# Patient Record
Sex: FEMALE | Race: WHITE | NOT HISPANIC OR LATINO | Employment: OTHER | URBAN - METROPOLITAN AREA
[De-identification: names, ages, dates, MRNs, and addresses within clinical notes are randomized per-mention and may not be internally consistent; named-entity substitution may affect disease eponyms.]

---

## 2017-01-02 PROBLEM — I48.0 PAROXYSMAL A-FIB (H): Status: ACTIVE | Noted: 2017-01-02

## 2017-01-02 PROBLEM — G63 POLYNEUROPATHY ASSOCIATED WITH UNDERLYING DISEASE (H): Status: ACTIVE | Noted: 2017-01-02

## 2017-01-02 PROBLEM — E11.9 TYPE 2 DIABETES MELLITUS WITHOUT COMPLICATION, WITHOUT LONG-TERM CURRENT USE OF INSULIN (H): Status: ACTIVE | Noted: 2017-01-02

## 2017-01-02 PROBLEM — E53.8 VITAMIN B12 DEFICIENCY (NON ANEMIC): Status: ACTIVE | Noted: 2017-01-02

## 2017-01-03 ENCOUNTER — OFFICE VISIT (OUTPATIENT)
Dept: FAMILY MEDICINE | Facility: CLINIC | Age: 82
End: 2017-01-03
Payer: MEDICARE

## 2017-01-03 VITALS
WEIGHT: 204.4 LBS | HEART RATE: 129 BPM | DIASTOLIC BLOOD PRESSURE: 80 MMHG | OXYGEN SATURATION: 95 % | SYSTOLIC BLOOD PRESSURE: 140 MMHG | TEMPERATURE: 97.6 F | BODY MASS INDEX: 30.27 KG/M2 | HEIGHT: 69 IN

## 2017-01-03 DIAGNOSIS — I48.0 PAROXYSMAL A-FIB (H): ICD-10-CM

## 2017-01-03 DIAGNOSIS — L30.9 DERMATITIS: ICD-10-CM

## 2017-01-03 DIAGNOSIS — Z13.5 SCREENING FOR DIABETIC RETINOPATHY: ICD-10-CM

## 2017-01-03 DIAGNOSIS — I10 BENIGN ESSENTIAL HYPERTENSION: Primary | ICD-10-CM

## 2017-01-03 DIAGNOSIS — E03.9 ACQUIRED HYPOTHYROIDISM: ICD-10-CM

## 2017-01-03 DIAGNOSIS — E78.5 HYPERLIPIDEMIA, UNSPECIFIED: ICD-10-CM

## 2017-01-03 DIAGNOSIS — Z23 NEED FOR PROPHYLACTIC VACCINATION AND INOCULATION AGAINST INFLUENZA: ICD-10-CM

## 2017-01-03 DIAGNOSIS — G63 POLYNEUROPATHY ASSOCIATED WITH UNDERLYING DISEASE (H): ICD-10-CM

## 2017-01-03 DIAGNOSIS — E11.9 TYPE 2 DIABETES MELLITUS WITHOUT COMPLICATION, WITHOUT LONG-TERM CURRENT USE OF INSULIN (H): ICD-10-CM

## 2017-01-03 DIAGNOSIS — Z13.89 SCREENING FOR DIABETIC PERIPHERAL NEUROPATHY: ICD-10-CM

## 2017-01-03 DIAGNOSIS — H81.10 BPPV (BENIGN PAROXYSMAL POSITIONAL VERTIGO), UNSPECIFIED LATERALITY: ICD-10-CM

## 2017-01-03 DIAGNOSIS — E53.8 VITAMIN B12 DEFICIENCY (NON ANEMIC): ICD-10-CM

## 2017-01-03 LAB
ANION GAP SERPL CALCULATED.3IONS-SCNC: 8 MMOL/L (ref 3–14)
BUN SERPL-MCNC: 10 MG/DL (ref 7–30)
CALCIUM SERPL-MCNC: 9.3 MG/DL (ref 8.5–10.1)
CHLORIDE SERPL-SCNC: 100 MMOL/L (ref 94–109)
CO2 SERPL-SCNC: 29 MMOL/L (ref 20–32)
CREAT SERPL-MCNC: 0.77 MG/DL (ref 0.52–1.04)
GFR SERPL CREATININE-BSD FRML MDRD: 71 ML/MIN/1.7M2
GLUCOSE SERPL-MCNC: 206 MG/DL (ref 70–99)
HBA1C MFR BLD: 7.8 % (ref 4.3–6)
POTASSIUM SERPL-SCNC: 4.1 MMOL/L (ref 3.4–5.3)
SODIUM SERPL-SCNC: 137 MMOL/L (ref 133–144)

## 2017-01-03 PROCEDURE — 90662 IIV NO PRSV INCREASED AG IM: CPT | Performed by: INTERNAL MEDICINE

## 2017-01-03 PROCEDURE — 99214 OFFICE O/P EST MOD 30 MIN: CPT | Mod: 25 | Performed by: INTERNAL MEDICINE

## 2017-01-03 PROCEDURE — 36415 COLL VENOUS BLD VENIPUNCTURE: CPT | Performed by: INTERNAL MEDICINE

## 2017-01-03 PROCEDURE — 83036 HEMOGLOBIN GLYCOSYLATED A1C: CPT | Performed by: INTERNAL MEDICINE

## 2017-01-03 PROCEDURE — G0008 ADMIN INFLUENZA VIRUS VAC: HCPCS | Performed by: INTERNAL MEDICINE

## 2017-01-03 PROCEDURE — 80048 BASIC METABOLIC PNL TOTAL CA: CPT | Performed by: INTERNAL MEDICINE

## 2017-01-03 RX ORDER — TRIAMCINOLONE ACETONIDE 1 MG/G
CREAM TOPICAL
Qty: 45 G | Refills: 1 | Status: SHIPPED | OUTPATIENT
Start: 2017-01-03 | End: 2018-07-16

## 2017-01-03 NOTE — NURSING NOTE
"Chief Complaint   Patient presents with     Establish Care     Dizziness       Initial /110 mmHg  Pulse 129  Temp(Src) 97.6  F (36.4  C) (Oral)  Ht 5' 9\" (1.753 m)  Wt 204 lb 6.4 oz (92.715 kg)  BMI 30.17 kg/m2  SpO2 95%  Breastfeeding? No Estimated body mass index is 30.17 kg/(m^2) as calculated from the following:    Height as of this encounter: 5' 9\" (1.753 m).    Weight as of this encounter: 204 lb 6.4 oz (92.715 kg).  BP completed using cuff size: large, right arm  Francisca Guadalupe CMA    "

## 2017-01-03 NOTE — PROGRESS NOTES
SUBJECTIVE:                                                    Gavi Pearson is a 85 year old female who presents to clinic today for the following health issues:    Medications  lovastain 20 mg   Metoprolol 50 mg   Lisinopril 20 mg   Januvia 100 mg   lisinopril-hydrochlorothiazide 20-12.5 mg   Metformin 500 mg BID  Levothyroxine 137 mcg  Glipizide 2.5 mg AM, 10 mg PM  Baby aspirin     Immunizations  Prevnar 2014  Pneumococcal 2006  TD 2009  Tdap 2013    Dizziness  Duration of complaint: Sat am   Patient with a history of polyneuropathy developed dizziness 3 days ago when she awoken in the morning. The following morning, she experienced more dizziness but denies any associated nausea or headaches. She has not felt dizzy since her last episode but states she feels off and does not feel comfortable laying supine. She denies any current illnesses, rhinorrhea, or fever. She states her ears do not feel plugged or achy but is unsure of any hearing changes.     She stubbed her left great toe about 1.5 months ago and traumatized her toenail. She was evaluated in clinic and her nail was trimmed down. She was instructed to keep a bandage over the nail until it falls off. Of note, she has onychomycosis and has been using toe nail antifungal since her last visit.     Admits to a high salt diet due to eating out often and does not exercise regularly. She goes to the grocery store about 2 times per week and does walk around the entire store. Weight has been stable and she feels well emotionally.    She recently was evaluated by dermatology for skin itching and was diagnosed with eczema. She had been using triamcinolone on the affected areas but ran out. She states she was prescribed diprosone cream but she ran out. She also has been applying Eucerin cream on the affected areas.     Problem list and histories reviewed & adjusted, as indicated.  Additional history:    Current Outpatient Prescriptions   Medication Sig Dispense  "Refill     lovastatin (MEVACOR) 20 MG tablet Take 20 mg by mouth At Bedtime       metoprolol (LOPRESSOR) 50 MG tablet Take 50 mg by mouth 2 times daily       lisinopril (PRINIVIL/ZESTRIL) 20 MG tablet Take 20 mg by mouth daily       sitagliptin (JANUVIA) 100 MG tablet Take 100 mg by mouth daily       lisinopril-hydrochlorothiazide (PRINZIDE/ZESTORETIC) 20-12.5 MG per tablet Take 1 tablet by mouth daily       metFORMIN (GLUCOPHAGE) 500 MG tablet Take 500 mg by mouth 2 times daily (with meals)       levothyroxine (SYNTHROID/LEVOTHROID) 137 MCG tablet Take 137 mcg by mouth daily       GLIPIZIDE PO Take 5 mg by mouth Take one-jade table by mouth every am and 2 tablets by mouth every evening       JOSÉ ASPIRIN PO        Allergies   Allergen Reactions     No Known Allergies        ROS:  Constitutional, HEENT, cardiovascular, pulmonary, gi and gu systems are negative, except as otherwise noted.    This document serves as a record of the services and decisions personally performed and made by Aleks Maher MD. It was created on his/her behalf by Glen Goins, a trained medical scribe. The creation of this document is based the provider's statements to the medical scribe.  Scribe Glen Goins 10:03 AM, January 3, 2017    OBJECTIVE:                                                    /80 mmHg  Pulse 129  Temp(Src) 97.6  F (36.4  C) (Oral)  Ht 5' 9\" (1.753 m)  Wt 204 lb 6.4 oz (92.715 kg)  BMI 30.17 kg/m2  SpO2 95%  Breastfeeding? No  Body mass index is 30.17 kg/(m^2).  Obese  Blood pressure recheck to 140/80  Throat clear  No nystagmus, PERRL  Lungs clear   Left great toenail detached from nailbed  Cardiovascular with no murmur, no gallop  Right knee with excoriations, no other changes      Diagnostic Test Results:  Results for orders placed or performed in visit on 01/03/17 (from the past 24 hour(s))   HEMOGLOBIN A1C   Result Value Ref Range    Hemoglobin A1C 7.8 (H) 4.3 - 6.0 %    "     ASSESSMENT/PLAN:                                                    1. Benign essential hypertension  Initially 172/110 but rechecked to be within normal limits at 140/80   - Basic metabolic panel    2. Paroxysmal a-fib (H)    - FLU VACCINE, INCREASED ANTIGEN, PRESV FREE    3. Polyneuropathy associated with underlying disease (H)    4. Type 2 diabetes mellitus without complication, without long-term current use of insulin (H)  Well managed on Januvia, metformin, and glipizide. Will adjust medication as necessary based on lab results.   - HEMOGLOBIN A1C    5. Hyperlipidemia, unspecified      6. Acquired hypothyroidism  Managed on levothyroixine 137 mcg. No change in medication.     7. Vitamin B12 deficiency (non anemic)      8. Screening for diabetic retinopathy      9. Screening for diabetic peripheral neuropathy      10. Need for prophylactic vaccination and inoculation against influenza    - ADMIN INFLUENZA[] (For MEDICARE Patients ONLY)     11. Dermatitis  Discussed applying triamcinolone cream to affected area as it has been effective in the past.   - triamcinolone (KENALOG) 0.1 % cream; Apply b.i.d. p.r.n. to dermatitis but not on the face  Dispense: 45 g; Refill: 1    12. BPPV (benign paroxysmal positional vertigo), unspecified laterality    If the vertigo is symptomatic she could try using  meclizine available over-the-counter to treat dizziness. Advised to avoid laying flat as this exacerbates vertigo     The information in this document, created by the medical scribe for me, accurately reflects the services I personally performed and the decisions made by me. I have reviewed and approved this document for accuracy prior to leaving the patient care area.  Aleks Maher MD  10:03 AM, 01/03/2017    Aleks Maher MD  [facility]  Injectable Influenza Immunization Documentation    1.  Is the person to be vaccinated sick today?  No    2. Does the person to be vaccinated have an allergy  to eggs or to a component of the vaccine?  No    3. Has the person to be vaccinated today ever had a serious reaction to influenza vaccine in the past?  No    4. Has the person to be vaccinated ever had Guillain-[place] syndrome?  No     Form completed by Francisca Guadalupe CMA

## 2017-01-03 NOTE — PATIENT INSTRUCTIONS
Continue covering left great toe nail.   Take Antivert (meclizine) to treat dizziness.   Increase walking  Apply triamcinolone cream   Avoid laying down flat

## 2017-01-25 ENCOUNTER — OFFICE VISIT (OUTPATIENT)
Dept: FAMILY MEDICINE | Facility: CLINIC | Age: 82
End: 2017-01-25
Payer: MEDICARE

## 2017-01-25 VITALS
SYSTOLIC BLOOD PRESSURE: 130 MMHG | DIASTOLIC BLOOD PRESSURE: 80 MMHG | WEIGHT: 204 LBS | HEART RATE: 68 BPM | TEMPERATURE: 97.8 F | OXYGEN SATURATION: 95 % | HEIGHT: 69 IN | BODY MASS INDEX: 30.21 KG/M2

## 2017-01-25 DIAGNOSIS — E78.5 HYPERLIPIDEMIA, UNSPECIFIED: ICD-10-CM

## 2017-01-25 DIAGNOSIS — E11.9 TYPE 2 DIABETES MELLITUS WITHOUT COMPLICATION, WITHOUT LONG-TERM CURRENT USE OF INSULIN (H): ICD-10-CM

## 2017-01-25 DIAGNOSIS — I10 BENIGN ESSENTIAL HYPERTENSION: ICD-10-CM

## 2017-01-25 DIAGNOSIS — E53.8 VITAMIN B12 DEFICIENCY (NON ANEMIC): ICD-10-CM

## 2017-01-25 DIAGNOSIS — H81.10 BPPV (BENIGN PAROXYSMAL POSITIONAL VERTIGO), UNSPECIFIED LATERALITY: ICD-10-CM

## 2017-01-25 DIAGNOSIS — G63 POLYNEUROPATHY ASSOCIATED WITH UNDERLYING DISEASE (H): ICD-10-CM

## 2017-01-25 DIAGNOSIS — E03.9 ACQUIRED HYPOTHYROIDISM: ICD-10-CM

## 2017-01-25 DIAGNOSIS — I48.0 PAROXYSMAL A-FIB (H): Primary | ICD-10-CM

## 2017-01-25 PROCEDURE — 99213 OFFICE O/P EST LOW 20 MIN: CPT | Performed by: INTERNAL MEDICINE

## 2017-01-25 NOTE — PROGRESS NOTES
SUBJECTIVE:                                                    Gavi Pearson is a 85 year old female who presents to clinic today for the following health issues:    Mrs. Pearson presents to the clinic follow up for dizziness and hypertension. She reports no subsequent presentations of symptoms. Blood pressure readings from pharmacy cuffs reported 144/74 on 1/19/17 and 143/94 on 1/21/17. Reports lightheadedness upon positional changes. Mrs. Pearson reports no regular exercise routine other than walking in grocery store.     Problem list and histories reviewed & adjusted, as indicated.  Additional history: as documented    Current Outpatient Prescriptions   Medication Sig Dispense Refill     triamcinolone (KENALOG) 0.1 % cream Apply b.i.d. p.r.n. to dermatitis but not on the face 45 g 1     lovastatin (MEVACOR) 20 MG tablet Take 20 mg by mouth At Bedtime       metoprolol (LOPRESSOR) 50 MG tablet Take 50 mg by mouth 2 times daily       lisinopril (PRINIVIL/ZESTRIL) 20 MG tablet Take 20 mg by mouth daily       sitagliptin (JANUVIA) 100 MG tablet Take 100 mg by mouth daily       lisinopril-hydrochlorothiazide (PRINZIDE/ZESTORETIC) 20-12.5 MG per tablet Take 1 tablet by mouth daily       metFORMIN (GLUCOPHAGE) 500 MG tablet Take 500 mg by mouth 2 times daily (with meals)       levothyroxine (SYNTHROID/LEVOTHROID) 137 MCG tablet Take 137 mcg by mouth daily       GLIPIZIDE PO Take 5 mg by mouth Take one-jade table by mouth every am and 2 tablets by mouth every evening       JOSÉ ASPIRIN PO        Allergies   Allergen Reactions     No Known Allergies        ROS:  Constitutional, HEENT, cardiovascular, pulmonary, gi and gu systems are negative, except as otherwise noted.    This document serves as a record of the services and decisions personally performed and made by Aleks Maher MD. It was created on his/her behalf by Felicitas Naqvi, a trained medical scribe. The creation of this document is based the provider's  "statements to the medical scribe.  Scribe Felicitas Naqvi 1:08 PM, January 25, 2017     OBJECTIVE:                                                    /80 mmHg  Pulse 68  Temp(Src) 97.8  F (36.6  C) (Oral)  Ht 1.753 m (5' 9\")  Wt 92.534 kg (204 lb)  BMI 30.11 kg/m2  SpO2 95%  Body mass index is 30.11 kg/(m^2).   Neck was supple without adenopathy or thyromegaly her carotids were normal without bruits  Chest clear to auscultation and percussion  Cardiovascular S1 and S2 are physiologic without murmurs or gallops  Abdomen bowel sounds were normal.  There is no palpable mass or organomegaly  Extremities nontender without any edema  Pulses pedal pulses are as described otherwise his pulses are bilaterally symmetrical throughout without bruits  Skin without significant abnormality      ASSESSMENT/PLAN:                                                    1. Paroxysmal a-fib (H)      2. Vitamin B12 deficiency (non anemic)      3. Polyneuropathy associated with underlying disease (H)      4. BPPV (benign paroxysmal positional vertigo), unspecified laterality  Recommended    5. Type 2 diabetes mellitus without complication, without long-term current use of insulin (H)  Advised walking program    6. Benign essential hypertension  Recommended patient to embark on an exercise routine walking on her treadmill, the grocery store or wherever.  She will continue to monitor her blood pressure and follow-up with her blood pressure and her hemoglobin A1c in 2 months        7. Hyperlipidemia, unspecified      8. Acquired hypothyroidism      Follow up in 2 months     The information in this document, created by the medical scribe for me, accurately reflects the services I personally performed and the decisions made by me. I have reviewed and approved this document for accuracy prior to leaving the patient care area.  Aleks Maher MD  1:08 PM, 01/25/2017     Aleks Maher MD  [facility] afternoon  "

## 2017-01-25 NOTE — NURSING NOTE
"Chief Complaint   Patient presents with     RECHECK     Hypertension       Initial /84 mmHg  Pulse 84  Temp(Src) 97.8  F (36.6  C) (Oral)  Ht 5' 9\" (1.753 m)  Wt 204 lb (92.534 kg)  BMI 30.11 kg/m2  SpO2 95% Estimated body mass index is 30.11 kg/(m^2) as calculated from the following:    Height as of this encounter: 5' 9\" (1.753 m).    Weight as of this encounter: 204 lb (92.534 kg).  BP completed using cuff size: large, right arm.  Adelaida Crespo MA    "

## 2017-08-11 NOTE — PROGRESS NOTES
SUBJECTIVE:   Gavi Pearson is a 85 year old female who presents for Preventive Visit.    Are you in the first 12 months of your Medicare Part B coverage?  No    Healthy Habits:    Do you get at least three servings of calcium containing foods daily (dairy, green leafy vegetables, etc.)? yes    Amount of exercise or daily activities, outside of work: 3 day(s) per week    Problems taking medications regularly No    Medication side effects: No    Have you had an eye exam in the past two years? no    Do you see a dentist twice per year? yes    Do you have sleep apnea, excessive snoring or daytime drowsiness?yes    COGNITIVE SCREEN  1) Repeat 3 items (Banana, Sunrise, Chair)    2) Clock draw:   3) 3 item recall:   Results: 3 items recalled: COGNITIVE IMPAIRMENT LESS LIKELY    Mini-CogTM Copyright S Yasmine. Licensed by the author for use in [employer]; reprinted with permission (mathew@[facility]). All rights reserved.      Patient presents to clinic for a preventive health visit. She complains of regular urinary incontinence. She wears pads to help her symptoms. She denies burning with urination, headaches, sinus issues, vision changes, neck or back pain, dyspnea, angina, palpitations, heartburn, hematochezia, and skin changes. She has nocturia 2-3x.   Patient states that her arthritis is flaring up and is affecting her hands mostly. She complains of increased swelling and soreness.  Also complains of decreased in her stamina when she walks. Does not exercise regularly, but she grocery shops twice a week and walks all the [place].    Glipizide 1 and 1/2 in the morning, 2 tablets in the evening    Reviewed and updated as needed this visit by clinical staff  Tobacco  Allergies  Meds  Problems  Med Hx  Surg Hx  Fam Hx  Soc Hx        Reviewed and updated as needed this visit by Provider      Social History   Substance Use Topics     Smoking status: Never Smoker     Smokeless tobacco: Never Used     Alcohol use  0.0 oz/week     0 Standard drinks or equivalent per week      Comment: occ     The patient does not drink >3 drinks per day nor >7 drinks per week.    Today's PHQ-2 Score:   PHQ-2 ( 1999 [employer]) 1/25/2017 12/16/2016   Q1: Little interest or pleasure in doing things 0 0   Q2: Feeling down, depressed or hopeless 0 0   PHQ-2 Score 0 0         Do you feel safe in your environment - Yes    Do you have a Health Care Directive?: Yes: Patient states has Advance Directive and will bring in a copy to clinic.    Current providers sharing in care for this patient include: Patient Care Team:  Aleks Maher MD as PCP - General (Internal Medicine)      Hearing impairment: No    Ability to successfully perform activities of daily living: Yes, no assistance needed     Fall risk:       Home safety:  none identified      The following health maintenance items are reviewed in Epic and correct as of today:Health Maintenance   Topic Date Due     FOOT EXAM Q1 YEAR  10/21/1932     EYE EXAM Q1 YEAR  10/21/1932     MICROALBUMIN Q1 YEAR  10/21/1932     ADVANCE DIRECTIVE PLANNING Q5 YRS  10/21/1986     A1C Q6 MO  07/03/2017     LIPID MONITORING Q1 YEAR  07/13/2017     INFLUENZA VACCINE (SYSTEM ASSIGNED)  09/01/2017     CREATININE Q1 YEAR  01/03/2018     FALL RISK ASSESSMENT  01/25/2018     TSH W/ FREE T4 REFLEX Q2 YEAR  07/13/2018     TETANUS IMMUNIZATION (SYSTEM ASSIGNED)  06/12/2023     PNEUMOCOCCAL  Completed     Current Outpatient Prescriptions   Medication Sig Dispense Refill     triamcinolone (KENALOG) 0.1 % cream Apply b.i.d. p.r.n. to dermatitis but not on the face 45 g 1     lovastatin (MEVACOR) 20 MG tablet Take 20 mg by mouth At Bedtime       metoprolol (LOPRESSOR) 50 MG tablet Take 50 mg by mouth 2 times daily       lisinopril (PRINIVIL/ZESTRIL) 20 MG tablet Take 20 mg by mouth daily       sitagliptin (JANUVIA) 100 MG tablet Take 100 mg by mouth daily       lisinopril-hydrochlorothiazide (PRINZIDE/ZESTORETIC) 20-12.5 MG per  "tablet Take 1 tablet by mouth daily       metFORMIN (GLUCOPHAGE) 500 MG tablet Take 500 mg by mouth 2 times daily (with meals)       levothyroxine (SYNTHROID/LEVOTHROID) 137 MCG tablet Take 137 mcg by mouth daily       GLIPIZIDE PO Take 5 mg by mouth Take one-jade table by mouth every am and 2 tablets by mouth every evening       JOSÉ ASPIRIN PO        Allergies   Allergen Reactions     No Known Allergies        ROS:  C: NEGATIVE for fever, chills, change in weight  I: NEGATIVE for worrisome rashes, moles or lesions  E: NEGATIVE for vision changes or irritation  E/M: NEGATIVE for ear, mouth and throat problems  R: NEGATIVE for significant cough or SOB  B: NEGATIVE for masses, tenderness or discharge  CV: NEGATIVE for chest pain, palpitations or peripheral edema  GI: NEGATIVE for nausea, abdominal pain, heartburn, or change in bowel habits  : NEGATIVE for frequency, dysuria, or hematuria  M: NEGATIVE for significant arthralgias or myalgia  N: NEGATIVE for weakness, dizziness or paresthesias  E: NEGATIVE for temperature intolerance, skin/hair changes  H: NEGATIVE for bleeding problems  P: NEGATIVE for changes in mood or affect    This document serves as a record of the services and decisions personally performed and made by Aleks Maher MD. It was created on his/her behalf by Diogenes Aguirre, a trained medical scribe. The creation of this document is based the provider's statements to the medical scribe.  Yanira Aguirre 9:00 AM, August 14, 2017    OBJECTIVE:   /80 (BP Location: Left arm, Patient Position: Sitting)  Temp 98.4  F (36.9  C) (Oral)  Ht 1.753 m (5' 9\")  Wt 90.7 kg (200 lb)  SpO2 97%  Breastfeeding? No  BMI 29.53 kg/m2 Estimated body mass index is 30.13 kg/(m^2) as calculated from the following:    Height as of 1/25/17: 5' 9\" (1.753 m).    Weight as of 1/25/17: 204 lb (92.5 kg).  EXAM:   GENERAL APPEARANCE: healthy, alert and no distress  EYES: Eyes grossly normal to inspection, " PERRL and conjunctivae and sclerae normal  HENT: ear canals and TM's normal, nose and mouth without ulcers or lesions, oropharynx clear and oral mucous membranes moist  NECK: no adenopathy, no asymmetry, masses, or scars and thyroid normal to palpation  RESP: lungs clear to auscultation - no rales, rhonchi or wheezes  BREAST: normal without masses, tenderness or nipple discharge and no palpable axillary masses or adenopathy  CV:irregularly irregular with a well controlled ventricular response normal S1 S2, no S3 or S4, no murmur, click or rub, no peripheral edema and peripheral pulses strong  ABDOMEN: soft, nontender, no hepatosplenomegaly, no masses and bowel sounds normal  MS: no musculoskeletal defects are noted and gait is age appropriate without ataxia  SKIN: no suspicious lesions or rashes, she has multiple seborrheic keratoses  NEURO: Normal strength and tone, sensory exam grossly normal, mentation intact and speech normal  Filament exam was positive on her feet, and subjectively positive to her high ankle  Foot exam: her feet were clean and dry, pulses were intact, she has bilateral bunions with a callus on her right bunion  PSYCH: mentation appears normal and affect normal/bright    ASSESSMENT / PLAN:   1. Benign essential hypertension  - Comprehensive metabolic panel  - Urine Microscopic    2. Hyperlipidemia, unspecified  - Lipid panel reflex to direct LDL    3. Acquired hypothyroidism  - TSH with free T4 reflex    4. Paroxysmal a-fib (H)  - EKG 12-lead complete w/read - Clinics    5. Type 2 diabetes mellitus without complication, without long-term current use of insulin (H)  - Albumin Random Urine Quantitative  - C FOOT EXAM  NO CHARGE  - Hemoglobin A1c    6. Osteopenia of multiple sites  - Vitamin D Deficiency    7. Encounter for routine adult health examination without abnormal findings  - UA reflex to Microscopic and Culture  - CBC with platelets    -Patient will watch her diet more closely and monitor  "her blood sugars regularly twice daily for two weeks. She will return to follow up on her blood sugars, diet, and medication.    End of Life Planning:  Patient currently has an advanced directive: No.  I have verified the patient's ablity to prepare an advanced directive/make health care decisions.  Literature was provided to assist patient in preparing an advanced directive.    COUNSELING:  Reviewed preventive health counseling, as reflected in patient instructions       Regular exercise       Healthy diet/nutrition       Vision screening       Hearing screening       Dental care    Estimated body mass index is 30.13 kg/(m^2) as calculated from the following:    Height as of 1/25/17: 5' 9\" (1.753 m).    Weight as of 1/25/17: 204 lb (92.5 kg).  Weight management plan: Discussed healthy diet and exercise guidelines and patient will follow up in 3 months in clinic to re-evaluate.   reports that she has never smoked. She has never used smokeless tobacco.    Appropriate preventive services were discussed with this patient, including applicable screening as appropriate for cardiovascular disease, diabetes, osteopenia/osteoporosis, and glaucoma.  As appropriate for age/gender, discussed screening for colorectal cancer, prostate cancer, breast cancer, and cervical cancer. Checklist reviewing preventive services available has been given to the patient.    Reviewed patients plan of care and provided an AVS. The Basic Care Plan (routine screening as documented in Health Maintenance) for Gavi meets the Care Plan requirement. This Care Plan has been established and reviewed with the Patient.    Counseling Resources:  ATP IV Guidelines  Pooled Cohorts Equation Calculator  Breast Cancer Risk Calculator  FRAX Risk Assessment  ICSI Preventive Guidelines  Dietary Guidelines for Americans, 2010  USDA's MyPlate  ASA Prophylaxis  Lung CA Screening    The information in this document, created by the medical scribe for me, accurately " reflects the services I personally performed and the decisions made by me. I have reviewed and approved this document for accuracy prior to leaving the patient care area.  Aleks Maher MD  10:19 AM, 08/14/17    Aleks Maher MD  [facility]

## 2017-08-14 ENCOUNTER — OFFICE VISIT (OUTPATIENT)
Dept: FAMILY MEDICINE | Facility: CLINIC | Age: 82
End: 2017-08-14
Payer: MEDICARE

## 2017-08-14 VITALS
BODY MASS INDEX: 29.62 KG/M2 | OXYGEN SATURATION: 97 % | HEIGHT: 69 IN | TEMPERATURE: 98.4 F | WEIGHT: 200 LBS | DIASTOLIC BLOOD PRESSURE: 80 MMHG | SYSTOLIC BLOOD PRESSURE: 130 MMHG

## 2017-08-14 DIAGNOSIS — M85.89 OSTEOPENIA OF MULTIPLE SITES: ICD-10-CM

## 2017-08-14 DIAGNOSIS — I10 BENIGN ESSENTIAL HYPERTENSION: Primary | ICD-10-CM

## 2017-08-14 DIAGNOSIS — E03.9 ACQUIRED HYPOTHYROIDISM: ICD-10-CM

## 2017-08-14 DIAGNOSIS — Z00.00 ENCOUNTER FOR ROUTINE ADULT HEALTH EXAMINATION WITHOUT ABNORMAL FINDINGS: ICD-10-CM

## 2017-08-14 DIAGNOSIS — I48.0 PAROXYSMAL A-FIB (H): ICD-10-CM

## 2017-08-14 DIAGNOSIS — E11.9 TYPE 2 DIABETES MELLITUS WITHOUT COMPLICATION, WITHOUT LONG-TERM CURRENT USE OF INSULIN (H): ICD-10-CM

## 2017-08-14 DIAGNOSIS — E78.5 HYPERLIPIDEMIA, UNSPECIFIED: ICD-10-CM

## 2017-08-14 LAB
ALBUMIN SERPL-MCNC: 3.7 G/DL (ref 3.4–5)
ALBUMIN UR-MCNC: 100 MG/DL
ALP SERPL-CCNC: 79 U/L (ref 40–150)
ALT SERPL W P-5'-P-CCNC: 47 U/L (ref 0–50)
ANION GAP SERPL CALCULATED.3IONS-SCNC: 10 MMOL/L (ref 3–14)
APPEARANCE UR: ABNORMAL
AST SERPL W P-5'-P-CCNC: 58 U/L (ref 0–45)
BACTERIA #/AREA URNS HPF: ABNORMAL /HPF
BILIRUB SERPL-MCNC: 0.8 MG/DL (ref 0.2–1.3)
BILIRUB UR QL STRIP: ABNORMAL
BUN SERPL-MCNC: 11 MG/DL (ref 7–30)
CALCIUM SERPL-MCNC: 9.3 MG/DL (ref 8.5–10.1)
CHLORIDE SERPL-SCNC: 98 MMOL/L (ref 94–109)
CHOLEST SERPL-MCNC: 135 MG/DL
CO2 SERPL-SCNC: 26 MMOL/L (ref 20–32)
COLOR UR AUTO: YELLOW
CREAT SERPL-MCNC: 0.76 MG/DL (ref 0.52–1.04)
CREAT UR-MCNC: 368 MG/DL
ERYTHROCYTE [DISTWIDTH] IN BLOOD BY AUTOMATED COUNT: 12.1 % (ref 10–15)
GFR SERPL CREATININE-BSD FRML MDRD: 73 ML/MIN/1.7M2
GLUCOSE SERPL-MCNC: 226 MG/DL (ref 70–99)
GLUCOSE UR STRIP-MCNC: NEGATIVE MG/DL
HBA1C MFR BLD: 7.9 % (ref 4.3–6)
HCT VFR BLD AUTO: 41.5 % (ref 35–47)
HDLC SERPL-MCNC: 54 MG/DL
HGB BLD-MCNC: 14 G/DL (ref 11.7–15.7)
HGB UR QL STRIP: NEGATIVE
HYALINE CASTS #/AREA URNS LPF: ABNORMAL /LPF (ref 0–2)
KETONES UR STRIP-MCNC: 15 MG/DL
LDLC SERPL CALC-MCNC: 58 MG/DL
LEUKOCYTE ESTERASE UR QL STRIP: NEGATIVE
MCH RBC QN AUTO: 30.9 PG (ref 26.5–33)
MCHC RBC AUTO-ENTMCNC: 33.7 G/DL (ref 31.5–36.5)
MCV RBC AUTO: 92 FL (ref 78–100)
MICROALBUMIN UR-MCNC: 454 MG/L
MICROALBUMIN/CREAT UR: 123.37 MG/G CR (ref 0–25)
MUCOUS THREADS #/AREA URNS LPF: PRESENT /LPF
NITRATE UR QL: NEGATIVE
NON-SQ EPI CELLS #/AREA URNS LPF: ABNORMAL /LPF
NONHDLC SERPL-MCNC: 81 MG/DL
PH UR STRIP: 5.5 PH (ref 5–7)
PLATELET # BLD AUTO: 226 10E9/L (ref 150–450)
POTASSIUM SERPL-SCNC: 4.1 MMOL/L (ref 3.4–5.3)
PROT SERPL-MCNC: 7.7 G/DL (ref 6.8–8.8)
RBC # BLD AUTO: 4.53 10E12/L (ref 3.8–5.2)
RBC #/AREA URNS AUTO: ABNORMAL /HPF (ref 0–2)
SODIUM SERPL-SCNC: 134 MMOL/L (ref 133–144)
SP GR UR STRIP: >1.03 (ref 1–1.03)
T4 FREE SERPL-MCNC: 1.62 NG/DL (ref 0.76–1.46)
TRIGL SERPL-MCNC: 115 MG/DL
TSH SERPL DL<=0.005 MIU/L-ACNC: 5.97 MU/L (ref 0.4–4)
URN SPEC COLLECT METH UR: ABNORMAL
UROBILINOGEN UR STRIP-ACNC: 1 EU/DL (ref 0.2–1)
WBC # BLD AUTO: 10.5 10E9/L (ref 4–11)
WBC #/AREA URNS AUTO: ABNORMAL /HPF (ref 0–2)

## 2017-08-14 PROCEDURE — 81001 URINALYSIS AUTO W/SCOPE: CPT | Performed by: INTERNAL MEDICINE

## 2017-08-14 PROCEDURE — 84439 ASSAY OF FREE THYROXINE: CPT | Performed by: INTERNAL MEDICINE

## 2017-08-14 PROCEDURE — 36415 COLL VENOUS BLD VENIPUNCTURE: CPT | Performed by: INTERNAL MEDICINE

## 2017-08-14 PROCEDURE — 99207 C FOOT EXAM  NO CHARGE: CPT | Performed by: INTERNAL MEDICINE

## 2017-08-14 PROCEDURE — 82043 UR ALBUMIN QUANTITATIVE: CPT | Performed by: INTERNAL MEDICINE

## 2017-08-14 PROCEDURE — 93000 ELECTROCARDIOGRAM COMPLETE: CPT | Performed by: INTERNAL MEDICINE

## 2017-08-14 PROCEDURE — 84443 ASSAY THYROID STIM HORMONE: CPT | Performed by: INTERNAL MEDICINE

## 2017-08-14 PROCEDURE — 80053 COMPREHEN METABOLIC PANEL: CPT | Performed by: INTERNAL MEDICINE

## 2017-08-14 PROCEDURE — 99213 OFFICE O/P EST LOW 20 MIN: CPT | Mod: 25 | Performed by: INTERNAL MEDICINE

## 2017-08-14 PROCEDURE — G0439 PPPS, SUBSEQ VISIT: HCPCS | Performed by: INTERNAL MEDICINE

## 2017-08-14 PROCEDURE — 83036 HEMOGLOBIN GLYCOSYLATED A1C: CPT | Performed by: INTERNAL MEDICINE

## 2017-08-14 PROCEDURE — 82306 VITAMIN D 25 HYDROXY: CPT | Performed by: INTERNAL MEDICINE

## 2017-08-14 PROCEDURE — 85027 COMPLETE CBC AUTOMATED: CPT | Performed by: INTERNAL MEDICINE

## 2017-08-14 PROCEDURE — 80061 LIPID PANEL: CPT | Performed by: INTERNAL MEDICINE

## 2017-08-14 NOTE — NURSING NOTE
"Chief Complaint   Patient presents with     Medicare Visit       Initial BP (!) 172/98  Temp 98.4  F (36.9  C) (Oral)  Ht 5' 9\" (1.753 m)  Wt 200 lb (90.7 kg)  SpO2 97%  Breastfeeding? No  BMI 29.53 kg/m2 Estimated body mass index is 29.53 kg/(m^2) as calculated from the following:    Height as of this encounter: 5' 9\" (1.753 m).    Weight as of this encounter: 200 lb (90.7 kg).  Medication Reconciliation: complete   Estefany THOMPSON CMA      "

## 2017-08-14 NOTE — MR AVS SNAPSHOT
After Visit Summary   8/14/2017    Gavi Pearson    MRN: [id number]           Patient Information     Date Of Birth          10/21/1931        Visit Information        Provider Department      8/14/2017 9:00 AM Aleks Maher MD [facility]        Today's Diagnoses     Benign essential hypertension    -  1    Hyperlipidemia, unspecified        Acquired hypothyroidism        Paroxysmal a-fib (H)        Type 2 diabetes mellitus without complication, without long-term current use of insulin (H)        Osteopenia of multiple sites        Encounter for routine adult health examination without abnormal findings          Care Instructions      Preventive Health Recommendations    Female Ages 65 +    Yearly exam:     See your health care provider every year in order to  o Review health changes.   o Discuss preventive care.    o Review your medicines if your doctor has prescribed any.      You no longer need a yearly Pap test unless you've had an abnormal Pap test in the past 10 years. If you have vaginal symptoms, such as bleeding or discharge, be sure to talk with your provider about a Pap test.      Every 1 to 2 years, have a mammogram.  If you are over 69, talk with your health care provider about whether or not you want to continue having screening mammograms.      Every 10 years, have a colonoscopy. Or, have a yearly FIT test (stool test). These exams will check for colon cancer.       Have a cholesterol test every 5 years, or more often if your doctor advises it.       Have a diabetes test (fasting glucose) every three years. If you are at risk for diabetes, you should have this test more often.       At age 65, have a bone density scan (DEXA) to check for osteoporosis (brittle bone disease).    Shots:    Get a flu shot each year.    Get a tetanus shot every 10 years.    Talk to your doctor about your pneumonia vaccines. There are now two you should receive - Pneumovax (PPSV 23) and Prevnar  (PCV 13).    Talk to your doctor about the shingles vaccine.    Talk to your doctor about the hepatitis B vaccine.    Nutrition:     Eat at least 5 servings of fruits and vegetables each day.      Eat whole-grain bread, whole-wheat pasta and brown rice instead of white grains and rice.      Talk to your provider about Calcium and Vitamin D.     Lifestyle    Exercise at least 150 minutes a week (30 minutes a day, 5 days a week). This will help you control your weight and prevent disease.      Limit alcohol to one drink per day.      No smoking.       Wear sunscreen to prevent skin cancer.       See your dentist twice a year for an exam and cleaning.      See your eye doctor every 1 to 2 years to screen for conditions such as glaucoma, macular degeneration and cataracts.          Follow-ups after your visit        Your next 10 appointments already scheduled     Aug 28, 2017 10:30 AM CDT   Office Visit with Aleks Maher MD   [facility] ([facility])    [address] [facility] [id number] [phone]           Bring a current list of meds and any records pertaining to this visit. For Physicals, please bring immunization records and any forms needing to be filled out. Please arrive 10 minutes early to complete paperwork.              Who to contact     If you have questions or need follow up information about today's clinic visit or your schedule please contact [facility] directly at [phone].  Normal or non-critical lab and imaging results will be communicated to you by MyChart, letter or phone within 4 business days after the clinic has received the results. If you do not hear from us within 7 days, please contact the clinic through MyChart or phone. If you have a critical or abnormal lab result, we will notify you by phone as soon as possible.  Submit refill requests through [employer] or call your pharmacy and they will forward the refill request to us. Please allow 3  "business days for your refill to be completed.          Additional Information About Your Visit        MyChart Information     [employer] lets you send messages to your doctor, view your test results, renew your prescriptions, schedule appointments and more. To sign up, go to [email or web address] . Click on \"Log in\" on the left side of the screen, which will take you to the Welcome page. Then click on \"Sign up Now\" on the right side of the page.     You will be asked to enter the access code listed below, as well as some personal information. Please follow the directions to create your username and password.     Your access code is: [email or web address]  Expires: 2017  4:37 AM     Your access code will  in 90 days. If you need help or a new code, please call your [place] clinic or [phone].        Care EveryWhere ID     This is your Care EveryWhere ID. This could be used by other organizations to access your [place] medical records  [id number]        Your Vitals Were     Temperature Height Pulse Oximetry Breastfeeding? BMI (Body Mass Index)       98.4  F (36.9  C) (Oral) 5' 9\" (1.753 m) 97% No 29.53 kg/m2        Blood Pressure from Last 3 Encounters:   17 130/80   17 130/80   17 140/80    Weight from Last 3 Encounters:   17 200 lb (90.7 kg)   17 204 lb (92.5 kg)   17 204 lb 6.4 oz (92.7 kg)              We Performed the Following     Albumin Random Urine Quantitative     C FOOT EXAM  NO CHARGE     CBC with platelets     Comprehensive metabolic panel     EKG 12-lead complete w/read - Clinics     Hemoglobin A1c     Lipid panel reflex to direct LDL     T4 free     TSH with free T4 reflex     UA reflex to Microscopic and Culture     Urine Microscopic     Vitamin D Deficiency        Primary Care Provider Office Phone # Fax #    Aleks Maher [phone] [address]  [place] [address]        Equal Access to Services     MARLYN GIL AH: " Hadii anthony morris hadaideno Sonavyaali, waaxda luqadaha, qaybta kaalmada delaney, shay jones. So [facility] [phone].    ATENCIÓN: Si habla augustine, tiene a lema disposición servicios gratuitos de asistencia lingüística. Llame al [phone].    We comply with applicable federal civil rights laws and [place] laws. We do not discriminate on the basis of race, color, national origin, age, disability sex, sexual orientation or gender identity.            Thank you!     Thank you for choosing [facility]  for your care. Our goal is always to provide you with excellent care. Hearing back from our patients is one way we can continue to improve our services. Please take a few minutes to complete the written survey that you may receive in the mail after your visit with us. Thank you!             Your Updated Medication List - Protect others around you: Learn how to safely use, store and throw away your medicines at www.disposemymeds.org.          This list is accurate as of: 8/14/17 11:59 PM.  Always use your most recent med list.                   Brand Name Dispense Instructions for use Diagnosis    JOSÉ ASPIRIN PO           triamcinolone 0.1 % cream    KENALOG    45 g    Apply b.i.d. p.r.n. to dermatitis but not on the face    Dermatitis

## 2017-08-15 DIAGNOSIS — E03.4 HYPOTHYROIDISM DUE TO ACQUIRED ATROPHY OF THYROID: ICD-10-CM

## 2017-08-15 DIAGNOSIS — E11.9 TYPE 2 DIABETES MELLITUS WITHOUT COMPLICATION, WITHOUT LONG-TERM CURRENT USE OF INSULIN (H): ICD-10-CM

## 2017-08-15 DIAGNOSIS — I10 BENIGN ESSENTIAL HYPERTENSION: Primary | ICD-10-CM

## 2017-08-15 DIAGNOSIS — E78.5 HYPERLIPIDEMIA LDL GOAL <100: ICD-10-CM

## 2017-08-15 LAB — DEPRECATED CALCIDIOL+CALCIFEROL SERPL-MC: 17 UG/L (ref 20–75)

## 2017-08-15 NOTE — TELEPHONE ENCOUNTER
ALL 7 MEDS below  are historical entries   Last Written Prescription Date:  --- historical   Last Fill Quantity: --- historical , # refills: --- historical   Last Office Visit with G, P or [facility] prescribing provider:  Yesterday Dr. Maher 8-14-17   Next 5 appointments (look out 90 days)     Aug 28, 2017 10:30 AM CDT   Office Visit with Aleks Maher MD   [facility] ([facility])    [address] [id number] [phone]                   lovastatin (MEVACOR) 20 MG tablet     --   Sig: Take 20 mg by mouth At Bedtime   Class: Historical       GLIPIZIDE PO     --   Sig: Take 5 mg by mouth Take one-jade table by mouth every am and 2 tablets by mouth every evening   Class: Historical     sitagliptin (JANUVIA) 100 MG tablet     No   Sig: Take 100 mg by mouth daily   Class: Historical       BP Readings from Last 3 Encounters:   08/14/17 130/80   01/25/17 130/80   01/03/17 140/80     Lab Results   Component Value Date    MICROL 454 08/14/2017     Lab Results   Component Value Date    UMALCR 123.37 08/14/2017     Creatinine   Date Value Ref Range Status   08/14/2017 0.76 0.52 - 1.04 mg/dL Final   ]  GFR Estimate   Date Value Ref Range Status   08/14/2017 73 >60 mL/min/1.7m2 Final     Comment:     Non  GFR Calc   01/03/2017 71 >60 mL/min/1.7m2 Final     Comment:     Non  GFR Calc   07/13/2016 64 >OR=60 ml/min/1.73m2 Final     GFR Estimate If Black   Date Value Ref Range Status   08/14/2017 88 >60 mL/min/1.7m2 Final     Comment:      GFR Calc   01/03/2017 86 >60 mL/min/1.7m2 Final     Comment:      GFR Calc   07/13/2016 74 >OR=60 ml/min/1.73m2 Final     Lab Results   Component Value Date    CHOL 135 08/14/2017     Lab Results   Component Value Date    HDL 54 08/14/2017     Lab Results   Component Value Date    LDL 58 08/14/2017     Lab Results   Component Value Date    TRIG 115 08/14/2017     No results found  for: CHOLHDLRATIO  Lab Results   Component Value Date    AST 58 08/14/2017     Lab Results   Component Value Date    ALT 47 08/14/2017     Lab Results   Component Value Date    A1C 7.9 08/14/2017    A1C 7.8 01/03/2017    A1C 7.1 11/17/2015    A1C 6.6 07/13/2015    A1C 7.0 11/18/2014     Potassium   Date Value Ref Range Status   08/14/2017 4.1 3.4 - 5.3 mmol/L Final     ======================  levothyroxine (SYNTHROID/LEVOTHROID) 137 MCG tablet     --   Sig: Take 137 mcg by mouth daily   Class: Historical                   TSH   Date Value Ref Range Status   08/14/2017 5.97 (H) 0.40 - 4.00 mU/L Final     Component      Latest Ref Rng & Units 8/14/2017   T4 Free      0.76 - 1.46 ng/dL 1.62 (H)     ===========================================  lisinopril (PRINIVIL/ZESTRIL) 20 MG tablet     --   Sig: Take 20 mg by mouth daily   Class: Historical     lisinopril-hydrochlorothiazide (PRINZIDE/ZESTORETIC) 20-12.5 MG per tablet     --   Sig: Take 1 tablet by mouth daily   Class: Historical       metoprolol (LOPRESSOR) 50 MG tablet     --   Sig: Take 50 mg by mouth 2 times daily   Class: Historical          BP Readings from Last 3 Encounters:   08/14/17 130/80   01/25/17 130/80   01/03/17 140/80     ==============================

## 2017-08-16 DIAGNOSIS — E11.9 TYPE 2 DIABETES MELLITUS WITHOUT COMPLICATION, WITHOUT LONG-TERM CURRENT USE OF INSULIN (H): Primary | ICD-10-CM

## 2017-08-16 RX ORDER — METOPROLOL TARTRATE 50 MG
TABLET ORAL
Qty: 180 TABLET | Refills: 3 | Status: SHIPPED | OUTPATIENT
Start: 2017-08-16 | End: 2018-08-13

## 2017-08-16 RX ORDER — LISINOPRIL AND HYDROCHLOROTHIAZIDE 12.5; 2 MG/1; MG/1
TABLET ORAL
Qty: 90 TABLET | Refills: 3 | Status: SHIPPED | OUTPATIENT
Start: 2017-08-16 | End: 2018-08-13

## 2017-08-16 RX ORDER — LEVOTHYROXINE SODIUM 137 UG/1
TABLET ORAL
Qty: 90 TABLET | Refills: 3 | Status: SHIPPED | OUTPATIENT
Start: 2017-08-16 | End: 2018-06-25

## 2017-08-16 RX ORDER — LOVASTATIN 20 MG
TABLET ORAL
Qty: 90 TABLET | Refills: 3 | Status: SHIPPED | OUTPATIENT
Start: 2017-08-16 | End: 2018-08-13

## 2017-08-16 RX ORDER — GLIPIZIDE 5 MG/1
TABLET ORAL
Qty: 270 TABLET | Refills: 0 | Status: SHIPPED | OUTPATIENT
Start: 2017-08-16 | End: 2017-08-29

## 2017-08-16 RX ORDER — LISINOPRIL 20 MG/1
TABLET ORAL
Qty: 90 TABLET | Refills: 0 | Status: SHIPPED | OUTPATIENT
Start: 2017-08-16 | End: 2017-08-28

## 2017-08-16 RX ORDER — SITAGLIPTIN 100 MG/1
TABLET, FILM COATED ORAL
Qty: 90 TABLET | Refills: 3 | Status: SHIPPED | OUTPATIENT
Start: 2017-08-16 | End: 2018-10-15

## 2017-08-16 NOTE — TELEPHONE ENCOUNTER
metFORMIN (GLUCOPHAGE) 500 MG tablet        Last Written Prescription Date:  ?  Last Fill Quantity: ?,   # refills: ?  Last Office Visit with [facility], P or [facility] prescribing provider: 8/14/2017  Future Office visit:    Next 5 appointments (look out 90 days)     Aug 28, 2017 10:30 AM CDT   [facility]    [address] [facility] [address]   [phone]                   Routing refill request to provider for review/approval because:  Medication is reported/historical

## 2017-08-17 DIAGNOSIS — E11.9 TYPE 2 DIABETES MELLITUS WITHOUT COMPLICATION, WITHOUT LONG-TERM CURRENT USE OF INSULIN (H): ICD-10-CM

## 2017-08-17 RX ORDER — GLIPIZIDE 5 MG/1
TABLET ORAL
Qty: 270 TABLET | Refills: 0 | OUTPATIENT
Start: 2017-08-17

## 2017-08-17 NOTE — TELEPHONE ENCOUNTER
Patient started this medication yesterday 8/16/17, does not need a refill        Brina STOVALL(R)

## 2017-08-18 ENCOUNTER — TELEPHONE (OUTPATIENT)
Dept: FAMILY MEDICINE | Facility: CLINIC | Age: 82
End: 2017-08-18

## 2017-08-18 NOTE — TELEPHONE ENCOUNTER
Per fax from [facility]    Plan does not cover Glipizide 5mg tabs (maybe quantity limit?)   Start PA or change medication.    Plan phone [phone]  Patient ID:  [id number]    RT Guadalupe (R)

## 2017-08-22 NOTE — TELEPHONE ENCOUNTER
I called the pharmacy and they report they were able to process it after changing the quantity to match the day supply.  Ta Willams, CMA

## 2017-08-22 NOTE — TELEPHONE ENCOUNTER
Hmm, according to formulary they should cover up to 8 tablets per day of the immediate release glipizide.    Ta Willams, CMA

## 2017-08-25 NOTE — PROGRESS NOTES
SUBJECTIVE:   Gaiv Pearson is a 85 year old female who presents to clinic today for the following health issues:    Patient presents to the clinic to follow up on her type 2 DM and hypertension. She has been tracking her blood sugars more regularly. Her numbers have been trending in good ranges and are improving. She reports that her arms have been aching bilaterally in the morning. The pain radiates from her upper arm to the forearm. She also complains of heartburn, she thinks it might be due to her diet. She gives examples of overeating, acidic, and greasy foods that exacerbating her heartburn. She denies any dyspnea, and angina.      Problem list and histories reviewed & adjusted, as indicated.  Additional history: as documented    Current Outpatient Prescriptions   Medication Sig Dispense Refill     metFORMIN (GLUCOPHAGE) 500 MG tablet TAKE 1 TABLET BY MOUTH TWICE DAILY 180 tablet 3     lovastatin (MEVACOR) 20 MG tablet TAKE 1 TABLET BY MOUTH EVERY DAY 90 tablet 3     levothyroxine (SYNTHROID/LEVOTHROID) 137 MCG tablet TAKE 1 TABLET BY MOUTH EVERY DAY 90 tablet 3     glipiZIDE (GLUCOTROL) 5 MG tablet TAKE ONE-HALF TABLET BY MOUTH EVERY MORNING AND 2 TABLETS BY MOUTH EVERY EVENING. 270 tablet 0     lisinopril (PRINIVIL/ZESTRIL) 20 MG tablet TAKE 1 TABLET BY MOUTH DAILY 90 tablet 0     lisinopril-hydrochlorothiazide (PRINZIDE/ZESTORETIC) 20-12.5 MG per tablet TAKE 1 TABLET BY MOUTH EVERY MORNING 90 tablet 3     metoprolol (LOPRESSOR) 50 MG tablet TAKE 1 TABLET BY MOUTH TWICE DAILY. 180 tablet 3     JANUVIA 100 MG tablet TAKE 1 TABLET BY MOUTH EVERY DAY 90 tablet 3     triamcinolone (KENALOG) 0.1 % cream Apply b.i.d. p.r.n. to dermatitis but not on the face 45 g 1     JOSÉ ASPIRIN PO        Allergies   Allergen Reactions     No Known Allergies        Reviewed and updated as needed this visit by clinical staff  Tobacco  Allergies  Meds  Problems  Med Hx  Surg Hx  Fam Hx  Soc Hx        Reviewed and updated  "as needed this visit by Provider       ROS:  Constitutional, HEENT, cardiovascular, pulmonary, gi and gu systems are negative, except as otherwise noted.    This document serves as a record of the services and decisions personally performed and made by Aleks Maher MD. It was created on his/her behalf by Diogenes Aguirre, a trained medical scribe. The creation of this document is based the provider's statements to the medical scribe.  Scribe Diogenes Aguirre 10:34 AM, August 28, 2017    OBJECTIVE:   /70  Pulse 90  Temp 97.9  F (36.6  C) (Oral)  Ht 1.753 m (5' 9\")  Wt 89.4 kg (197 lb)  SpO2 97%  Breastfeeding? No  BMI 29.09 kg/m2  Body mass index is 29.09 kg/(m^2).    Neck was supple without adenopathy or thyromegaly her carotids were normal without bruits  Chest clear to auscultation and percussion  Cardiovascular S1 and S2 are physiologic without murmurs or gallops  Abdomen bowel sounds were normal.  There is no palpable mass or organomegaly  Extremities nontender without any edema  Skin without significant abnormality  Discussed her diabetes follow up, reviewed her lab studies.    Diagnostic Test Results:  No results found for this or any previous visit (from the past 24 hour(s)).    ASSESSMENT/PLAN:     1. Type 2 diabetes mellitus without complication, without long-term current use of insulin (H)  -Patient will start taking blood sugars daily. Her blood sugars have been following a good trend since she started monitoring regularly.    2. Benign essential hypertension  -Well managed with current therapies.  - lisinopril (PRINIVIL/ZESTRIL) 20 MG tablet; Take 1 tablet (20 mg) by mouth daily  Dispense: 90 tablet; Refill: 3    3. Hyperlipidemia, unspecified  -Will be evaluated at next preventive visit.    4. Acquired hypothyroidism  -Levothyroxine---> patient will take 1 tablet daily, but on Tuesdays and Saturdays she will be taking 1 and 1/2 tablets.    5. Vitamin B12 deficiency (non anemic)  -She " will start taking OTC supplements.    -Start taking vitamin D3 5,000 IU daily OTC, recheck in 6 month  -She will be returning for a follow up on her thyroid medication in 2 months.    Aleks Maher MD  [facility]    The information in this document, created by the medical scribe for me, accurately reflects the services I personally performed and the decisions made by me. I have reviewed and approved this document for accuracy prior to leaving the patient care area.  Aleks Maher MD  10:55 AM, 08/28/17

## 2017-08-28 ENCOUNTER — OFFICE VISIT (OUTPATIENT)
Dept: FAMILY MEDICINE | Facility: CLINIC | Age: 82
End: 2017-08-28
Payer: MEDICARE

## 2017-08-28 VITALS
SYSTOLIC BLOOD PRESSURE: 130 MMHG | WEIGHT: 197 LBS | BODY MASS INDEX: 29.18 KG/M2 | HEART RATE: 90 BPM | TEMPERATURE: 97.9 F | HEIGHT: 69 IN | OXYGEN SATURATION: 97 % | DIASTOLIC BLOOD PRESSURE: 70 MMHG

## 2017-08-28 DIAGNOSIS — E78.5 HYPERLIPIDEMIA, UNSPECIFIED: ICD-10-CM

## 2017-08-28 DIAGNOSIS — I10 BENIGN ESSENTIAL HYPERTENSION: ICD-10-CM

## 2017-08-28 DIAGNOSIS — E53.8 VITAMIN B12 DEFICIENCY (NON ANEMIC): ICD-10-CM

## 2017-08-28 DIAGNOSIS — E03.9 ACQUIRED HYPOTHYROIDISM: ICD-10-CM

## 2017-08-28 DIAGNOSIS — E11.9 TYPE 2 DIABETES MELLITUS WITHOUT COMPLICATION, WITHOUT LONG-TERM CURRENT USE OF INSULIN (H): Primary | ICD-10-CM

## 2017-08-28 PROCEDURE — 99214 OFFICE O/P EST MOD 30 MIN: CPT | Performed by: INTERNAL MEDICINE

## 2017-08-28 RX ORDER — LISINOPRIL 20 MG/1
20 TABLET ORAL DAILY
Qty: 90 TABLET | Refills: 3 | Status: SHIPPED | OUTPATIENT
Start: 2017-08-28 | End: 2018-10-31

## 2017-08-28 NOTE — NURSING NOTE
"Chief Complaint   Patient presents with     Hypertension     Diabetes       Initial BP (!) 174/101  Pulse 90  Temp 97.9  F (36.6  C) (Oral)  Ht 5' 9\" (1.753 m)  Wt 197 lb (89.4 kg)  SpO2 97%  Breastfeeding? No  BMI 29.09 kg/m2 Estimated body mass index is 29.09 kg/(m^2) as calculated from the following:    Height as of this encounter: 5' 9\" (1.753 m).    Weight as of this encounter: 197 lb (89.4 kg).  Medication Reconciliation: complete   Estefany THOMPSON CMA      "

## 2017-08-28 NOTE — MR AVS SNAPSHOT
"              After Visit Summary   8/28/2017    Gavi Pearson    MRN: [id number]           Patient Information     Date Of Birth          10/21/1931        Visit Information        Provider Department      8/28/2017 10:30 AM Aleks Maher MD [facility]        Today's Diagnoses     Type 2 diabetes mellitus without complication, without long-term current use of insulin (H)    -  1    Benign essential hypertension        Polyneuropathy associated with underlying disease (H)        Paroxysmal a-fib (H)        Hyperlipidemia, unspecified        Acquired hypothyroidism        Vitamin B12 deficiency (non anemic)        BPPV (benign paroxysmal positional vertigo), unspecified laterality           Follow-ups after your visit        Who to contact     If you have questions or need follow up information about today's clinic visit or your schedule please contact [facility] directly at [phone].  Normal or non-critical lab and imaging results will be communicated to you by [employer], letter or phone within 4 business days after the clinic has received the results. If you do not hear from us within 7 days, please contact the clinic through MyChart or phone. If you have a critical or abnormal lab result, we will notify you by phone as soon as possible.  Submit refill requests through [employer] or call your pharmacy and they will forward the refill request to us. Please allow 3 business days for your refill to be completed.          Additional Information About Your Visit        MyChart Information     [employer] lets you send messages to your doctor, view your test results, renew your prescriptions, schedule appointments and more. To sign up, go to [email or web address] . Click on \"Log in\" on the left side of the screen, which will take you to the Welcome page. Then click on \"Sign up Now\" on the right side of the page.     You will be asked to enter the access code listed below, as well as some personal " "information. Please follow the directions to create your username and password.     Your access code is: [email or web address]  Expires: 2017  4:37 AM     Your access code will  in 90 days. If you need help or a new code, please call your [place] clinic or [phone].        Care EveryWhere ID     This is your Care EveryWhere ID. This could be used by other organizations to access your [place] medical records  [id number]        Your Vitals Were     Pulse Temperature Height Pulse Oximetry Breastfeeding? BMI (Body Mass Index)    90 97.9  F (36.6  C) (Oral) 5' 9\" (1.753 m) 97% No 29.09 kg/m2       Blood Pressure from Last 3 Encounters:   17 (!) 174/101   17 130/80   17 130/80    Weight from Last 3 Encounters:   17 197 lb (89.4 kg)   17 200 lb (90.7 kg)   17 204 lb (92.5 kg)              Today, you had the following     No orders found for display       Primary Care Provider Office Phone # Fax #    Aleks Maher [phone] [address] [address]        Equal Access to Services     [facility]: Hadii aad ku hadasho Soomaali, waaxda luqadaha, qaybta kaalmada adeegyada, shay pittman hayaan barrett cordero . So [facility] [phone].    ATENCIÓN: Si habla español, tiene a lema disposición servicios gratuitos de asistencia lingüística. Llame al [phone].    We comply with applicable federal civil rights laws and [place] laws. We do not discriminate on the basis of race, color, national origin, age, disability sex, sexual orientation or gender identity.            Thank you!     Thank you for choosing [facility]  for your care. Our goal is always to provide you with excellent care. Hearing back from our patients is one way we can continue to improve our services. Please take a few minutes to complete the written survey that you may receive in the mail after your visit with us. Thank you!             Your Updated " Medication List - Protect others around you: Learn how to safely use, store and throw away your medicines at www.disposemymeds.org.          This list is accurate as of: 8/28/17 10:33 AM.  Always use your most recent med list.                   Brand Name Dispense Instructions for use Diagnosis    JOSÉ ASPIRIN PO           glipiZIDE 5 MG tablet    GLUCOTROL    270 tablet    TAKE ONE-HALF TABLET BY MOUTH EVERY MORNING AND 2 TABLETS BY MOUTH EVERY EVENING.    Type 2 diabetes mellitus without complication, without long-term current use of insulin (H)       JANUVIA 100 MG tablet   Generic drug:  sitagliptin     90 tablet    TAKE 1 TABLET BY MOUTH EVERY DAY    Type 2 diabetes mellitus without complication, without long-term current use of insulin (H)       levothyroxine 137 MCG tablet    SYNTHROID/LEVOTHROID    90 tablet    TAKE 1 TABLET BY MOUTH EVERY DAY    Hypothyroidism due to acquired atrophy of thyroid       lisinopril 20 MG tablet    PRINIVIL/ZESTRIL    90 tablet    TAKE 1 TABLET BY MOUTH DAILY    Benign essential hypertension       lisinopril-hydrochlorothiazide 20-12.5 MG per tablet    PRINZIDE/ZESTORETIC    90 tablet    TAKE 1 TABLET BY MOUTH EVERY MORNING    Benign essential hypertension       lovastatin 20 MG tablet    MEVACOR    90 tablet    TAKE 1 TABLET BY MOUTH EVERY DAY    Hyperlipidemia LDL goal <100       metFORMIN 500 MG tablet    GLUCOPHAGE    180 tablet    TAKE 1 TABLET BY MOUTH TWICE DAILY    Type 2 diabetes mellitus without complication, without long-term current use of insulin (H)       metoprolol 50 MG tablet    LOPRESSOR    180 tablet    TAKE 1 TABLET BY MOUTH TWICE DAILY.    Benign essential hypertension       triamcinolone 0.1 % cream    KENALOG    45 g    Apply b.i.d. p.r.n. to dermatitis but not on the face    Dermatitis

## 2017-08-29 ENCOUNTER — TELEPHONE (OUTPATIENT)
Dept: FAMILY MEDICINE | Facility: CLINIC | Age: 82
End: 2017-08-29

## 2017-08-29 DIAGNOSIS — E11.9 TYPE 2 DIABETES MELLITUS WITHOUT COMPLICATION, WITHOUT LONG-TERM CURRENT USE OF INSULIN (H): ICD-10-CM

## 2017-08-29 RX ORDER — GLIPIZIDE 5 MG/1
TABLET ORAL
Qty: 225 TABLET | Refills: 0 | COMMUNITY
Start: 2017-08-29 | End: 2017-12-07

## 2017-08-29 NOTE — TELEPHONE ENCOUNTER
Dr Maher came to me about this patient's glipizide, stating that it is not going through insurance properly. In taking a closer look at the latest script that was sent it looks like Dr Maher was wanting to ensure a 90-day supply and sent in a dispense amount of 270 tablets. The correct 90-day supply though was actually 225 tablet. I believe that as long as the pharmacy processes this for 225 tablets per 90 days then it should go through just fine. I corrected this on the med list and also called the pharmacy to let them know.    Ta Willams, [facility]

## 2017-08-30 DIAGNOSIS — E11.9 TYPE 2 DIABETES MELLITUS WITHOUT COMPLICATION, WITHOUT LONG-TERM CURRENT USE OF INSULIN (H): Primary | ICD-10-CM

## 2017-08-30 NOTE — TELEPHONE ENCOUNTER
Pending Prescriptions:                       Disp   Refills    blood glucose monitoring (NO BRAND SPECIF*[address] st*             Sig: Use to test blood sugars 2 times daily or as           directed          Last Written Prescription Date:    Last Fill Quantity: ,   # refills:   Last Office Visit with G, P or [facility] prescribing provider: 08/28/17  Future Office visit:    Next 5 appointments (look out 90 days)     Oct 30, 2017  9:30 AM CDT   [facility]    [address] [facility] [address]   [phone]                   Routing refill request to provider for review/approval because:  Drug not active on patient's medication list

## 2017-08-31 NOTE — TELEPHONE ENCOUNTER
Routing refill request to provider for review/approval because:  Drug not active on patient's medication list  Leandra HOROWITZ RN

## 2017-10-29 PROBLEM — E55.9 VITAMIN D DEFICIENCY: Status: ACTIVE | Noted: 2017-10-29

## 2017-10-30 ENCOUNTER — OFFICE VISIT (OUTPATIENT)
Dept: FAMILY MEDICINE | Facility: CLINIC | Age: 82
End: 2017-10-30
Payer: MEDICARE

## 2017-10-30 VITALS
OXYGEN SATURATION: 95 % | TEMPERATURE: 98.1 F | DIASTOLIC BLOOD PRESSURE: 82 MMHG | SYSTOLIC BLOOD PRESSURE: 129 MMHG | HEIGHT: 69 IN | WEIGHT: 194 LBS | BODY MASS INDEX: 28.73 KG/M2 | HEART RATE: 107 BPM

## 2017-10-30 DIAGNOSIS — Z23 NEED FOR PROPHYLACTIC VACCINATION AND INOCULATION AGAINST INFLUENZA: ICD-10-CM

## 2017-10-30 DIAGNOSIS — E03.9 ACQUIRED HYPOTHYROIDISM: ICD-10-CM

## 2017-10-30 DIAGNOSIS — E11.9 TYPE 2 DIABETES MELLITUS WITHOUT COMPLICATION, WITHOUT LONG-TERM CURRENT USE OF INSULIN (H): Primary | ICD-10-CM

## 2017-10-30 DIAGNOSIS — E53.8 VITAMIN B12 DEFICIENCY (NON ANEMIC): ICD-10-CM

## 2017-10-30 LAB
HBA1C MFR BLD: 7.2 % (ref 4.3–6)
VIT B12 SERPL-MCNC: 315 PG/ML (ref 193–986)

## 2017-10-30 PROCEDURE — G0008 ADMIN INFLUENZA VIRUS VAC: HCPCS | Performed by: INTERNAL MEDICINE

## 2017-10-30 PROCEDURE — 36415 COLL VENOUS BLD VENIPUNCTURE: CPT | Performed by: INTERNAL MEDICINE

## 2017-10-30 PROCEDURE — 82607 VITAMIN B-12: CPT | Performed by: INTERNAL MEDICINE

## 2017-10-30 PROCEDURE — 83036 HEMOGLOBIN GLYCOSYLATED A1C: CPT | Performed by: INTERNAL MEDICINE

## 2017-10-30 PROCEDURE — 84443 ASSAY THYROID STIM HORMONE: CPT | Performed by: INTERNAL MEDICINE

## 2017-10-30 PROCEDURE — 99214 OFFICE O/P EST MOD 30 MIN: CPT | Mod: 25 | Performed by: INTERNAL MEDICINE

## 2017-10-30 PROCEDURE — 90662 IIV NO PRSV INCREASED AG IM: CPT | Performed by: INTERNAL MEDICINE

## 2017-10-30 NOTE — PROGRESS NOTES
SUBJECTIVE:   Gavi Pearson is a 86 year old female who presents to clinic today for the following health issues:    Diabetes Follow-up    Patient is checking blood sugars: rarely.  Results range from 134 to 150-160    Diabetic concerns: None     Symptoms of hypoglycemia (low blood sugar): none     Paresthesias (numbness or burning in feet) or sores: Yes sometimes the toes feel numb.     Date of last diabetic eye exam: x2+ years ago  Hyperlipidemia Follow-Up    Rate your low fat/cholesterol diet?: fair    Taking statin?  Yes, no muscle aches from statin    Other lipid medications/supplements?:  none  Hypertension Follow-up    Outpatient blood pressures are not being checked.    Low Salt Diet: no added salt-low salt  Hypothyroidism Follow-up    Since last visit, patient describes the following symptoms: Weight stable, no hair loss, no skin changes, no constipation, no loose stools    Amount of exercise or physical activity: 2-3 days/week for an average of 15 mins. walking    Problems taking medications regularly: No    Medication side effects: none    Diet: regular (no restrictions)      Patient with history of hypothyroidism, hypertension, and type two diabetes presents to the clinic for a follow up. She states that she is doing fine. Her blood pressure is well managed. She has lost a few pounds since her last visit. She eats a healthy well balanced diet and exercises two days a week. She doesn't check her blood sugars regularly. Patient also experiences stiffness in her toes occasionally.She denies palpitations, hair loss, skin changes, constipation, and diarrhea.  Patient states that last night she was sitting and when she got up her right knee gave out causing her to fall forward on her knees. She complains of right knee soreness with associated bruising. She hasn't done anything for her knee. We recommended icing her knee today and using heat for 20 minutes twice daily starting tomorrow.      Problem list and  histories reviewed & adjusted, as indicated.  Additional history: as documented    Current Outpatient Prescriptions   Medication Sig Dispense Refill     blood glucose monitoring (NO BRAND SPECIFIED) test strip Use to test blood sugars 2 times daily or as directed 200 strip 3     glipiZIDE (GLUCOTROL) 5 MG tablet Take 0.5 tablet every morning and 2 tablets every evening 225 tablet 0     lisinopril (PRINIVIL/ZESTRIL) 20 MG tablet Take 1 tablet (20 mg) by mouth daily 90 tablet 3     blood glucose (NO BRAND SPECIFIED) lancets standard Use to test blood sugar 2 times daily or as directed. 200 each 3     metFORMIN (GLUCOPHAGE) 500 MG tablet TAKE 1 TABLET BY MOUTH TWICE DAILY 180 tablet 3     lovastatin (MEVACOR) 20 MG tablet TAKE 1 TABLET BY MOUTH EVERY DAY 90 tablet 3     levothyroxine (SYNTHROID/LEVOTHROID) 137 MCG tablet TAKE 1 TABLET BY MOUTH EVERY DAY 90 tablet 3     lisinopril-hydrochlorothiazide (PRINZIDE/ZESTORETIC) 20-12.5 MG per tablet TAKE 1 TABLET BY MOUTH EVERY MORNING 90 tablet 3     metoprolol (LOPRESSOR) 50 MG tablet TAKE 1 TABLET BY MOUTH TWICE DAILY. 180 tablet 3     JANUVIA 100 MG tablet TAKE 1 TABLET BY MOUTH EVERY DAY 90 tablet 3     triamcinolone (KENALOG) 0.1 % cream Apply b.i.d. p.r.n. to dermatitis but not on the face 45 g 1     JOSÉ ASPIRIN PO        Allergies   Allergen Reactions     No Known Allergies        Reviewed and updated as needed this visit by clinical staffTobacco  Allergies  Soc Hx      Reviewed and updated as needed this visit by Provider         ROS:  Constitutional, HEENT, cardiovascular, pulmonary, gi and gu systems are negative, except as otherwise noted.    This document serves as a record of the services and decisions personally performed and made by Aleks Maher MD. It was created on his/her behalf by Diogenes Aguirre, a trained medical scribe. The creation of this document is based the provider's statements to the medical scribe.  Yanira Aguirre 9:58 AM,  "October 30, 2017    OBJECTIVE:   /82 (BP Location: Right arm, Patient Position: Sitting, Cuff Size: Adult Regular)  Pulse 107  Temp 98.1  F (36.7  C) (Oral)  Ht 1.753 m (5' 9\")  Wt 88 kg (194 lb)  SpO2 95%  Breastfeeding? No  BMI 28.65 kg/m2  Body mass index is 28.65 kg/(m^2).    Neck was supple without adenopathy or thyromegaly her carotids were normal without bruits  Chest clear to auscultation and percussion  Cardiovascular S1 and S2 are irregularly irregular without murmurs or gallops  Abdomen bowel sounds were normal.  There is no palpable mass or organomegaly  Extremities nontender, her right knee showed no significant effusion she does have ecchymoses evolving over her knee with some associated swelling of prepatellar bursae, knee cap seemed to be intact without significant pain with manipulation  FEET: were clean and dry her pulses were all normal  Pulses pedal pulses are as described otherwise his pulses are bilaterally symmetrical throughout without bruits  Skin without significant abnormality    Diagnostic Test Results:  No results found for this or any previous visit (from the past 24 hour(s)).    ASSESSMENT/PLAN:     1. Type 2 diabetes mellitus without complication, without long-term current use of insulin (H)  - Hemoglobin A1c    2. Vitamin B12 deficiency (non anemic)  - Vitamin B12    3. Acquired hypothyroidism  - TSH with free T4 reflex    4. Need for prophylactic vaccination and inoculation against influenza  - FLU VACCINE, INCREASED ANTIGEN, PRESV FREE, AGE 65+ [48759]  - ADMIN INFLUENZA (For MEDICARE Patients ONLY) []      -Will reevaluate her medications pending her lab results. She will be returning for a follow up in the spring.  -We recommended patient use ice a couple times today for her knee. Heat twice daily for 20 minutes starting tomorrow, and using Tylenol for pain.  -Write patient a letter for her lab results.    Aleks Maher MD  [facility]    The " information in this document, created by the medical scribe for me, accurately reflects the services I personally performed and the decisions made by me. I have reviewed and approved this document for accuracy prior to leaving the patient care area.  Aleks Maher MD  10:11 AM, 10/30/17    Injectable Influenza Immunization Documentation    1.  Is the person to be vaccinated sick today?   No    2. Does the person to be vaccinated have an allergy to a component   of the vaccine?   No  Egg Allergy Algorithm Link    3. Has the person to be vaccinated ever had a serious reaction   to influenza vaccine in the past?   No    4. Has the person to be vaccinated ever had Guillain-Barré syndrome?   No    Form completed by Cheyanne Vergara CMA  Prior to injection verified patient identity using patient's name and date of birth.

## 2017-10-30 NOTE — MR AVS SNAPSHOT
"              After Visit Summary   10/30/2017    Gavi Pearson    MRN: [id number]           Patient Information     Date Of Birth          10/21/1931        Visit Information        Provider Department      10/30/2017 9:30 AM Aleks Maher MD [facility]        Today's Diagnoses     Type 2 diabetes mellitus without complication, without long-term current use of insulin (H)    -  1    Vitamin B12 deficiency (non anemic)        Acquired hypothyroidism        Need for prophylactic vaccination and inoculation against influenza           Follow-ups after your visit        Who to contact     If you have questions or need follow up information about today's clinic visit or your schedule please contact [facility] directly at [phone].  Normal or non-critical lab and imaging results will be communicated to you by MyChart, letter or phone within 4 business days after the clinic has received the results. If you do not hear from us within 7 days, please contact the clinic through MyChart or phone. If you have a critical or abnormal lab result, we will notify you by phone as soon as possible.  Submit refill requests through [employer] or call your pharmacy and they will forward the refill request to us. Please allow 3 business days for your refill to be completed.          Additional Information About Your Visit        MyChart Information     PayPlug lets you send messages to your doctor, view your test results, renew your prescriptions, schedule appointments and more. To sign up, go to [email or web address] . Click on \"Log in\" on the left side of the screen, which will take you to the Welcome page. Then click on \"Sign up Now\" on the right side of the page.     You will be asked to enter the access code listed below, as well as some personal information. Please follow the directions to create your username and password.     Your access code is: [email or web address]  Expires: 11/19/2017  4:37 AM     Your " "access code will  in 90 days. If you need help or a new code, please call your [place] clinic or [phone].        Care EveryWhere ID     This is your Care EveryWhere ID. This could be used by other organizations to access your [place] medical records  [id number]        Your Vitals Were     Pulse Temperature Height Pulse Oximetry Breastfeeding? BMI (Body Mass Index)    107 98.1  F (36.7  C) (Oral) 5' 9\" (1.753 m) 95% No 28.65 kg/m2       Blood Pressure from Last 3 Encounters:   10/30/17 129/82   17 130/70   17 130/80    Weight from Last 3 Encounters:   10/30/17 194 lb (88 kg)   17 197 lb (89.4 kg)   17 200 lb (90.7 kg)              We Performed the Following     ADMIN INFLUENZA (For MEDICARE Patients ONLY) []     FLU VACCINE, INCREASED ANTIGEN, PRESV FREE, AGE 65+ [83980]     Hemoglobin A1c     TSH with free T4 reflex     Vitamin B12        Primary Care Provider Office Phone # Fax #    Aleks Maher [phone] [address]  [facility] [address]        Equal Access to Services     MARLYN GIL : Hadii aad ku hadasho Soomaali, waaxda luqadaha, qaybta kaalmada adeegyada, shay cordero . So [facility] [phone].    ATENCIÓN: Si habla español, tiene a lema disposición servicios gratuitos de asistencia lingüística. Llame al [phone].    We comply with applicable federal civil rights laws and [place] laws. We do not discriminate on the basis of race, color, national origin, age, disability, sex, sexual orientation, or gender identity.            Thank you!     Thank you for choosing [facility]  for your care. Our goal is always to provide you with excellent care. Hearing back from our patients is one way we can continue to improve our services. Please take a few minutes to complete the written survey that you may receive in the mail after your visit with us. Thank you!             Your Updated Medication " List - Protect others around you: Learn how to safely use, store and throw away your medicines at www.disposemymeds.org.          This list is accurate as of: 10/30/17 11:59 PM.  Always use your most recent med list.                   Brand Name Dispense Instructions for use Diagnosis    OJSÉ ASPIRIN PO           blood glucose lancets standard    no brand specified    200 each    Use to test blood sugar 2 times daily or as directed.    Type 2 diabetes mellitus without complication, without long-term current use of insulin (H)       blood glucose monitoring test strip    no brand specified    200 strip    Use to test blood sugars 2 times daily or as directed    Type 2 diabetes mellitus without complication, without long-term current use of insulin (H)       glipiZIDE 5 MG tablet    GLUCOTROL    225 tablet    Take 0.5 tablet every morning and 2 tablets every evening    Type 2 diabetes mellitus without complication, without long-term current use of insulin (H)       JANUVIA 100 MG tablet   Generic drug:  sitagliptin     90 tablet    TAKE 1 TABLET BY MOUTH EVERY DAY    Type 2 diabetes mellitus without complication, without long-term current use of insulin (H)       levothyroxine 137 MCG tablet    SYNTHROID/LEVOTHROID    90 tablet    TAKE 1 TABLET BY MOUTH EVERY DAY    Hypothyroidism due to acquired atrophy of thyroid       lisinopril 20 MG tablet    PRINIVIL/ZESTRIL    90 tablet    Take 1 tablet (20 mg) by mouth daily    Benign essential hypertension       lisinopril-hydrochlorothiazide 20-12.5 MG per tablet    PRINZIDE/ZESTORETIC    90 tablet    TAKE 1 TABLET BY MOUTH EVERY MORNING    Benign essential hypertension       lovastatin 20 MG tablet    MEVACOR    90 tablet    TAKE 1 TABLET BY MOUTH EVERY DAY    Hyperlipidemia LDL goal <100       metFORMIN 500 MG tablet    GLUCOPHAGE    180 tablet    TAKE 1 TABLET BY MOUTH TWICE DAILY    Type 2 diabetes mellitus without complication, without long-term current use of insulin  (H)       metoprolol 50 MG tablet    LOPRESSOR    180 tablet    TAKE 1 TABLET BY MOUTH TWICE DAILY.    Benign essential hypertension       triamcinolone 0.1 % cream    KENALOG    45 g    Apply b.i.d. p.r.n. to dermatitis but not on the face    Dermatitis

## 2017-10-30 NOTE — NURSING NOTE
"Chief Complaint   Patient presents with     RECHECK       Initial /82 (BP Location: Right arm, Patient Position: Sitting, Cuff Size: Adult Regular)  Pulse 107  Temp 98.1  F (36.7  C) (Oral)  Ht 5' 9\" (1.753 m)  Wt 194 lb (88 kg)  SpO2 95%  Breastfeeding? No  BMI 28.65 kg/m2 Estimated body mass index is 28.65 kg/(m^2) as calculated from the following:    Height as of this encounter: 5' 9\" (1.753 m).    Weight as of this encounter: 194 lb (88 kg).  Medication Reconciliation: complete   Prior to injection verified patient identity using patient's name and date of birth.      "

## 2017-11-01 LAB — TSH SERPL DL<=0.005 MIU/L-ACNC: 1.02 MU/L (ref 0.4–4)

## 2017-11-02 ENCOUNTER — TRANSFERRED RECORDS (OUTPATIENT)
Dept: HEALTH INFORMATION MANAGEMENT | Facility: CLINIC | Age: 82
End: 2017-11-02

## 2017-11-14 DIAGNOSIS — E11.9 TYPE 2 DIABETES MELLITUS WITHOUT COMPLICATION, WITHOUT LONG-TERM CURRENT USE OF INSULIN (H): ICD-10-CM

## 2017-12-07 ENCOUNTER — OFFICE VISIT (OUTPATIENT)
Dept: FAMILY MEDICINE | Facility: CLINIC | Age: 82
End: 2017-12-07
Payer: MEDICARE

## 2017-12-07 VITALS
WEIGHT: 195.5 LBS | HEIGHT: 69 IN | HEART RATE: 112 BPM | BODY MASS INDEX: 28.96 KG/M2 | OXYGEN SATURATION: 99 % | SYSTOLIC BLOOD PRESSURE: 107 MMHG | TEMPERATURE: 97.6 F | DIASTOLIC BLOOD PRESSURE: 79 MMHG

## 2017-12-07 DIAGNOSIS — R19.7 ACUTE DIARRHEA: ICD-10-CM

## 2017-12-07 DIAGNOSIS — G63 POLYNEUROPATHY ASSOCIATED WITH UNDERLYING DISEASE (H): ICD-10-CM

## 2017-12-07 DIAGNOSIS — E11.9 TYPE 2 DIABETES MELLITUS WITHOUT COMPLICATION, WITHOUT LONG-TERM CURRENT USE OF INSULIN (H): ICD-10-CM

## 2017-12-07 DIAGNOSIS — E03.9 ACQUIRED HYPOTHYROIDISM: ICD-10-CM

## 2017-12-07 DIAGNOSIS — I48.0 PAROXYSMAL A-FIB (H): ICD-10-CM

## 2017-12-07 DIAGNOSIS — I10 BENIGN ESSENTIAL HYPERTENSION: Primary | ICD-10-CM

## 2017-12-07 DIAGNOSIS — E53.8 VITAMIN B12 DEFICIENCY (NON ANEMIC): ICD-10-CM

## 2017-12-07 LAB
ERYTHROCYTE [DISTWIDTH] IN BLOOD BY AUTOMATED COUNT: 12.7 % (ref 10–15)
HBA1C MFR BLD: 7 % (ref 4.3–6)
HCT VFR BLD AUTO: 38.8 % (ref 35–47)
HGB BLD-MCNC: 13.6 G/DL (ref 11.7–15.7)
MCH RBC QN AUTO: 31.6 PG (ref 26.5–33)
MCHC RBC AUTO-ENTMCNC: 35.1 G/DL (ref 31.5–36.5)
MCV RBC AUTO: 90 FL (ref 78–100)
PLATELET # BLD AUTO: 216 10E9/L (ref 150–450)
RBC # BLD AUTO: 4.3 10E12/L (ref 3.8–5.2)
WBC # BLD AUTO: 11.9 10E9/L (ref 4–11)

## 2017-12-07 PROCEDURE — 80048 BASIC METABOLIC PNL TOTAL CA: CPT | Performed by: INTERNAL MEDICINE

## 2017-12-07 PROCEDURE — 99213 OFFICE O/P EST LOW 20 MIN: CPT | Performed by: INTERNAL MEDICINE

## 2017-12-07 PROCEDURE — 83036 HEMOGLOBIN GLYCOSYLATED A1C: CPT | Performed by: INTERNAL MEDICINE

## 2017-12-07 PROCEDURE — 85027 COMPLETE CBC AUTOMATED: CPT | Performed by: INTERNAL MEDICINE

## 2017-12-07 PROCEDURE — 36415 COLL VENOUS BLD VENIPUNCTURE: CPT | Performed by: INTERNAL MEDICINE

## 2017-12-07 RX ORDER — GLIPIZIDE 5 MG/1
TABLET ORAL
Qty: 225 TABLET | Refills: 3 | Status: SHIPPED | OUTPATIENT
Start: 2017-12-07 | End: 2018-11-26

## 2017-12-07 NOTE — LETTER
[place] [address] [facility]  [address]  [place]  [address]  Tel: [phone]    December 26, 2017    Gavi MINI Lili  [address] [place] [address]        Dear Ms. Pearson,    You'll notice that your open A1c continues to improve. It was 7.0 down from 7.2 from a month ago and down from 7.9 from four months ago.    Your minerals and electrolytes were generally okay however you will notice your serum sodium is slightly lower than normal. If you are drinking excessive amounts of water this could affect this value. It is nothing serious story about and we should recheck it in January.    Your blood cell tests were all normal.    I hope you're having good holidays, I will look forward to seeing you sometime in the middle of January.    Thanks,     If you have any further questions or problems, please contact our office.      Sincerely,    Aleks Maher MD/ Mariajose Irene CMA  Results for orders placed or performed in visit on 12/07/17   Basic metabolic panel   Result Value Ref Range    Sodium 129 (L) 133 - 144 mmol/L    Potassium 3.8 3.4 - 5.3 mmol/L    Chloride 95 94 - 109 mmol/L    Carbon Dioxide 22 20 - 32 mmol/L    Anion Gap 12 3 - 14 mmol/L    Glucose 222 (H) 70 - 99 mg/dL    Urea Nitrogen 14 7 - 30 mg/dL    Creatinine 0.85 0.52 - 1.04 mg/dL    GFR Estimate 63 >60 mL/min/1.7m2    GFR Estimate If Black 77 >60 mL/min/1.7m2    Calcium 8.7 8.5 - 10.1 mg/dL   CBC with platelets   Result Value Ref Range    WBC 11.9 (H) 4.0 - 11.0 10e9/L    RBC Count 4.30 3.8 - 5.2 10e12/L    Hemoglobin 13.6 11.7 - 15.7 g/dL    Hematocrit 38.8 35.0 - 47.0 %    MCV 90 78 - 100 fl    MCH 31.6 26.5 - 33.0 pg    MCHC 35.1 31.5 - 36.5 g/dL    RDW 12.7 10.0 - 15.0 %    Platelet Count 216 150 - 450 10e9/L   Hemoglobin A1c   Result Value Ref Range    Hemoglobin A1C 7.0 (H) 4.3 - 6.0 %               Enclosure: Lab Results

## 2017-12-07 NOTE — MR AVS SNAPSHOT
"              After Visit Summary   12/7/2017    Gavi Pearson    MRN: [id number]           Patient Information     Date Of Birth          10/21/1931        Visit Information        Provider Department      12/7/2017 1:30 PM Aleks Maher MD [facility]        Today's Diagnoses     Benign essential hypertension    -  1    Acquired hypothyroidism        Paroxysmal a-fib (H)        Vitamin B12 deficiency (non anemic)        Type 2 diabetes mellitus without complication, without long-term current use of insulin (H)        Polyneuropathy associated with underlying disease (H)        Acute diarrhea           Follow-ups after your visit        Who to contact     If you have questions or need follow up information about today's clinic visit or your schedule please contact [facility] directly at [phone].  Normal or non-critical lab and imaging results will be communicated to you by MyChart, letter or phone within 4 business days after the clinic has received the results. If you do not hear from us within 7 days, please contact the clinic through MyChart or phone. If you have a critical or abnormal lab result, we will notify you by phone as soon as possible.  Submit refill requests through [employer] or call your pharmacy and they will forward the refill request to us. Please allow 3 business days for your refill to be completed.          Additional Information About Your Visit        MyChart Information     [employer]Sprout lets you send messages to your doctor, view your test results, renew your prescriptions, schedule appointments and more. To sign up, go to [email or web address] . Click on \"Log in\" on the left side of the screen, which will take you to the Welcome page. Then click on \"Sign up Now\" on the right side of the page.     You will be asked to enter the access code listed below, as well as some personal information. Please follow the directions to create your username and password.     Your " "access code is: [email or web address]  Expires: 3/10/2018  4:52 PM     Your access code will  in 90 days. If you need help or a new code, please call your [place] clinic or [phone].        Care EveryWhere ID     This is your Care EveryWhere ID. This could be used by other organizations to access your [place] medical records  [id number]        Your Vitals Were     Pulse Temperature Height Pulse Oximetry Breastfeeding? BMI (Body Mass Index)    112 97.6  F (36.4  C) (Oral) 5' 9\" (1.753 m) 99% No 28.87 kg/m2       Blood Pressure from Last 3 Encounters:   17 107/79   10/30/17 129/82   17 130/70    Weight from Last 3 Encounters:   17 195 lb 8 oz (88.7 kg)   10/30/17 194 lb (88 kg)   17 197 lb (89.4 kg)              We Performed the Following     Basic metabolic panel     CBC with platelets     Hemoglobin A1c          Where to get your medicines      These medications were sent to [id number] - [place] - [address]  [address] [place] [address]    Hours:  24-hours Phone:  [phone]     glipiZIDE 5 MG tablet          Primary Care Provider Office Phone # Fax #    Aleks Maher [phone] [address]  [place] [address]        Equal Access to Services     [facility] AH: Hadii aad ku hadasho Soomaali, waaxda luqadaha, qaybta kaalmada adeegyada, shay jones. So [facility] [phone].    ATENCIÓN: Si habla español, tiene a lema disposición servicios gratuitos de asistencia lingüística. Llame al [phone].    We comply with applicable federal civil rights laws and [place] laws. We do not discriminate on the basis of race, color, national origin, age, disability, sex, sexual orientation, or gender identity.            Thank you!     Thank you for choosing [facility]  for your care. Our goal is always to provide you with excellent care. Hearing back from our " patients is one way we can continue to improve our services. Please take a few minutes to complete the written survey that you may receive in the mail after your visit with us. Thank you!             Your Updated Medication List - Protect others around you: Learn how to safely use, store and throw away your medicines at www.disposemymeds.org.          This list is accurate as of: 12/7/17 11:59 PM.  Always use your most recent med list.                   Brand Name Dispense Instructions for use Diagnosis    JOSÉ ASPIRIN PO           blood glucose lancets standard    no brand specified    200 each    Use to test blood sugar 2 times daily or as directed.    Type 2 diabetes mellitus without complication, without long-term current use of insulin (H)       blood glucose monitoring test strip    no brand specified    200 strip    Use to test blood sugars 2 times daily or as directed    Type 2 diabetes mellitus without complication, without long-term current use of insulin (H)       glipiZIDE 5 MG tablet    GLUCOTROL    225 tablet    Take 0.5 tablet every morning and 2 tablets every evening    Type 2 diabetes mellitus without complication, without long-term current use of insulin (H)       JANUVIA 100 MG tablet   Generic drug:  sitagliptin     90 tablet    TAKE 1 TABLET BY MOUTH EVERY DAY    Type 2 diabetes mellitus without complication, without long-term current use of insulin (H)       levothyroxine 137 MCG tablet    SYNTHROID/LEVOTHROID    90 tablet    TAKE 1 TABLET BY MOUTH EVERY DAY    Hypothyroidism due to acquired atrophy of thyroid       lisinopril 20 MG tablet    PRINIVIL/ZESTRIL    90 tablet    Take 1 tablet (20 mg) by mouth daily    Benign essential hypertension       lisinopril-hydrochlorothiazide 20-12.5 MG per tablet    PRINZIDE/ZESTORETIC    90 tablet    TAKE 1 TABLET BY MOUTH EVERY MORNING    Benign essential hypertension       lovastatin 20 MG tablet    MEVACOR    90 tablet    TAKE 1 TABLET BY MOUTH EVERY  DAY    Hyperlipidemia LDL goal <100       metFORMIN 500 MG tablet    GLUCOPHAGE    180 tablet    TAKE 1 TABLET BY MOUTH TWICE DAILY    Type 2 diabetes mellitus without complication, without long-term current use of insulin (H)       metoprolol 50 MG tablet    LOPRESSOR    180 tablet    TAKE 1 TABLET BY MOUTH TWICE DAILY.    Benign essential hypertension       triamcinolone 0.1 % cream    KENALOG    45 g    Apply b.i.d. p.r.n. to dermatitis but not on the face    Dermatitis

## 2017-12-07 NOTE — PROGRESS NOTES
SUBJECTIVE:   Gavi Pearson is a 86 year old female who presents to clinic today for the following health issues:      Dizziness  Duration of complaint: fell on Tues, dizziness started today, nauseous on Wed.,        She states that on Tuesday she went outside to  the newspaper and she tipped over forward and fell on her hands. She crawled into the house. The rest of that day she took it easy. She states that on Wednesday she felt nauseated and she'd never had that symptom after a fall. She state that this morning she ate breakfast and leaned her head back and had a feeling of lightheadedness as if she was going to faint. She drinks plenty of water, coffee, and orange juice. She has been having episodes of diarrhea for the last couple days with 2 or 3 bowel movements a day. She denies abdominal discomfort with the diarrhea. Of note her blood sugar was 124 this morning.      Problem list and histories reviewed & adjusted, as indicated.  Additional history: as documented    Current Outpatient Prescriptions   Medication Sig Dispense Refill     blood glucose monitoring (NO BRAND SPECIFIED) test strip Use to test blood sugars 2 times daily or as directed 200 strip 3     glipiZIDE (GLUCOTROL) 5 MG tablet Take 0.5 tablet every morning and 2 tablets every evening 225 tablet 0     lisinopril (PRINIVIL/ZESTRIL) 20 MG tablet Take 1 tablet (20 mg) by mouth daily 90 tablet 3     blood glucose (NO BRAND SPECIFIED) lancets standard Use to test blood sugar 2 times daily or as directed. 200 each 3     metFORMIN (GLUCOPHAGE) 500 MG tablet TAKE 1 TABLET BY MOUTH TWICE DAILY 180 tablet 3     lovastatin (MEVACOR) 20 MG tablet TAKE 1 TABLET BY MOUTH EVERY DAY 90 tablet 3     levothyroxine (SYNTHROID/LEVOTHROID) 137 MCG tablet TAKE 1 TABLET BY MOUTH EVERY DAY 90 tablet 3     lisinopril-hydrochlorothiazide (PRINZIDE/ZESTORETIC) 20-12.5 MG per tablet TAKE 1 TABLET BY MOUTH EVERY MORNING 90 tablet 3     metoprolol (LOPRESSOR) 50 MG  "tablet TAKE 1 TABLET BY MOUTH TWICE DAILY. 180 tablet 3     JANUVIA 100 MG tablet TAKE 1 TABLET BY MOUTH EVERY DAY 90 tablet 3     triamcinolone (KENALOG) 0.1 % cream Apply b.i.d. p.r.n. to dermatitis but not on the face 45 g 1     JOSÉ ASPIRIN PO        Allergies   Allergen Reactions     No Known Allergies      Reviewed and updated as needed this visit by clinical staff  Tobacco  Allergies  Meds  Soc Hx      Reviewed and updated as needed this visit by Provider         ROS:  Constitutional, HEENT, cardiovascular, pulmonary, gi and gu systems are negative, except as otherwise noted.    This document serves as a record of the services and decisions personally performed and made by Aleks Maher MD. It was created on his/her behalf by Diogenes Aguirre, a trained medical scribe. The creation of this document is based the provider's statements to the medical scribe.  Scribphoenix Aguirre 1:35 PM, December 7, 2017    OBJECTIVE:   /79 (BP Location: Right arm, Patient Position: Chair, Cuff Size: Adult Large)  Pulse 112  Temp 97.6  F (36.4  C) (Oral)  Ht 1.753 m (5' 9\")  Wt 88.7 kg (195 lb 8 oz)  SpO2 99%  Breastfeeding? No  BMI 28.87 kg/m2  Body mass index is 28.87 kg/(m^2).  Supine-110/70, Sitting 96/80    Neck was supple without adenopathy or thyromegaly her carotids were normal without bruits  Chest clear to auscultation and percussion  Cardiovascular S1 and S2 are physiologic without murmurs or gallops  Abdomen bowel sounds were normal.  There is no palpable mass or organomegaly  Extremities nontender without any edema  Pulses pedal pulses are as described otherwise his pulses are bilaterally symmetrical throughout without bruits  Skin without significant abnormality      Diagnostic Test Results:  Results for orders placed or performed in visit on 12/07/17 (from the past 24 hour(s))   CBC with platelets   Result Value Ref Range    WBC 11.9 (H) 4.0 - 11.0 10e9/L    RBC Count 4.30 3.8 - 5.2 10e12/L "    Hemoglobin 13.6 11.7 - 15.7 g/dL    Hematocrit 38.8 35.0 - 47.0 %    MCV 90 78 - 100 fl    MCH 31.6 26.5 - 33.0 pg    MCHC 35.1 31.5 - 36.5 g/dL    RDW 12.7 10.0 - 15.0 %    Platelet Count 216 150 - 450 10e9/L       ASSESSMENT/PLAN:     1. Benign essential hypertension    2. Acquired hypothyroidism    3. Paroxysmal a-fib (H)    4. Vitamin B12 deficiency (non anemic)    5. Type 2 diabetes mellitus without complication, without long-term current use of insulin (H)  - Hemoglobin A1c  - glipiZIDE (GLUCOTROL) 5 MG tablet; Take 0.5 tablet every morning and 2 tablets every evening  Dispense: 225 tablet; Refill: 3    6. Polyneuropathy associated with underlying disease (H)    7. Acute diarrhea  - Basic metabolic panel  - CBC with platelets    -Recommend patient start a full liquid diet. We recommend that patient expand her activities/exercise program. Left all her medications unchanged, except she should hold her Lisinopril and lisinopril HCTuntil she is on a regular diet.    -Call patient with lab results.    Aleks Maher MD  [facility]    The information in this document, created by the medical scribe for me, accurately reflects the services I personally performed and the decisions made by me. I have reviewed and approved this document for accuracy prior to leaving the patient care area.  Aleks Maher MD  2:45 PM, 12/07/17

## 2017-12-07 NOTE — NURSING NOTE
"Chief Complaint   Patient presents with     Dizziness       Initial /79 (BP Location: Right arm, Patient Position: Chair, Cuff Size: Adult Large)  Pulse 112  Temp 97.6  F (36.4  C) (Oral)  Ht 5' 9\" (1.753 m)  Wt 195 lb 8 oz (88.7 kg)  SpO2 99%  Breastfeeding? No  BMI 28.87 kg/m2 Estimated body mass index is 28.87 kg/(m^2) as calculated from the following:    Height as of this encounter: 5' 9\" (1.753 m).    Weight as of this encounter: 195 lb 8 oz (88.7 kg).  Medication Reconciliation: complete.  Francisca Guadalupe CMA    "

## 2017-12-08 LAB
ANION GAP SERPL CALCULATED.3IONS-SCNC: 12 MMOL/L (ref 3–14)
BUN SERPL-MCNC: 14 MG/DL (ref 7–30)
CALCIUM SERPL-MCNC: 8.7 MG/DL (ref 8.5–10.1)
CHLORIDE SERPL-SCNC: 95 MMOL/L (ref 94–109)
CO2 SERPL-SCNC: 22 MMOL/L (ref 20–32)
CREAT SERPL-MCNC: 0.85 MG/DL (ref 0.52–1.04)
GFR SERPL CREATININE-BSD FRML MDRD: 63 ML/MIN/1.7M2
GLUCOSE SERPL-MCNC: 222 MG/DL (ref 70–99)
POTASSIUM SERPL-SCNC: 3.8 MMOL/L (ref 3.4–5.3)
SODIUM SERPL-SCNC: 129 MMOL/L (ref 133–144)

## 2018-01-10 ENCOUNTER — OFFICE VISIT (OUTPATIENT)
Dept: FAMILY MEDICINE | Facility: CLINIC | Age: 83
End: 2018-01-10
Payer: MEDICARE

## 2018-01-10 VITALS
TEMPERATURE: 96.7 F | WEIGHT: 194 LBS | DIASTOLIC BLOOD PRESSURE: 90 MMHG | BODY MASS INDEX: 28.73 KG/M2 | HEIGHT: 69 IN | HEART RATE: 66 BPM | OXYGEN SATURATION: 96 % | SYSTOLIC BLOOD PRESSURE: 159 MMHG

## 2018-01-10 DIAGNOSIS — I48.0 PAROXYSMAL A-FIB (H): ICD-10-CM

## 2018-01-10 DIAGNOSIS — G63 POLYNEUROPATHY ASSOCIATED WITH UNDERLYING DISEASE (H): ICD-10-CM

## 2018-01-10 DIAGNOSIS — I10 BENIGN ESSENTIAL HYPERTENSION: Primary | ICD-10-CM

## 2018-01-10 DIAGNOSIS — E53.8 VITAMIN B12 DEFICIENCY (NON ANEMIC): ICD-10-CM

## 2018-01-10 DIAGNOSIS — E11.9 TYPE 2 DIABETES MELLITUS WITHOUT COMPLICATION, WITHOUT LONG-TERM CURRENT USE OF INSULIN (H): ICD-10-CM

## 2018-01-10 DIAGNOSIS — E78.01 FAMILIAL HYPERCHOLESTEROLEMIA: ICD-10-CM

## 2018-01-10 DIAGNOSIS — E03.9 ACQUIRED HYPOTHYROIDISM: ICD-10-CM

## 2018-01-10 LAB
ERYTHROCYTE [DISTWIDTH] IN BLOOD BY AUTOMATED COUNT: 13 % (ref 10–15)
HBA1C MFR BLD: 6.8 % (ref 4.3–6)
HCT VFR BLD AUTO: 40.3 % (ref 35–47)
HGB BLD-MCNC: 13.9 G/DL (ref 11.7–15.7)
MCH RBC QN AUTO: 31.8 PG (ref 26.5–33)
MCHC RBC AUTO-ENTMCNC: 34.5 G/DL (ref 31.5–36.5)
MCV RBC AUTO: 92 FL (ref 78–100)
PLATELET # BLD AUTO: 217 10E9/L (ref 150–450)
RBC # BLD AUTO: 4.37 10E12/L (ref 3.8–5.2)
WBC # BLD AUTO: 10.6 10E9/L (ref 4–11)

## 2018-01-10 PROCEDURE — 80048 BASIC METABOLIC PNL TOTAL CA: CPT | Performed by: INTERNAL MEDICINE

## 2018-01-10 PROCEDURE — 83036 HEMOGLOBIN GLYCOSYLATED A1C: CPT | Performed by: INTERNAL MEDICINE

## 2018-01-10 PROCEDURE — 84443 ASSAY THYROID STIM HORMONE: CPT | Performed by: INTERNAL MEDICINE

## 2018-01-10 PROCEDURE — 36415 COLL VENOUS BLD VENIPUNCTURE: CPT | Performed by: INTERNAL MEDICINE

## 2018-01-10 PROCEDURE — 99214 OFFICE O/P EST MOD 30 MIN: CPT | Performed by: INTERNAL MEDICINE

## 2018-01-10 PROCEDURE — 85027 COMPLETE CBC AUTOMATED: CPT | Performed by: INTERNAL MEDICINE

## 2018-01-10 NOTE — PROGRESS NOTES
"  SUBJECTIVE:   Gavi Pearson is a 86 year old female who presents to clinic today for the following health issues:      The patient states that she has been feeling \"not quite there\" for the past three months, since she fell in October. She has been experiencing emotional fatigue constantly. She also feels like she is negatively affected by the Winter season and darker days. She denies diarrhea, difficulty sleeping, and memory problems.       The patient has a history of type II diabetes and states that she has been checking her blood pressures regularly. In the morning she gets readings of 140-160 mg/dL and 145-150 mg/dL in the evening. She currently takes glipizide, metformin, and Januvia.    Nocturia x1    Problem list and histories reviewed & adjusted, as indicated.  Additional history: as documented      Reviewed and updated as needed this visit by clinical staff  Tobacco  Allergies  Meds  Problems  Med Hx  Surg Hx  Fam Hx  Soc Hx        Reviewed and updated as needed this visit by Provider         ROS:  Constitutional, HEENT, cardiovascular, pulmonary, gi and gu systems are negative, except as otherwise noted.    This document serves as a record of the services and decisions personally performed and made by Aleks Maher MD. It was created on his/her behalf by Diogenes Aguirre, a trained medical scribe. The creation of this document is based the provider's statements to the medical scribe.  Yanira Aguirre 1:20 PM, January 10, 2018  OBJECTIVE:   /90 (BP Location: Left arm, Cuff Size: Adult Large)  Pulse 66  Temp 96.7  F (35.9  C) (Oral)  Ht 1.753 m (5' 9\")  Wt 88 kg (194 lb)  SpO2 96%  BMI 28.65 kg/m2  Body mass index is 28.65 kg/(m^2).  Neck was supple without adenopathy or thyromegaly her carotids were normal without bruits  Chest clear to auscultation and percussion  Cardiovascular S1 and S2 were irregularly irregular and are physiologic without murmurs or gallops  Abdomen bowel " sounds were normal.  There is no palpable mass or organomegaly  Extremities nontender without any edema  Pulses pedal pulses are as described otherwise his pulses are bilaterally symmetrical throughout without bruits  Skin without significant abnormality  NEURO: Normal strength and tone, mentation intact and speech normal, strength was equal bilaterally, no drift, Rhomberg was negative     Diagnostic Test Results:  Pending  ASSESSMENT/PLAN:     1. Type 2 diabetes mellitus without complication, without long-term current use of insulin (H)  - Lab results pending  - Hemoglobin A1c    2. Benign essential hypertension  - Lab results pending  - Basic metabolic panel    3. Paroxysmal a-fib (H)    4. Polyneuropathy associated with underlying disease (H)    5. Familial hypercholesterolemia    6. Acquired hypothyroidism  - Lab results pending  - TSH with free T4 reflex    7. Vitamin B12 deficiency (non anemic)  - Lab results pending  - CBC with platelets    8. Fatigue  - The patient most likely SAD, we are checking to make sure there is no progression in previously noted hyponitremia or no other hormonal or metabolic cause of her fatigue  - Unlikely that this is associated with hypoglycemia    Followup in one week for lab recheck and review labs    Aleks Maher MD  [facility]    The information in this document, created by the medical scribe for me, accurately reflects the services I personally performed and the decisions made by me. I have reviewed and approved this document for accuracy prior to leaving the patient care area.  Aleks Maher MD  1:21 PM, 01/10/18

## 2018-01-10 NOTE — NURSING NOTE
"Chief Complaint   Patient presents with     RECHECK       Initial /90 (BP Location: Left arm, Cuff Size: Adult Large)  Pulse 66  Temp 96.7  F (35.9  C) (Oral)  Ht 5' 9\" (1.753 m)  Wt 194 lb (88 kg)  SpO2 96%  BMI 28.65 kg/m2 Estimated body mass index is 28.65 kg/(m^2) as calculated from the following:    Height as of this encounter: 5' 9\" (1.753 m).    Weight as of this encounter: 194 lb (88 kg).  Medication Reconciliation: complete     Adelaida Crespo MA    "

## 2018-01-10 NOTE — MR AVS SNAPSHOT
"              After Visit Summary   1/10/2018    Gavi Pearson    MRN: [id number]           Patient Information     Date Of Birth          10/21/1931        Visit Information        Provider Department      1/10/2018 1:00 PM Aleks Maher MD [facility]        Today's Diagnoses     Benign essential hypertension    -  1    Type 2 diabetes mellitus without complication, without long-term current use of insulin (H)        Paroxysmal a-fib (H)        Polyneuropathy associated with underlying disease (H)        Familial hypercholesterolemia        Acquired hypothyroidism        Vitamin B12 deficiency (non anemic)           Follow-ups after your visit        Who to contact     If you have questions or need follow up information about today's clinic visit or your schedule please contact [facility] directly at [phone].  Normal or non-critical lab and imaging results will be communicated to you by MyChart, letter or phone within 4 business days after the clinic has received the results. If you do not hear from us within 7 days, please contact the clinic through MyChart or phone. If you have a critical or abnormal lab result, we will notify you by phone as soon as possible.  Submit refill requests through [employer] or call your pharmacy and they will forward the refill request to us. Please allow 3 business days for your refill to be completed.          Additional Information About Your Visit        My[employer] Information     [employer] lets you send messages to your doctor, view your test results, renew your prescriptions, schedule appointments and more. To sign up, go to [email or web address] . Click on \"Log in\" on the left side of the screen, which will take you to the Welcome page. Then click on \"Sign up Now\" on the right side of the page.     You will be asked to enter the access code listed below, as well as some personal information. Please follow the directions to create your username and " "password.     Your access code is: [email or web address]  Expires: 2018 10:12 PM     Your access code will  in 90 days. If you need help or a new code, please call your [place] clinic or [phone].        Care EveryWhere ID     This is your Care EveryWhere ID. This could be used by other organizations to access your [facility] medical records  [id number]        Your Vitals Were     Pulse Temperature Height Pulse Oximetry BMI (Body Mass Index)       66 96.7  F (35.9  C) (Oral) 5' 9\" (1.753 m) 96% 28.65 kg/m2        Blood Pressure from Last 3 Encounters:   18 173/88   01/10/18 159/90   17 107/79    Weight from Last 3 Encounters:   18 194 lb (88 kg)   01/10/18 194 lb (88 kg)   17 195 lb 8 oz (88.7 kg)              We Performed the Following     Basic metabolic panel     CBC with platelets     Hemoglobin A1c     TSH with free T4 reflex        Primary Care Provider Office Phone # Fax #    Aleks Maher [phone] [address] [address]        Equal Access to Services     MARCIA GIL : Hadii aad ku hadasho Soomaali, waaxda luqadaha, qaybta kaalmada adeegyada, waxay idiin hayvirgenn barrett sampson lajorgito . So [facility] [phone].    ATENCIÓN: Si habla español, tiene a lema disposición servicios gratuitos de asistencia lingüística. Llkiley al [phone].    We comply with applicable federal civil rights laws and [place] laws. We do not discriminate on the basis of race, color, national origin, age, disability, sex, sexual orientation, or gender identity.            Thank you!     Thank you for choosing [facility]  for your care. Our goal is always to provide you with excellent care. Hearing back from our patients is one way we can continue to improve our services. Please take a few minutes to complete the written survey that you may receive in the mail after your visit with us. Thank you!             Your Updated Medication List - Protect " others around you: Learn how to safely use, store and throw away your medicines at www.disposemymeds.org.          This list is accurate as of 1/10/18 11:59 PM.  Always use your most recent med list.                   Brand Name Dispense Instructions for use Diagnosis    JOSÉ ASPIRIN PO           blood glucose lancets standard    no brand specified    200 each    Use to test blood sugar 2 times daily or as directed.    Type 2 diabetes mellitus without complication, without long-term current use of insulin (H)       blood glucose monitoring test strip    no brand specified    200 strip    Use to test blood sugars 2 times daily or as directed    Type 2 diabetes mellitus without complication, without long-term current use of insulin (H)       glipiZIDE 5 MG tablet    GLUCOTROL    225 tablet    Take 0.5 tablet every morning and 2 tablets every evening    Type 2 diabetes mellitus without complication, without long-term current use of insulin (H)       JANUVIA 100 MG tablet   Generic drug:  sitagliptin     90 tablet    TAKE 1 TABLET BY MOUTH EVERY DAY    Type 2 diabetes mellitus without complication, without long-term current use of insulin (H)       levothyroxine 137 MCG tablet    SYNTHROID/LEVOTHROID    90 tablet    TAKE 1 TABLET BY MOUTH EVERY DAY    Hypothyroidism due to acquired atrophy of thyroid       lisinopril 20 MG tablet    PRINIVIL/ZESTRIL    90 tablet    Take 1 tablet (20 mg) by mouth daily    Benign essential hypertension       lisinopril-hydrochlorothiazide 20-12.5 MG per tablet    PRINZIDE/ZESTORETIC    90 tablet    TAKE 1 TABLET BY MOUTH EVERY MORNING    Benign essential hypertension       lovastatin 20 MG tablet    MEVACOR    90 tablet    TAKE 1 TABLET BY MOUTH EVERY DAY    Hyperlipidemia LDL goal <100       metFORMIN 500 MG tablet    GLUCOPHAGE    180 tablet    TAKE 1 TABLET BY MOUTH TWICE DAILY    Type 2 diabetes mellitus without complication, without long-term current use of insulin (H)        metoprolol tartrate 50 MG tablet    LOPRESSOR    180 tablet    TAKE 1 TABLET BY MOUTH TWICE DAILY.    Benign essential hypertension       triamcinolone 0.1 % cream    KENALOG    45 g    Apply b.i.d. p.r.n. to dermatitis but not on the face    Dermatitis

## 2018-01-10 NOTE — LETTER
[place] [address] [facility]  [address]  [place]  [address]  Tel: [phone]    January 16, 2018    Gavi Pearson  [address] [place] [address]        Dear MsRashi Lili,    I'm happy to report that your lab studies are looking good.   Your serum sodium is much improved from a month ago and your minerals and electrolytes or otherwise all normal in your kidney function test is normal as well.the blood cell counts are equally normal, there is no sign of low blood or anemia.   Last but not least your hemoglobin A1c continues to improve, congratulations on the effort. The hemoglobin A1c was 6.8 down from 7.0, our goal is to keep it under seven.   I'm glad to report the noted progress, please keep up the good work. If you have any questions please let me know. Let's recheck your A1c in four months.       Sincerely,    Aleks Maher MD/fernando    Results for orders placed or performed in visit on 01/10/18   TSH with free T4 reflex   Result Value Ref Range    TSH 0.60 0.40 - 4.00 mU/L   Basic metabolic panel   Result Value Ref Range    Sodium 137 133 - 144 mmol/L    Potassium 3.9 3.4 - 5.3 mmol/L    Chloride 101 94 - 109 mmol/L    Carbon Dioxide 22 20 - 32 mmol/L    Anion Gap 14 3 - 14 mmol/L    Glucose 99 70 - 99 mg/dL    Urea Nitrogen 15 7 - 30 mg/dL    Creatinine 0.67 0.52 - 1.04 mg/dL    GFR Estimate 83 >60 mL/min/1.7m2    GFR Estimate If Black >90 >60 mL/min/1.7m2    Calcium 9.3 8.5 - 10.1 mg/dL   Hemoglobin A1c   Result Value Ref Range    Hemoglobin A1C 6.8 (H) 4.3 - 6.0 %   CBC with platelets   Result Value Ref Range    WBC 10.6 4.0 - 11.0 10e9/L    RBC Count 4.37 3.8 - 5.2 10e12/L    Hemoglobin 13.9 11.7 - 15.7 g/dL    Hematocrit 40.3 35.0 - 47.0 %    MCV 92 78 - 100 fl    MCH 31.8 26.5 - 33.0 pg    MCHC 34.5 31.5 - 36.5 g/dL    RDW 13.0 10.0 - 15.0 %    Platelet Count 217 150 - 450 10e9/L           Enclosure: Lab Results

## 2018-01-12 LAB
ANION GAP SERPL CALCULATED.3IONS-SCNC: 14 MMOL/L (ref 3–14)
BUN SERPL-MCNC: 15 MG/DL (ref 7–30)
CALCIUM SERPL-MCNC: 9.3 MG/DL (ref 8.5–10.1)
CHLORIDE SERPL-SCNC: 101 MMOL/L (ref 94–109)
CO2 SERPL-SCNC: 22 MMOL/L (ref 20–32)
CREAT SERPL-MCNC: 0.67 MG/DL (ref 0.52–1.04)
GFR SERPL CREATININE-BSD FRML MDRD: 83 ML/MIN/1.7M2
GLUCOSE SERPL-MCNC: 99 MG/DL (ref 70–99)
POTASSIUM SERPL-SCNC: 3.9 MMOL/L (ref 3.4–5.3)
SODIUM SERPL-SCNC: 137 MMOL/L (ref 133–144)
TSH SERPL DL<=0.005 MIU/L-ACNC: 0.6 MU/L (ref 0.4–4)

## 2018-01-18 ENCOUNTER — OFFICE VISIT (OUTPATIENT)
Dept: FAMILY MEDICINE | Facility: CLINIC | Age: 83
End: 2018-01-18
Payer: MEDICARE

## 2018-01-18 VITALS
TEMPERATURE: 97.5 F | SYSTOLIC BLOOD PRESSURE: 173 MMHG | HEIGHT: 69 IN | WEIGHT: 194 LBS | RESPIRATION RATE: 18 BRPM | BODY MASS INDEX: 28.73 KG/M2 | OXYGEN SATURATION: 96 % | HEART RATE: 92 BPM | DIASTOLIC BLOOD PRESSURE: 88 MMHG

## 2018-01-18 DIAGNOSIS — E53.8 VITAMIN B12 DEFICIENCY (NON ANEMIC): ICD-10-CM

## 2018-01-18 DIAGNOSIS — E11.9 TYPE 2 DIABETES MELLITUS WITHOUT COMPLICATION, WITHOUT LONG-TERM CURRENT USE OF INSULIN (H): ICD-10-CM

## 2018-01-18 DIAGNOSIS — G63 POLYNEUROPATHY ASSOCIATED WITH UNDERLYING DISEASE (H): ICD-10-CM

## 2018-01-18 DIAGNOSIS — I48.0 PAROXYSMAL A-FIB (H): ICD-10-CM

## 2018-01-18 DIAGNOSIS — E78.01 FAMILIAL HYPERCHOLESTEROLEMIA: ICD-10-CM

## 2018-01-18 DIAGNOSIS — E03.9 ACQUIRED HYPOTHYROIDISM: ICD-10-CM

## 2018-01-18 DIAGNOSIS — I10 BENIGN ESSENTIAL HYPERTENSION: Primary | ICD-10-CM

## 2018-01-18 PROCEDURE — 99213 OFFICE O/P EST LOW 20 MIN: CPT | Performed by: INTERNAL MEDICINE

## 2018-01-18 NOTE — PROGRESS NOTES
SUBJECTIVE:                                                    Gavi Pearson is a 86 year old female who presents to clinic today for the following health issues:    Patient presents to the clinic to follow up on her lab results from her previous visit. We reviewed her reports. We discussed the influenza vaccine as well.       Problem list and histories reviewed & adjusted, as indicated.  Additional history: as documented    Current Outpatient Prescriptions   Medication Sig Dispense Refill     glipiZIDE (GLUCOTROL) 5 MG tablet Take 0.5 tablet every morning and 2 tablets every evening 225 tablet 3     blood glucose monitoring (NO BRAND SPECIFIED) test strip Use to test blood sugars 2 times daily or as directed 200 strip 3     lisinopril (PRINIVIL/ZESTRIL) 20 MG tablet Take 1 tablet (20 mg) by mouth daily 90 tablet 3     blood glucose (NO BRAND SPECIFIED) lancets standard Use to test blood sugar 2 times daily or as directed. 200 each 3     metFORMIN (GLUCOPHAGE) 500 MG tablet TAKE 1 TABLET BY MOUTH TWICE DAILY 180 tablet 3     lovastatin (MEVACOR) 20 MG tablet TAKE 1 TABLET BY MOUTH EVERY DAY 90 tablet 3     levothyroxine (SYNTHROID/LEVOTHROID) 137 MCG tablet TAKE 1 TABLET BY MOUTH EVERY DAY 90 tablet 3     lisinopril-hydrochlorothiazide (PRINZIDE/ZESTORETIC) 20-12.5 MG per tablet TAKE 1 TABLET BY MOUTH EVERY MORNING 90 tablet 3     metoprolol (LOPRESSOR) 50 MG tablet TAKE 1 TABLET BY MOUTH TWICE DAILY. 180 tablet 3     JANUVIA 100 MG tablet TAKE 1 TABLET BY MOUTH EVERY DAY 90 tablet 3     triamcinolone (KENALOG) 0.1 % cream Apply b.i.d. p.r.n. to dermatitis but not on the face 45 g 1     JOSÉ ASPIRIN PO        No Known Allergies    ROS:  Constitutional, HEENT, cardiovascular, pulmonary, gi and gu systems are negative, except as otherwise noted.    This document serves as a record of the services and decisions personally performed and made by Aleks Maher MD. It was created on his/her behalf by Diogenes Aguirre  "a trained medical scribe. The creation of this document is based the provider's statements to the medical scribe.  Yanira Duffymarisol 1:25 PM, January 18, 2018    OBJECTIVE:   /88 (BP Location: Right arm, Patient Position: Chair, Cuff Size: Adult Regular)  Pulse 92  Temp 97.5  F (36.4  C) (Oral)  Resp 18  Ht 1.753 m (5' 9\")  Wt 88 kg (194 lb)  SpO2 96%  BMI 28.65 kg/m2  Body mass index is 28.65 kg/(m^2).    5 minutes spent with patient, over 50% time counseling, coordinating care and explaining about nature of the patient's conditions.    Diagnostic Test Results:  none     ASSESSMENT/PLAN:     1. Benign essential hypertension  -Continue current therapies.    2. Familial hypercholesterolemia  -Patient is managing well.    3. Acquired hypothyroidism  -She will continue her current course of treatment.    4. Paroxysmal a-fib (H)    5. Vitamin B12 deficiency (non anemic)    6. Polyneuropathy associated with underlying disease (H)    7. Type 2 diabetes mellitus without complication, without long-term current use of insulin (H)  -Well managed on current therapies.    -She will return to clinic if acute problems arise.    Aleks Maher MD  [facility]    The information in this document, created by the medical scribe for me, accurately reflects the services I personally performed and the decisions made by me. I have reviewed and approved this document for accuracy prior to leaving the patient care area.  Aleks Maher MD  1:30 PM, 01/18/18          "

## 2018-01-18 NOTE — NURSING NOTE
"Chief Complaint   Patient presents with     Diabetes     Lab results       Initial /88 (BP Location: Right arm, Patient Position: Chair, Cuff Size: Adult Regular)  Pulse 92  Temp 97.5  F (36.4  C) (Oral)  Resp 18  Ht 5' 9\" (1.753 m)  Wt 194 lb (88 kg)  SpO2 96%  BMI 28.65 kg/m2 Estimated body mass index is 28.65 kg/(m^2) as calculated from the following:    Height as of this encounter: 5' 9\" (1.753 m).    Weight as of this encounter: 194 lb (88 kg).  Medication Reconciliation: complete   Aliyah Mitchell CMA (AAMA)      "

## 2018-01-18 NOTE — MR AVS SNAPSHOT
"              After Visit Summary   1/18/2018    Gavi Pearson    MRN: [id number]           Patient Information     Date Of Birth          10/21/1931        Visit Information        Provider Department      1/18/2018 1:30 PM Aleks Maher MD [facility]        Today's Diagnoses     Benign essential hypertension    -  1    Familial hypercholesterolemia        Acquired hypothyroidism        Paroxysmal a-fib (H)        Vitamin B12 deficiency (non anemic)        Polyneuropathy associated with underlying disease (H)        Type 2 diabetes mellitus without complication, without long-term current use of insulin (H)           Follow-ups after your visit        Who to contact     If you have questions or need follow up information about today's clinic visit or your schedule please contact [facility] directly at [phone].  Normal or non-critical lab and imaging results will be communicated to you by MyChart, letter or phone within 4 business days after the clinic has received the results. If you do not hear from us within 7 days, please contact the clinic through MyChart or phone. If you have a critical or abnormal lab result, we will notify you by phone as soon as possible.  Submit refill requests through [employer] or call your pharmacy and they will forward the refill request to us. Please allow 3 business days for your refill to be completed.          Additional Information About Your Visit        [employer] Information     [employer] lets you send messages to your doctor, view your test results, renew your prescriptions, schedule appointments and more. To sign up, go to [email or web address] . Click on \"Log in\" on the left side of the screen, which will take you to the Welcome page. Then click on \"Sign up Now\" on the right side of the page.     You will be asked to enter the access code listed below, as well as some personal information. Please follow the directions to create your username and " "password.     Your access code is: [email or web address]  Expires: 3/10/2018  4:52 PM     Your access code will  in 90 days. If you need help or a new code, please call your [facility] clinic or [phone].        Care EveryWhere ID     This is your Care EveryWhere ID. This could be used by other organizations to access your [facility] medical records  [id number]        Your Vitals Were     Pulse Temperature Respirations Height Pulse Oximetry BMI (Body Mass Index)    92 97.5  F (36.4  C) (Oral) 18 5' 9\" (1.753 m) 96% 28.65 kg/m2       Blood Pressure from Last 3 Encounters:   18 173/88   01/10/18 159/90   17 107/79    Weight from Last 3 Encounters:   18 194 lb (88 kg)   01/10/18 194 lb (88 kg)   17 195 lb 8 oz (88.7 kg)              Today, you had the following     No orders found for display       Primary Care Provider Office Phone # Fax #    Aleks Maher [phone] [address]  [facility] [address]        Equal Access to Services     MARLYN GIL : Hadii aad ku hadasho Soomaali, waaxda luqadaha, qaybta kaalmada adeegyada, waxay zain hayvirgenn barrett cordero . So [facility] [phone].    ATENCIÓN: Si habla español, tiene a lema disposición servicios gratuitos de asistencia lingüística. Llkiley al [phone].    We comply with applicable federal civil rights laws and [place] laws. We do not discriminate on the basis of race, color, national origin, age, disability, sex, sexual orientation, or gender identity.            Thank you!     Thank you for choosing [facility]  for your care. Our goal is always to provide you with excellent care. Hearing back from our patients is one way we can continue to improve our services. Please take a few minutes to complete the written survey that you may receive in the mail after your visit with us. Thank you!             Your Updated Medication List - Protect others around you: Learn how to safely use, store " and throw away your medicines at www.disposemymeds.org.          This list is accurate as of: 1/18/18 11:59 PM.  Always use your most recent med list.                   Brand Name Dispense Instructions for use Diagnosis    JOSÉ ASPIRIN PO           blood glucose lancets standard    no brand specified    200 each    Use to test blood sugar 2 times daily or as directed.    Type 2 diabetes mellitus without complication, without long-term current use of insulin (H)       blood glucose monitoring test strip    no brand specified    200 strip    Use to test blood sugars 2 times daily or as directed    Type 2 diabetes mellitus without complication, without long-term current use of insulin (H)       glipiZIDE 5 MG tablet    GLUCOTROL    225 tablet    Take 0.5 tablet every morning and 2 tablets every evening    Type 2 diabetes mellitus without complication, without long-term current use of insulin (H)       JANUVIA 100 MG tablet   Generic drug:  sitagliptin     90 tablet    TAKE 1 TABLET BY MOUTH EVERY DAY    Type 2 diabetes mellitus without complication, without long-term current use of insulin (H)       levothyroxine 137 MCG tablet    SYNTHROID/LEVOTHROID    90 tablet    TAKE 1 TABLET BY MOUTH EVERY DAY    Hypothyroidism due to acquired atrophy of thyroid       lisinopril 20 MG tablet    PRINIVIL/ZESTRIL    90 tablet    Take 1 tablet (20 mg) by mouth daily    Benign essential hypertension       lisinopril-hydrochlorothiazide 20-12.5 MG per tablet    PRINZIDE/ZESTORETIC    90 tablet    TAKE 1 TABLET BY MOUTH EVERY MORNING    Benign essential hypertension       lovastatin 20 MG tablet    MEVACOR    90 tablet    TAKE 1 TABLET BY MOUTH EVERY DAY    Hyperlipidemia LDL goal <100       metFORMIN 500 MG tablet    GLUCOPHAGE    180 tablet    TAKE 1 TABLET BY MOUTH TWICE DAILY    Type 2 diabetes mellitus without complication, without long-term current use of insulin (H)       metoprolol tartrate 50 MG tablet    LOPRESSOR    180  tablet    TAKE 1 TABLET BY MOUTH TWICE DAILY.    Benign essential hypertension       triamcinolone 0.1 % cream    KENALOG    45 g    Apply b.i.d. p.r.n. to dermatitis but not on the face    Dermatitis

## 2018-06-25 DIAGNOSIS — E03.4 HYPOTHYROIDISM DUE TO ACQUIRED ATROPHY OF THYROID: ICD-10-CM

## 2018-06-25 NOTE — TELEPHONE ENCOUNTER
"levothyroxine (SYNTHROID/LEVOTHROID) 137 MCG tablet 90 tablet 3 8/16/2017     Last Written Prescription Date:  8/16/17  Last Fill Quantity: 90,  # refills: 3   Last office visit: 1/18/2018 with prescribing provider:  Gunnar   Future Office Visit:   Next 5 appointments (look out 90 days)     Jul 16, 2018  9:00 AM CDT   [facility]    [address] [facility] [address] [phone]                 Requested Prescriptions   Pending Prescriptions Disp Refills     levothyroxine (SYNTHROID/LEVOTHROID) 137 MCG tablet [Pharmacy Med Name: LEVOTHYROXINE 0.137MG (137MCG) TAB] 90 tablet 0     Sig: TAKE 1 TABLET BY MOUTH EVERY DAY    Thyroid Protocol Passed    6/25/2018 10:12 AM       Passed - Patient is 12 years or older       Passed - Recent (12 mo) or future (30 days) visit within the authorizing provider's specialty    Patient had office visit in the last 12 months or has a visit in the next 30 days with authorizing provider or within the authorizing provider's specialty.  See \"Patient Info\" tab in inbasket, or \"Choose Columns\" in Meds & Orders section of the refill encounter.           Passed - Normal TSH on file in past 12 months    Recent Labs   Lab Test  01/10/18   1340   TSH  0.60             Passed - No active pregnancy on record    If patient is pregnant or has had a positive pregnancy test, please check TSH.         Passed - No positive pregnancy test in past 12 months    If patient is pregnant or has had a positive pregnancy test, please check TSH.          No flowsheet data found.  "

## 2018-06-26 RX ORDER — LEVOTHYROXINE SODIUM 137 UG/1
TABLET ORAL
Qty: 90 TABLET | Refills: 0 | Status: SHIPPED | OUTPATIENT
Start: 2018-06-26 | End: 2018-09-18

## 2018-07-13 NOTE — PROGRESS NOTES
SUBJECTIVE:   Gavi Pearson is a 86 year old female who presents for Preventive Visit.  -Notes general weakness with a low level of activity  -Intermittent right jaw pain not associated with activity  -= Suffering from gnat bites  Are you in the first 12 months of your Medicare Part B coverage?  No    Healthy Habits:    Do you get at least three servings of calcium containing foods daily (dairy, green leafy vegetables, etc.)? yes    Amount of exercise or daily activities, outside of work: 3 day(s) per week    Problems taking medications regularly No    Medication side effects: No    Have you had an eye exam in the past two years? no    Do you see a dentist twice per year? yes    Do you have sleep apnea, excessive snoring or daytime drowsiness?no      Ability to successfully perform activities of daily living: Yes, no assistance needed    Home safety:  none identified     Hearing impairment: no isk:           COGNITIVE SCREEN  1) Repeat 3 items (Leader, Season, Table)    2) Clock draw:   3) 3 item recall: Recalls 3 objects  Results: 3 items recalled: COGNITIVE IMPAIRMENT LESS LIKELY    Mini-CogTM Copyright KATLIN Underwood. Licensed by the author for use in [facility]; reprinted with permission (soob@[email or web address]). All rights reserved.                Reviewed and updated as needed this visit by clinical staff         Reviewed and updated as needed this visit by Provider        Social History   Substance Use Topics     Smoking status: Never Smoker     Smokeless tobacco: Never Used     Alcohol use 0.0 oz/week     0 Standard drinks or equivalent per week      Comment: occ       If you drink alcohol do you typically have >3 drinks per day or >7 drinks per week? No                        Today's PHQ-2 Score:   PHQ-2 ( 1999 [employer]) 1/18/2018 1/10/2018   Q1: Little interest or pleasure in doing things 0 0   Q2: Feeling down, depressed or hopeless 0 0   PHQ-2 Score 0 0       Do you feel safe in your environment - Yes    Do  you have a Health Care Directive?: Yes: Patient states has Advance Directive and will bring in a copy to clinic.    Current providers sharing in care for this patient include:   Patient Care Team:  Aleks Maher MD as PCP - General (Internal Medicine)    The following health maintenance items are reviewed in Epic and correct as of today:  Health Maintenance   Topic Date Due     EYE EXAM Q1 YEAR  10/21/1932     ADVANCE DIRECTIVE PLANNING Q5 YRS  10/21/1986     A1C Q6 MO  07/10/2018     FOOT EXAM Q1 YEAR  08/14/2018     LIPID MONITORING Q1 YEAR  08/14/2018     MICROALBUMIN Q1 YEAR  08/14/2018     INFLUENZA VACCINE (1) 09/01/2018     CREATININE Q1 YEAR  01/10/2019     FALL RISK ASSESSMENT  01/10/2019     PHQ-2 Q1 YR  01/18/2019     TSH W/ FREE T4 REFLEX Q2 YEAR  01/10/2020     TETANUS IMMUNIZATION (SYSTEM ASSIGNED)  06/12/2023     PNEUMOCOCCAL  Completed     Current Outpatient Prescriptions   Medication Sig Dispense Refill     JOSÉ ASPIRIN PO        blood glucose (NO BRAND SPECIFIED) lancets standard Use to test blood sugar 2 times daily or as directed. 200 each 3     blood glucose monitoring (NO BRAND SPECIFIED) test strip Use to test blood sugars 2 times daily or as directed 200 strip 3     glipiZIDE (GLUCOTROL) 5 MG tablet Take 0.5 tablet every morning and 2 tablets every evening 225 tablet 3     lisinopril (PRINIVIL/ZESTRIL) 20 MG tablet Take 1 tablet (20 mg) by mouth daily 90 tablet 3     triamcinolone (KENALOG) 0.1 % cream Apply b.i.d. p.r.n. to dermatitis but not on the face 45 g 1     JANUVIA 100 MG tablet TAKE 1 TABLET BY MOUTH EVERY DAY 90 tablet 1     levothyroxine (SYNTHROID/LEVOTHROID) 137 MCG tablet Take 1 tablet (137 mcg) by mouth daily Lab recheck due end of October. Schedule at [phone] 90 tablet 0     lisinopril-hydrochlorothiazide (PRINZIDE/ZESTORETIC) 20-12.5 MG per tablet TAKE 1 TABLET BY MOUTH EVERY MORNING 90 tablet 0     lovastatin (MEVACOR) 20 MG tablet TAKE 1 TABLET BY MOUTH EVERY DAY  "90 tablet 2     metFORMIN (GLUCOPHAGE) 500 MG tablet TAKE 1 TABLET BY MOUTH TWICE DAILY 180 tablet 0     metoprolol tartrate (LOPRESSOR) 50 MG tablet TAKE 1 TABLET BY MOUTH TWICE DAILY. 180 tablet 2           ROS:  CONSTITUTIONAL: NEGATIVE for fever, chills, change in weight  INTEGUMENTARY/SKIN: NEGATIVE for worrisome rashes, moles or lesions  EYES: NEGATIVE for vision changes or irritation    ENT/MOUTH: NEGATIVE for ear, mouth and throat problems  RESP: NEGATIVE for significant cough or SOB  BREAST: NEGATIVE for masses, tenderness or discharge  CV: NEGATIVE for chest pain, palpitations or peripheral edema    GI: NEGATIVE for nausea, abdominal pain, heartburn, or change in bowel habits  : NEGATIVE except for frequency, no dysuria, or hematuria  MUSCULOSKELETAL: NEGATIVE for significant arthralgias or myalgia except for bilateral knee pain  NEURO: NEGATIVE for weakness, dizziness or paresthesias  ENDOCRINE: NEGATIVE for temperature intolerance, skin/hair changes  HEME: NEGATIVE for bleeding problems  PSYCHIATRIC: NEGATIVE for changes in mood or affect    OBJECTIVE:   There were no vitals taken for this visit. Estimated body mass index is 28.65 kg/(m^2) as calculated from the following:    Height as of 1/18/18: 5' 9\" (1.753 m).    Weight as of 1/18/18: 194 lb (88 kg).  EXAM:   GENERAL: healthy, alert and no distress  EYES: Eyes grossly normal to inspection, PERRL and conjunctivae and sclerae normal  HENT: ear canals and TM's normal, nose and mouth without ulcers or lesions  NECK: no adenopathy, no asymmetry, masses, or scars and thyroid normal to palpation  RESP: lungs clear to auscultation - no rales, rhonchi or wheezes  BREAST: normal without masses, tenderness or nipple discharge and no palpable axillary masses or adenopathy  Breasts surgically absent with no masses skin changes or lymphadenopathy  CV: regular rate and rhythm, normal S1 S2, no S3 or S4, no murmur, click or rub, no peripheral edema and peripheral " pulses strong  ABDOMEN: soft, nontender, no hepatosplenomegaly, no masses and bowel sounds normal  MS: no gross musculoskeletal defects noted, no edema  SKIN: no suspicious lesions or rashes  NEURO: Normal strength and tone, mentation intact and speech normal  PSYCH: mentation appears normal, affect normal/bright  Feet:clean and dry no other skin changes, paresthesias to the ankle      ASSESSMENT / PLAN:   1. Encounter for routine adult health examination without abnormal findings    - UA reflex to Microscopic and Culture  - CBC with platelets  - Comprehensive metabolic panel  - Lipid panel reflex to direct LDL Fasting  - TSH with free T4 reflex  - Vitamin D Deficiency  - Hemoglobin A1c  - Albumin Random Urine Quantitative with Creat Ratio  - C FOOT EXAM  NO CHARGE  - Urine Microscopic  - T4 free    2. Benign essential hypertension    - UA reflex to Microscopic and Culture  - CBC with platelets  - Comprehensive metabolic panel    3. Familial hypercholesterolemia    - Lipid panel reflex to direct LDL Fasting    4. Acquired hypothyroidism    - TSH with free T4 reflex    5. Paroxysmal a-fib (H)      6. Vitamin B12 deficiency (non anemic)      7. Polyneuropathy associated with underlying disease (H)      8. Type 2 diabetes mellitus without complication, without long-term current use of insulin (H)    - Hemoglobin A1c  - Albumin Random Urine Quantitative with Creat Ratio  - C FOOT EXAM  NO CHARGE    9. Vitamin D deficiency    - Vitamin D Deficiency    10. CARDIOVASCULAR SCREENING; LDL GOAL LESS THAN 100    - Lipid panel reflex to direct LDL Fasting    11. Dermatitis    - triamcinolone (KENALOG) 0.1 % cream; Apply b.i.d. p.r.n. to dermatitis but not on the face  Dispense: 45 g; Refill: 1    End of Life Planning:  Patient currently has an advanced directive:     COUNSELING:  Reviewed preventive health counseling, as reflected in patient instructions       Regular exercise       Healthy diet/nutrition       Vision  "screening       Immunizations              BP Readings from Last 1 Encounters:   01/18/18 173/88     Estimated body mass index is 28.65 kg/(m^2) as calculated from the following:    Height as of 1/18/18: 5' 9\" (1.753 m).    Weight as of 1/18/18: 194 lb (88 kg).           reports that she has never smoked. She has never used smokeless tobacco.      Appropriate preventive services were discussed with this patient, including applicable screening as appropriate for cardiovascular disease, diabetes, osteopenia/osteoporosis, and glaucoma.  As appropriate for age/gender, discussed screening for colorectal cancer, prostate cancer, breast cancer, and cervical cancer. Checklist reviewing preventive services available has been given to the patient.    Reviewed patients plan of care and provided an AVS. The  for Gavi meets the Care Plan requirement. This Care Plan has been established and reviewed with the Patient.    Counseling Resources:  ATP IV Guidelines  Pooled Cohorts Equation Calculator  Breast Cancer Risk Calculator  FRAX Risk Assessment  ICSI Preventive Guidelines  Dietary Guidelines for Americans, 2010  USDA's MyPlate  ASA Prophylaxis  Lung CA Screening    Aleks Maher MD  [facility]  "

## 2018-07-16 ENCOUNTER — OFFICE VISIT (OUTPATIENT)
Dept: FAMILY MEDICINE | Facility: CLINIC | Age: 83
End: 2018-07-16
Payer: MEDICARE

## 2018-07-16 VITALS
OXYGEN SATURATION: 96 % | TEMPERATURE: 97.9 F | BODY MASS INDEX: 28.88 KG/M2 | SYSTOLIC BLOOD PRESSURE: 154 MMHG | DIASTOLIC BLOOD PRESSURE: 85 MMHG | HEIGHT: 69 IN | WEIGHT: 195 LBS | HEART RATE: 96 BPM

## 2018-07-16 DIAGNOSIS — E78.01 FAMILIAL HYPERCHOLESTEROLEMIA: ICD-10-CM

## 2018-07-16 DIAGNOSIS — I48.0 PAROXYSMAL A-FIB (H): ICD-10-CM

## 2018-07-16 DIAGNOSIS — L30.9 DERMATITIS: ICD-10-CM

## 2018-07-16 DIAGNOSIS — E53.8 VITAMIN B12 DEFICIENCY (NON ANEMIC): ICD-10-CM

## 2018-07-16 DIAGNOSIS — Z13.6 CARDIOVASCULAR SCREENING; LDL GOAL LESS THAN 100: ICD-10-CM

## 2018-07-16 DIAGNOSIS — E11.9 TYPE 2 DIABETES MELLITUS WITHOUT COMPLICATION, WITHOUT LONG-TERM CURRENT USE OF INSULIN (H): ICD-10-CM

## 2018-07-16 DIAGNOSIS — E03.9 ACQUIRED HYPOTHYROIDISM: ICD-10-CM

## 2018-07-16 DIAGNOSIS — G63 POLYNEUROPATHY ASSOCIATED WITH UNDERLYING DISEASE (H): ICD-10-CM

## 2018-07-16 DIAGNOSIS — E55.9 VITAMIN D DEFICIENCY: ICD-10-CM

## 2018-07-16 DIAGNOSIS — I10 BENIGN ESSENTIAL HYPERTENSION: ICD-10-CM

## 2018-07-16 DIAGNOSIS — Z00.00 ENCOUNTER FOR ROUTINE ADULT HEALTH EXAMINATION WITHOUT ABNORMAL FINDINGS: Primary | ICD-10-CM

## 2018-07-16 LAB
ALBUMIN SERPL-MCNC: 3.7 G/DL (ref 3.4–5)
ALBUMIN UR-MCNC: ABNORMAL MG/DL
ALP SERPL-CCNC: 82 U/L (ref 40–150)
ALT SERPL W P-5'-P-CCNC: 32 U/L (ref 0–50)
ANION GAP SERPL CALCULATED.3IONS-SCNC: 8 MMOL/L (ref 3–14)
APPEARANCE UR: CLEAR
AST SERPL W P-5'-P-CCNC: 26 U/L (ref 0–45)
BACTERIA #/AREA URNS HPF: ABNORMAL /HPF
BILIRUB SERPL-MCNC: 0.7 MG/DL (ref 0.2–1.3)
BILIRUB UR QL STRIP: NEGATIVE
BUN SERPL-MCNC: 14 MG/DL (ref 7–30)
CALCIUM SERPL-MCNC: 9 MG/DL (ref 8.5–10.1)
CHLORIDE SERPL-SCNC: 99 MMOL/L (ref 94–109)
CHOLEST SERPL-MCNC: 130 MG/DL
CO2 SERPL-SCNC: 27 MMOL/L (ref 20–32)
COLOR UR AUTO: YELLOW
CREAT SERPL-MCNC: 0.74 MG/DL (ref 0.52–1.04)
CREAT UR-MCNC: 106 MG/DL
ERYTHROCYTE [DISTWIDTH] IN BLOOD BY AUTOMATED COUNT: 12.4 % (ref 10–15)
GFR SERPL CREATININE-BSD FRML MDRD: 75 ML/MIN/1.7M2
GLUCOSE SERPL-MCNC: 169 MG/DL (ref 70–99)
GLUCOSE UR STRIP-MCNC: NEGATIVE MG/DL
HBA1C MFR BLD: 7 % (ref 0–5.6)
HCT VFR BLD AUTO: 40.8 % (ref 35–47)
HDLC SERPL-MCNC: 51 MG/DL
HGB BLD-MCNC: 14 G/DL (ref 11.7–15.7)
HGB UR QL STRIP: NEGATIVE
KETONES UR STRIP-MCNC: NEGATIVE MG/DL
LDLC SERPL CALC-MCNC: 58 MG/DL
LEUKOCYTE ESTERASE UR QL STRIP: ABNORMAL
MCH RBC QN AUTO: 31.5 PG (ref 26.5–33)
MCHC RBC AUTO-ENTMCNC: 34.3 G/DL (ref 31.5–36.5)
MCV RBC AUTO: 92 FL (ref 78–100)
MICROALBUMIN UR-MCNC: 57 MG/L
MICROALBUMIN/CREAT UR: 53.58 MG/G CR (ref 0–25)
NITRATE UR QL: NEGATIVE
NON-SQ EPI CELLS #/AREA URNS LPF: ABNORMAL /LPF
NONHDLC SERPL-MCNC: 79 MG/DL
PH UR STRIP: 6 PH (ref 5–7)
PLATELET # BLD AUTO: 180 10E9/L (ref 150–450)
POTASSIUM SERPL-SCNC: 4.2 MMOL/L (ref 3.4–5.3)
PROT SERPL-MCNC: 7.6 G/DL (ref 6.8–8.8)
RBC # BLD AUTO: 4.44 10E12/L (ref 3.8–5.2)
RBC #/AREA URNS AUTO: ABNORMAL /HPF
SODIUM SERPL-SCNC: 134 MMOL/L (ref 133–144)
SOURCE: ABNORMAL
SP GR UR STRIP: 1.02 (ref 1–1.03)
T4 FREE SERPL-MCNC: 1.57 NG/DL (ref 0.76–1.46)
TRANS CELLS #/AREA URNS HPF: ABNORMAL /HPF
TRIGL SERPL-MCNC: 107 MG/DL
TSH SERPL DL<=0.005 MIU/L-ACNC: 0.35 MU/L (ref 0.4–4)
UROBILINOGEN UR STRIP-ACNC: 0.2 EU/DL (ref 0.2–1)
WBC # BLD AUTO: 9.6 10E9/L (ref 4–11)
WBC #/AREA URNS AUTO: ABNORMAL /HPF

## 2018-07-16 PROCEDURE — 85027 COMPLETE CBC AUTOMATED: CPT | Performed by: INTERNAL MEDICINE

## 2018-07-16 PROCEDURE — 83036 HEMOGLOBIN GLYCOSYLATED A1C: CPT | Performed by: INTERNAL MEDICINE

## 2018-07-16 PROCEDURE — 82043 UR ALBUMIN QUANTITATIVE: CPT | Performed by: INTERNAL MEDICINE

## 2018-07-16 PROCEDURE — 36415 COLL VENOUS BLD VENIPUNCTURE: CPT | Performed by: INTERNAL MEDICINE

## 2018-07-16 PROCEDURE — 84439 ASSAY OF FREE THYROXINE: CPT | Performed by: INTERNAL MEDICINE

## 2018-07-16 PROCEDURE — G0439 PPPS, SUBSEQ VISIT: HCPCS | Performed by: INTERNAL MEDICINE

## 2018-07-16 PROCEDURE — 80053 COMPREHEN METABOLIC PANEL: CPT | Performed by: INTERNAL MEDICINE

## 2018-07-16 PROCEDURE — 81001 URINALYSIS AUTO W/SCOPE: CPT | Performed by: INTERNAL MEDICINE

## 2018-07-16 PROCEDURE — 82306 VITAMIN D 25 HYDROXY: CPT | Performed by: INTERNAL MEDICINE

## 2018-07-16 PROCEDURE — 99207 C FOOT EXAM  NO CHARGE: CPT | Performed by: INTERNAL MEDICINE

## 2018-07-16 PROCEDURE — 84443 ASSAY THYROID STIM HORMONE: CPT | Performed by: INTERNAL MEDICINE

## 2018-07-16 PROCEDURE — 80061 LIPID PANEL: CPT | Performed by: INTERNAL MEDICINE

## 2018-07-16 RX ORDER — TRIAMCINOLONE ACETONIDE 1 MG/G
CREAM TOPICAL
Qty: 45 G | Refills: 1 | Status: SHIPPED | OUTPATIENT
Start: 2018-07-16 | End: 2022-05-12

## 2018-07-16 NOTE — LETTER
[place] [address] [facility]  [address]  [place]  [address]  Tel: [phone]    August 27, 2018    Gavi Pearson  [address]  [place] [address]        Dear Ms. Pearson,    Enclosed are your lab reports from your recent wellness exam.    Vitamin D is important for bone health and your vitamin D level remains low.  I recommend beginning a vitamin D supplement 5000 international units daily.  After 6 months we could recheck a value and determine if we given you adequate supplementation.    As follow-up for your adult onset diabetes we did check several laboratory studies.  Hemoglobin A1c is not quite as good as it was a year ago.  Your recent result was 7.0 up slightly from 6.8 a year ago.  I recommend increasing the vigilance and dietary discretion and adding a little more aerobic activity would be helpful as well.  In addition we also had checked your random urine albumin level which is a very sensitive indicator of kidney function.  I am happy to report that the test is improved from a year ago with only very minor protein loss.  As we attempt to maintain as normal of blood sugar control and helps to maintain normal kidney function.    Your thyroid function shows that we are slightly overdosing you on your thyroid replacement hormone.  My recommendation is to begin taking your thyroid replacement hormone with food.  By doing this it slightly reduces the absorption and should help to normalize your thyroid status.  Recommend rechecking this in the latter part of October.    Comprehensive metabolic panel showed that your minerals and electrolytes, kidney and liver function tests were all normal.  Your fasting blood sugar was 169.    Your lipid panel was excellent.  The total cholesterol was 130, anything less than 200 is normal and is essentially the same as a year ago.  The triglycerides were 107, better than 115 from a year ago and anything less than 150 is normal.  The HDL or good cholesterol was  good at 51, similar to 54 from a year ago and anything higher than 50 is normal.  The LDL or bad cholesterol is the most important factor in your level was 58, the same as a year ago and her goal is to keep it under 100 but the lower that value is the better off you are.    Your urinalysis was normal and your complete blood count was normal as well.  There is no sign of low blood or anemia.    In general I am glad to see that things are looking very good and as I mentioned previously if you  taking her thyroid replacement hormone with food will help to normalize your thyroid function.  We should recheck the value at the end of October.  You can get your flu shot as well at that time.  If you have any questions regarding these reports please let me know.  I will look forward to seeing you in October.    Sincerely,    Aleks Maher MD/PREM          Enclosure: Lab Results  Results for orders placed or performed in visit on 07/16/18   UA reflex to Microscopic and Culture   Result Value Ref Range    Color Urine Yellow     Appearance Urine Clear     Glucose Urine Negative NEG^Negative mg/dL    Bilirubin Urine Negative NEG^Negative    Ketones Urine Negative NEG^Negative mg/dL    Specific Gravity Urine 1.020 1.003 - 1.035    Blood Urine Negative NEG^Negative    pH Urine 6.0 5.0 - 7.0 pH    Protein Albumin Urine Trace (A) NEG^Negative mg/dL    Urobilinogen Urine 0.2 0.2 - 1.0 EU/dL    Nitrite Urine Negative NEG^Negative    Leukocyte Esterase Urine Trace (A) NEG^Negative    Source Midstream Urine    CBC with platelets   Result Value Ref Range    WBC 9.6 4.0 - 11.0 10e9/L    RBC Count 4.44 3.8 - 5.2 10e12/L    Hemoglobin 14.0 11.7 - 15.7 g/dL    Hematocrit 40.8 35.0 - 47.0 %    MCV 92 78 - 100 fl    MCH 31.5 26.5 - 33.0 pg    MCHC 34.3 31.5 - 36.5 g/dL    RDW 12.4 10.0 - 15.0 %    Platelet Count 180 150 - 450 10e9/L   Comprehensive metabolic panel   Result Value Ref Range    Sodium 134 133 - 144 mmol/L    Potassium 4.2 3.4 -  5.3 mmol/L    Chloride 99 94 - 109 mmol/L    Carbon Dioxide 27 20 - 32 mmol/L    Anion Gap 8 3 - 14 mmol/L    Glucose 169 (H) 70 - 99 mg/dL    Urea Nitrogen 14 7 - 30 mg/dL    Creatinine 0.74 0.52 - 1.04 mg/dL    GFR Estimate 75 >60 mL/min/1.7m2    GFR Estimate If Black >90 >60 mL/min/1.7m2    Calcium 9.0 8.5 - 10.1 mg/dL    Bilirubin Total 0.7 0.2 - 1.3 mg/dL    Albumin 3.7 3.4 - 5.0 g/dL    Protein Total 7.6 6.8 - 8.8 g/dL    Alkaline Phosphatase 82 40 - 150 U/L    ALT 32 0 - 50 U/L    AST 26 0 - 45 U/L   Lipid panel reflex to direct LDL Fasting   Result Value Ref Range    Cholesterol 130 <200 mg/dL    Triglycerides 107 <150 mg/dL    HDL Cholesterol 51 >49 mg/dL    LDL Cholesterol Calculated 58 <100 mg/dL    Non HDL Cholesterol 79 <130 mg/dL   TSH with free T4 reflex   Result Value Ref Range    TSH 0.35 (L) 0.40 - 4.00 mU/L   Vitamin D Deficiency   Result Value Ref Range    Vitamin D Deficiency screening 16 (L) 20 - 75 ug/L   Hemoglobin A1c   Result Value Ref Range    Hemoglobin A1C 7.0 (H) 0 - 5.6 %   Albumin Random Urine Quantitative with Creat Ratio   Result Value Ref Range    Creatinine Urine 106 mg/dL    Albumin Urine mg/L 57 mg/L    Albumin Urine mg/g Cr 53.58 (H) 0 - 25 mg/g Cr   Urine Microscopic   Result Value Ref Range    WBC Urine 5-10 (A) OTO5^0 - 5 /HPF    RBC Urine O - 2 OTO2^O - 2 /HPF    Squamous Epithelial /LPF Urine Many (A) FEW^Few /LPF    Transitional Epi Few FEW^Few /HPF    Bacteria Urine Few (A) NEG^Negative /HPF   T4 free   Result Value Ref Range    T4 Free 1.57 (H) 0.76 - 1.46 ng/dL

## 2018-07-16 NOTE — MR AVS SNAPSHOT
After Visit Summary   7/16/2018    Gavi Pearson    MRN: [id number]           Patient Information     Date Of Birth          10/21/1931        Visit Information        Provider Department      7/16/2018 9:00 AM Aleks Maher MD [facility]        Today's Diagnoses     Encounter for routine adult health examination without abnormal findings    -  1    Benign essential hypertension        Familial hypercholesterolemia        Acquired hypothyroidism        Paroxysmal a-fib (H)        Vitamin B12 deficiency (non anemic)        Polyneuropathy associated with underlying disease (H)        Type 2 diabetes mellitus without complication, without long-term current use of insulin (H)        Vitamin D deficiency        CARDIOVASCULAR SCREENING; LDL GOAL LESS THAN 100        Dermatitis          Care Instructions      Preventive Health Recommendations    Female Ages 65 +    Yearly exam:     See your health care provider every year in order to  o Review health changes.   o Discuss preventive care.    o Review your medicines if your doctor has prescribed any.      You no longer need a yearly Pap test unless you've had an abnormal Pap test in the past 10 years. If you have vaginal symptoms, such as bleeding or discharge, be sure to talk with your provider about a Pap test.      Every 1 to 2 years, have a mammogram.  If you are over 69, talk with your health care provider about whether or not you want to continue having screening mammograms.      Every 10 years, have a colonoscopy. Or, have a yearly FIT test (stool test). These exams will check for colon cancer.       Have a cholesterol test every 5 years, or more often if your doctor advises it.       Have a diabetes test (fasting glucose) every three years. If you are at risk for diabetes, you should have this test more often.       At age 65, have a bone density scan (DEXA) to check for osteoporosis (brittle bone disease).    Shots:    Get a flu shot each  year.    Get a tetanus shot every 10 years.    Talk to your doctor about your pneumonia vaccines. There are now two you should receive - Pneumovax (PPSV 23) and Prevnar (PCV 13).    Talk to your pharmacist about the shingles vaccine.    Talk to your doctor about the hepatitis B vaccine.    Nutrition:     Eat at least 5 servings of fruits and vegetables each day.      Eat whole-grain bread, whole-wheat pasta and brown rice instead of white grains and rice.      Get adequate Calcium and Vitamin D.     Lifestyle    Exercise at least 150 minutes a week (30 minutes a day, 5 days a week). This will help you control your weight and prevent disease.      Limit alcohol to one drink per day.      No smoking.       Wear sunscreen to prevent skin cancer.       See your dentist twice a year for an exam and cleaning.      See your eye doctor every 1 to 2 years to screen for conditions such as glaucoma, macular degeneration and cataracts.          Follow-ups after your visit        Who to contact     If you have questions or need follow up information about today's clinic visit or your schedule please contact [facility] directly at [phone].  Normal or non-critical lab and imaging results will be communicated to you by Fatsomahart, letter or phone within 4 business days after the clinic has received the results. If you do not hear from us within 7 days, please contact the clinic through Fotecht or phone. If you have a critical or abnormal lab result, we will notify you by phone as soon as possible.  Submit refill requests through TruantToday or call your pharmacy and they will forward the refill request to us. Please allow 3 business days for your refill to be completed.          Additional Information About Your Visit        TruantToday Information     TruantToday lets you send messages to your doctor, view your test results, renew your prescriptions, schedule appointments and more. To sign up, go to [email or web address] .  "Click on \"Log in\" on the left side of the screen, which will take you to the Welcome page. Then click on \"Sign up Now\" on the right side of the page.     You will be asked to enter the access code listed below, as well as some personal information. Please follow the directions to create your username and password.     Your access code is: [email or web address]  Expires: 2019  4:44 AM     Your access code will  in 90 days. If you need help or a new code, please call your [facility] clinic or [phone].        Care EveryWhere ID     This is your Care EveryWhere ID. This could be used by other organizations to access your [facility] medical records  [id number]        Your Vitals Were     Pulse Temperature Height Pulse Oximetry Breastfeeding? BMI (Body Mass Index)    96 97.9  F (36.6  C) (Oral) 5' 9\" (1.753 m) 96% No 28.8 kg/m2       Blood Pressure from Last 3 Encounters:   18 154/85   18 173/88   01/10/18 159/90    Weight from Last 3 Encounters:   18 195 lb (88.5 kg)   18 194 lb (88 kg)   01/10/18 194 lb (88 kg)              We Performed the Following     Albumin Random Urine Quantitative with Creat Ratio     C FOOT EXAM  NO CHARGE     CBC with platelets     Comprehensive metabolic panel     Hemoglobin A1c     Lipid panel reflex to direct LDL Fasting     T4 free     TSH with free T4 reflex     UA reflex to Microscopic and Culture     Urine Microscopic     Vitamin D Deficiency          Where to get your medicines      These medications were sent to [facility] [id number]  [place] - [address] AT [address] [place] [address] & [facility]  [address] [place] [address]    Hours:  24-hours Phone:  [phone]     triamcinolone 0.1 % cream          Primary Care Provider Office Phone # Fax #    Aleks Maher [phone] [address]  [place] [address]        Equal Access to Services     MARCIA GIL AH: dulce Rudolph, aurora " shay osborneterrence cordero ah. So [facility] [phone].    ATENCIÓN: Si bridget bradshaw, tiene a lema disposición servicios gratuitos de asistencia lingüística. Esdras al [phone].    We comply with applicable federal civil rights laws and [place] laws. We do not discriminate on the basis of race, color, national origin, age, disability, sex, sexual orientation, or gender identity.            Thank you!     Thank you for choosing [facility]  for your care. Our goal is always to provide you with excellent care. Hearing back from our patients is one way we can continue to improve our services. Please take a few minutes to complete the written survey that you may receive in the mail after your visit with us. Thank you!             Your Updated Medication List - Protect others around you: Learn how to safely use, store and throw away your medicines at www.disposemymeds.org.          This list is accurate as of 7/16/18 11:59 PM.  Always use your most recent med list.                   Brand Name Dispense Instructions for use Diagnosis    JOSÉ ASPIRIN PO           blood glucose lancets standard    no brand specified    200 each    Use to test blood sugar 2 times daily or as directed.    Type 2 diabetes mellitus without complication, without long-term current use of insulin (H)       blood glucose monitoring test strip    no brand specified    200 strip    Use to test blood sugars 2 times daily or as directed    Type 2 diabetes mellitus without complication, without long-term current use of insulin (H)       glipiZIDE 5 MG tablet    GLUCOTROL    225 tablet    Take 0.5 tablet every morning and 2 tablets every evening    Type 2 diabetes mellitus without complication, without long-term current use of insulin (H)       lisinopril 20 MG tablet    PRINIVIL/ZESTRIL    90 tablet    Take 1 tablet (20 mg) by mouth daily    Benign essential hypertension       triamcinolone 0.1 % cream     KENALOG    45 g    Apply b.i.d. p.r.n. to dermatitis but not on the face    Dermatitis

## 2018-07-17 LAB — DEPRECATED CALCIDIOL+CALCIFEROL SERPL-MC: 16 UG/L (ref 20–75)

## 2018-08-13 DIAGNOSIS — I10 BENIGN ESSENTIAL HYPERTENSION: ICD-10-CM

## 2018-08-13 DIAGNOSIS — E78.5 HYPERLIPIDEMIA LDL GOAL <100: ICD-10-CM

## 2018-08-13 DIAGNOSIS — E11.9 TYPE 2 DIABETES MELLITUS WITHOUT COMPLICATION, WITHOUT LONG-TERM CURRENT USE OF INSULIN (H): ICD-10-CM

## 2018-08-13 NOTE — TELEPHONE ENCOUNTER
"Requesting Lisinopril + hydrochlorothiazide last Rx'ed 8-   BUT I see plain lisinopril filled at office visit on  08-        Disp Refills Start End ERASTO   lisinopril-hydrochlorothiazide (PRINZIDE/ZESTORETIC) 20-12.5 MG per tablet 90 tablet 3 8/16/2017  No   Sig: TAKE 1 TABLET BY MOUTH EVERY MORNING   Class: E-Prescribe       lisinopril (PRINIVIL/ZESTRIL) 20 MG tablet 90 tablet 3 8/28/2017  No   Sig - Route: Take 1 tablet (20 mg) by mouth daily          lovastatin (MEVACOR) 20 MG tablet 90 tablet 3 8/16/2017  No   Sig: TAKE 1 TABLET BY MOUTH EVERY DAY       metFORMIN (GLUCOPHAGE) 500 MG tablet 180 tablet 3 8/21/2017  No   Sig: TAKE 1 TABLET BY MOUTH TWICE DAILY       metoprolol (LOPRESSOR) 50 MG tablet 180 tablet 3 8/16/2017  No   Sig: TAKE 1 TABLET BY MOUTH TWICE DAILY.     Last Written Prescription Date:  08/16 or 21/2017  Last Fill Quantity: 90 day,  # refills: 3   Last office visit: 7/16/2018 with prescribing provider:     Future Office Visit:      Requested Prescriptions   Pending Prescriptions Disp Refills     lisinopril-hydrochlorothiazide (PRINZIDE/ZESTORETIC) 20-12.5 MG per tablet [Pharmacy Med Name: LISINOPRIL-HCTZ 20/12.5MG TABLETS] 90 tablet 0     Sig: TAKE 1 TABLET BY MOUTH EVERY MORNING    Diuretics (Including Combos) Protocol Failed    8/13/2018  9:47 AM       Failed - Blood pressure under 140/90 in past 12 months    BP Readings from Last 3 Encounters:   07/16/18 154/85   01/18/18 173/88   01/10/18 159/90                Passed - Recent (12 mo) or future (30 days) visit within the authorizing provider's specialty    Patient had office visit in the last 12 months or has a visit in the next 30 days with authorizing provider or within the authorizing provider's specialty.  See \"Patient Info\" tab in inbasket, or \"Choose Columns\" in Meds & Orders section of the refill encounter.           Passed - Patient is age 18 or older       Passed - No active pregancy on record       Passed - Normal serum " "creatinine on file in past 12 months    Recent Labs   Lab Test  07/16/18   1032   CR  0.74             Passed - Normal serum potassium on file in past 12 months    Recent Labs   Lab Test  07/16/18   1032   POTASSIUM  4.2                   Passed - Normal serum sodium on file in past 12 months    Recent Labs   Lab Test  07/16/18   1032   NA  134             Passed - No positive pregnancy test in past 12 months        lovastatin (MEVACOR) 20 MG tablet [Pharmacy Med Name: LOVASTATIN 20MG TABLETS] 90 tablet 0     Sig: TAKE 1 TABLET BY MOUTH EVERY DAY    Statins Protocol Passed    8/13/2018  9:47 AM       Passed - LDL on file in past 12 months    Recent Labs   Lab Test  07/16/18   1032   LDL  58            Passed - No abnormal creatine kinase in past 12 months    No lab results found.            Passed - Recent (12 mo) or future (30 days) visit within the authorizing provider's specialty    Patient had office visit in the last 12 months or has a visit in the next 30 days with authorizing provider or within the authorizing provider's specialty.  See \"Patient Info\" tab in inbasket, or \"Choose Columns\" in Meds & Orders section of the refill encounter.           Passed - Patient is age 18 or older       Passed - No active pregnancy on record       Passed - No positive pregnancy test in past 12 months        metoprolol tartrate (LOPRESSOR) 50 MG tablet [Pharmacy Med Name: METOPROLOL TARTRATE 50MG TABLETS] 180 tablet 0     Sig: TAKE 1 TABLET BY MOUTH TWICE DAILY.    Beta-Blockers Protocol Failed    8/13/2018  9:47 AM       Failed - Blood pressure under 140/90 in past 12 months    BP Readings from Last 3 Encounters:   07/16/18 154/85   01/18/18 173/88   01/10/18 159/90                Passed - Patient is age 6 or older       Passed - Recent (12 mo) or future (30 days) visit within the authorizing provider's specialty    Patient had office visit in the last 12 months or has a visit in the next 30 days with authorizing provider or " "within the authorizing provider's specialty.  See \"Patient Info\" tab in inbasket, or \"Choose Columns\" in Meds & Orders section of the refill encounter.            metFORMIN (GLUCOPHAGE) 500 MG tablet [Pharmacy Med Name: METFORMIN 500MG TABLETS] 180 tablet 0     Sig: TAKE 1 TABLET BY MOUTH TWICE DAILY    Biguanide Agents Failed    8/13/2018  9:47 AM       Failed - Blood pressure less than 140/90 in past 6 months    BP Readings from Last 3 Encounters:   07/16/18 154/85   01/18/18 173/88   01/10/18 159/90                Passed - Patient has documented LDL within the past 12 mos.    Recent Labs   Lab Test  07/16/18   1032   LDL  58            Passed - Patient has had a Microalbumin in the past 12 mos.    Recent Labs   Lab Test  07/16/18   1032   MICROL  57   UMALCR  53.58*            Passed - Patient is age 10 or older       Passed - Patient has documented A1c within the specified period of time.    If HgbA1C is 8 or greater, it needs to be on file within the past 3 months.  If less than 8, must be on file within the past 6 months.     Recent Labs   Lab Test  07/16/18   1032   A1C  7.0*            Passed - Patient's CR is NOT>1.4 OR Patient's EGFR is NOT<45 within past 12 mos.    Recent Labs   Lab Test  07/16/18   1032   GFRESTIMATED  75   GFRESTBLACK  >90       Recent Labs   Lab Test  07/16/18   1032   CR  0.74            Passed - Patient does NOT have a diagnosis of CHF.       Passed - Patient is not pregnant       Passed - Patient has not had a positive pregnancy test within the past 12 mos.        Passed - Recent (6 mo) or future (30 days) visit within the authorizing provider's specialty    Patient had office visit in the last 6 months or has a visit in the next 30 days with authorizing provider or within the authorizing provider's specialty.  See \"Patient Info\" tab in inbasket, or \"Choose Columns\" in Meds & Orders section of the refill encounter.              "

## 2018-08-15 RX ORDER — LOVASTATIN 20 MG
TABLET ORAL
Qty: 90 TABLET | Refills: 2 | Status: SHIPPED | OUTPATIENT
Start: 2018-08-15 | End: 2019-05-13

## 2018-08-15 RX ORDER — LISINOPRIL AND HYDROCHLOROTHIAZIDE 12.5; 2 MG/1; MG/1
TABLET ORAL
Qty: 90 TABLET | Refills: 0 | Status: SHIPPED | OUTPATIENT
Start: 2018-08-15 | End: 2018-11-14

## 2018-08-15 RX ORDER — METOPROLOL TARTRATE 50 MG
TABLET ORAL
Qty: 180 TABLET | Refills: 2 | Status: SHIPPED | OUTPATIENT
Start: 2018-08-15 | End: 2019-11-12

## 2018-08-15 NOTE — TELEPHONE ENCOUNTER
Metoprolol, Metformin, Lovastatin:   Prescription approved per [facility] Refill Protocol.    Lisinopril:   Is pt to be on stand alone med or combo med  Last Rx for plain Lisinopril  Pharmacy requesting Lisinopril Hydrochlorothiazide though  Leandra HOROWITZ RN

## 2018-08-26 ENCOUNTER — TELEPHONE (OUTPATIENT)
Dept: FAMILY MEDICINE | Facility: CLINIC | Age: 83
End: 2018-08-26

## 2018-09-18 DIAGNOSIS — E03.4 HYPOTHYROIDISM DUE TO ACQUIRED ATROPHY OF THYROID: ICD-10-CM

## 2018-09-18 NOTE — TELEPHONE ENCOUNTER
"    levothyroxine (SYNTHROID/LEVOTHROID) 137 MCG tablet 90 tablet 0 6/26/2018         Last Written Prescription Date:  06/26/2018  Last Fill Quantity: 90,  # refills: 0   Last office visit: 7/16/2018 with prescribing provider:    Future Office Visit:  Unknown      Requested Prescriptions   Pending Prescriptions Disp Refills     levothyroxine (SYNTHROID/LEVOTHROID) 137 MCG tablet [Pharmacy Med Name: LEVOTHYROXINE 0.137MG (137MCG) TAB] 90 tablet 0     Sig: TAKE 1 TABLET BY MOUTH EVERY DAY    Thyroid Protocol Failed    9/18/2018  9:51 AM       Failed - Normal TSH on file in past 12 months    Recent Labs   Lab Test  07/16/18   1032   TSH  0.35*             Passed - Patient is 12 years or older       Passed - Recent (12 mo) or future (30 days) visit within the authorizing provider's specialty    Patient had office visit in the last 12 months or has a visit in the next 30 days with authorizing provider or within the authorizing provider's specialty.  See \"Patient Info\" tab in inbasket, or \"Choose Columns\" in Meds & Orders section of the refill encounter.           Passed - No active pregnancy on record    If patient is pregnant or has had a positive pregnancy test, please check TSH.         Passed - No positive pregnancy test in past 12 months    If patient is pregnant or has had a positive pregnancy test, please check TSH.            "

## 2018-09-19 RX ORDER — LEVOTHYROXINE SODIUM 137 UG/1
137 TABLET ORAL DAILY
Qty: 90 TABLET | Refills: 0 | Status: SHIPPED | OUTPATIENT
Start: 2018-09-19 | End: 2018-11-26

## 2018-09-19 NOTE — TELEPHONE ENCOUNTER
Medication is being filled for 1 time refill only due to:  Patient needs labs TSH recheck due end of October.  Pt notified. .     Notes from last TSH lab:   Your thyroid function shows that we are slightly overdosing you on your thyroid replacement hormone.  My recommendation is to begin taking your thyroid replacement hormone with food.  By doing this it slightly reduces the absorption and should help to normalize your thyroid status.  Recommend rechecking this in the latter part of October      Patricia ENAMORADO RN,BSN

## 2018-10-15 DIAGNOSIS — E11.9 TYPE 2 DIABETES MELLITUS WITHOUT COMPLICATION, WITHOUT LONG-TERM CURRENT USE OF INSULIN (H): ICD-10-CM

## 2018-10-16 NOTE — TELEPHONE ENCOUNTER
"JANUVIA 100 MG tablet 90 tablet 3 8/16/2017  No   Sig: TAKE 1 TABLET BY MOUTH EVERY DAY       Last Written Prescription Date:  08/16/2017  Last Fill Quantity: 90,  # refills: 3   Last office visit: 7/16/2018 with prescribing provider:     Future Office Visit:      Requested Prescriptions   Pending Prescriptions Disp Refills     JANUVIA 100 MG tablet [Pharmacy Med Name: JANUVIA 100MG TABLETS] 90 tablet 0     Sig: TAKE 1 TABLET BY MOUTH EVERY DAY    DPP4 Inhibitors Protocol Failed    10/15/2018  9:39 AM       Failed - Blood pressure less than 140/90 in past 6 months    BP Readings from Last 3 Encounters:   07/16/18 154/85   01/18/18 173/88   01/10/18 159/90                Passed - LDL on file in past 12 months    Recent Labs   Lab Test  07/16/18   1032   LDL  58            Passed - Microalbumin on file in past 12 months    Recent Labs   Lab Test  07/16/18   1032   MICROL  57   UMALCR  53.58*            Passed - HgbA1C in past 3 or 6 months    If HgbA1C is 8 or greater, it needs to be on file within the past 3 months.  If less than 8, must be on file within the past 6 months.     Recent Labs   Lab Test  07/16/18   1032   A1C  7.0*            Passed - Patient is age 18 or older       Passed - No active pregnancy on record       Passed - Normal serum creatinine in past 12 months    Recent Labs   Lab Test  07/16/18   1032   CR  0.74            Passed - No positive pregnancy test in past 12 months       Passed - Recent (6 mo) or future (30 days) visit within the authorizing provider's specialty    Patient had office visit in the last 6 months or has a visit in the next 30 days with authorizing provider.  See \"Patient Info\" tab in inbasket, or \"Choose Columns\" in Meds & Orders section of the refill encounter.                "

## 2018-10-16 NOTE — TELEPHONE ENCOUNTER
Routing refill request to provider for review/approval because:  BP out of range:  07/16/18 154/85   01/18/18 173/88   01/10/18 159/90     Please review and authorize if appropriate.    Thank you,   Zane REDD RN

## 2018-10-17 RX ORDER — SITAGLIPTIN 100 MG/1
TABLET, FILM COATED ORAL
Qty: 90 TABLET | Refills: 1 | Status: SHIPPED | OUTPATIENT
Start: 2018-10-17 | End: 2019-04-15

## 2018-10-31 ENCOUNTER — OFFICE VISIT (OUTPATIENT)
Dept: FAMILY MEDICINE | Facility: CLINIC | Age: 83
End: 2018-10-31
Payer: MEDICARE

## 2018-10-31 VITALS
DIASTOLIC BLOOD PRESSURE: 82 MMHG | OXYGEN SATURATION: 93 % | BODY MASS INDEX: 29.03 KG/M2 | HEART RATE: 120 BPM | TEMPERATURE: 98.7 F | HEIGHT: 69 IN | WEIGHT: 196 LBS | SYSTOLIC BLOOD PRESSURE: 140 MMHG

## 2018-10-31 DIAGNOSIS — E53.8 VITAMIN B12 DEFICIENCY (NON ANEMIC): ICD-10-CM

## 2018-10-31 DIAGNOSIS — Z23 NEED FOR PROPHYLACTIC VACCINATION AND INOCULATION AGAINST INFLUENZA: ICD-10-CM

## 2018-10-31 DIAGNOSIS — G63 POLYNEUROPATHY ASSOCIATED WITH UNDERLYING DISEASE (H): ICD-10-CM

## 2018-10-31 DIAGNOSIS — E55.9 VITAMIN D DEFICIENCY: ICD-10-CM

## 2018-10-31 DIAGNOSIS — I10 BENIGN ESSENTIAL HYPERTENSION: Primary | ICD-10-CM

## 2018-10-31 DIAGNOSIS — E11.9 TYPE 2 DIABETES MELLITUS WITHOUT COMPLICATION, WITHOUT LONG-TERM CURRENT USE OF INSULIN (H): ICD-10-CM

## 2018-10-31 DIAGNOSIS — E03.9 ACQUIRED HYPOTHYROIDISM: ICD-10-CM

## 2018-10-31 DIAGNOSIS — I48.0 PAROXYSMAL A-FIB (H): ICD-10-CM

## 2018-10-31 DIAGNOSIS — E78.01 FAMILIAL HYPERCHOLESTEROLEMIA: ICD-10-CM

## 2018-10-31 LAB
HBA1C MFR BLD: 7.5 % (ref 0–5.6)
VIT B12 SERPL-MCNC: 216 PG/ML (ref 193–986)

## 2018-10-31 PROCEDURE — 36415 COLL VENOUS BLD VENIPUNCTURE: CPT | Performed by: INTERNAL MEDICINE

## 2018-10-31 PROCEDURE — 83036 HEMOGLOBIN GLYCOSYLATED A1C: CPT | Performed by: INTERNAL MEDICINE

## 2018-10-31 PROCEDURE — G0008 ADMIN INFLUENZA VIRUS VAC: HCPCS | Performed by: INTERNAL MEDICINE

## 2018-10-31 PROCEDURE — 99214 OFFICE O/P EST MOD 30 MIN: CPT | Mod: 25 | Performed by: INTERNAL MEDICINE

## 2018-10-31 PROCEDURE — 82607 VITAMIN B-12: CPT | Performed by: INTERNAL MEDICINE

## 2018-10-31 PROCEDURE — 90662 IIV NO PRSV INCREASED AG IM: CPT | Performed by: INTERNAL MEDICINE

## 2018-10-31 PROCEDURE — 84443 ASSAY THYROID STIM HORMONE: CPT | Performed by: INTERNAL MEDICINE

## 2018-10-31 NOTE — LETTER
[place] [address] [facility]  [address]  [place]  [address]  Tel: [phone]    November 14, 2018    Gavi Pearson  [address] [place] [place] [address]      Gavi,    Enclosed are your lab reports from your recent office exam.    Lab studies show that your thyroid function test is normal your vitamin B12 is low and you should begin taking a vitamin B12 replacement at thousand micrograms per day. This is available over-the-counter at the drugstore and you should be given this as soon as possible.    Your hemoglobin A1c was 7.5    Higher than 7.0 from 4 months ago and higher than 6.8 from 10 months ago.  Obviously her blood sugars are not well controlled and we need to improve this.  He has several different choices including improved dietary discretion and increased activity, increasing oral medications or adding insulin.  My recommendations would be to improve the dietary discretion and increase her activities.    I recommend coming back to see me in 2 months to follow-up on your B12 and your A1c.  If any questions regarding these lab reports please let me know otherwise is, I will look forward to seeing you back in 2 months.    If you have any further questions or problems, please contact our office.      Sincerely,    Aleks Maher MD / osmin          Enclosure: Lab Results          Resulted Orders   Vitamin B12   Result Value Ref Range    Vitamin B12 216 193 - 986 pg/mL   TSH with free T4 reflex   Result Value Ref Range    TSH 1.41 0.40 - 4.00 mU/L   Hemoglobin A1c   Result Value Ref Range    Hemoglobin A1C 7.5 (H) 0 - 5.6 %      Comment:      Normal <5.7% Prediabetes 5.7-6.4%  Diabetes 6.5% or higher - adopted from ADA   consensus guidelines.

## 2018-10-31 NOTE — MR AVS SNAPSHOT
"              After Visit Summary   10/31/2018    Gavi Pearson    MRN: [id number]           Patient Information     Date Of Birth          10/21/1931        Visit Information        Provider Department      10/31/2018 11:00 AM Aleks Maher MD [facility]        Today's Diagnoses     Benign essential hypertension    -  1    Familial hypercholesterolemia        Type 2 diabetes mellitus without complication, without long-term current use of insulin (H)        Acquired hypothyroidism        Paroxysmal A-fib (H)        Vitamin B12 deficiency (non anemic)        Polyneuropathy associated with underlying disease (H)        Vitamin D deficiency        Need for prophylactic vaccination and inoculation against influenza          Care Instructions    Flu vac    F'up labs          Follow-ups after your visit        Who to contact     If you have questions or need follow up information about today's clinic visit or your schedule please contact [facility] directly at [phone].  Normal or non-critical lab and imaging results will be communicated to you by [email or web address], letter or phone within 4 business days after the clinic has received the results. If you do not hear from us within 7 days, please contact the clinic through [email or web address] or phone. If you have a critical or abnormal lab result, we will notify you by phone as soon as possible.  Submit refill requests through goCatch or call your pharmacy and they will forward the refill request to us. Please allow 3 business days for your refill to be completed.          Additional Information About Your Visit        MyChart Information     goCatch lets you send messages to your doctor, view your test results, renew your prescriptions, schedule appointments and more. To sign up, go to [email or web address] . Click on \"Log in\" on the left side of the screen, which will take you to the Welcome page. Then click on \"Sign up Now\" on the right side of the page. " "    You will be asked to enter the access code listed below, as well as some personal information. Please follow the directions to create your username and password.     Your access code is: [email or web address]  Expires: 2019  4:44 AM     Your access code will  in 90 days. If you need help or a new code, please call your [facility] clinic or [phone].        Care EveryWhere ID     This is your Care EveryWhere ID. This could be used by other organizations to access your [facility] medical records  [id number]        Your Vitals Were     Pulse Temperature Height Pulse Oximetry BMI (Body Mass Index)       120 98.7  F (37.1  C) (Tympanic) 5' 9\" (1.753 m) 93% 28.94 kg/m2        Blood Pressure from Last 3 Encounters:   10/31/18 140/82   18 154/85   18 173/88    Weight from Last 3 Encounters:   10/31/18 196 lb (88.9 kg)   18 195 lb (88.5 kg)   18 194 lb (88 kg)              We Performed the Following     ADMIN INFLUENZA (For MEDICARE Patients ONLY) []     FLU VACCINE, INCREASED ANTIGEN, PRESV FREE, AGE 65+ [00229]     Hemoglobin A1c     TSH with free T4 reflex     Vitamin B12          Today's Medication Changes          These changes are accurate as of 10/31/18 11:59 PM.  If you have any questions, ask your nurse or doctor.               Stop taking these medicines if you haven't already. Please contact your care team if you have questions.     lisinopril 20 MG tablet   Commonly known as:  PRINIVIL/ZESTRIL   Stopped by:  Aleks Maher MD                    Primary Care Provider Office Phone # Fax #    Aleks Maher [phone] [address] [address]        Equal Access to Services     MARCIA GIL : Dedrick Diaz, waaxda luqadaha, qaybta kaalmamily baltazar, shay jones. So [facility] [place] [facility] [phone].    ATENCIÓN: Si habla español, tiene a lema disposición servicios gratuitos de asistencia lingüística. " Esdras flanagan [phone].    We comply with applicable federal civil rights laws and [place] laws. We do not discriminate on the basis of race, color, national origin, age, disability, sex, sexual orientation, or gender identity.            Thank you!     Thank you for choosing [facility]  for your care. Our goal is always to provide you with excellent care. Hearing back from our patients is one way we can continue to improve our services. Please take a few minutes to complete the written survey that you may receive in the mail after your visit with us. Thank you!             Your Updated Medication List - Protect others around you: Learn how to safely use, store and throw away your medicines at www.disposemymeds.org.          This list is accurate as of 10/31/18 11:59 PM.  Always use your most recent med list.                   Brand Name Dispense Instructions for use Diagnosis    JOSÉ ASPIRIN PO           blood glucose lancets standard    no brand specified    200 each    Use to test blood sugar 2 times daily or as directed.    Type 2 diabetes mellitus without complication, without long-term current use of insulin (H)       blood glucose monitoring test strip    no brand specified    200 strip    Use to test blood sugars 2 times daily or as directed    Type 2 diabetes mellitus without complication, without long-term current use of insulin (H)       glipiZIDE 5 MG tablet    GLUCOTROL    225 tablet    Take 0.5 tablet every morning and 2 tablets every evening    Type 2 diabetes mellitus without complication, without long-term current use of insulin (H)       JANUVIA 100 MG tablet   Generic drug:  sitagliptin     90 tablet    TAKE 1 TABLET BY MOUTH EVERY DAY    Type 2 diabetes mellitus without complication, without long-term current use of insulin (H)       levothyroxine 137 MCG tablet    SYNTHROID/LEVOTHROID    90 tablet    Take 1 tablet (137 mcg) by mouth daily Lab recheck due end of October. Schedule at  [phone]    Hypothyroidism due to acquired atrophy of thyroid       lisinopril-hydrochlorothiazide 20-12.5 MG per tablet    PRINZIDE/ZESTORETIC    90 tablet    TAKE 1 TABLET BY MOUTH EVERY MORNING    Benign essential hypertension       lovastatin 20 MG tablet    MEVACOR    90 tablet    TAKE 1 TABLET BY MOUTH EVERY DAY    Hyperlipidemia LDL goal <100       metFORMIN 500 MG tablet    GLUCOPHAGE    180 tablet    TAKE 1 TABLET BY MOUTH TWICE DAILY    Type 2 diabetes mellitus without complication, without long-term current use of insulin (H)       metoprolol tartrate 50 MG tablet    LOPRESSOR    180 tablet    TAKE 1 TABLET BY MOUTH TWICE DAILY.    Benign essential hypertension       triamcinolone 0.1 % cream    KENALOG    45 g    Apply b.i.d. p.r.n. to dermatitis but not on the face    Dermatitis

## 2018-10-31 NOTE — PROGRESS NOTES
SUBJECTIVE:   Gavi Pearson is a 87 year old female who presents to clinic today for the following health issues:    Recheck -- Thyroid  Gavi takes her levothyroxine as prescribed with food and occasionally takes a second tablet as needed.     Problem list and histories reviewed & adjusted, as indicated.  Additional history: as documented    Patient Active Problem List   Diagnosis     Benign essential hypertension     Hyperlipidemia, unspecified     Acquired hypothyroidism     Paroxysmal A-fib (H)     Vitamin B12 deficiency (non anemic)     Polyneuropathy associated with underlying disease (H)     Type 2 diabetes mellitus without complication, without long-term current use of insulin (H)     BPPV (benign paroxysmal positional vertigo), unspecified laterality     Vitamin D deficiency     History reviewed. No pertinent surgical history.    Social History   Substance Use Topics     Smoking status: Never Smoker     Smokeless tobacco: Never Used     Alcohol use 0.0 oz/week     0 Standard drinks or equivalent per week      Comment: occ     History reviewed. No pertinent family history.      Labs reviewed in EPIC    Reviewed and updated as needed this visit by clinical staff  Tobacco  Allergies  Meds  Problems  Med Hx  Surg Hx  Fam Hx  Soc Hx        Reviewed and updated as needed this visit by Provider       ROS:  CONSTITUTIONAL: NEGATIVE for fever, chills, change in weight  INTEGUMENTARY/SKIN: NEGATIVE for worrisome rashes, moles or lesions  EYES: NEGATIVE for vision changes or irritation  ENT/MOUTH: NEGATIVE for ear, mouth and throat problems  RESP: NEGATIVE for significant cough or SOB  BREAST: NEGATIVE for masses, tenderness or discharge  CV: NEGATIVE for chest pain, palpitations or peripheral edema  GI: NEGATIVE for nausea, abdominal pain, heartburn, or change in bowel habits  : NEGATIVE for frequency, dysuria, or hematuria  MUSCULOSKELETAL: NEGATIVE for significant arthralgias or myalgia  NEURO: NEGATIVE for  "weakness, dizziness or paresthesias    This document serves as a record of the services and decisions personally performed and made by Aleks Maher MD. It was created on his behalf by Wilner Lynne, a trained medical scribe. The creation of this document is based the provider's statements to the medical scribe.  Scribe Wilner Lynne 11:47 AM, October 31, 2018    OBJECTIVE:   /82  Pulse 120  Temp 98.7  F (37.1  C) (Tympanic)  Ht 1.753 m (5' 9\")  Wt 88.9 kg (196 lb)  SpO2 93%  BMI 28.94 kg/m2  Body mass index is 28.94 kg/(m^2).  Neck was supple without adenopathy or thyromegaly her carotids were normal without bruits  Chest clear to auscultation and percussion  Cardiovascular S1 and S2 are physiologic without murmurs or gallops  Abdomen bowel sounds were normal.  There is no palpable mass or organomegaly  Extremities nontender without any edema  Pulses pedal pulses are as described otherwise his pulses are bilaterally symmetrical throughout without bruits  Feet were nontender without any edema otherwise skin was clean and dry  DTR normal   Skin without significant abnormality     BP Recheck: 140/82    Results for orders placed or performed in visit on 10/31/18 (from the past 24 hour(s))   Hemoglobin A1c   Result Value Ref Range    Hemoglobin A1C 7.5 (H) 0 - 5.6 %      Labs pending at time of visit, will contact to follow up with results tomorrow.     ASSESSMENT/PLAN:   Gavi was seen today for recheck.    Diagnoses and all orders for this visit:    Benign essential hypertension  Mentioned therapy as blood pressure is under good control   familial hypercholesterolemia    Type 2 diabetes mellitus without complication, without long-term current use of insulin (H)  Encouraged to adjust carbohydrate intake  -     Hemoglobin A1c  Blood sugar control continues to be out of control.  Reviewed diet and activity with the patient.  Recommend returning to clinic in 3 months to reevaluate and if her blood sugars are not improved we " need to consider alternative treatment options including insulin  Acquired hypothyroidism  -     TSH with free T4 reflex  Follow-up reports and review thyroid replacement  Paroxysmal A-fib (H)  Rate controlled and intentionally not on anticoagulants    vitamin B12 deficiency (non anemic)  -     Vitamin B12    Polyneuropathy associated with underlying disease (H)    Vitamin D deficiency    Need for prophylactic vaccination and inoculation against influenza  -     FLU VACCINE, INCREASED ANTIGEN, PRESV FREE, AGE 65+ [19279]  -     ADMIN INFLUENZA (For MEDICARE Patients ONLY) []      We will call to follow up on lab results for A1C and TSH tomorrow.       The information in this document, created by the medical scribe for me, accurately reflects the services I personally performed and the decisions made by me. I have reviewed and approved this document for accuracy prior to leaving the patient care area.  12:01 PM, 10/31/18    Aleks Maher MD  [facility]    Injectable Influenza Immunization Documentation    1.  Is the person to be vaccinated sick today?   No    2. Does the person to be vaccinated have an allergy to a component   of the vaccine?   No  Egg Allergy Algorithm Link    3. Has the person to be vaccinated ever had a serious reaction   to influenza vaccine in the past?   No    4. Has the person to be vaccinated ever had Guillain-Barré syndrome?   No    Form completed by patient

## 2018-11-01 LAB — TSH SERPL DL<=0.005 MIU/L-ACNC: 1.41 MU/L (ref 0.4–4)

## 2018-11-26 DIAGNOSIS — E03.4 HYPOTHYROIDISM DUE TO ACQUIRED ATROPHY OF THYROID: ICD-10-CM

## 2018-11-26 DIAGNOSIS — E11.9 TYPE 2 DIABETES MELLITUS WITHOUT COMPLICATION, WITHOUT LONG-TERM CURRENT USE OF INSULIN (H): ICD-10-CM

## 2018-11-26 NOTE — TELEPHONE ENCOUNTER
"glipiZIDE (GLUCOTROL) 5 MG tablet 225 tablet 3 12/7/2017  No   Sig: Take 0.5 tablet every morning and 2 tablets every evening     Last Written Prescription Date:  12/07/2017  Last Fill Quantity: 225,  # refills: 3   Last office visit: 10/31/2018 with prescribing provider:     Future Office Visit:          levothyroxine (SYNTHROID/LEVOTHROID) 137 MCG tablet 90 tablet 0 9/19/2018  No   Sig - Route: Take 1 tablet (137 mcg) by mouth daily Lab recheck due end of October.      Last Written Prescription Date:  09/19/2018  Last Fill Quantity: 90,  # refills: 0   Last office visit: 10/31/2018 with prescribing provider:     Future Office Visit:        Requested Prescriptions   Pending Prescriptions Disp Refills     levothyroxine (SYNTHROID/LEVOTHROID) 137 MCG tablet [Pharmacy Med Name: LEVOTHYROXINE 0.137MG (137MCG) TAB] 90 tablet 0     Sig: TAKE 1 TABLET BY MOUTH EVERY DAY    Thyroid Protocol Passed    11/26/2018  9:54 AM       Passed - Patient is 12 years or older       Passed - Recent (12 mo) or future (30 days) visit within the authorizing provider's specialty    Patient had office visit in the last 12 months or has a visit in the next 30 days with authorizing provider or within the authorizing provider's specialty.  See \"Patient Info\" tab in inbasket, or \"Choose Columns\" in Meds & Orders section of the refill encounter.             Passed - Normal TSH on file in past 12 months    Recent Labs   Lab Test  10/31/18   1125   TSH  1.41             Passed - No active pregnancy on record    If patient is pregnant or has had a positive pregnancy test, please check TSH.         Passed - No positive pregnancy test in past 12 months    If patient is pregnant or has had a positive pregnancy test, please check TSH.          glipiZIDE (GLUCOTROL) 5 MG tablet [Pharmacy Med Name: GLIPIZIDE 5MG TABLETS] 225 tablet 0     Sig: TAKE 0.5 TABLET EVERY MORNING AND 2 TABLETS EVERY EVENING    Sulfonylurea Agents Failed    11/26/2018  9:54 AM    " "   Failed - Blood pressure less than 140/90 in past 6 months    BP Readings from Last 3 Encounters:   10/31/18 140/82   07/16/18 154/85   01/18/18 173/88                Passed - Patient has documented LDL within the past 12 mos.    Recent Labs   Lab Test  07/16/18   1032   LDL  58            Passed - Patient has had a Microalbumin in the past 12 mos.    Recent Labs   Lab Test  07/16/18   1032   MICROL  57   UMALCR  53.58*            Passed - Patient has documented A1c within the specified period of time.    If HgbA1C is 8 or greater, it needs to be on file within the past 3 months.  If less than 8, must be on file within the past 6 months.     Recent Labs   Lab Test  10/31/18   1125   A1C  7.5*            Passed - Patient is age 18 or older       Passed - No active pregnancy on record       Passed - Patient has a recent creatinine (normal) within the past 12 mos.    Recent Labs   Lab Test  07/16/18   1032   CR  0.74            Passed - Patient has not had a positive pregnancy test within the past 12 mos.       Passed - Recent (6 mo) or future (30 days) visit within the authorizing provider's specialty    Patient had office visit in the last 6 months or has a visit in the next 30 days with authorizing provider or within the authorizing provider's specialty.  See \"Patient Info\" tab in inbasket, or \"Choose Columns\" in Meds & Orders section of the refill encounter.              "

## 2018-11-28 RX ORDER — LEVOTHYROXINE SODIUM 137 UG/1
TABLET ORAL
Qty: 90 TABLET | Refills: 1 | Status: SHIPPED | OUTPATIENT
Start: 2018-11-28 | End: 2019-04-30

## 2018-11-28 RX ORDER — GLIPIZIDE 5 MG/1
TABLET ORAL
Qty: 225 TABLET | Refills: 1 | Status: SHIPPED | OUTPATIENT
Start: 2018-11-28 | End: 2019-04-30

## 2018-11-28 NOTE — TELEPHONE ENCOUNTER
Levothyroxine prescription approved per [facility] Refill Protocol.    Routing Glipizide refill request to provider for review/approval because:  BP out of range:   10/31/18 140/82   07/16/18 154/85   01/18/18 173/88     Please review and authorize if appropriate.    Thank you,   Zane REDD RN

## 2019-02-11 DIAGNOSIS — I10 BENIGN ESSENTIAL HYPERTENSION: ICD-10-CM

## 2019-02-11 DIAGNOSIS — E11.9 TYPE 2 DIABETES MELLITUS WITHOUT COMPLICATION, WITHOUT LONG-TERM CURRENT USE OF INSULIN (H): ICD-10-CM

## 2019-02-11 NOTE — TELEPHONE ENCOUNTER
"  lisinopril-hydrochlorothiazide (PRINZIDE/ZESTORETIC) 20-12.5 MG per tablet 90 tablet 0 11/15/2018     lisinopril (PRINIVIL/ZESTRIL) 20 MG tablet 90 tablet 0 11/15/2018       Last Written Prescription Date:  11/15/2018  Last Fill Quantity: 90,  # refills: 0       metFORMIN (GLUCOPHAGE) 500 MG tablet 180 tablet 0 11/15/2018       Last Written Prescription Date:  11/15/2018  Last Fill Quantity: 180,  # refills: 0   Last office visit: 10/31/2018 with prescribing provider:     Future Office Visit:  Unknown    Requested Prescriptions   Pending Prescriptions Disp Refills     lisinopril (PRINIVIL/ZESTRIL) 20 MG tablet [Pharmacy Med Name: LISINOPRIL 20MG TABLETS] 90 tablet 0     Sig: TAKE 1 TABLET(20 MG) BY MOUTH DAILY    ACE Inhibitors (Including Combos) Protocol Failed - 2/11/2019  1:07 PM       Failed - Blood pressure under 140/90 in past 12 months    BP Readings from Last 3 Encounters:   10/31/18 140/82   07/16/18 154/85   01/18/18 173/88                Passed - Recent (12 mo) or future (30 days) visit within the authorizing provider's specialty    Patient had office visit in the last 12 months or has a visit in the next 30 days with authorizing provider or within the authorizing provider's specialty.  See \"Patient Info\" tab in inbasket, or \"Choose Columns\" in Meds & Orders section of the refill encounter.             Passed - Medication is active on med list       Passed - Patient is age 18 or older       Passed - No active pregnancy on record       Passed - Normal serum creatinine on file in past 12 months    Recent Labs   Lab Test 07/16/18  1032   CR 0.74            Passed - Normal serum potassium on file in past 12 months    Recent Labs   Lab Test 07/16/18  1032   POTASSIUM 4.2            Passed - No positive pregnancy test within past 12 months        metFORMIN (GLUCOPHAGE) 500 MG tablet [Pharmacy Med Name: METFORMIN 500MG TABLETS] 180 tablet 0     Sig: TAKE 1 TABLET BY MOUTH TWICE DAILY    Biguanide " "Agents Failed - 2/11/2019  1:07 PM       Failed - Blood pressure less than 140/90 in past 6 months    BP Readings from Last 3 Encounters:   10/31/18 140/82   07/16/18 154/85   01/18/18 173/88                Passed - Patient has documented LDL within the past 12 mos.    Recent Labs   Lab Test 07/16/18  1032   LDL 58            Passed - Patient has had a Microalbumin in the past 15 mos.    Recent Labs   Lab Test 07/16/18  1032   MICROL 57   UMALCR 53.58*            Passed - Patient is age 10 or older       Passed - Patient has documented A1c within the specified period of time.    If HgbA1C is 8 or greater, it needs to be on file within the past 3 months.  If less than 8, must be on file within the past 6 months.     Recent Labs   Lab Test 10/31/18  1125   A1C 7.5*            Passed - Patient's CR is NOT>1.4 OR Patient's EGFR is NOT<45 within past 12 mos.    Recent Labs   Lab Test 07/16/18  1032   GFRESTIMATED 75   GFRESTBLACK >90       Recent Labs   Lab Test 07/16/18  1032   CR 0.74            Passed - Patient does NOT have a diagnosis of CHF.       Passed - Medication is active on med list       Passed - Patient is not pregnant       Passed - Patient has not had a positive pregnancy test within the past 12 mos.        Passed - Recent (6 mo) or future (30 days) visit within the authorizing provider's specialty    Patient had office visit in the last 6 months or has a visit in the next 30 days with authorizing provider or within the authorizing provider's specialty.  See \"Patient Info\" tab in inbasket, or \"Choose Columns\" in Meds & Orders section of the refill encounter.            lisinopril-hydrochlorothiazide (PRINZIDE/ZESTORETIC) 20-12.5 MG tablet [Pharmacy Med Name: LISINOPRIL-HCTZ 20/12.5MG TABLETS] 90 tablet 0     Sig: TAKE 1 TABLET BY MOUTH EVERY MORNING    ACE Inhibitors (Including Combos) Protocol Failed - 2/11/2019  1:07 PM       Failed - Blood pressure under 140/90 in past 12 months    BP Readings from " "Last 3 Encounters:   10/31/18 140/82   07/16/18 154/85   01/18/18 173/88                Passed - Recent (12 mo) or future (30 days) visit within the authorizing provider's specialty    Patient had office visit in the last 12 months or has a visit in the next 30 days with authorizing provider or within the authorizing provider's specialty.  See \"Patient Info\" tab in inbasket, or \"Choose Columns\" in Meds & Orders section of the refill encounter.             Passed - Medication is active on med list       Passed - Patient is age 18 or older       Passed - No active pregnancy on record       Passed - Normal serum creatinine on file in past 12 months    Recent Labs   Lab Test 07/16/18  1032   CR 0.74            Passed - Normal serum potassium on file in past 12 months    Recent Labs   Lab Test 07/16/18  1032   POTASSIUM 4.2            Passed - No positive pregnancy test within past 12 months          "

## 2019-02-13 RX ORDER — LISINOPRIL AND HYDROCHLOROTHIAZIDE 12.5; 2 MG/1; MG/1
TABLET ORAL
Qty: 90 TABLET | Refills: 0 | Status: SHIPPED | OUTPATIENT
Start: 2019-02-13 | End: 2019-04-30

## 2019-02-13 RX ORDER — LISINOPRIL 20 MG/1
TABLET ORAL
Qty: 90 TABLET | Refills: 0 | Status: SHIPPED | OUTPATIENT
Start: 2019-02-13 | End: 2019-04-30

## 2019-02-13 NOTE — TELEPHONE ENCOUNTER
Routing refill requests to provider for review/approval because:  Last BP elevated     Temi THOMPSON RN

## 2019-04-08 ENCOUNTER — OFFICE VISIT (OUTPATIENT)
Dept: FAMILY MEDICINE | Facility: CLINIC | Age: 84
End: 2019-04-08
Payer: MEDICARE

## 2019-04-08 VITALS
OXYGEN SATURATION: 96 % | TEMPERATURE: 98.8 F | SYSTOLIC BLOOD PRESSURE: 140 MMHG | WEIGHT: 201 LBS | DIASTOLIC BLOOD PRESSURE: 76 MMHG | HEART RATE: 67 BPM | HEIGHT: 69 IN | BODY MASS INDEX: 29.77 KG/M2

## 2019-04-08 DIAGNOSIS — E11.9 TYPE 2 DIABETES MELLITUS WITHOUT COMPLICATION, WITHOUT LONG-TERM CURRENT USE OF INSULIN (H): ICD-10-CM

## 2019-04-08 DIAGNOSIS — E53.8 VITAMIN B12 DEFICIENCY (NON ANEMIC): ICD-10-CM

## 2019-04-08 DIAGNOSIS — I10 BENIGN ESSENTIAL HYPERTENSION: Primary | ICD-10-CM

## 2019-04-08 DIAGNOSIS — E03.9 ACQUIRED HYPOTHYROIDISM: ICD-10-CM

## 2019-04-08 LAB — HBA1C MFR BLD: 7.7 % (ref 0–5.6)

## 2019-04-08 PROCEDURE — 99214 OFFICE O/P EST MOD 30 MIN: CPT | Performed by: INTERNAL MEDICINE

## 2019-04-08 PROCEDURE — 36415 COLL VENOUS BLD VENIPUNCTURE: CPT | Performed by: INTERNAL MEDICINE

## 2019-04-08 PROCEDURE — 84443 ASSAY THYROID STIM HORMONE: CPT | Performed by: INTERNAL MEDICINE

## 2019-04-08 PROCEDURE — 83036 HEMOGLOBIN GLYCOSYLATED A1C: CPT | Performed by: INTERNAL MEDICINE

## 2019-04-08 PROCEDURE — 80048 BASIC METABOLIC PNL TOTAL CA: CPT | Performed by: INTERNAL MEDICINE

## 2019-04-08 PROCEDURE — 82607 VITAMIN B-12: CPT | Performed by: INTERNAL MEDICINE

## 2019-04-08 ASSESSMENT — MIFFLIN-ST. JEOR: SCORE: 1411.11

## 2019-04-08 NOTE — PROGRESS NOTES
SUBJECTIVE:                                                    Gavi Pearson is a 87 year old female who presents to clinic today for the following health issues:    Diabetes Follow Up  Patient states blood sugars running between 140-160 mg/dL. Usually around 140 mg/dL when patient wakes up in the morning. Checks blood sugars once a day or every other day. She is taking her medications as instructed and diligently without complication.      Incontinence   Gavi reports that she has frequent episodes of incontinence, and nocturia 2x.           Problem list and histories reviewed & adjusted, as indicated.  Additional history: as documented    Patient Active Problem List   Diagnosis     Benign essential hypertension     Hyperlipidemia, unspecified     Acquired hypothyroidism     Paroxysmal A-fib (H)     Vitamin B12 deficiency (non anemic)     Polyneuropathy associated with underlying disease (H)     Type 2 diabetes mellitus without complication, without long-term current use of insulin (H)     BPPV (benign paroxysmal positional vertigo), unspecified laterality     Vitamin D deficiency     No past surgical history on file.    Social History     Tobacco Use     Smoking status: Never Smoker     Smokeless tobacco: Never Used   Substance Use Topics     Alcohol use: Yes     Alcohol/week: 0.0 oz     Comment: occ     No family history on file.      Current Outpatient Medications   Medication Sig Dispense Refill     JOSÉ ASPIRIN PO        blood glucose (NO BRAND SPECIFIED) lancets standard Use to test blood sugar 2 times daily or as directed. 200 each 3     blood glucose monitoring (NO BRAND SPECIFIED) test strip Use to test blood sugars 2 times daily or as directed 200 strip 3     glipiZIDE (GLUCOTROL) 5 MG tablet TAKE 0.5 TABLET EVERY MORNING AND 2 TABLETS EVERY EVENING 225 tablet 1     JANUVIA 100 MG tablet TAKE 1 TABLET BY MOUTH EVERY DAY 90 tablet 1     levothyroxine (SYNTHROID/LEVOTHROID) 137 MCG tablet TAKE 1 TABLET BY  MOUTH EVERY DAY 90 tablet 1     lisinopril (PRINIVIL/ZESTRIL) 20 MG tablet TAKE 1 TABLET(20 MG) BY MOUTH DAILY 90 tablet 0     lisinopril-hydrochlorothiazide (PRINZIDE/ZESTORETIC) 20-12.5 MG tablet TAKE 1 TABLET BY MOUTH EVERY MORNING 90 tablet 0     lovastatin (MEVACOR) 20 MG tablet TAKE 1 TABLET BY MOUTH EVERY DAY 90 tablet 2     metFORMIN (GLUCOPHAGE) 500 MG tablet TAKE 1 TABLET BY MOUTH TWICE DAILY 180 tablet 0     metoprolol tartrate (LOPRESSOR) 50 MG tablet TAKE 1 TABLET BY MOUTH TWICE DAILY. 180 tablet 2     triamcinolone (KENALOG) 0.1 % cream Apply b.i.d. p.r.n. to dermatitis but not on the face 45 g 1     No Known Allergies  Labs reviewed in EPIC    ROS:  CONSTITUTIONAL: NEGATIVE for fever, chills, change in weight  INTEGUMENTARY/SKIN: NEGATIVE for worrisome rashes, moles or lesions  EYES: NEGATIVE for vision changes or irritation  ENT/MOUTH: NEGATIVE for ear, mouth and throat problems  RESP: NEGATIVE for significant cough or SOB  BREAST: NEGATIVE for masses, tenderness or discharge  CV: NEGATIVE for chest pain, palpitations or peripheral edema  GI: NEGATIVE for nausea, abdominal pain, heartburn, or change in bowel habits  : NEGATIVE for frequency, dysuria, or hematuria  MUSCULOSKELETAL: NEGATIVE for significant arthralgias or myalgia  NEURO: NEGATIVE for weakness, dizziness or paresthesias  ENDOCRINE: NEGATIVE for temperature intolerance, skin/hair changes  HEME: NEGATIVE for bleeding problems  PSYCHIATRIC: NEGATIVE for changes in mood or affect  This document serves as a record of the services and decisions personally performed and made by Aleks Maher MD. It was created on his behalf by Wilner Lynne, a trained medical scribe. The creation of this document is based the provider's statements to the medical scribe.  [place]rib[place] Lynne 5:15 PM, April 8, 2019    OBJECTIVE:     /76 (BP Location: Right arm, Patient Position: Sitting, Cuff Size: Adult Regular)   Pulse 67   Temp 98.8  F (37.1  C) (Oral)   Ht  "1.753 m (5' 9\")   Wt 91.2 kg (201 lb)   SpO2 96%   BMI 29.68 kg/m    Body mass index is 29.68 kg/m .  Neck was supple without adenopathy or thyromegaly her carotids were normal without bruits  Chest clear to auscultation and percussion  Cardiovascular S1 and S2 were irregularly irregular with a well controlled ventricular response without murmurs or gallops  Abdomen bowel sounds were normal.  There is no palpable mass or organomegaly  Extremities nontender without any edema  Pulses pedal pulses are as described otherwise his pulses are bilaterally symmetrical throughout without bruits  Skin without significant abnormality  Feet are clean and dry  Neuro paraesthesias to the mid calf     ASSESSMENT/PLAN:   Gavi was seen today for recheck.    Diagnoses and all orders for this visit:    Benign essential hypertension  Adequately controlled, recommended to increase her activities to assist good control.   -     Basic metabolic panel    Acquired hypothyroidism  Rechecking labs  -     TSH with free T4 reflex    Vitamin B12 deficiency (non anemic)  Rechecking labs  -     Vitamin B12    Type 2 diabetes mellitus without complication, without long-term current use of insulin (H)  Recommended increasing her activities, adjusting her diets to more selective choices than fast foods.   -     Hemoglobin A1c      The information in this document, created by the medical scribe for me, accurately reflects the services I personally performed and the decisions made by me. I have reviewed and approved this document for accuracy prior to leaving the patient care area.  4:59 PM, 04/08/19  30 minutes spent in reviewing and evaluating her condition and greater than 50% was spent in counseling and coordinating care  Aleks Maher MD  [facility]        "

## 2019-04-09 LAB
ANION GAP SERPL CALCULATED.3IONS-SCNC: 11 MMOL/L (ref 3–14)
BUN SERPL-MCNC: 12 MG/DL (ref 7–30)
CALCIUM SERPL-MCNC: 9.5 MG/DL (ref 8.5–10.1)
CHLORIDE SERPL-SCNC: 102 MMOL/L (ref 94–109)
CO2 SERPL-SCNC: 23 MMOL/L (ref 20–32)
CREAT SERPL-MCNC: 0.72 MG/DL (ref 0.52–1.04)
GFR SERPL CREATININE-BSD FRML MDRD: 76 ML/MIN/{1.73_M2}
GLUCOSE SERPL-MCNC: 115 MG/DL (ref 70–99)
POTASSIUM SERPL-SCNC: 4.1 MMOL/L (ref 3.4–5.3)
SODIUM SERPL-SCNC: 136 MMOL/L (ref 133–144)
TSH SERPL DL<=0.005 MIU/L-ACNC: 1.67 MU/L (ref 0.4–4)
VIT B12 SERPL-MCNC: 306 PG/ML (ref 193–986)

## 2019-04-15 DIAGNOSIS — E11.9 TYPE 2 DIABETES MELLITUS WITHOUT COMPLICATION, WITHOUT LONG-TERM CURRENT USE OF INSULIN (H): ICD-10-CM

## 2019-04-16 NOTE — TELEPHONE ENCOUNTER
"JANUVIA 100 MG tablet 90 tablet 1 10/17/2018  No        Last Written Prescription Date:  10/17/18  Last Fill Quantity: 90,  # refills: 1   Last office visit: 4/8/2019 with prescribing provider:  Gunnar   Future Office Visit:  NONE    Requested Prescriptions   Pending Prescriptions Disp Refills     JANUVIA 100 MG tablet [Pharmacy Med Name: JANUVIA 100MG TABLETS] 90 tablet 0     Sig: TAKE 1 TABLET BY MOUTH EVERY DAY       DPP4 Inhibitors Protocol Failed - 4/15/2019  9:52 AM        Failed - Blood pressure less than 140/90 in past 6 months     BP Readings from Last 3 Encounters:   04/08/19 140/76   10/31/18 140/82   07/16/18 154/85                 Passed - LDL on file in past 12 months     Recent Labs   Lab Test 07/16/18  1032   LDL 58             Passed - Microalbumin on file in past 12 months     Recent Labs   Lab Test 07/16/18  1032   MICROL 57   UMALCR 53.58*             Passed - HgbA1C in past 3 or 6 months     If HgbA1C is 8 or greater, it needs to be on file within the past 3 months.  If less than 8, must be on file within the past 6 months.     Recent Labs   Lab Test 04/08/19  1716   A1C 7.7*             Passed - Medication is active on med list        Passed - Patient is age 18 or older        Passed - No active pregnancy on record        Passed - Normal serum creatinine in past 12 months     Recent Labs   Lab Test 04/08/19  1716   CR 0.72             Passed - No positive pregnancy test in past 12 months        Passed - Recent (6 mo) or future (30 days) visit within the authorizing provider's specialty     Patient had office visit in the last 6 months or has a visit in the next 30 days with authorizing provider.  See \"Patient Info\" tab in inbasket, or \"Choose Columns\" in Meds & Orders section of the refill encounter.            No flowsheet data found.      "

## 2019-04-17 NOTE — TELEPHONE ENCOUNTER
Routing refill request to provider for review/approval because:  BP out of range:    04/08/19 140/76   10/31/18 140/82   07/16/18 154/85     Please review and authorize if appropriate.    Thank you,   Zane REDD RN

## 2019-04-18 RX ORDER — SITAGLIPTIN 100 MG/1
TABLET, FILM COATED ORAL
Qty: 90 TABLET | Refills: 1 | Status: SHIPPED | OUTPATIENT
Start: 2019-04-18 | End: 2019-10-01

## 2019-05-13 DIAGNOSIS — E78.5 HYPERLIPIDEMIA LDL GOAL <100: ICD-10-CM

## 2019-05-13 NOTE — TELEPHONE ENCOUNTER
"lovastatin (MEVACOR) 20 MG tablet 90 tablet 2 8/15/2018         Last Written Prescription Date:  08/15/2018  Last Fill Quantity: 90,  # refills: 2   Last office visit: 4/8/2019 with prescribing provider:     Future Office Visit:  Unknown    Requested Prescriptions   Pending Prescriptions Disp Refills     lovastatin (MEVACOR) 20 MG tablet [Pharmacy Med Name: LOVASTATIN 20MG TABLETS] 90 tablet 0     Sig: TAKE 1 TABLET BY MOUTH EVERY DAY       Statins Protocol Passed - 5/13/2019  9:34 AM        Passed - LDL on file in past 12 months     Recent Labs   Lab Test 07/16/18  1032   LDL 58             Passed - No abnormal creatine kinase in past 12 months     No lab results found.             Passed - Recent (12 mo) or future (30 days) visit within the authorizing provider's specialty     Patient had office visit in the last 12 months or has a visit in the next 30 days with authorizing provider or within the authorizing provider's specialty.  See \"Patient Info\" tab in inbasket, or \"Choose Columns\" in Meds & Orders section of the refill encounter.              Passed - Medication is active on med list        Passed - Patient is age 18 or older        Passed - No active pregnancy on record        Passed - No positive pregnancy test in past 12 months          "

## 2019-05-15 RX ORDER — LOVASTATIN 20 MG
TABLET ORAL
Qty: 90 TABLET | Refills: 0 | Status: SHIPPED | OUTPATIENT
Start: 2019-05-15 | End: 2019-10-01

## 2019-10-01 ENCOUNTER — OFFICE VISIT (OUTPATIENT)
Dept: FAMILY MEDICINE | Facility: CLINIC | Age: 84
End: 2019-10-01
Payer: MEDICARE

## 2019-10-01 VITALS
WEIGHT: 196 LBS | DIASTOLIC BLOOD PRESSURE: 70 MMHG | SYSTOLIC BLOOD PRESSURE: 138 MMHG | TEMPERATURE: 98.6 F | HEART RATE: 87 BPM | BODY MASS INDEX: 28.94 KG/M2 | OXYGEN SATURATION: 96 %

## 2019-10-01 DIAGNOSIS — I48.0 PAROXYSMAL A-FIB (H): ICD-10-CM

## 2019-10-01 DIAGNOSIS — Z23 NEED FOR PROPHYLACTIC VACCINATION AND INOCULATION AGAINST INFLUENZA: ICD-10-CM

## 2019-10-01 DIAGNOSIS — E55.9 VITAMIN D DEFICIENCY: ICD-10-CM

## 2019-10-01 DIAGNOSIS — I10 BENIGN ESSENTIAL HYPERTENSION: ICD-10-CM

## 2019-10-01 DIAGNOSIS — E03.4 HYPOTHYROIDISM DUE TO ACQUIRED ATROPHY OF THYROID: Primary | ICD-10-CM

## 2019-10-01 DIAGNOSIS — E53.8 VITAMIN B12 DEFICIENCY (NON ANEMIC): ICD-10-CM

## 2019-10-01 DIAGNOSIS — G63 POLYNEUROPATHY ASSOCIATED WITH UNDERLYING DISEASE (H): ICD-10-CM

## 2019-10-01 DIAGNOSIS — E78.5 HYPERLIPIDEMIA LDL GOAL <100: ICD-10-CM

## 2019-10-01 DIAGNOSIS — H81.10 BPPV (BENIGN PAROXYSMAL POSITIONAL VERTIGO), UNSPECIFIED LATERALITY: ICD-10-CM

## 2019-10-01 DIAGNOSIS — E11.9 TYPE 2 DIABETES MELLITUS WITHOUT COMPLICATION, WITHOUT LONG-TERM CURRENT USE OF INSULIN (H): ICD-10-CM

## 2019-10-01 DIAGNOSIS — L30.9 DERMATITIS: ICD-10-CM

## 2019-10-01 DIAGNOSIS — E78.01 FAMILIAL HYPERCHOLESTEROLEMIA: ICD-10-CM

## 2019-10-01 LAB
ERYTHROCYTE [DISTWIDTH] IN BLOOD BY AUTOMATED COUNT: 12.5 % (ref 10–15)
HBA1C MFR BLD: 7.7 % (ref 0–5.6)
HCT VFR BLD AUTO: 42.1 % (ref 35–47)
HGB BLD-MCNC: 14.6 G/DL (ref 11.7–15.7)
MCH RBC QN AUTO: 32 PG (ref 26.5–33)
MCHC RBC AUTO-ENTMCNC: 34.7 G/DL (ref 31.5–36.5)
MCV RBC AUTO: 92 FL (ref 78–100)
PLATELET # BLD AUTO: 202 10E9/L (ref 150–450)
RBC # BLD AUTO: 4.56 10E12/L (ref 3.8–5.2)
WBC # BLD AUTO: 10.5 10E9/L (ref 4–11)

## 2019-10-01 PROCEDURE — 99207 C FOOT EXAM  NO CHARGE: CPT | Performed by: INTERNAL MEDICINE

## 2019-10-01 PROCEDURE — 90662 IIV NO PRSV INCREASED AG IM: CPT | Performed by: INTERNAL MEDICINE

## 2019-10-01 PROCEDURE — 82043 UR ALBUMIN QUANTITATIVE: CPT | Performed by: INTERNAL MEDICINE

## 2019-10-01 PROCEDURE — 80048 BASIC METABOLIC PNL TOTAL CA: CPT | Performed by: INTERNAL MEDICINE

## 2019-10-01 PROCEDURE — G0008 ADMIN INFLUENZA VIRUS VAC: HCPCS | Performed by: INTERNAL MEDICINE

## 2019-10-01 PROCEDURE — 85027 COMPLETE CBC AUTOMATED: CPT | Performed by: INTERNAL MEDICINE

## 2019-10-01 PROCEDURE — 84443 ASSAY THYROID STIM HORMONE: CPT | Performed by: INTERNAL MEDICINE

## 2019-10-01 PROCEDURE — 82607 VITAMIN B-12: CPT | Performed by: INTERNAL MEDICINE

## 2019-10-01 PROCEDURE — 99214 OFFICE O/P EST MOD 30 MIN: CPT | Mod: 25 | Performed by: INTERNAL MEDICINE

## 2019-10-01 PROCEDURE — 36415 COLL VENOUS BLD VENIPUNCTURE: CPT | Performed by: INTERNAL MEDICINE

## 2019-10-01 PROCEDURE — 83036 HEMOGLOBIN GLYCOSYLATED A1C: CPT | Performed by: INTERNAL MEDICINE

## 2019-10-01 RX ORDER — LOVASTATIN 20 MG
20 TABLET ORAL DAILY
Qty: 90 TABLET | Refills: 0 | Status: SHIPPED | OUTPATIENT
Start: 2019-10-01 | End: 2020-01-07

## 2019-10-01 RX ORDER — GLIPIZIDE 5 MG/1
TABLET ORAL
Qty: 225 TABLET | Refills: 1 | Status: SHIPPED | OUTPATIENT
Start: 2019-10-01 | End: 2020-05-05

## 2019-10-01 NOTE — PROGRESS NOTES
Subjective     Gavi Pearson is a 87 year old female who presents to clinic today for the following health issues:    HPI   Medication Followup of all meds    Taking Medication as prescribed: yes    Side Effects:  None    Medication Helping Symptoms:  yes     The pt reports feeling in good health. Notes that she has occasional arthritic pain in her fingers and left shoulder. She states that the pain is mild.     For exercise the pt will go to the grocery store 2x week.     The pt notes that her regular morning blood/glucose levels are around 140 mg/dL. The pt has not been to the ophthalmologist in awhile. Notes that she is worried about cataract surgery being a possibility. Requests a referral. Notes a bunion on her left foot. She wears half calf compression stockings.      Reports a few weeks ago she had an itchy rash on her left forearm.      Patient Active Problem List   Diagnosis     Benign essential hypertension     Hyperlipidemia, unspecified     Acquired hypothyroidism     Paroxysmal A-fib (H)     Vitamin B12 deficiency (non anemic)     Polyneuropathy associated with underlying disease (H)     Type 2 diabetes mellitus without complication, without long-term current use of insulin (H)     BPPV (benign paroxysmal positional vertigo), unspecified laterality     Vitamin D deficiency     History reviewed. No pertinent surgical history.    Social History     Tobacco Use     Smoking status: Never Smoker     Smokeless tobacco: Never Used   Substance Use Topics     Alcohol use: Yes     Alcohol/week: 0.0 standard drinks     Comment: occ     History reviewed. No pertinent family history.      Current Outpatient Medications   Medication Sig Dispense Refill     JOSÉ ASPIRIN PO        blood glucose (NO BRAND SPECIFIED) lancets standard Use to test blood sugar 2 times daily or as directed. 200 each 3     blood glucose monitoring (NO BRAND SPECIFIED) test strip Use to test blood sugars 2 times daily or as directed 200 strip 3      glipiZIDE (GLUCOTROL) 5 MG tablet TAKE 0.5 TABLET EVERY MORNING AND 2 TABLETS EVERY EVENING 225 tablet 1     levothyroxine (SYNTHROID/LEVOTHROID) 137 MCG tablet TAKE 1 TABLET BY MOUTH EVERY DAY 90 tablet 3     lisinopril (PRINIVIL/ZESTRIL) 20 MG tablet TAKE 1 TABLET(20 MG) BY MOUTH DAILY 90 tablet 3     lisinopril-hydrochlorothiazide (PRINZIDE/ZESTORETIC) 20-12.5 MG tablet TAKE 1 TABLET BY MOUTH EVERY MORNING 90 tablet 3     lovastatin (MEVACOR) 20 MG tablet Take 1 tablet (20 mg) by mouth daily 90 tablet 0     metFORMIN (GLUCOPHAGE) 500 MG tablet TAKE 1 TABLET BY MOUTH TWICE DAILY 180 tablet 1     metoprolol tartrate (LOPRESSOR) 50 MG tablet TAKE 1 TABLET BY MOUTH TWICE DAILY. 180 tablet 2     sitagliptin (JANUVIA) 100 MG tablet Take 1 tablet (100 mg) by mouth daily 90 tablet 1     triamcinolone (KENALOG) 0.1 % cream Apply b.i.d. p.r.n. to dermatitis but not on the face 45 g 1     No Known Allergies    Reviewed and updated as needed this visit by Provider         Review of Systems   ROS COMP: Constitutional, HEENT, cardiovascular, pulmonary, gi and gu systems are negative, except as otherwise noted.    This document serves as a record of the services and decisions personally performed and made by Aleks Maher MD. It was created on his behalf by Denzel Torres, a trained medical scribe. The creation of this document is based on the provider's statements to the medical scribe.  Denzel Torres October 1, 2019 1:15 PM          Objective    BP (!) 171/88 (BP Location: Right arm, Cuff Size: Adult Regular)   Pulse 87   Temp 98.6  F (37  C) (Tympanic)   Wt 88.9 kg (196 lb)   SpO2 96%   BMI 28.94 kg/m    Body mass index is 28.94 kg/m .     BP Recheck   138/70    Physical Exam   Neck was supple without adenopathy or thyromegaly her carotids were normal without bruits  Chest clear to auscultation and percussion  Cardiovascular irregularly irregular, S1 and S2 are physiologic without murmurs or gallops  Abdomen bowel  "sounds were normal.  There is no palpable mass or organomegaly  Extremities nontender without any edema  Feet clean and dry, bunion on her left great toe  Pulses pedal pulses are as described otherwise his pulses are bilaterally symmetrical throughout without bruits  Skin without significant abnormality      Diagnostic Test Results:  Labs reviewed in Epic  No results found for this or any previous visit (from the past 24 hour(s)).        Assessment & Plan     We will send the pt a letter explaining her lab results once they made available and reviewed.     Hypothyroidism due to acquired atrophy of thyroid    - TSH with free T4 reflex    Type 2 diabetes mellitus without complication, without long-term current use of insulin (H)  Recommended that the pt schedule an eye appointment. We provided the pt with a referral.   - Hemoglobin A1c  - Albumin Random Urine Quantitative with Creat Ratio  - C FOOT EXAM  NO CHARGE  - glipiZIDE (GLUCOTROL) 5 MG tablet  Dispense: 225 tablet; Refill: 1  - sitagliptin (JANUVIA) 100 MG tablet  Dispense: 90 tablet; Refill: 1  - metFORMIN (GLUCOPHAGE) 500 MG tablet  Dispense: 180 tablet; Refill: 1    Paroxysmal A-fib (H)      Benign essential hypertension    - Basic metabolic panel  - CBC with platelets    Dermatitis      Familial hypercholesterolemia      Vitamin B12 deficiency (non anemic)    - Vitamin B12    BPPV (benign paroxysmal positional vertigo), unspecified laterality  Stable with current therapies     Polyneuropathy associated with underlying disease (H)  Stable with current therapies     Vitamin D deficiency      Hyperlipidemia LDL goal <100    - lovastatin (MEVACOR) 20 MG tablet  Dispense: 90 tablet; Refill: 0         BMI:   Estimated body mass index is 28.94 kg/m  as calculated from the following:    Height as of 4/8/19: 1.753 m (5' 9\").    Weight as of this encounter: 88.9 kg (196 lb).           FUTURE APPOINTMENTS:       - Follow-up visit in 3 months     No follow-ups on " file.     The information in this document, created by the medical scribe for me, accurately reflects the services I personally performed and the decisions made by me. I have reviewed and approved this document for accuracy prior to leaving the patient care area.  October 1, 2019 1:43 PM  30 minutes were spent during this encounter which included greater than 50% of the time spent in counseling and coordinating care  Aleks Maher MD  [facility]

## 2019-10-02 LAB
ANION GAP SERPL CALCULATED.3IONS-SCNC: 9 MMOL/L (ref 3–14)
BUN SERPL-MCNC: 12 MG/DL (ref 7–30)
CALCIUM SERPL-MCNC: 9.3 MG/DL (ref 8.5–10.1)
CHLORIDE SERPL-SCNC: 99 MMOL/L (ref 94–109)
CO2 SERPL-SCNC: 25 MMOL/L (ref 20–32)
CREAT SERPL-MCNC: 0.64 MG/DL (ref 0.52–1.04)
CREAT UR-MCNC: 83 MG/DL
GFR SERPL CREATININE-BSD FRML MDRD: 80 ML/MIN/{1.73_M2}
GLUCOSE SERPL-MCNC: 132 MG/DL (ref 70–99)
MICROALBUMIN UR-MCNC: 85 MG/L
MICROALBUMIN/CREAT UR: 102.52 MG/G CR (ref 0–25)
POTASSIUM SERPL-SCNC: 3.9 MMOL/L (ref 3.4–5.3)
SODIUM SERPL-SCNC: 133 MMOL/L (ref 133–144)
TSH SERPL DL<=0.005 MIU/L-ACNC: 1.16 MU/L (ref 0.4–4)

## 2019-10-03 LAB — VIT B12 SERPL-MCNC: 286 PG/ML (ref 193–986)

## 2019-11-12 DIAGNOSIS — I10 BENIGN ESSENTIAL HYPERTENSION: ICD-10-CM

## 2019-11-12 RX ORDER — METOPROLOL TARTRATE 50 MG
TABLET ORAL
Qty: 180 TABLET | Refills: 3 | Status: SHIPPED | OUTPATIENT
Start: 2019-11-12 | End: 2020-11-25

## 2019-11-12 NOTE — TELEPHONE ENCOUNTER
"  metoprolol tartrate (LOPRESSOR) 50 MG tablet 180 tablet 2 8/15/2018       Last Written Prescription Date:  08/15/2018  Last Fill Quantity: 180,  # refills: 2   Last office visit: 10/1/2019 with prescribing provider:     Future Office Visit:  Unknown      Requested Prescriptions   Pending Prescriptions Disp Refills     metoprolol tartrate (LOPRESSOR) 50 MG tablet [Pharmacy Med Name: METOPROLOL TARTRATE 50MG TABLETS] 180 tablet 0     Sig: TAKE 1 TABLET BY MOUTH TWICE DAILY.       Beta-Blockers Protocol Passed - 11/12/2019 10:10 AM        Passed - Blood pressure under 140/90 in past 12 months     BP Readings from Last 3 Encounters:   10/01/19 138/70   04/08/19 140/76   10/31/18 140/82                 Passed - Patient is age 6 or older        Passed - Recent (12 mo) or future (30 days) visit within the authorizing provider's specialty     Patient has had an office visit with the authorizing provider or a provider within the authorizing providers department within the previous 12 mos or has a future within next 30 days. See \"Patient Info\" tab in inbasket, or \"Choose Columns\" in Meds & Orders section of the refill encounter.              Passed - Medication is active on med list          "

## 2020-01-06 DIAGNOSIS — E78.5 HYPERLIPIDEMIA LDL GOAL <100: ICD-10-CM

## 2020-01-06 NOTE — TELEPHONE ENCOUNTER
"lovastatin (MEVACOR) 20 MG tablet    Last Written Prescription Date:  10/1/2019  Last Fill Quantity: 90,  # refills: 0   Last office visit: 10/1/2019 with prescribing provider:  Dr. Maher   Future Office Visit:  unknown    Requested Prescriptions   Pending Prescriptions Disp Refills     lovastatin (MEVACOR) 20 MG tablet [Pharmacy Med Name: LOVASTATIN 20MG TABLETS] 90 tablet 0     Sig: TAKE 1 TABLET(20 MG) BY MOUTH DAILY       Statins Protocol Failed - 1/6/2020  9:37 AM        Failed - LDL on file in past 12 months     Recent Labs   Lab Test 07/16/18  1032   LDL 58             Passed - No abnormal creatine kinase in past 12 months     No lab results found.             Passed - Recent (12 mo) or future (30 days) visit within the authorizing provider's specialty     Patient has had an office visit with the authorizing provider or a provider within the authorizing providers department within the previous 12 mos or has a future within next 30 days. See \"Patient Info\" tab in inbasket, or \"Choose Columns\" in Meds & Orders section of the refill encounter.              Passed - Medication is active on med list        Passed - Patient is age 18 or older        Passed - No active pregnancy on record        Passed - No positive pregnancy test in past 12 months          "

## 2020-01-07 RX ORDER — LOVASTATIN 20 MG
TABLET ORAL
Qty: 30 TABLET | Refills: 0 | Status: SHIPPED | OUTPATIENT
Start: 2020-01-07 | End: 2020-01-23

## 2020-01-07 NOTE — TELEPHONE ENCOUNTER
Medication is being filled for 1 time refill only due to:  Patient needs to be seen because due for physical and fasting labs. Last 7/16/18.   Mary Lou Snyder RN

## 2020-01-23 ENCOUNTER — OFFICE VISIT (OUTPATIENT)
Dept: FAMILY MEDICINE | Facility: CLINIC | Age: 85
End: 2020-01-23
Payer: MEDICARE

## 2020-01-23 VITALS
HEART RATE: 100 BPM | DIASTOLIC BLOOD PRESSURE: 80 MMHG | TEMPERATURE: 99 F | OXYGEN SATURATION: 95 % | BODY MASS INDEX: 28.8 KG/M2 | SYSTOLIC BLOOD PRESSURE: 136 MMHG | WEIGHT: 195 LBS

## 2020-01-23 DIAGNOSIS — E11.9 TYPE 2 DIABETES MELLITUS WITHOUT COMPLICATION, WITHOUT LONG-TERM CURRENT USE OF INSULIN (H): Primary | ICD-10-CM

## 2020-01-23 DIAGNOSIS — E78.5 HYPERLIPIDEMIA LDL GOAL <100: ICD-10-CM

## 2020-01-23 DIAGNOSIS — I10 BENIGN ESSENTIAL HYPERTENSION: ICD-10-CM

## 2020-01-23 DIAGNOSIS — G63 POLYNEUROPATHY ASSOCIATED WITH UNDERLYING DISEASE (H): ICD-10-CM

## 2020-01-23 DIAGNOSIS — E03.4 HYPOTHYROIDISM DUE TO ACQUIRED ATROPHY OF THYROID: ICD-10-CM

## 2020-01-23 DIAGNOSIS — I48.0 PAROXYSMAL A-FIB (H): ICD-10-CM

## 2020-01-23 DIAGNOSIS — E53.8 VITAMIN B12 DEFICIENCY (NON ANEMIC): ICD-10-CM

## 2020-01-23 DIAGNOSIS — E55.9 VITAMIN D DEFICIENCY: ICD-10-CM

## 2020-01-23 DIAGNOSIS — E78.01 FAMILIAL HYPERCHOLESTEROLEMIA: ICD-10-CM

## 2020-01-23 DIAGNOSIS — L30.9 DERMATITIS: ICD-10-CM

## 2020-01-23 LAB
ALBUMIN UR-MCNC: ABNORMAL MG/DL
APPEARANCE UR: CLEAR
BILIRUB UR QL STRIP: NEGATIVE
COLOR UR AUTO: YELLOW
ERYTHROCYTE [DISTWIDTH] IN BLOOD BY AUTOMATED COUNT: 12.4 % (ref 10–15)
GLUCOSE UR STRIP-MCNC: NEGATIVE MG/DL
HBA1C MFR BLD: 7.7 % (ref 0–5.6)
HCT VFR BLD AUTO: 41.6 % (ref 35–47)
HGB BLD-MCNC: 14.1 G/DL (ref 11.7–15.7)
HGB UR QL STRIP: ABNORMAL
KETONES UR STRIP-MCNC: NEGATIVE MG/DL
LEUKOCYTE ESTERASE UR QL STRIP: ABNORMAL
MCH RBC QN AUTO: 31.4 PG (ref 26.5–33)
MCHC RBC AUTO-ENTMCNC: 33.9 G/DL (ref 31.5–36.5)
MCV RBC AUTO: 93 FL (ref 78–100)
NITRATE UR QL: NEGATIVE
PH UR STRIP: 6 PH (ref 5–7)
PLATELET # BLD AUTO: 201 10E9/L (ref 150–450)
RBC # BLD AUTO: 4.49 10E12/L (ref 3.8–5.2)
RBC #/AREA URNS AUTO: NORMAL /HPF
SOURCE: ABNORMAL
SP GR UR STRIP: 1.02 (ref 1–1.03)
UROBILINOGEN UR STRIP-ACNC: 0.2 EU/DL (ref 0.2–1)
VIT B12 SERPL-MCNC: 509 PG/ML (ref 193–986)
WBC # BLD AUTO: 11.6 10E9/L (ref 4–11)
WBC #/AREA URNS AUTO: NORMAL /HPF

## 2020-01-23 PROCEDURE — 83036 HEMOGLOBIN GLYCOSYLATED A1C: CPT | Performed by: INTERNAL MEDICINE

## 2020-01-23 PROCEDURE — 90714 TD VACC NO PRESV 7 YRS+ IM: CPT | Performed by: INTERNAL MEDICINE

## 2020-01-23 PROCEDURE — 80053 COMPREHEN METABOLIC PANEL: CPT | Performed by: INTERNAL MEDICINE

## 2020-01-23 PROCEDURE — 80061 LIPID PANEL: CPT | Performed by: INTERNAL MEDICINE

## 2020-01-23 PROCEDURE — 36415 COLL VENOUS BLD VENIPUNCTURE: CPT | Performed by: INTERNAL MEDICINE

## 2020-01-23 PROCEDURE — 81001 URINALYSIS AUTO W/SCOPE: CPT | Performed by: INTERNAL MEDICINE

## 2020-01-23 PROCEDURE — 90471 IMMUNIZATION ADMIN: CPT | Performed by: INTERNAL MEDICINE

## 2020-01-23 PROCEDURE — 82306 VITAMIN D 25 HYDROXY: CPT | Performed by: INTERNAL MEDICINE

## 2020-01-23 PROCEDURE — 99207 C FOOT EXAM  NO CHARGE: CPT | Performed by: INTERNAL MEDICINE

## 2020-01-23 PROCEDURE — 85027 COMPLETE CBC AUTOMATED: CPT | Performed by: INTERNAL MEDICINE

## 2020-01-23 PROCEDURE — 84443 ASSAY THYROID STIM HORMONE: CPT | Performed by: INTERNAL MEDICINE

## 2020-01-23 PROCEDURE — G0439 PPPS, SUBSEQ VISIT: HCPCS | Performed by: INTERNAL MEDICINE

## 2020-01-23 PROCEDURE — 82607 VITAMIN B-12: CPT | Performed by: INTERNAL MEDICINE

## 2020-01-23 PROCEDURE — 82043 UR ALBUMIN QUANTITATIVE: CPT | Performed by: INTERNAL MEDICINE

## 2020-01-23 RX ORDER — LOVASTATIN 20 MG
20 TABLET ORAL DAILY
Qty: 90 TABLET | Refills: 3 | Status: SHIPPED | OUTPATIENT
Start: 2020-01-23 | End: 2021-02-03

## 2020-01-23 ASSESSMENT — ACTIVITIES OF DAILY LIVING (ADL): CURRENT_FUNCTION: NO ASSISTANCE NEEDED

## 2020-01-23 NOTE — LETTER
[place] [address] [facility]  [address]  [place]  [address]  Tel: [phone]    February 4, 2020    Gavi Pearson  [address] [place] [address]        Gavi,     Enclosed are your lab reports from your recent wellness exam.     Vitamin D is important for bone health and your vitamin D level remains low at 10.  I recommend making sure that you are getting a vitamin D3 supplement to help to improve your vitamin D level and dosage I would recommend is 5000 international units a day.  Vitamin D3 is available over-the-counter at the drugstore at this dose.   The TSH is a sensitive indicator of thyroid function and your TSH remains in a very good normal range.     Your lipid profile was excellent.  The total cholesterol was 151, up slightly from 130 from a year ago.  The triglycerides were 141, up slightly from 107 from a year ago and anything less than 150 is normal but anything under 100 would be better.  The triglycerides are intimately related to carbohydrates in your diet.  The HDL or good cholesterol was 50 essentially the same as 51 from a year ago and anything greater than 49 is normal.  The HDL is intimately related to regular aerobic activity.  Most important factor is the LDL or bad cholesterol which was 73, up slightly from 58 from a year ago and anything less than 100 is our goal.     The comprehensive metabolic panel showed your minerals and electrolytes, kidney function and liver function tests were all normal.  Your serum glucose was 142.     The random urine albumin is a sensitive indicator of kidney function and your test was improved from a year ago.   Vitamin B12 is an important vitamin for multiple biological functions and your vitamin D level was 509 much better than 286 from 4 months ago.  I recommend continuing with the same B12 supplement.     The hemoglobin A1c gives us an idea what your blood sugars are doing over weeks to months and your hemoglobin A1c was 7.7 the same as it  was over the last 10 months and our goal is to keep this under 7.  To improve this I would recommend increasing your activity and monitoring the simple carbohydrates in your diet more closely.     The routine urinalysis was normal.  The CBC showed that your blood cell counts were all normal, no sign of low blood or anemia.     All in all things are looking good and please except the recommendations on improving your blood sugars.  I suggest coming back to see me in 4 months.  If you have any questions regarding these reports please let me know.     Thanks, GB           Enclosure: Lab Results  Results for orders placed or performed in visit on 01/23/20   Albumin Random Urine Quantitative with Creat Ratio     Status: Abnormal   Result Value Ref Range    Creatinine Urine 113 mg/dL    Albumin Urine mg/L 79 mg/L    Albumin Urine mg/g Cr 69.91 (H) 0 - 25 mg/g Cr   Hemoglobin A1c     Status: Abnormal   Result Value Ref Range    Hemoglobin A1C 7.7 (H) 0 - 5.6 %   UA reflex to Microscopic and Culture     Status: Abnormal   Result Value Ref Range    Color Urine Yellow     Appearance Urine Clear     Glucose Urine Negative NEG^Negative mg/dL    Bilirubin Urine Negative NEG^Negative    Ketones Urine Negative NEG^Negative mg/dL    Specific Gravity Urine 1.020 1.003 - 1.035    Blood Urine Trace (A) NEG^Negative    pH Urine 6.0 5.0 - 7.0 pH    Protein Albumin Urine Trace (A) NEG^Negative mg/dL    Urobilinogen Urine 0.2 0.2 - 1.0 EU/dL    Nitrite Urine Negative NEG^Negative    Leukocyte Esterase Urine Trace (A) NEG^Negative    Source Midstream Urine    CBC with platelets     Status: Abnormal   Result Value Ref Range    WBC 11.6 (H) 4.0 - 11.0 10e9/L    RBC Count 4.49 3.8 - 5.2 10e12/L    Hemoglobin 14.1 11.7 - 15.7 g/dL    Hematocrit 41.6 35.0 - 47.0 %    MCV 93 78 - 100 fl    MCH 31.4 26.5 - 33.0 pg    MCHC 33.9 31.5 - 36.5 g/dL    RDW 12.4 10.0 - 15.0 %    Platelet Count 201 150 - 450 10e9/L   Comprehensive metabolic panel      Status: Abnormal   Result Value Ref Range    Sodium 134 133 - 144 mmol/L    Potassium 3.9 3.4 - 5.3 mmol/L    Chloride 101 94 - 109 mmol/L    Carbon Dioxide 24 20 - 32 mmol/L    Anion Gap 9 3 - 14 mmol/L    Glucose 142 (H) 70 - 99 mg/dL    Urea Nitrogen 13 7 - 30 mg/dL    Creatinine 0.70 0.52 - 1.04 mg/dL    GFR Estimate 77 >60 mL/min/[1.73_m2]    GFR Estimate If Black 90 >60 mL/min/[1.73_m2]    Calcium 9.2 8.5 - 10.1 mg/dL    Bilirubin Total 0.5 0.2 - 1.3 mg/dL    Albumin 3.8 3.4 - 5.0 g/dL    Protein Total 7.6 6.8 - 8.8 g/dL    Alkaline Phosphatase 88 40 - 150 U/L    ALT 33 0 - 50 U/L    AST 32 0 - 45 U/L   Lipid panel reflex to direct LDL Fasting     Status: None   Result Value Ref Range    Cholesterol 151 <200 mg/dL    Triglycerides 141 <150 mg/dL    HDL Cholesterol 50 >49 mg/dL    LDL Cholesterol Calculated 73 <100 mg/dL    Non HDL Cholesterol 101 <130 mg/dL   TSH with free T4 reflex     Status: None   Result Value Ref Range    TSH 1.37 0.40 - 4.00 mU/L   Vitamin D Deficiency     Status: Abnormal   Result Value Ref Range    Vitamin D Deficiency screening 10 (L) 20 - 75 ug/L   Vitamin B12     Status: None   Result Value Ref Range    Vitamin B12 509 193 - 986 pg/mL   Urine Microscopic     Status: None   Result Value Ref Range    WBC Urine 0 - 5 OTO5^0 - 5 /HPF    RBC Urine O - 2 OTO2^O - 2 /HPF

## 2020-01-23 NOTE — PROGRESS NOTES
"SUBJECTIVE:   Gavi Pearson is a 88 year old female who presents for Preventive Visit.      Are you in the first 12 months of your Medicare coverage?  No    Healthy Habits:    In general, how would you rate your overall health?  Very good    Frequency of exercise:  None    Duration of exercise:  Less than 15 minutes    Do you usually eat at least 4 servings of fruit and vegetables a day, include whole grains    & fiber and avoid regularly eating high fat or \"junk\" foods?  Yes    Taking medications regularly:  Yes    Barriers to taking medications:  None    Medication side effects:  None    Ability to successfully perform activities of daily living:  No assistance needed    Home Safety:  No safety concerns identified    Hearing Impairment:  No hearing concerns    In the past 6 months, have you been bothered by leaking of urine? Yes    In general, how would you rate your overall mental or emotional health?  Very good      PHQ-2 Total Score:    Additional concerns today:  No  The pt presents to the clinic for a routine physical exam. She notes that she is feeling in good health.     She complains of pain in his right upper arm. 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     She notes that she is taking 500 international unit(s) of vitamin B12 for energy.     Patient denies any headaches, vision changes, back or neck pain, dyspnea, chest pain, palpitations, heartburn, acid indigestion, bowel changes, hematochezia, or nocturia.    Do you feel safe in your environment? Yes    Have you ever done Advance Care Planning? (For example, a Health Directive, POLST, or a discussion with a medical provider or your loved ones about your wishes): No, advance care planning information given to patient to review.  Advanced care planning was discussed at today's visit.      Fall risk  Fallen 2 or more times in the past year?: No  Any fall with injury in the past year?: No    Cognitive Screening   1) Repeat 3 " items (Leader, Season, Table)    2) Clock draw: ABNORMAL   3) 3 item recall: Recalls 3 objects  Results: 3 items recalled: COGNITIVE IMPAIRMENT LESS LIKELY    Mini-CogTM Copyright S Yasmine. Licensed by the author for use in [facility]; reprinted with permission (mathew@.[facility]). All rights reserved.      Do you have sleep apnea, excessive snoring or daytime drowsiness?: no    Reviewed and updated as needed this visit by clinical staff  Tobacco  Allergies  Meds  Problems  Med Hx  Surg Hx         Reviewed and updated as needed this visit by Provider        Social History     Tobacco Use     Smoking status: Never Smoker     Smokeless tobacco: Never Used   Substance Use Topics     Alcohol use: Yes     Alcohol/week: 0.0 standard drinks     Comment: occ     If you drink alcohol do you typically have >3 drinks per day or >7 drinks per week? No    Alcohol Use 1/23/2020   Prescreen: >3 drinks/day or >7 drinks/week? No               Current providers sharing in care for this patient include:   Patient Care Team:  Aleks Maher MD as PCP - General (Internal Medicine)  Aleks Maher MD as Assigned PCP    The following health maintenance items are reviewed in Epic and correct as of today:  Health Maintenance   Topic Date Due     ADVANCE CARE PLANNING  10/21/1931     EYE EXAM  10/21/1931     ZOSTER IMMUNIZATION (1 of 2) 10/21/1981     MEDICARE ANNUAL WELLNESS VISIT  07/16/2019     LIPID  07/16/2019     PHQ-2  01/01/2020     A1C  04/01/2020     BMP  10/01/2020     MICROALBUMIN  10/01/2020     DIABETIC FOOT EXAM  10/01/2020     FALL RISK ASSESSMENT  10/01/2020     TSH W/FREE T4 REFLEX  10/01/2021     DTAP/TDAP/TD IMMUNIZATION (3 - Td) 06/12/2023     INFLUENZA VACCINE  Completed     PNEUMOCOCCAL IMMUNIZATION 65+ LOW/MEDIUM RISK  Completed     IPV IMMUNIZATION  Aged Out     MENINGITIS IMMUNIZATION  Aged Out     Patient Active Problem List   Diagnosis     Benign essential hypertension     Hyperlipidemia,  unspecified     Acquired hypothyroidism     Paroxysmal A-fib (H)     Vitamin B12 deficiency (non anemic)     Polyneuropathy associated with underlying disease (H)     Type 2 diabetes mellitus without complication, without long-term current use of insulin (H)     BPPV (benign paroxysmal positional vertigo), unspecified laterality     Vitamin D deficiency     Dermatitis     Hypothyroidism due to acquired atrophy of thyroid     History reviewed. No pertinent surgical history.    Social History     Tobacco Use     Smoking status: Never Smoker     Smokeless tobacco: Never Used   Substance Use Topics     Alcohol use: Yes     Alcohol/week: 0.0 standard drinks     Comment: occ     No family history on file.      Current Outpatient Medications   Medication Sig Dispense Refill     JOSÉ ASPIRIN PO        blood glucose (NO BRAND SPECIFIED) lancets standard Use to test blood sugar 2 times daily or as directed. 200 each 3     blood glucose monitoring (NO BRAND SPECIFIED) test strip Use to test blood sugars 2 times daily or as directed 200 strip 3     glipiZIDE (GLUCOTROL) 5 MG tablet TAKE 0.5 TABLET EVERY MORNING AND 2 TABLETS EVERY EVENING 225 tablet 1     levothyroxine (SYNTHROID/LEVOTHROID) 137 MCG tablet TAKE 1 TABLET BY MOUTH EVERY DAY 90 tablet 3     lisinopril (PRINIVIL/ZESTRIL) 20 MG tablet TAKE 1 TABLET(20 MG) BY MOUTH DAILY 90 tablet 3     lisinopril-hydrochlorothiazide (PRINZIDE/ZESTORETIC) 20-12.5 MG tablet TAKE 1 TABLET BY MOUTH EVERY MORNING 90 tablet 3     lovastatin (MEVACOR) 20 MG tablet Take 1 tablet (20 mg) by mouth daily 90 tablet 3     metFORMIN (GLUCOPHAGE) 500 MG tablet TAKE 1 TABLET BY MOUTH TWICE DAILY 180 tablet 1     metoprolol tartrate (LOPRESSOR) 50 MG tablet TAKE 1 TABLET BY MOUTH TWICE DAILY. 180 tablet 3     sitagliptin (JANUVIA) 100 MG tablet Take 1 tablet (100 mg) by mouth daily 90 tablet 1     triamcinolone (KENALOG) 0.1 % cream Apply b.i.d. p.r.n. to dermatitis but not on the face 45 g 1  "    No Known Allergies      Review of Systems  CONSTITUTIONAL: NEGATIVE for fever, chills, change in weight  INTEGUMENTARY/SKIN: POSITIVE for eczema NEGATIVE for worrisome moles or lesions  EYES: NEGATIVE for vision changes or irritation  ENT/MOUTH: NEGATIVE for ear, mouth and throat problems  RESP: NEGATIVE for significant cough or SOB  BREAST: NEGATIVE for masses, tenderness or discharge  CV: NEGATIVE for chest pain, palpitations or peripheral edema  GI: NEGATIVE for nausea, abdominal pain, heartburn, or change in bowel habits  : POSITIVE for urinary incontinence NEGATIVE for frequency, dysuria, or hematuria  MUSCULOSKELETAL: POSITIVE for right arm/shoulder pain NEGATIVE for significant myalgia  NEURO: NEGATIVE for weakness, dizziness or paresthesias  ENDOCRINE: NEGATIVE for temperature intolerance, skin/hair changes  HEME: NEGATIVE for bleeding problems  PSYCHIATRIC: NEGATIVE for changes in mood or affect    This document serves as a record of the services and decisions personally performed and made by Aleks Maher MD. It was created on his behalf by Denzel Torres, a trained medical scribe. The creation of this document is based on the provider's statements to the medical scribe.  Denzel Torres January 23, 2020 2:14 PM      OBJECTIVE:   /80 (BP Location: Left arm, Patient Position: Sitting, Cuff Size: Adult Regular)   Pulse 100   Temp 99  F (37.2  C) (Tympanic)   Wt 88.5 kg (195 lb)   SpO2 95%   BMI 28.80 kg/m   Estimated body mass index is 28.8 kg/m  as calculated from the following:    Height as of 4/8/19: 1.753 m (5' 9\").    Weight as of this encounter: 88.5 kg (195 lb).     BP Recheck  136/80    Physical Exam  GENERAL APPEARANCE: healthy, alert and no distress  EYES: Eyes grossly normal to inspection, PERRL and conjunctivae and sclerae normal  HENT: ear canals and TM's normal, nose and mouth without ulcers or lesions, oropharynx clear and oral mucous membranes moist  NECK: no adenopathy, no " asymmetry, masses, or scars and thyroid normal to palpation  RESP: lungs clear to auscultation - no rales, rhonchi or wheezes  BREAST: normal without masses, tenderness or nipple discharge and no palpable axillary masses or adenopathy  CV: regular rate and irregularly irregular rhythm, normal S1 S2, no S3 or S4, no murmur, click or rub, no peripheral edema and peripheral pulses strong  ABDOMEN: soft, nontender, no hepatosplenomegaly, no masses and bowel sounds normal  MS: no musculoskeletal defects are noted and gait is age appropriate without ataxia, superficial varicosities bilaterally, 1-2+ pretibial edema, prominent bunion on left great toe   Feet clean and dry, filament normal? Bilaterally   SKIN: no suspicious lesions or rashes  NEURO: Normal strength and tone, sensory exam grossly normal, mentation intact and speech normal  PSYCH: mentation appears normal and affect normal/bright    Diagnostic Test Results:  Labs reviewed in Epic  No results found for this or any previous visit (from the past 24 hour(s)).    ASSESSMENT / PLAN:   Hyperlipidemia LDL goal <100  Refilled lovastatin and rechecking lipid panel in lab   - lovastatin (MEVACOR) 20 MG tablet  Dispense: 90 tablet; Refill: 3  - Lipid panel reflex to direct LDL Fasting    Type 2 diabetes mellitus without complication, without long-term current use of insulin (H)  Rechecking status of DM   - C FOOT EXAM  NO CHARGE  - Albumin Random Urine Quantitative with Creat Ratio  - Hemoglobin A1c    Paroxysmal A-fib (H)      Vitamin D deficiency    - Vitamin D Deficiency    Vitamin B12 deficiency (non anemic)    - Vitamin B12    Benign essential hypertension  Well controlled with current therapies. Highly encouraged the pt to continue with frequent exercise.   - UA reflex to Microscopic and Culture  - CBC with platelets  - Comprehensive metabolic panel    Polyneuropathy associated with underlying disease (H)      Familial hypercholesterolemia    - Lipid panel reflex  "to direct LDL Fasting    Hypothyroidism due to acquired atrophy of thyroid    - TSH with free T4 reflex    Dermatitis          F/U in 3 months         COUNSELING:  Reviewed preventive health counseling, as reflected in patient instructions       Regular exercise       Healthy diet/nutrition    Estimated body mass index is 28.8 kg/m  as calculated from the following:    Height as of 4/8/19: 1.753 m (5' 9\").    Weight as of this encounter: 88.5 kg (195 lb).         reports that she has never smoked. She has never used smokeless tobacco.      Appropriate preventive services were discussed with this patient, including applicable screening as appropriate for cardiovascular disease, diabetes, osteopenia/osteoporosis, and glaucoma.  As appropriate for age/gender, discussed screening for colorectal cancer, prostate cancer, breast cancer, and cervical cancer. Checklist reviewing preventive services available has been given to the patient.    Reviewed patients plan of care and provided an AVS. The Basic Care Plan (routine screening as documented in Health Maintenance) for Gavi meets the Care Plan requirement. This Care Plan has been established and reviewed with the Patient.    Counseling Resources:  ATP IV Guidelines  Pooled Cohorts Equation Calculator  Breast Cancer Risk Calculator  FRAX Risk Assessment  ICSI Preventive Guidelines  Dietary Guidelines for Americans, 2010  USDA's MyPlate  ASA Prophylaxis  Lung CA Screening    The information in this document, created by the medical scribe for me, accurately reflects the services I personally performed and the decisions made by me. I have reviewed and approved this document for accuracy prior to leaving the patient care area.  January 23, 2020 3:01 PM    Aleks Maher MD  [facility]    Identified Health Risks:  "

## 2020-01-23 NOTE — NURSING NOTE
Prior to immunization administration, verified patients identity using patient s name and date of birth. Please see Immunization Activity for additional information.     Screening Questionnaire for Adult Immunization    Are you sick today?   No   Do you have allergies to medications, food, a vaccine component or latex?   No   Have you ever had a serious reaction after receiving a vaccination?   No   Do you have a long-term health problem with heart, lung, kidney, or metabolic disease (e.g., diabetes), asthma, a blood disorder, no spleen, complement component deficiency, a cochlear implant, or a spinal fluid leak?  Are you on long-term aspirin therapy?   No   Do you have cancer, leukemia, HIV/AIDS, or any other immune system problem?   No   Do you have a parent, brother, or sister with an immune system problem?   No   In the past 3 months, have you taken medications that affect  your immune system, such as prednisone, other steroids, or anticancer drugs; drugs for the treatment of rheumatoid arthritis, Crohn s disease, or psoriasis; or have you had radiation treatments?   No   Have you had a seizure, or a brain or other nervous system problem?   No   During the past year, have you received a transfusion of blood or blood    products, or been given immune (gamma) globulin or antiviral drug?   No   For women: Are you pregnant or is there a chance you could become       pregnant during the next month?   No   Have you received any vaccinations in the past 4 weeks?   No     Immunization questionnaire answers were all negative.        Per orders of Dr. Maher, injection of Td given by Adelaida Duncan CMA. Patient instructed to remain in clinic for 15 minutes afterwards, and to report any adverse reaction to me immediately.       Screening performed by Adelaida Duncan CMA on 1/23/2020 at 3:24 PM.

## 2020-01-24 LAB
ALBUMIN SERPL-MCNC: 3.8 G/DL (ref 3.4–5)
ALP SERPL-CCNC: 88 U/L (ref 40–150)
ALT SERPL W P-5'-P-CCNC: 33 U/L (ref 0–50)
ANION GAP SERPL CALCULATED.3IONS-SCNC: 9 MMOL/L (ref 3–14)
AST SERPL W P-5'-P-CCNC: 32 U/L (ref 0–45)
BILIRUB SERPL-MCNC: 0.5 MG/DL (ref 0.2–1.3)
BUN SERPL-MCNC: 13 MG/DL (ref 7–30)
CALCIUM SERPL-MCNC: 9.2 MG/DL (ref 8.5–10.1)
CHLORIDE SERPL-SCNC: 101 MMOL/L (ref 94–109)
CHOLEST SERPL-MCNC: 151 MG/DL
CO2 SERPL-SCNC: 24 MMOL/L (ref 20–32)
CREAT SERPL-MCNC: 0.7 MG/DL (ref 0.52–1.04)
CREAT UR-MCNC: 113 MG/DL
DEPRECATED CALCIDIOL+CALCIFEROL SERPL-MC: 10 UG/L (ref 20–75)
GFR SERPL CREATININE-BSD FRML MDRD: 77 ML/MIN/{1.73_M2}
GLUCOSE SERPL-MCNC: 142 MG/DL (ref 70–99)
HDLC SERPL-MCNC: 50 MG/DL
LDLC SERPL CALC-MCNC: 73 MG/DL
MICROALBUMIN UR-MCNC: 79 MG/L
MICROALBUMIN/CREAT UR: 69.91 MG/G CR (ref 0–25)
NONHDLC SERPL-MCNC: 101 MG/DL
POTASSIUM SERPL-SCNC: 3.9 MMOL/L (ref 3.4–5.3)
PROT SERPL-MCNC: 7.6 G/DL (ref 6.8–8.8)
SODIUM SERPL-SCNC: 134 MMOL/L (ref 133–144)
TRIGL SERPL-MCNC: 141 MG/DL
TSH SERPL DL<=0.005 MIU/L-ACNC: 1.37 MU/L (ref 0.4–4)

## 2020-02-19 ENCOUNTER — TRANSFERRED RECORDS (OUTPATIENT)
Dept: HEALTH INFORMATION MANAGEMENT | Facility: CLINIC | Age: 85
End: 2020-02-19

## 2020-02-21 ENCOUNTER — TRANSFERRED RECORDS (OUTPATIENT)
Dept: HEALTH INFORMATION MANAGEMENT | Facility: CLINIC | Age: 85
End: 2020-02-21

## 2020-02-21 LAB — RETINOPATHY: NEGATIVE

## 2020-03-11 ENCOUNTER — OFFICE VISIT (OUTPATIENT)
Dept: FAMILY MEDICINE | Facility: CLINIC | Age: 85
End: 2020-03-11
Payer: MEDICARE

## 2020-03-11 VITALS
TEMPERATURE: 98.3 F | OXYGEN SATURATION: 97 % | BODY MASS INDEX: 27.99 KG/M2 | SYSTOLIC BLOOD PRESSURE: 110 MMHG | HEART RATE: 94 BPM | WEIGHT: 189 LBS | DIASTOLIC BLOOD PRESSURE: 60 MMHG | HEIGHT: 69 IN

## 2020-03-11 DIAGNOSIS — Z01.818 PREOP GENERAL PHYSICAL EXAM: ICD-10-CM

## 2020-03-11 DIAGNOSIS — E55.9 VITAMIN D DEFICIENCY: ICD-10-CM

## 2020-03-11 DIAGNOSIS — I48.0 PAROXYSMAL A-FIB (H): ICD-10-CM

## 2020-03-11 DIAGNOSIS — E78.5 HYPERLIPIDEMIA LDL GOAL <100: ICD-10-CM

## 2020-03-11 DIAGNOSIS — G63 POLYNEUROPATHY ASSOCIATED WITH UNDERLYING DISEASE (H): ICD-10-CM

## 2020-03-11 DIAGNOSIS — E11.9 TYPE 2 DIABETES MELLITUS WITHOUT COMPLICATION, WITHOUT LONG-TERM CURRENT USE OF INSULIN (H): Primary | ICD-10-CM

## 2020-03-11 DIAGNOSIS — E03.4 HYPOTHYROIDISM DUE TO ACQUIRED ATROPHY OF THYROID: ICD-10-CM

## 2020-03-11 DIAGNOSIS — E03.9 ACQUIRED HYPOTHYROIDISM: ICD-10-CM

## 2020-03-11 DIAGNOSIS — I10 BENIGN ESSENTIAL HYPERTENSION: ICD-10-CM

## 2020-03-11 DIAGNOSIS — E78.01 FAMILIAL HYPERCHOLESTEROLEMIA: ICD-10-CM

## 2020-03-11 DIAGNOSIS — E53.8 VITAMIN B12 DEFICIENCY (NON ANEMIC): ICD-10-CM

## 2020-03-11 LAB
ANION GAP SERPL CALCULATED.3IONS-SCNC: 7 MMOL/L (ref 3–14)
BUN SERPL-MCNC: 14 MG/DL (ref 7–30)
CALCIUM SERPL-MCNC: 9.2 MG/DL (ref 8.5–10.1)
CHLORIDE SERPL-SCNC: 97 MMOL/L (ref 94–109)
CO2 SERPL-SCNC: 28 MMOL/L (ref 20–32)
CREAT SERPL-MCNC: 0.73 MG/DL (ref 0.52–1.04)
ERYTHROCYTE [DISTWIDTH] IN BLOOD BY AUTOMATED COUNT: 12.2 % (ref 10–15)
GFR SERPL CREATININE-BSD FRML MDRD: 73 ML/MIN/{1.73_M2}
GLUCOSE SERPL-MCNC: 209 MG/DL (ref 70–99)
HBA1C MFR BLD: 8.2 % (ref 0–5.6)
HCT VFR BLD AUTO: 42.1 % (ref 35–47)
HGB BLD-MCNC: 14.6 G/DL (ref 11.7–15.7)
MCH RBC QN AUTO: 31.6 PG (ref 26.5–33)
MCHC RBC AUTO-ENTMCNC: 34.7 G/DL (ref 31.5–36.5)
MCV RBC AUTO: 91 FL (ref 78–100)
PLATELET # BLD AUTO: 208 10E9/L (ref 150–450)
POTASSIUM SERPL-SCNC: 3.7 MMOL/L (ref 3.4–5.3)
RBC # BLD AUTO: 4.62 10E12/L (ref 3.8–5.2)
SODIUM SERPL-SCNC: 132 MMOL/L (ref 133–144)
WBC # BLD AUTO: 13.6 10E9/L (ref 4–11)

## 2020-03-11 PROCEDURE — 85027 COMPLETE CBC AUTOMATED: CPT | Performed by: INTERNAL MEDICINE

## 2020-03-11 PROCEDURE — 80048 BASIC METABOLIC PNL TOTAL CA: CPT | Performed by: INTERNAL MEDICINE

## 2020-03-11 PROCEDURE — 99215 OFFICE O/P EST HI 40 MIN: CPT | Performed by: INTERNAL MEDICINE

## 2020-03-11 PROCEDURE — 36415 COLL VENOUS BLD VENIPUNCTURE: CPT | Performed by: INTERNAL MEDICINE

## 2020-03-11 PROCEDURE — 83036 HEMOGLOBIN GLYCOSYLATED A1C: CPT | Performed by: INTERNAL MEDICINE

## 2020-03-11 PROCEDURE — 93000 ELECTROCARDIOGRAM COMPLETE: CPT | Performed by: INTERNAL MEDICINE

## 2020-03-11 ASSESSMENT — MIFFLIN-ST. JEOR: SCORE: 1351.68

## 2020-03-11 NOTE — PROGRESS NOTES
[address] [address]  [phone]  Dept: [phone]    PRE-OP EVALUATION:  Today's date: 3/11/2020    Gavi Pearson (: 10/21/1931) presents for pre-operative evaluation assessment as requested by Dr. James.  She requires evaluation and anesthesia risk assessment prior to undergoing surgery/procedure for treatment of cataracts .    Proposed Surgery/ Procedure: Remove Cataract- Left eye  Date of Surgery/ Procedure: 20  Time of Surgery/ Procedure: 7:30 am  Hospital/Surgical Facility: [facility]  Fax number for surgical facility: [phone]  Primary Physician: Aleks Maher  Type of Anesthesia Anticipated: to be determined    Patient has a Health Care Directive or Living Will:  NO    1. NO - Do you have a history of heart attack, stroke, stent, bypass or surgery on an artery in the head, neck, heart or legs?  2. NO - Do you ever have any pain or discomfort in your chest?  3. NO - Do you have a history of  Heart Failure?  4. NO - Are you troubled by shortness of breath when: walking on the level, up a slight hill or at night?  5. NO - Do you currently have a cold, bronchitis or other respiratory infection?  6. NO - Do you have a cough, shortness of breath or wheezing?  7. NO - Do you sometimes get pains in the calves of your legs when you walk?  8. NO - Do you or anyone in your family have previous history of blood clots?  9. NO - Do you or does anyone in your family have a serious bleeding problem such as prolonged bleeding following surgeries or cuts?  10. NO - Have you ever had problems with anemia or been told to take iron pills?  11. NO - Have you had any abnormal blood loss such as black, tarry or bloody stools, or abnormal vaginal bleeding?  12. NO - Have you ever had a blood transfusion?  13. NO - Have you or any of your relatives ever had problems with anesthesia?  14. NO - Do you have sleep apnea, excessive snoring or daytime  drowsiness?  15. NO - Do you have any prosthetic heart valves?  16. NO - Do you have prosthetic joints?  17. NO - Is there any chance that you may be pregnant?    HPI:     HPI related to upcoming procedure:   Gavi Pearson is an 88 year old female who presents to the clinic for pre-op evaluation. The pt is scheduled for a left eye cataracts procedure with [facility] on 03/18/2020. The pt reports her vision is blurry or 'fuzzy' in her left eye. She notes her vision is worsened especially after blinking.     A-FIB - Patient has a longstanding history of chronic A-fib currently on rate control. Current treatment regimen includes Aspirin for stroke prevention and denies significant symptoms of lightheadedness, palpitations or dyspnea.     DIABETES - Patient has a longstanding history of DiabetesType Type II . Patient is being treated with diet, oral agents and exercise and denies significant side effects. Control has been good. Complicating factors include but are not limited to: hypertension, hyperlipidemia and neuropathy.     HYPERTENSION - Patient has longstanding history of HTN , currently denies any symptoms referable to elevated blood pressure. Specifically denies chest pain, palpitations, dyspnea, orthopnea, PND or peripheral edema. Blood pressure readings have been in normal range. Current medication regimen is as listed below. Patient denies any side effects of medication.     HYPOTHYROIDISM - Patient has a longstanding history of chronic Hypothyroidism. Patient has been doing well, noting no tremor, insomnia, hair loss or changes in skin texture. Continues to take medications as directed, without adverse reactions or side effects. Last TSH   Lab Results   Component Value Date    TSH 1.37 01/23/2020     Patient denies any headaches, back or neck pain, dyspnea, palpitations, chest pain, heartburn, acid indigestion, bowel changes, hematochezia, nocturia or skin changes.    MEDICAL HISTORY:     Patient  Active Problem List    Diagnosis Date Noted     Dermatitis 01/23/2020     Priority: Medium     Hypothyroidism due to acquired atrophy of thyroid 01/23/2020     Priority: Medium     Vitamin D deficiency 10/29/2017     Priority: Medium     BPPV (benign paroxysmal positional vertigo), unspecified laterality 01/03/2017     Priority: Medium     Paroxysmal A-fib (H) 01/02/2017     Priority: Medium     Vitamin B12 deficiency (non anemic) 01/02/2017     Priority: Medium     Polyneuropathy associated with underlying disease (H) 01/02/2017     Priority: Medium     Type 2 diabetes mellitus without complication, without long-term current use of insulin (H) 01/02/2017     Priority: Medium     Benign essential hypertension 12/16/2016     Priority: Medium     Hyperlipidemia, unspecified 12/16/2016     Priority: Medium     Acquired hypothyroidism 12/16/2016     Priority: Medium      History reviewed. No pertinent past medical history.  History reviewed. No pertinent surgical history.  Current Outpatient Medications   Medication Sig Dispense Refill     JOSÉ ASPIRIN PO        blood glucose (NO BRAND SPECIFIED) lancets standard Use to test blood sugar 2 times daily or as directed. 200 each 3     blood glucose monitoring (NO BRAND SPECIFIED) test strip Use to test blood sugars 2 times daily or as directed 200 strip 3     glipiZIDE (GLUCOTROL) 5 MG tablet TAKE 0.5 TABLET EVERY MORNING AND 2 TABLETS EVERY EVENING 225 tablet 1     levothyroxine (SYNTHROID/LEVOTHROID) 137 MCG tablet TAKE 1 TABLET BY MOUTH EVERY DAY 90 tablet 3     lisinopril (PRINIVIL/ZESTRIL) 20 MG tablet TAKE 1 TABLET(20 MG) BY MOUTH DAILY 90 tablet 3     lisinopril-hydrochlorothiazide (PRINZIDE/ZESTORETIC) 20-12.5 MG tablet TAKE 1 TABLET BY MOUTH EVERY MORNING 90 tablet 3     lovastatin (MEVACOR) 20 MG tablet Take 1 tablet (20 mg) by mouth daily 90 tablet 3     metFORMIN (GLUCOPHAGE) 500 MG tablet TAKE 1 TABLET BY MOUTH TWICE DAILY 180 tablet 1     metoprolol tartrate  (LOPRESSOR) 50 MG tablet TAKE 1 TABLET BY MOUTH TWICE DAILY. 180 tablet 3     sitagliptin (JANUVIA) 100 MG tablet Take 1 tablet (100 mg) by mouth daily 90 tablet 1     triamcinolone (KENALOG) 0.1 % cream Apply b.i.d. p.r.n. to dermatitis but not on the face 45 g 1     OTC products: None, except as noted above    No Known Allergies   Latex Allergy: NO    Social History     Tobacco Use     Smoking status: Never Smoker     Smokeless tobacco: Never Used   Substance Use Topics     Alcohol use: Yes     Alcohol/week: 0.0 standard drinks     Comment: occ     History   Drug Use No       REVIEW OF SYSTEMS:   CONSTITUTIONAL: NEGATIVE for fever, chills, change in weight  INTEGUMENTARY/SKIN: NEGATIVE for worrisome rashes, moles or lesions  EYES: POSITIVE for vision changes (cataracts left eye) NEGATIVE for irritation  ENT/MOUTH: NEGATIVE for ear, mouth and throat problems  RESP: NEGATIVE for significant cough or SOB  BREAST: NEGATIVE for masses, tenderness or discharge  CV: NEGATIVE for chest pain, palpitations or peripheral edema  GI: NEGATIVE for nausea, abdominal pain, heartburn, or change in bowel habits  : POSITIVE for urinary incontinence/stress incontinence NEGATIVE for frequency, dysuria, or hematuria  MUSCULOSKELETAL: POSITIVE for right knee pain NEGATIVE for significant arthralgias or myalgia  NEURO: NEGATIVE for weakness, dizziness or paresthesias  ENDOCRINE: NEGATIVE for temperature intolerance, skin/hair changes  HEME: NEGATIVE for bleeding problems  PSYCHIATRIC: NEGATIVE for changes in mood or affect    This document serves as a record of the services and decisions personally performed and made by Aleks Maher MD. It was created on his behalf by Denzel Torres, a trained medical scribe. The creation of this document is based on the provider's statements to the medical scribe.  Denzel Torres March 11, 2020 9:47 AM      EXAM:   /60 (BP Location: Right arm, Patient Position: Sitting, Cuff Size: Adult Regular)    "Pulse 94   Temp 98.3  F (36.8  C) (Tympanic)   Ht 1.753 m (5' 9\")   Wt 85.7 kg (189 lb)   SpO2 97%   BMI 27.91 kg/m       BP Recheck   110/60      GENERAL APPEARANCE: healthy, alert and no distress     EYES: EOMI, PERRL     HENT: ear canals and TM's normal and nose and mouth without ulcers or lesions     NECK: no adenopathy, no asymmetry, masses, or scars and thyroid normal to palpation     RESP: lungs clear to auscultation - no rales, rhonchi or wheezes     CV: irregularly irregular, regular rates and rhythm, normal S1 S2, no S3 or S4 and no murmur, click or rub     ABDOMEN:  soft, nontender, no HSM or masses and bowel sounds normal     MS: extremities normal- no gross deformities noted, no evidence of inflammation in joints, FROM in all extremities.     SKIN: no suspicious lesions or rashes     NEURO: Normal strength and tone, sensory exam grossly normal, mentation intact and speech normal, paresthesias of her feet      PSYCH: mentation appears normal. and affect normal/bright     LYMPHATICS: No cervical adenopathy    DIAGNOSTICS:     EKG: atrial fibrillation, rate 89, normal axis, normal intervals, no LVH by voltage criteria, nonspecific ST depression, unchanged from previous tracings  Labs Drawn and in Process:   Unresulted Labs Ordered in the Past 30 Days of this Admission     No orders found from 2/10/2020 to 3/12/2020.          Recent Labs   CBC, BMP, hemoglobin A1c, EKG    IMPRESSION:   Reason for surgery/procedure: Left eye cataracts removal   Diagnosis/reason for consult: Presents for pre-operative evaluation assessment as requested by Dr. James.  She requires evaluation and anesthesia risk assessment prior to undergoing surgery/procedure for treatment of cataracts .    The proposed surgical procedure is considered LOW risk.    REVISED CARDIAC RISK INDEX  The patient has the following serious cardiovascular risks for perioperative complications such as (MI, PE, VFib and 3  AV Block):  Atrial " Fibrillation     INTERPRETATION: 0 risks: Class I (very low risk - 0.4% complication rate)    The patient has the following additional risks for perioperative complications:  No identified additional risks      ICD-10-CM    1. Type 2 diabetes mellitus without complication, without long-term current use of insulin (H)  E11.9 Hemoglobin A1c   2. Paroxysmal A-fib (H)  I48.0 EKG 12-lead complete w/read - Clinics   3. Polyneuropathy associated with underlying disease (H)  G63    4. Benign essential hypertension  I10 CBC with platelets     Basic metabolic panel   5. Familial hypercholesterolemia  E78.01    6. Vitamin B12 deficiency (non anemic)  E53.8    7. Acquired hypothyroidism  E03.9    8. Vitamin D deficiency  E55.9    9. Hyperlipidemia LDL goal <100  E78.5    10. Preop general physical exam  Z01.818 CBC with platelets     Basic metabolic panel     Hemoglobin A1c     EKG 12-lead complete w/read - Clinics   11. Hypothyroidism due to acquired atrophy of thyroid  E03.4 levothyroxine (SYNTHROID/LEVOTHROID) 137 MCG tablet       RECOMMENDATIONS:   Type 2 diabetes mellitus without complication, without long-term current use of insulin (H)    - Hemoglobin A1c    Paroxysmal A-fib (H)    Performing EKG for upcoming cataracts procedure.   - EKG 12-lead complete w/read - Clinics    Polyneuropathy associated with underlying disease (H)      Benign essential hypertension  Well controlled with current therapies   - CBC with platelets  - Basic metabolic panel    Familial hypercholesterolemia      Vitamin B12 deficiency (non anemic)      Acquired hypothyroidism      Vitamin D deficiency      Hyperlipidemia LDL goal <100      Preop general physical exam  Reviewed medications and the pt should follow the instructions for surgery. Pt will receive a report concerning her lab results once they are made available.   - CBC with platelets  - Basic metabolic panel  - Hemoglobin A1c  - EKG 12-lead complete w/read - Clinics    Hypothyroidism  due to acquired atrophy of thyroid    - levothyroxine (SYNTHROID/LEVOTHROID) 137 MCG tablet  Dispense: 90 tablet; Refill: 3      --Patient is to take all scheduled medications on the day of surgery EXCEPT for modifications listed below.  Hold glipizide AM of surgery  Hold lisinopril-hydrochlorothiazide AM of surgery  Hold Januvia AM of surgery and take after the procedure    APPROVAL GIVEN to proceed with proposed procedure, without further diagnostic evaluation     The information in this document, created by the medical scribe for me, accurately reflects the services I personally performed and the decisions made by me. I have reviewed and approved this document for accuracy prior to leaving the patient care area.  March 11, 2020 10:43 AM    Signed Electronically by: Aleks Maher MD    Copy of this evaluation report is provided to requesting physician.    [facility] Preop Guidelines    Revised Cardiac Risk Index

## 2020-03-11 NOTE — LETTER
[address]  [address]  [place]  [address]  Tel: [phone]    March 19, 2020    Gavi MINI Lili  [address] [place] [place] [address]        Dear Ms. Pearson,    The basic metabolic panel shows that your minerals and electrolytes and kidney function tests are all normal.  Please note that your nonfasting blood sugar was 209.   The hemoglobin A1c shows that your blood sugar control is slipping as compared to a month ago.  Your hemoglobin A1c was 8.2 and our goal is to keep it less than 7.  You need to moderate the carbohydrates in your diet and a little activity would help to enhance the control of your blood sugars.  If we cannot achieve this light diet and activity then we will reevaluate your medications.  I recommend working diligently on this over the next 2 months.     CBC shows that your blood cell counts are all good, no sign of low blood or anemia.     I recommend that you come back to see me in 2 months which may need to be modified depending on the pandemic.  If we are still in the midst of the coronavirus outbreak please call me in 2 months.       Sincerely,    Aleks Maher MD/PREM          Enclosure: Lab Results  Results for orders placed or performed in visit on 03/11/20   CBC with platelets     Status: Abnormal   Result Value Ref Range    WBC 13.6 (H) 4.0 - 11.0 10e9/L    RBC Count 4.62 3.8 - 5.2 10e12/L    Hemoglobin 14.6 11.7 - 15.7 g/dL    Hematocrit 42.1 35.0 - 47.0 %    MCV 91 78 - 100 fl    MCH 31.6 26.5 - 33.0 pg    MCHC 34.7 31.5 - 36.5 g/dL    RDW 12.2 10.0 - 15.0 %    Platelet Count 208 150 - 450 10e9/L   Basic metabolic panel     Status: Abnormal   Result Value Ref Range    Sodium 132 (L) 133 - 144 mmol/L    Potassium 3.7 3.4 - 5.3 mmol/L    Chloride 97 94 - 109 mmol/L    Carbon Dioxide 28 20 - 32 mmol/L    Anion Gap 7 3 - 14 mmol/L    Glucose 209 (H) 70 - 99 mg/dL    Urea Nitrogen 14 7 - 30 mg/dL    Creatinine 0.73 0.52 - 1.04 mg/dL    GFR Estimate 73 >60  mL/min/[1.73_m2]    GFR Estimate If Black 85 >60 mL/min/[1.73_m2]    Calcium 9.2 8.5 - 10.1 mg/dL   Hemoglobin A1c     Status: Abnormal   Result Value Ref Range    Hemoglobin A1C 8.2 (H) 0 - 5.6 %

## 2020-03-15 RX ORDER — LEVOTHYROXINE SODIUM 137 UG/1
137 TABLET ORAL DAILY
Qty: 90 TABLET | Refills: 3 | Status: SHIPPED | OUTPATIENT
Start: 2020-03-15 | End: 2020-11-25

## 2020-04-06 DIAGNOSIS — E11.9 TYPE 2 DIABETES MELLITUS WITHOUT COMPLICATION, WITHOUT LONG-TERM CURRENT USE OF INSULIN (H): ICD-10-CM

## 2020-04-06 NOTE — TELEPHONE ENCOUNTER
"Last Written Prescription Date:  10/1/19  Last Fill Quantity: 90,  # refills: 1   Last office visit: 3/11/2020 with prescribing provider:     Future Office Visit:    Requested Prescriptions   Pending Prescriptions Disp Refills     JANUVIA 100 MG tablet [Pharmacy Med Name: JANUVIA 100MG TABLETS] 90 tablet 1     Sig: TAKE 1 TABLET(100 MG) BY MOUTH DAILY       DPP4 Inhibitors Protocol Passed - 4/6/2020  9:11 AM        Passed - HgbA1C in past 3 or 6 months     If HgbA1C is 8 or greater, it needs to be on file within the past 3 months.  If less than 8, must be on file within the past 6 months.     Recent Labs   Lab Test 03/11/20  1044   A1C 8.2*             Passed - Medication is active on med list        Passed - Patient is age 18 or older        Passed - No active pregnancy on record        Passed - Normal serum creatinine in past 12 months     Recent Labs   Lab Test 03/11/20  1044   CR 0.73       Ok to refill medication if creatinine is low          Passed - No positive pregnancy test in past 12 months        Passed - Recent (6 mo) or future (30 days) visit within the authorizing provider's specialty     Patient had office visit in the last 6 months or has a visit in the next 30 days with authorizing provider.  See \"Patient Info\" tab in inbasket, or \"Choose Columns\" in Meds & Orders section of the refill encounter.                 "

## 2020-04-07 RX ORDER — SITAGLIPTIN 100 MG/1
TABLET, FILM COATED ORAL
Qty: 90 TABLET | Refills: 1 | Status: SHIPPED | OUTPATIENT
Start: 2020-04-07 | End: 2020-10-06

## 2020-05-04 DIAGNOSIS — E11.9 TYPE 2 DIABETES MELLITUS WITHOUT COMPLICATION, WITHOUT LONG-TERM CURRENT USE OF INSULIN (H): ICD-10-CM

## 2020-05-04 DIAGNOSIS — I10 BENIGN ESSENTIAL HYPERTENSION: ICD-10-CM

## 2020-05-05 RX ORDER — GLIPIZIDE 5 MG/1
TABLET ORAL
Qty: 225 TABLET | Refills: 1 | Status: SHIPPED | OUTPATIENT
Start: 2020-05-05 | End: 2020-10-29

## 2020-05-05 NOTE — TELEPHONE ENCOUNTER
PCP,    Pt on both lisinopril and lisinopril- hydrochlorothiazide  Routing to confirm this is correct  Also sodium out of range    Please review and authorize if appropriate.    Thank you,   Zane REDD RN

## 2020-05-07 NOTE — TELEPHONE ENCOUNTER
Reason for Call:  Other prescription    Detailed comments: Gavi calling in to check on the status. Please send RX.    Phone Number Patient can be reached at: Cell number on file:    Telephone Information:   Mobile [phone]       Best Time: any    Can we leave a detailed message on this number? YES    Call taken on 5/7/2020 at 9:38 AM by Dawson Titus

## 2020-05-08 RX ORDER — LISINOPRIL 20 MG/1
TABLET ORAL
Qty: 90 TABLET | Refills: 3 | Status: SHIPPED | OUTPATIENT
Start: 2020-05-08 | End: 2021-05-24

## 2020-05-08 RX ORDER — LISINOPRIL AND HYDROCHLOROTHIAZIDE 12.5; 2 MG/1; MG/1
TABLET ORAL
Qty: 90 TABLET | Refills: 3 | Status: SHIPPED | OUTPATIENT
Start: 2020-05-08 | End: 2021-05-24

## 2020-06-03 ENCOUNTER — VIRTUAL VISIT (OUTPATIENT)
Dept: FAMILY MEDICINE | Facility: CLINIC | Age: 85
End: 2020-06-03
Payer: MEDICARE

## 2020-06-03 DIAGNOSIS — H81.10 BPPV (BENIGN PAROXYSMAL POSITIONAL VERTIGO), UNSPECIFIED LATERALITY: ICD-10-CM

## 2020-06-03 DIAGNOSIS — G63 POLYNEUROPATHY ASSOCIATED WITH UNDERLYING DISEASE (H): Primary | ICD-10-CM

## 2020-06-03 DIAGNOSIS — E03.9 ACQUIRED HYPOTHYROIDISM: ICD-10-CM

## 2020-06-03 DIAGNOSIS — I10 BENIGN ESSENTIAL HYPERTENSION: ICD-10-CM

## 2020-06-03 DIAGNOSIS — E53.8 VITAMIN B12 DEFICIENCY (NON ANEMIC): ICD-10-CM

## 2020-06-03 DIAGNOSIS — I48.0 PAROXYSMAL A-FIB (H): ICD-10-CM

## 2020-06-03 DIAGNOSIS — E55.9 VITAMIN D DEFICIENCY: ICD-10-CM

## 2020-06-03 DIAGNOSIS — E11.9 TYPE 2 DIABETES MELLITUS WITHOUT COMPLICATION, WITHOUT LONG-TERM CURRENT USE OF INSULIN (H): ICD-10-CM

## 2020-06-03 PROCEDURE — 99442 ZZC PHYSICIAN TELEPHONE EVALUATION 11-20 MIN: CPT | Performed by: INTERNAL MEDICINE

## 2020-06-03 NOTE — PROGRESS NOTES
"Gavi Pearson is a 88 year old female who is being evaluated via a billable telephone visit.      The patient has been notified of following:     \"This telephone visit will be conducted via a call between you and your physician/provider. We have found that certain health care needs can be provided without the need for a physical exam.  This service lets us provide the care you need with a short phone conversation.  If a prescription is necessary we can send it directly to your pharmacy.  If lab work is needed we can place an order for that and you can then stop by our lab to have the test done at a later time.    Telephone visits are billed at different rates depending on your insurance coverage. During this emergency period, for some insurers they may be billed the same as an in-person visit.  Please reach out to your insurance provider with any questions.    If during the course of the call the physician/provider feels a telephone visit is not appropriate, you will not be charged for this service.\"    Patient has given verbal consent for Telephone visit?  Yes    What phone number would you like to be contacted at? [phone]    How would you like to obtain your AVS? Mail a copy    Subjective     Gavi Pearson is a 88 year old female who presents via phone visit today for the following health issues:    HPI  Managing diet better and her blood sugars are in the 150s to 160 range.  She has not been doing much activity because of COVID and the protests.  These factors are also causing increasing anxiety.  She is getting good support from her niece who provides her with some conversation and companionship and provides her with meals twice weekly.  She is also continuing to be self quarantine and is getting her groceries delivered.?  Weight gain    Right shoulder discomfort continues and she has to adjust the way that she is sleeping to manage the discomfort.           Current Outpatient Medications   Medication Sig " Dispense Refill     JOSÉ ASPIRIN PO        blood glucose (NO BRAND SPECIFIED) lancets standard Use to test blood sugar 2 times daily or as directed. 200 each 3     blood glucose (NO BRAND SPECIFIED) test strip Use to test blood sugars 2 times daily or as directed 200 strip 3     glipiZIDE (GLUCOTROL) 5 MG tablet TAKE 1/2 TABLET BY MOUTH EVERY MORNING AND 2 TABLETS BY MOUTH EVERY EVENING 225 tablet 1     JANUVIA 100 MG tablet TAKE 1 TABLET(100 MG) BY MOUTH DAILY 90 tablet 1     levothyroxine (SYNTHROID/LEVOTHROID) 137 MCG tablet Take 1 tablet (137 mcg) by mouth daily 90 tablet 3     lisinopril (ZESTRIL) 20 MG tablet TAKE 1 TABLET(20 MG) BY MOUTH DAILY 90 tablet 3     lisinopril-hydrochlorothiazide (ZESTORETIC) 20-12.5 MG tablet TAKE 1 TABLET BY MOUTH EVERY MORNING 90 tablet 3     lovastatin (MEVACOR) 20 MG tablet Take 1 tablet (20 mg) by mouth daily 90 tablet 3     metFORMIN (GLUCOPHAGE) 500 MG tablet TAKE 1 TABLET BY MOUTH TWICE DAILY 180 tablet 1     metoprolol tartrate (LOPRESSOR) 50 MG tablet TAKE 1 TABLET BY MOUTH TWICE DAILY. 180 tablet 3     triamcinolone (KENALOG) 0.1 % cream Apply b.i.d. p.r.n. to dermatitis but not on the face 45 g 1     No Known Allergies    Reviewed and updated as needed this visit by Provider         Review of Systems   Constitutional, HEENT, cardiovascular, pulmonary, gi and gu systems are negative, except as otherwise noted.       Objective   Reported vitals:  There were no vitals taken for this visit.   healthy, alert and no distress  PSYCH: Alert and oriented times 3; coherent speech, normal   rate and volume, able to articulate logical thoughts, able   to abstract reason, no tangential thoughts, no hallucinations   or delusions  Her affect is normal  RESP: No cough, no audible wheezing, able to talk in full sentences  Remainder of exam unable to be completed due to telephone visits            Assessment/Plan:  1. Type 2 diabetes mellitus without complication, without long-term  current use of insulin (H)  Recommended continuing to manage the carbohydrates in her diet and to avoid weight gain.  Walking more regularly would be helpful.  Recommended returning to clinic in July for follow-up 6-month evaluation including a hemoglobin A1c and exam.- blood glucose (NO BRAND SPECIFIED) test strip; Use to test blood sugars 2 times daily or as directed  Dispense: 200 strip; Refill: 3    2. Polyneuropathy associated with underlying disease (H)  No changes    3. Acquired hypothyroidism  Continue same thyroid replacement hormone    4. BPPV (benign paroxysmal positional vertigo), unspecified laterality      5. Benign essential hypertension  Reevaluate with next visit, chills    6. Vitamin D deficiency      7. Paroxysmal A-fib (H)      8. Vitamin B12 deficiency (non anemic)        No follow-ups on file.      Phone call duration:16  minutes    Aleks Maher MD

## 2020-10-06 DIAGNOSIS — E11.9 TYPE 2 DIABETES MELLITUS WITHOUT COMPLICATION, WITHOUT LONG-TERM CURRENT USE OF INSULIN (H): ICD-10-CM

## 2020-10-07 NOTE — TELEPHONE ENCOUNTER
Prescription approved per [facility] Refill Protocol.  Leandra HOROWITZ RN

## 2020-10-07 NOTE — TELEPHONE ENCOUNTER
Refill request:    JANUVIA 100 mg tablets    Summary: TAKE 1 TABLET(100 MG) BY MOUTH DAILY, Disp-90 tablet,R-1, E-Prescribe   Start: 4/7/2020  Ord/Sold: 4/7/2020

## 2020-10-27 DIAGNOSIS — E11.9 TYPE 2 DIABETES MELLITUS WITHOUT COMPLICATION, WITHOUT LONG-TERM CURRENT USE OF INSULIN (H): ICD-10-CM

## 2020-10-27 NOTE — TELEPHONE ENCOUNTER
Refill request:    METFORMIN 500 MG TABLETS    Summary: TAKE 1 TABLET BY MOUTH TWICE DAILY, Disp-180 tablet,R-1, E-Prescribe   Start: 5/5/2020  Ord/Sold: 5/5/2020     GLIPIZIDE 5 MG TABLETS    Summary: TAKE 1/2 TABLET BY MOUTH EVERY MORNING AND 2 TABLETS BY MOUTH EVERY EVENING, Disp-225 tablet,R-1, E-Prescribe   Start: 5/5/2020  Ord/Sold: 5/5/2020 (

## 2020-10-29 RX ORDER — GLIPIZIDE 5 MG/1
TABLET ORAL
Qty: 225 TABLET | Refills: 3 | Status: SHIPPED | OUTPATIENT
Start: 2020-10-29 | End: 2021-11-10

## 2020-10-29 NOTE — TELEPHONE ENCOUNTER
Routing refill request to provider for review/approval because:  Labs not current:  A1C  Patient needs to be seen because:    Overdue for apt- was due in July- added in pharm comments to schedule.  Please authorize if appropriate.  Thanks,  Liv Cohen RN

## 2020-11-06 ENCOUNTER — HOSPITAL ENCOUNTER (INPATIENT)
Facility: CLINIC | Age: 85
LOS: 5 days | Discharge: NURSING, REHAB OR LONG-TERM CARE | DRG: 522 | End: 2020-11-11
Attending: EMERGENCY MEDICINE | Admitting: INTERNAL MEDICINE
Payer: MEDICARE

## 2020-11-06 ENCOUNTER — APPOINTMENT (OUTPATIENT)
Dept: GENERAL RADIOLOGY | Facility: CLINIC | Age: 85
DRG: 522 | End: 2020-11-06
Attending: EMERGENCY MEDICINE
Payer: MEDICARE

## 2020-11-06 DIAGNOSIS — S72.001A CLOSED FRACTURE OF NECK OF RIGHT FEMUR, INITIAL ENCOUNTER (H): ICD-10-CM

## 2020-11-06 DIAGNOSIS — E03.4 HYPOTHYROIDISM DUE TO ACQUIRED ATROPHY OF THYROID: ICD-10-CM

## 2020-11-06 LAB
ALBUMIN UR-MCNC: NEGATIVE MG/DL
ANION GAP SERPL CALCULATED.3IONS-SCNC: 7 MMOL/L (ref 3–14)
APPEARANCE UR: CLEAR
BASOPHILS # BLD AUTO: 0 10E9/L (ref 0–0.2)
BASOPHILS NFR BLD AUTO: 0.1 %
BILIRUB UR QL STRIP: NEGATIVE
BUN SERPL-MCNC: 10 MG/DL (ref 7–30)
CALCIUM SERPL-MCNC: 8 MG/DL (ref 8.5–10.1)
CHLORIDE SERPL-SCNC: 103 MMOL/L (ref 94–109)
CO2 SERPL-SCNC: 24 MMOL/L (ref 20–32)
COLOR UR AUTO: ABNORMAL
CREAT SERPL-MCNC: 0.66 MG/DL (ref 0.52–1.04)
DIFFERENTIAL METHOD BLD: ABNORMAL
EOSINOPHIL # BLD AUTO: 0.1 10E9/L (ref 0–0.7)
EOSINOPHIL NFR BLD AUTO: 0.4 %
ERYTHROCYTE [DISTWIDTH] IN BLOOD BY AUTOMATED COUNT: 12.4 % (ref 10–15)
GFR SERPL CREATININE-BSD FRML MDRD: 78 ML/MIN/{1.73_M2}
GLUCOSE BLDC GLUCOMTR-MCNC: 268 MG/DL (ref 70–99)
GLUCOSE SERPL-MCNC: 226 MG/DL (ref 70–99)
GLUCOSE UR STRIP-MCNC: 70 MG/DL
HBA1C MFR BLD: 7.2 % (ref 0–5.6)
HCT VFR BLD AUTO: 39.1 % (ref 35–47)
HGB BLD-MCNC: 13.4 G/DL (ref 11.7–15.7)
HGB UR QL STRIP: NEGATIVE
IMM GRANULOCYTES # BLD: 0.1 10E9/L (ref 0–0.4)
IMM GRANULOCYTES NFR BLD: 0.4 %
INR PPP: 1.07 (ref 0.86–1.14)
INTERPRETATION ECG - MUSE: NORMAL
KETONES UR STRIP-MCNC: 10 MG/DL
LACTATE BLD-SCNC: 3.3 MMOL/L (ref 0.7–2)
LEUKOCYTE ESTERASE UR QL STRIP: NEGATIVE
LYMPHOCYTES # BLD AUTO: 1.4 10E9/L (ref 0.8–5.3)
LYMPHOCYTES NFR BLD AUTO: 8.9 %
MCH RBC QN AUTO: 31.1 PG (ref 26.5–33)
MCHC RBC AUTO-ENTMCNC: 34.3 G/DL (ref 31.5–36.5)
MCV RBC AUTO: 91 FL (ref 78–100)
MONOCYTES # BLD AUTO: 1.4 10E9/L (ref 0–1.3)
MONOCYTES NFR BLD AUTO: 9 %
MUCOUS THREADS #/AREA URNS LPF: PRESENT /LPF
NEUTROPHILS # BLD AUTO: 12.9 10E9/L (ref 1.6–8.3)
NEUTROPHILS NFR BLD AUTO: 81.2 %
NITRATE UR QL: NEGATIVE
NRBC # BLD AUTO: 0 10*3/UL
NRBC BLD AUTO-RTO: 0 /100
PH UR STRIP: 7 PH (ref 5–7)
PLATELET # BLD AUTO: 186 10E9/L (ref 150–450)
POTASSIUM SERPL-SCNC: 3.4 MMOL/L (ref 3.4–5.3)
POTASSIUM SERPL-SCNC: 3.6 MMOL/L (ref 3.4–5.3)
RBC # BLD AUTO: 4.31 10E12/L (ref 3.8–5.2)
RBC #/AREA URNS AUTO: 1 /HPF (ref 0–2)
SODIUM SERPL-SCNC: 134 MMOL/L (ref 133–144)
SOURCE: ABNORMAL
SP GR UR STRIP: 1.01 (ref 1–1.03)
UROBILINOGEN UR STRIP-MCNC: NORMAL MG/DL (ref 0–2)
WBC # BLD AUTO: 15.6 10E9/L (ref 4–11)
WBC #/AREA URNS AUTO: 1 /HPF (ref 0–5)

## 2020-11-06 PROCEDURE — 36415 COLL VENOUS BLD VENIPUNCTURE: CPT | Performed by: EMERGENCY MEDICINE

## 2020-11-06 PROCEDURE — C9803 HOPD COVID-19 SPEC COLLECT: HCPCS

## 2020-11-06 PROCEDURE — 83036 HEMOGLOBIN GLYCOSYLATED A1C: CPT | Performed by: INTERNAL MEDICINE

## 2020-11-06 PROCEDURE — 96374 THER/PROPH/DIAG INJ IV PUSH: CPT

## 2020-11-06 PROCEDURE — 99285 EMERGENCY DEPT VISIT HI MDM: CPT | Mod: 25

## 2020-11-06 PROCEDURE — 83605 ASSAY OF LACTIC ACID: CPT | Performed by: INTERNAL MEDICINE

## 2020-11-06 PROCEDURE — 36415 COLL VENOUS BLD VENIPUNCTURE: CPT | Performed by: INTERNAL MEDICINE

## 2020-11-06 PROCEDURE — 250N000011 HC RX IP 250 OP 636: Performed by: INTERNAL MEDICINE

## 2020-11-06 PROCEDURE — 85025 COMPLETE CBC W/AUTO DIFF WBC: CPT | Performed by: EMERGENCY MEDICINE

## 2020-11-06 PROCEDURE — 84132 ASSAY OF SERUM POTASSIUM: CPT | Performed by: INTERNAL MEDICINE

## 2020-11-06 PROCEDURE — 99207 PR NO CHARGE LOS: CPT | Performed by: INTERNAL MEDICINE

## 2020-11-06 PROCEDURE — 81001 URINALYSIS AUTO W/SCOPE: CPT | Performed by: EMERGENCY MEDICINE

## 2020-11-06 PROCEDURE — U0003 INFECTIOUS AGENT DETECTION BY NUCLEIC ACID (DNA OR RNA); SEVERE ACUTE RESPIRATORY SYNDROME CORONAVIRUS 2 (SARS-COV-2) (CORONAVIRUS DISEASE [COVID-19]), AMPLIFIED PROBE TECHNIQUE, MAKING USE OF HIGH THROUGHPUT TECHNOLOGIES AS DESCRIBED BY CMS-2020-01-R: HCPCS | Performed by: EMERGENCY MEDICINE

## 2020-11-06 PROCEDURE — 250N000011 HC RX IP 250 OP 636: Performed by: EMERGENCY MEDICINE

## 2020-11-06 PROCEDURE — 93005 ELECTROCARDIOGRAM TRACING: CPT

## 2020-11-06 PROCEDURE — 73502 X-RAY EXAM HIP UNI 2-3 VIEWS: CPT

## 2020-11-06 PROCEDURE — 999N001017 HC STATISTIC GLUCOSE BY METER IP

## 2020-11-06 PROCEDURE — 99223 1ST HOSP IP/OBS HIGH 75: CPT | Mod: AI | Performed by: INTERNAL MEDICINE

## 2020-11-06 PROCEDURE — 258N000003 HC RX IP 258 OP 636: Performed by: EMERGENCY MEDICINE

## 2020-11-06 PROCEDURE — 85610 PROTHROMBIN TIME: CPT | Performed by: EMERGENCY MEDICINE

## 2020-11-06 PROCEDURE — 250N000013 HC RX MED GY IP 250 OP 250 PS 637: Performed by: INTERNAL MEDICINE

## 2020-11-06 PROCEDURE — 96375 TX/PRO/DX INJ NEW DRUG ADDON: CPT

## 2020-11-06 PROCEDURE — 250N000012 HC RX MED GY IP 250 OP 636 PS 637: Performed by: INTERNAL MEDICINE

## 2020-11-06 PROCEDURE — 120N000001 HC R&B MED SURG/OB

## 2020-11-06 PROCEDURE — 96361 HYDRATE IV INFUSION ADD-ON: CPT

## 2020-11-06 PROCEDURE — 80048 BASIC METABOLIC PNL TOTAL CA: CPT | Performed by: EMERGENCY MEDICINE

## 2020-11-06 PROCEDURE — 258N000003 HC RX IP 258 OP 636: Performed by: INTERNAL MEDICINE

## 2020-11-06 PROCEDURE — 73552 X-RAY EXAM OF FEMUR 2/>: CPT | Mod: RT

## 2020-11-06 RX ORDER — LEVOTHYROXINE SODIUM 100 UG/1
200 TABLET ORAL
Status: DISCONTINUED | OUTPATIENT
Start: 2020-11-07 | End: 2020-11-11 | Stop reason: HOSPADM

## 2020-11-06 RX ORDER — METOPROLOL TARTRATE 1 MG/ML
2.5 INJECTION, SOLUTION INTRAVENOUS EVERY 4 HOURS PRN
Status: DISCONTINUED | OUTPATIENT
Start: 2020-11-06 | End: 2020-11-11 | Stop reason: HOSPADM

## 2020-11-06 RX ORDER — NALOXONE HYDROCHLORIDE 0.4 MG/ML
.1-.4 INJECTION, SOLUTION INTRAMUSCULAR; INTRAVENOUS; SUBCUTANEOUS
Status: DISCONTINUED | OUTPATIENT
Start: 2020-11-06 | End: 2020-11-11 | Stop reason: HOSPADM

## 2020-11-06 RX ORDER — SODIUM CHLORIDE 9 MG/ML
INJECTION, SOLUTION INTRAVENOUS CONTINUOUS
Status: DISCONTINUED | OUTPATIENT
Start: 2020-11-06 | End: 2020-11-06

## 2020-11-06 RX ORDER — AMOXICILLIN 250 MG
1 CAPSULE ORAL 2 TIMES DAILY
Status: DISCONTINUED | OUTPATIENT
Start: 2020-11-06 | End: 2020-11-07

## 2020-11-06 RX ORDER — POLYETHYLENE GLYCOL 3350 17 G/17G
17 POWDER, FOR SOLUTION ORAL DAILY
Status: DISCONTINUED | OUTPATIENT
Start: 2020-11-07 | End: 2020-11-07

## 2020-11-06 RX ORDER — SODIUM CHLORIDE 9 MG/ML
INJECTION, SOLUTION INTRAVENOUS CONTINUOUS
Status: DISCONTINUED | OUTPATIENT
Start: 2020-11-06 | End: 2020-11-07

## 2020-11-06 RX ORDER — HYDROMORPHONE HYDROCHLORIDE 1 MG/ML
.2-.4 INJECTION, SOLUTION INTRAMUSCULAR; INTRAVENOUS; SUBCUTANEOUS
Status: DISCONTINUED | OUTPATIENT
Start: 2020-11-06 | End: 2020-11-07

## 2020-11-06 RX ORDER — ASPIRIN 81 MG/1
81 TABLET ORAL AT BEDTIME
Status: ON HOLD | COMMUNITY
End: 2020-11-09

## 2020-11-06 RX ORDER — ACETAMINOPHEN 325 MG/1
650 TABLET ORAL EVERY 4 HOURS PRN
Status: DISCONTINUED | OUTPATIENT
Start: 2020-11-06 | End: 2020-11-07

## 2020-11-06 RX ORDER — PRAVASTATIN SODIUM 20 MG
20 TABLET ORAL AT BEDTIME
Refills: 3 | Status: DISCONTINUED | OUTPATIENT
Start: 2020-11-06 | End: 2020-11-11 | Stop reason: HOSPADM

## 2020-11-06 RX ORDER — HYDRALAZINE HYDROCHLORIDE 20 MG/ML
10 INJECTION INTRAMUSCULAR; INTRAVENOUS EVERY 4 HOURS PRN
Status: DISCONTINUED | OUTPATIENT
Start: 2020-11-06 | End: 2020-11-11 | Stop reason: HOSPADM

## 2020-11-06 RX ORDER — METOPROLOL TARTRATE 50 MG
50 TABLET ORAL 2 TIMES DAILY
Status: DISCONTINUED | OUTPATIENT
Start: 2020-11-06 | End: 2020-11-06

## 2020-11-06 RX ORDER — LEVOTHYROXINE SODIUM 137 UG/1
137 TABLET ORAL
Status: DISCONTINUED | OUTPATIENT
Start: 2020-11-08 | End: 2020-11-11 | Stop reason: HOSPADM

## 2020-11-06 RX ORDER — NICOTINE POLACRILEX 4 MG
15-30 LOZENGE BUCCAL
Status: DISCONTINUED | OUTPATIENT
Start: 2020-11-06 | End: 2020-11-08

## 2020-11-06 RX ORDER — LIDOCAINE 40 MG/G
CREAM TOPICAL
Status: DISCONTINUED | OUTPATIENT
Start: 2020-11-06 | End: 2020-11-07

## 2020-11-06 RX ORDER — ONDANSETRON 2 MG/ML
4 INJECTION INTRAMUSCULAR; INTRAVENOUS EVERY 6 HOURS PRN
Status: DISCONTINUED | OUTPATIENT
Start: 2020-11-06 | End: 2020-11-07

## 2020-11-06 RX ORDER — METOPROLOL TARTRATE 25 MG/1
25 TABLET, FILM COATED ORAL 2 TIMES DAILY
Status: DISCONTINUED | OUTPATIENT
Start: 2020-11-06 | End: 2020-11-10

## 2020-11-06 RX ORDER — ONDANSETRON 4 MG/1
4 TABLET, ORALLY DISINTEGRATING ORAL EVERY 6 HOURS PRN
Status: DISCONTINUED | OUTPATIENT
Start: 2020-11-06 | End: 2020-11-07

## 2020-11-06 RX ORDER — OXYCODONE HYDROCHLORIDE 5 MG/1
5-10 TABLET ORAL
Status: DISCONTINUED | OUTPATIENT
Start: 2020-11-06 | End: 2020-11-07

## 2020-11-06 RX ORDER — LISINOPRIL 20 MG/1
20 TABLET ORAL DAILY
Status: DISCONTINUED | OUTPATIENT
Start: 2020-11-07 | End: 2020-11-10

## 2020-11-06 RX ORDER — ONDANSETRON 2 MG/ML
4 INJECTION INTRAMUSCULAR; INTRAVENOUS EVERY 30 MIN PRN
Status: DISCONTINUED | OUTPATIENT
Start: 2020-11-06 | End: 2020-11-06

## 2020-11-06 RX ORDER — AMOXICILLIN 250 MG
2 CAPSULE ORAL 2 TIMES DAILY
Status: DISCONTINUED | OUTPATIENT
Start: 2020-11-06 | End: 2020-11-07

## 2020-11-06 RX ORDER — DEXTROSE MONOHYDRATE 25 G/50ML
25-50 INJECTION, SOLUTION INTRAVENOUS
Status: DISCONTINUED | OUTPATIENT
Start: 2020-11-06 | End: 2020-11-08

## 2020-11-06 RX ORDER — HYDROMORPHONE HYDROCHLORIDE 1 MG/ML
0.5 INJECTION, SOLUTION INTRAMUSCULAR; INTRAVENOUS; SUBCUTANEOUS ONCE
Status: COMPLETED | OUTPATIENT
Start: 2020-11-06 | End: 2020-11-06

## 2020-11-06 RX ADMIN — ONDANSETRON 4 MG: 4 TABLET, ORALLY DISINTEGRATING ORAL at 23:31

## 2020-11-06 RX ADMIN — PRAVASTATIN SODIUM 20 MG: 20 TABLET ORAL at 23:28

## 2020-11-06 RX ADMIN — INSULIN ASPART 3 UNITS: 100 INJECTION, SOLUTION INTRAVENOUS; SUBCUTANEOUS at 22:51

## 2020-11-06 RX ADMIN — METOPROLOL TARTRATE 25 MG: 25 TABLET, FILM COATED ORAL at 22:47

## 2020-11-06 RX ADMIN — HYDROMORPHONE HYDROCHLORIDE 0.5 MG: 1 INJECTION, SOLUTION INTRAMUSCULAR; INTRAVENOUS; SUBCUTANEOUS at 21:29

## 2020-11-06 RX ADMIN — ONDANSETRON 4 MG: 2 INJECTION INTRAMUSCULAR; INTRAVENOUS at 17:41

## 2020-11-06 RX ADMIN — HYDROMORPHONE HYDROCHLORIDE 0.4 MG: 1 INJECTION, SOLUTION INTRAMUSCULAR; INTRAVENOUS; SUBCUTANEOUS at 23:28

## 2020-11-06 RX ADMIN — SODIUM CHLORIDE 1000 ML: 9 INJECTION, SOLUTION INTRAVENOUS at 19:25

## 2020-11-06 RX ADMIN — SODIUM CHLORIDE: 9 INJECTION, SOLUTION INTRAVENOUS at 22:47

## 2020-11-06 ASSESSMENT — ENCOUNTER SYMPTOMS
DIARRHEA: 0
COUGH: 1
BACK PAIN: 0
SHORTNESS OF BREATH: 0
NAUSEA: 0
VOMITING: 0
FEVER: 0
NECK PAIN: 0
ARTHRALGIAS: 1
CHILLS: 0

## 2020-11-06 ASSESSMENT — MIFFLIN-ST. JEOR: SCORE: 1364.38

## 2020-11-06 NOTE — ED NOTES
Bed: ST03  Expected date:   Expected time:   Means of arrival:   Comments:  [facility]

## 2020-11-06 NOTE — ED PROVIDER NOTES
"  History     Chief Complaint:    Hip Pain      HPI   Gavi Pearson is a 89 year old female who presents with right hip pain. She states that she was sitting in a chair in her den when she got up to take a few steps and her leg gave out. While falling she felt her hip twist. The patient did not hit her head. She could not get up off the floor. Denies neck or back pain. No Covid-19 symptoms. The patient does live alone.     Allergies:  No Known Drug Allergies    Medications:    Glucotrol  Synthroid  Zestril  Zestoretic  Mevacor  Glucophage  Lopressor  Januvia  Arlene Aspirin    Past Medical History:    Hypothyroidism  Hyperlipidemia  Hypertension  Type 2 diabetes mellitus  Polyneuropathy  Paroxysmal atrial fibrillation  BPPV    Past Surgical History:    The patient does not have any pertinent past surgical history.    Family History:    No past pertinent family history.    Social History:  The patient was accompanied to the ED via EMS.  Smoking Status: Never Smoker  Smokeless Tobacco: Never Used  Alcohol Use: Positive  Drug Use: Negative  PCP: Aleks Maher    Marital Status:  Single     Review of Systems   Constitutional: Negative for chills and fever.   Respiratory: Positive for cough. Negative for shortness of breath.    Cardiovascular: Negative for chest pain.   Gastrointestinal: Negative for diarrhea, nausea and vomiting.   Musculoskeletal: Positive for arthralgias. Negative for back pain and neck pain.   All other systems reviewed and are negative.    Physical Exam     Patient Vitals for the past 24 hrs:   BP Temp Temp src Pulse Resp SpO2 Height Weight   11/06/20 1727 (!) 178/100 97.8  F (36.6  C) Temporal 122 18 97 % 1.753 m (5' 9\") 87.5 kg (192 lb 14.4 oz)       Physical Exam  VS: Reviewed per above  HENT: Mucous membranes moist, no external signs of head trauma.  No midline vertebral tenderness of the neck.  EYES: sclera anicteric  CV: Rate as noted, irregularly irregular rhythm.   RESP: Effort normal. Breath " sounds are normal bilaterally.  GI: no tenderness/rebound/guarding, not distended.  NEURO: Alert, moving all extremities  MSK: Right lower extremity is shortened and tender about the right hip.  No tenderness of the right knee or ankle.  Pain with passive range of motion of the left hip, left knee, left ankle, bilateral upper extremities.  No tenderness of the chest wall.  SKIN: Warm and dry    Emergency Department Course     ECG:   Indication: Atrial fibrillation hx  Time: 1926  Vent. Rate 100 bpm. SD interval *. QRS duration 82. QT/QTc 358/461. P-R-T axis * 36 10. Atrial fibrillation. ST & T wave abnormality, consider lateral ischemia. Abnormal ECG. Atrial fibrillation is new when compared to prior dated 1/28/2004. Read time: 1934    Imaging:  Radiology findings were communicated with the patient and Admitting MD who voiced understanding of the findings.    XR Pelvis w Hip, Right G/E 1 views:   Acute right femoral neck fracture with impaction and coxa  vara angulation. There is mild posterior and superior displacement of  the distal fracture fragment. Normal joint alignment maintained.  Postsurgical changes of the left femur. No hardware complication. Mild  bilateral hip and sacroiliac joint degenerative changes. Moderate  degenerative changes of the lower lumbar spine. Osteopenia. As per radiology.    XR Femur, Port Right G/E 2 views:   Distal two thirds of the right femur are normal. No fracture. No knee joint effusion. As per radiology.    Laboratory:  Laboratory findings were communicated with the patient and Admitting MD who voiced understanding of the findings.    CBC: WBC: 15.6 (H), HGB: 13.4, PLT: 186    BMP: Glucose 226 (H), Calcium: 8.0 (L), o/w WNL (Creatinine: 0.66)    UA with Microscopic: Glucose: 70 (A), Ketones: 10 (A), Mucous: Present (A) o/w Negative    COVID-19 Virus (Coronavirus), PCR NP Swab: Pending      INR: Ordered    Interventions:  1741 Zofran 4 mg IV  1925 NS 1L IV    Emergency Department  Course:  Past medical records, nursing notes, and vitals reviewed.    1815 I performed an exam of the patient as documented above.     EKG obtained in the ED, see results above.     IV was inserted and blood was drawn for laboratory testing, results above.    A urine sample was obtained here in the emergency department, see results above.     The patient was sent for a Right Pelvis,Hip, and Femur X-ray while in the emergency department, results above.     1827 I consulted with Dr. Ledesma, Orthopedics, regarding the patient's history and presentation here in the emergency department.    1950 I consulted with Dr. Cast, Hospitalist, regarding the patient's history and presentation here in the emergency department.    2004 I rechecked the patient and discussed the results of her workup thus far.     Findings and plan explained to the Patient who consents to admission. Discussed the patient with Dr. Cast, who will admit the patient to a [facility] bed for further monitoring, evaluation, and treatment.    I personally reviewed the laboratory and imaging results with the Patient and answered all related questions prior to admission.     Impression & Plan     Covid-19  Gavi Pearson was evaluated during a global COVID-19 pandemic, which necessitated consideration that the patient might be at risk for infection with the SARS-CoV-2 virus that causes COVID-19.   Applicable protocols for evaluation were followed during the patient's care.   COVID-19 was considered as part of the patient's evaluation. The plan for testing is:  a test was obtained during this visit.    Medical Decision Making:  Patient presents to the ER for evaluation of right hip pain after mechanical fall.  On arrival patient has tachycardia in the low 100s.  EKG confirms atrial fibrillation.  It does not appear that she is anticoagulated.  Other vital signs are reassuring.  Patient was noted to have some desaturation on room air after receiving fentanyl per  paramedics.  She was placed on a few liters per nasal cannula.  X-ray does confirm a right femoral neck fracture.  I spoke with orthopedics, who plan for likely surgical repair tomorrow.  Basic labs do show nonspecific leukocytosis.  Urinalysis does not show evidence of infection.  Patient does not report any history of recent febrile illness or infectious symptoms.  It is likely that this leukocytosis is reactive in nature to the pain and stress of this injury.  Patient remained stable in the ER while awaiting inpatient bed during my shift.      Diagnosis:    ICD-10-CM    1. Closed fracture of neck of right femur, initial encounter (H)  S72.001A        Disposition:  Discharged to home.    Scribe Disclosure:  I, John Edmondson, am serving as a scribe at 5:58 PM on 11/6/2020 to document services personally performed by Herber Thomas MD based on my observations and the provider's statements to me.        Herber Thomas MD  11/06/20 [id number]

## 2020-11-06 NOTE — ED TRIAGE NOTES
Had a near fall at home. Sudden twist of the right hip and went down to the floor without an actual fall. Pain occurred following the sudden twisting movement. Got 85 mcg of fentanyl prior to arrival. Pain 8/10

## 2020-11-07 ENCOUNTER — APPOINTMENT (OUTPATIENT)
Dept: GENERAL RADIOLOGY | Facility: CLINIC | Age: 85
DRG: 522 | End: 2020-11-07
Attending: INTERNAL MEDICINE
Payer: MEDICARE

## 2020-11-07 ENCOUNTER — ANESTHESIA EVENT (OUTPATIENT)
Dept: SURGERY | Facility: CLINIC | Age: 85
DRG: 522 | End: 2020-11-07
Payer: MEDICARE

## 2020-11-07 ENCOUNTER — ANESTHESIA (OUTPATIENT)
Dept: SURGERY | Facility: CLINIC | Age: 85
DRG: 522 | End: 2020-11-07
Payer: MEDICARE

## 2020-11-07 LAB
ANION GAP SERPL CALCULATED.3IONS-SCNC: 6 MMOL/L (ref 3–14)
BUN SERPL-MCNC: 8 MG/DL (ref 7–30)
CALCIUM SERPL-MCNC: 8.2 MG/DL (ref 8.5–10.1)
CHLORIDE SERPL-SCNC: 100 MMOL/L (ref 94–109)
CO2 SERPL-SCNC: 28 MMOL/L (ref 20–32)
CREAT SERPL-MCNC: 0.57 MG/DL (ref 0.52–1.04)
ERYTHROCYTE [DISTWIDTH] IN BLOOD BY AUTOMATED COUNT: 12.5 % (ref 10–15)
GFR SERPL CREATININE-BSD FRML MDRD: 82 ML/MIN/{1.73_M2}
GLUCOSE BLDC GLUCOMTR-MCNC: 187 MG/DL (ref 70–99)
GLUCOSE BLDC GLUCOMTR-MCNC: 205 MG/DL (ref 70–99)
GLUCOSE BLDC GLUCOMTR-MCNC: 216 MG/DL (ref 70–99)
GLUCOSE BLDC GLUCOMTR-MCNC: 232 MG/DL (ref 70–99)
GLUCOSE BLDC GLUCOMTR-MCNC: 249 MG/DL (ref 70–99)
GLUCOSE BLDC GLUCOMTR-MCNC: 269 MG/DL (ref 70–99)
GLUCOSE SERPL-MCNC: 276 MG/DL (ref 70–99)
HCT VFR BLD AUTO: 38.8 % (ref 35–47)
HGB BLD-MCNC: 13.2 G/DL (ref 11.7–15.7)
LABORATORY COMMENT REPORT: NORMAL
LACTATE BLD-SCNC: 1.3 MMOL/L (ref 0.7–2)
MCH RBC QN AUTO: 31 PG (ref 26.5–33)
MCHC RBC AUTO-ENTMCNC: 34 G/DL (ref 31.5–36.5)
MCV RBC AUTO: 91 FL (ref 78–100)
PLATELET # BLD AUTO: 190 10E9/L (ref 150–450)
POTASSIUM SERPL-SCNC: 3.8 MMOL/L (ref 3.4–5.3)
RBC # BLD AUTO: 4.26 10E12/L (ref 3.8–5.2)
SARS-COV-2 RNA SPEC QL NAA+PROBE: NEGATIVE
SARS-COV-2 RNA SPEC QL NAA+PROBE: NORMAL
SODIUM SERPL-SCNC: 134 MMOL/L (ref 133–144)
SPECIMEN SOURCE: NORMAL
SPECIMEN SOURCE: NORMAL
WBC # BLD AUTO: 11.7 10E9/L (ref 4–11)

## 2020-11-07 PROCEDURE — 258N000003 HC RX IP 258 OP 636: Performed by: ANESTHESIOLOGY

## 2020-11-07 PROCEDURE — 999N001017 HC STATISTIC GLUCOSE BY METER IP

## 2020-11-07 PROCEDURE — 73600 X-RAY EXAM OF ANKLE: CPT | Mod: RT

## 2020-11-07 PROCEDURE — 258N000003 HC RX IP 258 OP 636: Performed by: ORTHOPAEDIC SURGERY

## 2020-11-07 PROCEDURE — C1776 JOINT DEVICE (IMPLANTABLE): HCPCS | Performed by: ORTHOPAEDIC SURGERY

## 2020-11-07 PROCEDURE — 250N000009 HC RX 250: Performed by: NURSE ANESTHETIST, CERTIFIED REGISTERED

## 2020-11-07 PROCEDURE — 278N000051 HC OR IMPLANT GENERAL: Performed by: ORTHOPAEDIC SURGERY

## 2020-11-07 PROCEDURE — 360N000026 HC SURGERY LEVEL 4 1ST 30 MIN: Performed by: ORTHOPAEDIC SURGERY

## 2020-11-07 PROCEDURE — 0SRR0JA REPLACEMENT OF RIGHT HIP JOINT, FEMORAL SURFACE WITH SYNTHETIC SUBSTITUTE, UNCEMENTED, OPEN APPROACH: ICD-10-PCS | Performed by: ORTHOPAEDIC SURGERY

## 2020-11-07 PROCEDURE — 250N000011 HC RX IP 250 OP 636: Performed by: ORTHOPAEDIC SURGERY

## 2020-11-07 PROCEDURE — 360N000027 HC SURGERY LEVEL 4 EA 15 ADDTL MIN: Performed by: ORTHOPAEDIC SURGERY

## 2020-11-07 PROCEDURE — 36415 COLL VENOUS BLD VENIPUNCTURE: CPT | Performed by: INTERNAL MEDICINE

## 2020-11-07 PROCEDURE — 761N000001 HC RECOVERY PHASE 1 LEVEL 1 FIRST HR: Performed by: ORTHOPAEDIC SURGERY

## 2020-11-07 PROCEDURE — C1713 ANCHOR/SCREW BN/BN,TIS/BN: HCPCS | Performed by: ORTHOPAEDIC SURGERY

## 2020-11-07 PROCEDURE — 272N000001 HC OR GENERAL SUPPLY STERILE: Performed by: ORTHOPAEDIC SURGERY

## 2020-11-07 PROCEDURE — 250N000011 HC RX IP 250 OP 636: Performed by: ANESTHESIOLOGY

## 2020-11-07 PROCEDURE — 250N000013 HC RX MED GY IP 250 OP 250 PS 637: Performed by: ORTHOPAEDIC SURGERY

## 2020-11-07 PROCEDURE — 250N000009 HC RX 250: Performed by: ORTHOPAEDIC SURGERY

## 2020-11-07 PROCEDURE — 250N000013 HC RX MED GY IP 250 OP 250 PS 637: Performed by: INTERNAL MEDICINE

## 2020-11-07 PROCEDURE — 120N000001 HC R&B MED SURG/OB

## 2020-11-07 PROCEDURE — 99232 SBSQ HOSP IP/OBS MODERATE 35: CPT | Performed by: INTERNAL MEDICINE

## 2020-11-07 PROCEDURE — 370N000002 HC ANESTHESIA TECHNICAL FEE, EACH ADDTL 15 MIN: Performed by: ORTHOPAEDIC SURGERY

## 2020-11-07 PROCEDURE — 250N000011 HC RX IP 250 OP 636: Performed by: NURSE ANESTHETIST, CERTIFIED REGISTERED

## 2020-11-07 PROCEDURE — 83605 ASSAY OF LACTIC ACID: CPT | Performed by: INTERNAL MEDICINE

## 2020-11-07 PROCEDURE — 80048 BASIC METABOLIC PNL TOTAL CA: CPT | Performed by: INTERNAL MEDICINE

## 2020-11-07 PROCEDURE — 999N000139 HC STATISTIC PRE-PROCEDURE ASSESSMENT II: Performed by: ORTHOPAEDIC SURGERY

## 2020-11-07 PROCEDURE — 258N000001 HC RX 258: Performed by: ORTHOPAEDIC SURGERY

## 2020-11-07 PROCEDURE — 258N000003 HC RX IP 258 OP 636: Performed by: NURSE ANESTHETIST, CERTIFIED REGISTERED

## 2020-11-07 PROCEDURE — 370N000001 HC ANESTHESIA TECHNICAL FEE, 1ST 30 MIN: Performed by: ORTHOPAEDIC SURGERY

## 2020-11-07 PROCEDURE — 250N000003 HC SEVOFLURANE, EA 15 MIN: Performed by: ORTHOPAEDIC SURGERY

## 2020-11-07 PROCEDURE — 258N000003 HC RX IP 258 OP 636: Performed by: INTERNAL MEDICINE

## 2020-11-07 PROCEDURE — 85027 COMPLETE CBC AUTOMATED: CPT | Performed by: INTERNAL MEDICINE

## 2020-11-07 DEVICE — IMPLANTABLE DEVICE: Type: IMPLANTABLE DEVICE | Site: HIP | Status: FUNCTIONAL

## 2020-11-07 DEVICE — BONE CEMENT SIMPLEX FULL DOSE 6191-1-001: Type: IMPLANTABLE DEVICE | Site: HIP | Status: FUNCTIONAL

## 2020-11-07 DEVICE — BONE CEMENT RESTRICTOR BUCK FEMORAL 25MM 129419: Type: IMPLANTABLE DEVICE | Site: HIP | Status: FUNCTIONAL

## 2020-11-07 DEVICE — IMP SPACER OSTEONICS DISTAL CEMENT 12MM 1067-0012: Type: IMPLANTABLE DEVICE | Site: HIP | Status: FUNCTIONAL

## 2020-11-07 DEVICE — IMP INSERT SLEEVE STRK UNITRAX C-TAPER +0MM 6942-7-065: Type: IMPLANTABLE DEVICE | Site: HIP | Status: FUNCTIONAL

## 2020-11-07 RX ORDER — ONDANSETRON 4 MG/1
4 TABLET, ORALLY DISINTEGRATING ORAL EVERY 30 MIN PRN
Status: DISCONTINUED | OUTPATIENT
Start: 2020-11-07 | End: 2020-11-07 | Stop reason: HOSPADM

## 2020-11-07 RX ORDER — NEOSTIGMINE METHYLSULFATE 1 MG/ML
VIAL (ML) INJECTION PRN
Status: DISCONTINUED | OUTPATIENT
Start: 2020-11-07 | End: 2020-11-07

## 2020-11-07 RX ORDER — BUPIVACAINE HYDROCHLORIDE AND EPINEPHRINE 2.5; 5 MG/ML; UG/ML
INJECTION, SOLUTION INFILTRATION; PERINEURAL PRN
Status: DISCONTINUED | OUTPATIENT
Start: 2020-11-07 | End: 2020-11-07 | Stop reason: HOSPADM

## 2020-11-07 RX ORDER — PROCHLORPERAZINE MALEATE 5 MG
5 TABLET ORAL EVERY 6 HOURS PRN
Status: DISCONTINUED | OUTPATIENT
Start: 2020-11-07 | End: 2020-11-11 | Stop reason: HOSPADM

## 2020-11-07 RX ORDER — HYDROMORPHONE HYDROCHLORIDE 2 MG/1
2 TABLET ORAL EVERY 4 HOURS PRN
Status: DISCONTINUED | OUTPATIENT
Start: 2020-11-07 | End: 2020-11-11 | Stop reason: HOSPADM

## 2020-11-07 RX ORDER — LABETALOL HYDROCHLORIDE 5 MG/ML
10 INJECTION, SOLUTION INTRAVENOUS
Status: COMPLETED | OUTPATIENT
Start: 2020-11-07 | End: 2020-11-07

## 2020-11-07 RX ORDER — NALOXONE HYDROCHLORIDE 0.4 MG/ML
.1-.4 INJECTION, SOLUTION INTRAMUSCULAR; INTRAVENOUS; SUBCUTANEOUS
Status: DISCONTINUED | OUTPATIENT
Start: 2020-11-07 | End: 2020-11-07

## 2020-11-07 RX ORDER — ACETAMINOPHEN 325 MG/1
650 TABLET ORAL EVERY 4 HOURS PRN
Status: DISCONTINUED | OUTPATIENT
Start: 2020-11-10 | End: 2020-11-11 | Stop reason: HOSPADM

## 2020-11-07 RX ORDER — DOCUSATE SODIUM 100 MG/1
100 CAPSULE, LIQUID FILLED ORAL 2 TIMES DAILY
Status: DISCONTINUED | OUTPATIENT
Start: 2020-11-07 | End: 2020-11-11 | Stop reason: HOSPADM

## 2020-11-07 RX ORDER — LIDOCAINE HYDROCHLORIDE 20 MG/ML
INJECTION, SOLUTION INFILTRATION; PERINEURAL PRN
Status: DISCONTINUED | OUTPATIENT
Start: 2020-11-07 | End: 2020-11-07

## 2020-11-07 RX ORDER — SODIUM CHLORIDE, SODIUM LACTATE, POTASSIUM CHLORIDE, CALCIUM CHLORIDE 600; 310; 30; 20 MG/100ML; MG/100ML; MG/100ML; MG/100ML
INJECTION, SOLUTION INTRAVENOUS CONTINUOUS
Status: DISCONTINUED | OUTPATIENT
Start: 2020-11-07 | End: 2020-11-07

## 2020-11-07 RX ORDER — HYDROMORPHONE HYDROCHLORIDE 1 MG/ML
0.4 INJECTION, SOLUTION INTRAMUSCULAR; INTRAVENOUS; SUBCUTANEOUS
Status: DISCONTINUED | OUTPATIENT
Start: 2020-11-07 | End: 2020-11-11 | Stop reason: HOSPADM

## 2020-11-07 RX ORDER — CEFAZOLIN SODIUM 2 G/100ML
2 INJECTION, SOLUTION INTRAVENOUS
Status: COMPLETED | OUTPATIENT
Start: 2020-11-07 | End: 2020-11-07

## 2020-11-07 RX ORDER — HYDROMORPHONE HYDROCHLORIDE 1 MG/ML
.3-.5 INJECTION, SOLUTION INTRAMUSCULAR; INTRAVENOUS; SUBCUTANEOUS EVERY 5 MIN PRN
Status: DISCONTINUED | OUTPATIENT
Start: 2020-11-07 | End: 2020-11-07 | Stop reason: HOSPADM

## 2020-11-07 RX ORDER — MAGNESIUM HYDROXIDE 1200 MG/15ML
LIQUID ORAL PRN
Status: DISCONTINUED | OUTPATIENT
Start: 2020-11-07 | End: 2020-11-07 | Stop reason: HOSPADM

## 2020-11-07 RX ORDER — ONDANSETRON 2 MG/ML
4 INJECTION INTRAMUSCULAR; INTRAVENOUS EVERY 6 HOURS PRN
Status: DISCONTINUED | OUTPATIENT
Start: 2020-11-07 | End: 2020-11-07

## 2020-11-07 RX ORDER — ACETAMINOPHEN 325 MG/1
975 TABLET ORAL
Status: DISPENSED | OUTPATIENT
Start: 2020-11-07 | End: 2020-11-10

## 2020-11-07 RX ORDER — FENTANYL CITRATE 50 UG/ML
INJECTION, SOLUTION INTRAMUSCULAR; INTRAVENOUS PRN
Status: DISCONTINUED | OUTPATIENT
Start: 2020-11-07 | End: 2020-11-07

## 2020-11-07 RX ORDER — ONDANSETRON 2 MG/ML
INJECTION INTRAMUSCULAR; INTRAVENOUS PRN
Status: DISCONTINUED | OUTPATIENT
Start: 2020-11-07 | End: 2020-11-07

## 2020-11-07 RX ORDER — ONDANSETRON 4 MG/1
4 TABLET, ORALLY DISINTEGRATING ORAL EVERY 6 HOURS PRN
Status: DISCONTINUED | OUTPATIENT
Start: 2020-11-07 | End: 2020-11-11 | Stop reason: HOSPADM

## 2020-11-07 RX ORDER — TRANEXAMIC ACID 10 MG/ML
1 INJECTION, SOLUTION INTRAVENOUS ONCE
Status: COMPLETED | OUTPATIENT
Start: 2020-11-07 | End: 2020-11-07

## 2020-11-07 RX ORDER — SODIUM CHLORIDE, SODIUM LACTATE, POTASSIUM CHLORIDE, CALCIUM CHLORIDE 600; 310; 30; 20 MG/100ML; MG/100ML; MG/100ML; MG/100ML
INJECTION, SOLUTION INTRAVENOUS CONTINUOUS
Status: DISCONTINUED | OUTPATIENT
Start: 2020-11-07 | End: 2020-11-07 | Stop reason: HOSPADM

## 2020-11-07 RX ORDER — AMOXICILLIN 250 MG
1 CAPSULE ORAL 2 TIMES DAILY
Status: DISCONTINUED | OUTPATIENT
Start: 2020-11-07 | End: 2020-11-11 | Stop reason: HOSPADM

## 2020-11-07 RX ORDER — POLYETHYLENE GLYCOL 3350 17 G/17G
17 POWDER, FOR SOLUTION ORAL DAILY
Status: DISCONTINUED | OUTPATIENT
Start: 2020-11-08 | End: 2020-11-11 | Stop reason: HOSPADM

## 2020-11-07 RX ORDER — FENTANYL CITRATE 50 UG/ML
25-50 INJECTION, SOLUTION INTRAMUSCULAR; INTRAVENOUS
Status: DISCONTINUED | OUTPATIENT
Start: 2020-11-07 | End: 2020-11-07 | Stop reason: HOSPADM

## 2020-11-07 RX ORDER — SODIUM CHLORIDE, SODIUM LACTATE, POTASSIUM CHLORIDE, CALCIUM CHLORIDE 600; 310; 30; 20 MG/100ML; MG/100ML; MG/100ML; MG/100ML
INJECTION, SOLUTION INTRAVENOUS CONTINUOUS
Status: DISCONTINUED | OUTPATIENT
Start: 2020-11-07 | End: 2020-11-09

## 2020-11-07 RX ORDER — ONDANSETRON 2 MG/ML
4 INJECTION INTRAMUSCULAR; INTRAVENOUS EVERY 30 MIN PRN
Status: DISCONTINUED | OUTPATIENT
Start: 2020-11-07 | End: 2020-11-07 | Stop reason: HOSPADM

## 2020-11-07 RX ORDER — CEFAZOLIN SODIUM 2 G/100ML
2 INJECTION, SOLUTION INTRAVENOUS EVERY 8 HOURS
Status: COMPLETED | OUTPATIENT
Start: 2020-11-07 | End: 2020-11-08

## 2020-11-07 RX ORDER — ONDANSETRON 2 MG/ML
4 INJECTION INTRAMUSCULAR; INTRAVENOUS EVERY 6 HOURS PRN
Status: DISCONTINUED | OUTPATIENT
Start: 2020-11-07 | End: 2020-11-11 | Stop reason: HOSPADM

## 2020-11-07 RX ORDER — HYDROMORPHONE HYDROCHLORIDE 4 MG/1
4 TABLET ORAL EVERY 4 HOURS PRN
Status: DISCONTINUED | OUTPATIENT
Start: 2020-11-07 | End: 2020-11-11 | Stop reason: HOSPADM

## 2020-11-07 RX ORDER — HYDROMORPHONE HYDROCHLORIDE 1 MG/ML
0.2 INJECTION, SOLUTION INTRAMUSCULAR; INTRAVENOUS; SUBCUTANEOUS
Status: DISCONTINUED | OUTPATIENT
Start: 2020-11-07 | End: 2020-11-11 | Stop reason: HOSPADM

## 2020-11-07 RX ORDER — CEFAZOLIN SODIUM 1 G/3ML
1 INJECTION, POWDER, FOR SOLUTION INTRAMUSCULAR; INTRAVENOUS SEE ADMIN INSTRUCTIONS
Status: DISCONTINUED | OUTPATIENT
Start: 2020-11-07 | End: 2020-11-07

## 2020-11-07 RX ORDER — BISACODYL 10 MG
10 SUPPOSITORY, RECTAL RECTAL DAILY PRN
Status: DISCONTINUED | OUTPATIENT
Start: 2020-11-07 | End: 2020-11-11 | Stop reason: HOSPADM

## 2020-11-07 RX ORDER — HYDROMORPHONE HYDROCHLORIDE 1 MG/ML
0.5 INJECTION, SOLUTION INTRAMUSCULAR; INTRAVENOUS; SUBCUTANEOUS ONCE
Status: COMPLETED | OUTPATIENT
Start: 2020-11-07 | End: 2020-11-07

## 2020-11-07 RX ORDER — GLYCOPYRROLATE 0.2 MG/ML
INJECTION, SOLUTION INTRAMUSCULAR; INTRAVENOUS PRN
Status: DISCONTINUED | OUTPATIENT
Start: 2020-11-07 | End: 2020-11-07

## 2020-11-07 RX ORDER — HYDRALAZINE HYDROCHLORIDE 20 MG/ML
2.5-5 INJECTION INTRAMUSCULAR; INTRAVENOUS EVERY 10 MIN PRN
Status: DISCONTINUED | OUTPATIENT
Start: 2020-11-07 | End: 2020-11-07 | Stop reason: HOSPADM

## 2020-11-07 RX ORDER — LIDOCAINE 40 MG/G
CREAM TOPICAL
Status: DISCONTINUED | OUTPATIENT
Start: 2020-11-07 | End: 2020-11-11 | Stop reason: HOSPADM

## 2020-11-07 RX ORDER — PROPOFOL 10 MG/ML
INJECTION, EMULSION INTRAVENOUS PRN
Status: DISCONTINUED | OUTPATIENT
Start: 2020-11-07 | End: 2020-11-07

## 2020-11-07 RX ADMIN — PHENYLEPHRINE HYDROCHLORIDE 100 MCG: 10 INJECTION INTRAVENOUS at 14:42

## 2020-11-07 RX ADMIN — INSULIN ASPART 3 UNITS: 100 INJECTION, SOLUTION INTRAVENOUS; SUBCUTANEOUS at 09:22

## 2020-11-07 RX ADMIN — SODIUM CHLORIDE, POTASSIUM CHLORIDE, SODIUM LACTATE AND CALCIUM CHLORIDE: 600; 310; 30; 20 INJECTION, SOLUTION INTRAVENOUS at 11:09

## 2020-11-07 RX ADMIN — PHENYLEPHRINE HYDROCHLORIDE 200 MCG: 10 INJECTION INTRAVENOUS at 15:49

## 2020-11-07 RX ADMIN — PRAVASTATIN SODIUM 20 MG: 20 TABLET ORAL at 20:13

## 2020-11-07 RX ADMIN — PHENYLEPHRINE HYDROCHLORIDE 150 MCG: 10 INJECTION INTRAVENOUS at 15:32

## 2020-11-07 RX ADMIN — PHENYLEPHRINE HYDROCHLORIDE 0.3 MCG/KG/MIN: 10 INJECTION INTRAVENOUS at 15:53

## 2020-11-07 RX ADMIN — ROCURONIUM BROMIDE 50 MG: 10 INJECTION INTRAVENOUS at 14:26

## 2020-11-07 RX ADMIN — METOPROLOL TARTRATE 25 MG: 25 TABLET, FILM COATED ORAL at 20:14

## 2020-11-07 RX ADMIN — FENTANYL CITRATE 50 MCG: 50 INJECTION, SOLUTION INTRAMUSCULAR; INTRAVENOUS at 15:19

## 2020-11-07 RX ADMIN — TRANEXAMIC ACID 1 G: 10 INJECTION, SOLUTION INTRAVENOUS at 16:02

## 2020-11-07 RX ADMIN — INSULIN ASPART 2 UNITS: 100 INJECTION, SOLUTION INTRAVENOUS; SUBCUTANEOUS at 01:41

## 2020-11-07 RX ADMIN — DOCUSATE SODIUM 50 MG AND SENNOSIDES 8.6 MG 1 TABLET: 8.6; 5 TABLET, FILM COATED ORAL at 09:18

## 2020-11-07 RX ADMIN — NEOSTIGMINE METHYLSULFATE 4.5 MG: 1 INJECTION, SOLUTION INTRAVENOUS at 16:05

## 2020-11-07 RX ADMIN — CEFAZOLIN SODIUM 2 G: 2 INJECTION, SOLUTION INTRAVENOUS at 21:59

## 2020-11-07 RX ADMIN — LISINOPRIL 20 MG: 20 TABLET ORAL at 09:18

## 2020-11-07 RX ADMIN — SODIUM CHLORIDE, POTASSIUM CHLORIDE, SODIUM LACTATE AND CALCIUM CHLORIDE: 600; 310; 30; 20 INJECTION, SOLUTION INTRAVENOUS at 20:04

## 2020-11-07 RX ADMIN — ONDANSETRON 4 MG: 2 INJECTION INTRAMUSCULAR; INTRAVENOUS at 11:26

## 2020-11-07 RX ADMIN — TRANEXAMIC ACID 1 G: 10 INJECTION, SOLUTION INTRAVENOUS at 15:01

## 2020-11-07 RX ADMIN — HYDROMORPHONE HYDROCHLORIDE 0.5 MG: 1 INJECTION, SOLUTION INTRAMUSCULAR; INTRAVENOUS; SUBCUTANEOUS at 11:26

## 2020-11-07 RX ADMIN — PROPOFOL 140 MG: 10 INJECTION, EMULSION INTRAVENOUS at 14:26

## 2020-11-07 RX ADMIN — INSULIN ASPART 3 UNITS: 100 INJECTION, SOLUTION INTRAVENOUS; SUBCUTANEOUS at 21:59

## 2020-11-07 RX ADMIN — HYDROMORPHONE HYDROCHLORIDE 0.5 MG: 1 INJECTION, SOLUTION INTRAMUSCULAR; INTRAVENOUS; SUBCUTANEOUS at 15:26

## 2020-11-07 RX ADMIN — ASPIRIN 325 MG: 325 TABLET, COATED ORAL at 20:13

## 2020-11-07 RX ADMIN — ONDANSETRON 4 MG: 2 INJECTION INTRAMUSCULAR; INTRAVENOUS at 16:00

## 2020-11-07 RX ADMIN — GLYCOPYRROLATE 0.7 MG: 0.2 INJECTION, SOLUTION INTRAMUSCULAR; INTRAVENOUS at 16:05

## 2020-11-07 RX ADMIN — PHENYLEPHRINE HYDROCHLORIDE 150 MCG: 10 INJECTION INTRAVENOUS at 15:04

## 2020-11-07 RX ADMIN — LABETALOL HYDROCHLORIDE 10 MG: 5 INJECTION, SOLUTION INTRAVENOUS at 17:18

## 2020-11-07 RX ADMIN — SODIUM CHLORIDE, POTASSIUM CHLORIDE, SODIUM LACTATE AND CALCIUM CHLORIDE: 600; 310; 30; 20 INJECTION, SOLUTION INTRAVENOUS at 15:20

## 2020-11-07 RX ADMIN — INSULIN ASPART 3 UNITS: 100 INJECTION, SOLUTION INTRAVENOUS; SUBCUTANEOUS at 06:36

## 2020-11-07 RX ADMIN — FENTANYL CITRATE 50 MCG: 50 INJECTION, SOLUTION INTRAMUSCULAR; INTRAVENOUS at 14:23

## 2020-11-07 RX ADMIN — LIDOCAINE HYDROCHLORIDE 60 MG: 20 INJECTION, SOLUTION INFILTRATION; PERINEURAL at 14:26

## 2020-11-07 RX ADMIN — METOPROLOL TARTRATE 25 MG: 25 TABLET, FILM COATED ORAL at 09:18

## 2020-11-07 RX ADMIN — CEFAZOLIN SODIUM 2 G: 2 INJECTION, SOLUTION INTRAVENOUS at 14:47

## 2020-11-07 RX ADMIN — PHENYLEPHRINE HYDROCHLORIDE 200 MCG: 10 INJECTION INTRAVENOUS at 15:40

## 2020-11-07 RX ADMIN — LEVOTHYROXINE SODIUM 200 MCG: 100 TABLET ORAL at 06:36

## 2020-11-07 RX ADMIN — SODIUM CHLORIDE 1000 ML: 9 INJECTION, SOLUTION INTRAVENOUS at 00:17

## 2020-11-07 RX ADMIN — PHENYLEPHRINE HYDROCHLORIDE 100 MCG: 10 INJECTION INTRAVENOUS at 14:46

## 2020-11-07 ASSESSMENT — ACTIVITIES OF DAILY LIVING (ADL)
ADLS_ACUITY_SCORE: 17
ADLS_ACUITY_SCORE: 17
ADLS_ACUITY_SCORE: 15
ADLS_ACUITY_SCORE: 20

## 2020-11-07 ASSESSMENT — ENCOUNTER SYMPTOMS: DYSRHYTHMIAS: 1

## 2020-11-07 ASSESSMENT — MIFFLIN-ST. JEOR: SCORE: 1376.38

## 2020-11-07 ASSESSMENT — LIFESTYLE VARIABLES: TOBACCO_USE: 0

## 2020-11-07 NOTE — PROGRESS NOTES
Pt left unit for preop at 10:34AM. A/Ox4. Belongings (phone, purse, clothing) remained in room. VSS on 2L NC. Tele removed for transfer, but was a-fib with CVR. Bedrest. Hernandez patent, emptied 345 ml. Denies pain, except for repositioning. Plan for RIGHT HIP CEMENTED UNIPOLAR HEMIARTHROPLASTY today at 1300 with Dr. Ledesma. Continue to monitor.

## 2020-11-07 NOTE — H&P
Admitted:     11/06/2020      PRIMARY CARE PHYSICIAN:  Aleks Maher MD      CHIEF COMPLAINT:  Fall and right hip pain.      HISTORY OF PRESENT ILLNESS:  History had been obtained from the patient who is a good historian.  I did discuss with ER attending, Dr. Thomas, and I reviewed her chart as well.      Ms. Pearson is a very pleasant 89-year-old female with past medical history of hypertension, dyslipidemia, paroxysmal atrial fibrillation, on aspirin, diabetes mellitus type 2, peripheral neuropathy, and BPPV and hypothyroidism who was brought in for evaluation of right hip pain, status post a mechanical fall.  The patient lives alone in her own house.  For ambulation, she uses a cane or a walker.  She states that earlier today she was sitting in her watching television at some point she tried to get up from her chair and she felt that her right leg gave up and she fell to the ground.  She landed on her right side.  She was not able to get up by herself as she felt very weak.  She was having right hip pain.  She was able to call her niece, who came to her house and was called and she was brought to ER for further evaluation.      Upon further questioning, the patient reports that when she tried to get up from the chair, she felt weak, otherwise she denies any dizziness, no headache, no chest pain, no shortness of breath.  She denies loss of consciousness.  She denies palpitations.  No head trauma.  She denies any recent fevers, no coughing, no chest pain, no shortness of breath, no nausea, no vomiting, no abdominal pain, no diarrhea, no constipation, no dysuria.      In the ER, she was seen by Dr. Thomas.  Her initial vitals showed a blood pressure of 178/100, later on blood pressure was better 162/123.  She was in atrial fibrillation with RVR with heart rate of 120, later on improved to mid 90s, oxygen saturation 94% to 97% on room air, respiratory rate 14, temperature 97.8.  She did have basic blood work  which showed a BMP with sodium of 134, potassium 3.4, chloride 103, bicarbonate 24, BUN 10, creatinine 0.66.  Her calcium was 8, anion gap of 7, glucose 122.  White blood cells 15.6, hemoglobin 13.4, hematocrit 39.1 and platelet count 186,000.  Her UA is negative with white blood cells of 1, negative leukocyte esterase, negative nitrites.  X-ray of the right femur showed a distal two-thirds of the right femoral were normal.  X-ray of the pelvis and right hip showed acute right femoral neck fracture with impaction and and coxa vara angulation.  Her EKG showed atrial fibrillation at the rate of 100 beats per minute with some nonspecific ST-T wave abnormalities.      In ER, she received a bolus of normal saline.  Her heart rate improved.  She also was given 1 dose of Zofran and Hospitalist Service was called regarding the admission.  Dr. Harvey of Orthopedics was also called from ER.      COVID-19 PCR, asymptomatic, was sent from ER pending at this time.      PAST MEDICAL HISTORY:   1.  Hypertension.   2.  Dyslipidemia.   3.  Hypothyroidism.   4.  Diabetes mellitus type 2.   5.  History of paroxysmal atrial fibrillation, on aspirin.   6.  History of BPPV.   7.  History of neuropathy.      PAST SURGICAL HISTORY:    Past Surgical History:   Procedure Laterality Date     OPEN REDUCTION INTERNAL FIXATION HIP BIPOLAR Right 11/7/2020    Procedure: RIGHT HIP CEMENTED UNIPOLAR HEMIARTHROPLASTY;  Surgeon: Enrico Ledesma MD;  Location:  OR        SOCIAL HISTORY:  She never smoked.  She denies alcohol drinking.  She denies illicit drug abuse.      FAMILY HISTORY:  Reviewed and is noncontributory to the current admission.      PRIOR TO ADMISSION MEDICATIONS:   aspirin 81 MG EC tablet Take 81 mg by mouth At Bedtime 11/5/2020 at hs Yes Unknown, Entered By History   glipiZIDE (GLUCOTROL) 5 MG tablet TAKE 1/2 TABLET BY MOUTH EVERY MORNING AND 2 TABLETS BY MOUTH EVERY EVENING 11/6/2020 at am Yes Aleks Maher MD    levothyroxine (SYNTHROID/LEVOTHROID) 137 MCG tablet Take 1 tablet (137 mcg) by mouth daily  Patient taking differently: Take 137 mcg by mouth daily 205.5mg (1.5 x 137mcg) on Tuesdays and Saturdays. 137 mcg daily all other days of the week 11/6/2020 at am Yes Aleks Maher MD   lisinopril (ZESTRIL) 20 MG tablet TAKE 1 TABLET(20 MG) BY MOUTH DAILY 11/6/2020 at am Yes Aleks Maher MD   lisinopril-hydrochlorothiazide (ZESTORETIC) 20-12.5 MG tablet TAKE 1 TABLET BY MOUTH EVERY MORNING 11/6/2020 at am Yes Aleks Maher MD   lovastatin (MEVACOR) 20 MG tablet Take 1 tablet (20 mg) by mouth daily 11/5/2020 at hs Yes Aleks Maher MD   metFORMIN (GLUCOPHAGE) 500 MG tablet Take 1 tablet (500 mg) by mouth 2 times daily (with meals) 11/6/2020 at am Yes Aleks Maher MD   metoprolol tartrate (LOPRESSOR) 50 MG tablet TAKE 1 TABLET BY MOUTH TWICE DAILY.  Patient taking differently: Take 25 mg by mouth 2 times daily  11/6/2020 at am Yes Aleks Maher MD   sitagliptin (JANUVIA) 100 MG tablet Take 1 tablet (100 mg) by mouth daily 11/6/2020 at am Yes Aleks Maher MD   triamcinolone (KENALOG) 0.1 % cream Apply b.i.d. p.r.n. to dermatitis but not on the face prn Yes Aleks Maher MD   blood glucose (NO BRAND SPECIFIED) lancets standard Use to test blood sugar 2 times daily or as directed.     Aleks Maher MD   blood glucose (NO BRAND SPECIFIED) test strip Use to test blood sugars 2 times daily or as directed     Aleks Maher MD                ALLERGIES:  NO KNOWN DRUG ALLERGIES.      REVIEW OF SYSTEMS:  A 10-point review of systems was conducted and it was negative except for pertinent positives mentioned in history of present illness.      PHYSICAL EXAMINATION:   VITAL SIGNS:  Blood pressure is 162/123, heart rate 96, respiratory rate 14, oxygen saturation 94% to 97% on room air, temperature 97.8.   GENERAL:  The patient is awake, alert, no acute distress at the time of my examination.   HEENT:   Normocephalic, atraumatic.  Pupils are equally round and reactive to light.  Oral mucosa is moist.   NECK:  Supple.  No cervical lymphadenopathy, no thyromegaly.   CHEST:  There is bilateral air entry, no wheezing, no rales, no crackles.   CARDIOVASCULAR:  There is irregularly irregular heart rate, mild tachycardia, no murmurs, no rubs.   ABDOMEN:  Soft, nontender, nondistended.  Bowel sounds are present.   EXTREMITIES:  Right lower extremity is shortened and externally rotated.  There is no leg swelling, no calf tenderness, 2+ peripheral pulses are palpable.   SKIN:  Intact, no rash, no cyanosis.   NEUROLOGIC:  The patient is awake, alert, oriented to self, place and time.  There are no focal neurological deficits.   PSYCHIATRIC:  Normal mood, normal affect.   MUSCULOSKELETAL:  Again, the right lower extremity is  shortened and externally rotated with limited range of motion due to the pain.      LABORATORY:  Reviewed and dictated above.      ASSESSMENT:  Ms. Pearson is a very pleasant 89-year-old female with past medical history of hypertension, dyslipidemia, hypothyroidism, diabetes mellitus type 2, paroxysmal atrial fibrillation on aspirin and polyneuropathy, who presented for evaluation of right hip pain, status post mechanical fall.      PLAN:   1.  Right femoral neck fracture.   2.  Fall.   She reported that when she tried to get up from her chair.  Her right leg gave up and she fell to the ground on the right side.  She was not able to get up by herself.  I suspect this is a mechanical fall.  She seems neurologically intact.  Her x-ray of pelvis shows acute right femoral neck fracture with impaction and caudal angulation.  She is noted to be in atrial fibrillation with RVR, possibly related to pain.  She does have a history of atrial fibrillation and she had maintained on aspirin only.  She denies any other history of coronary artery disease or stroke.  Since she is in atrial fibrillation with rapid  ventricular response in ER., we will plan to better control her heart rate before surgery.  We will resume her prior to admission metoprolol.  We will hydrate her and will order an echocardiogram for the morning.  She is admitted to ortho floor.  We will keep her n.p.o. after midnight.  We will keep her on bed rest.  Hernandez catheter had been placed in ER.  We will provide pain medications and ortho consult requested.   1.  History of hypertension:  Her blood pressure seems to be on higher side at this time.  At home, she had been on Lopressor 25 mg p.o. twice daily, lisinopril 20 mg p.o. daily and stop over 80, which is a combination of lisinopril and hydrochlorothiazide.  Plan for now is to resume her Lopressor and lisinopril while holding her Zestoretic.  We will monitor her blood pressure.   2.  Atrial fibrillation with RVR.  She does have a history of atrial fibrillation at home.  She had been on metoprolol 25 mg p.o. twice daily.  Regarding her anticoagulation, she had been only on aspirin 81 mg p.o. daily.  She is in atrial fibrillation with RVR at this time.  Her heart rate improved after a bolus of normal saline in ER.  She will be continued to be monitored on telemetry.  We will resume her prior to admission metoprolol 25 mg p.o. twice daily.  She will have metoprolol IV p.r.n. available.  We will continue with mild IV hydration for now to have her heart rate also an echocardiogram had been ordered for the morning.  I am holding her prior to admission aspirin for now given anticipated surgery.   3.  Hypothyroidism.  We will resume her prior to admission levothyroxine 137 mcg p.o. daily.   4.  History of diabetes mellitus type 2:  At home she is on glipizide 2.5 mg p.o. every morning and 10 mg in the evening, Metformin 500 mg p.o. twice daily and Januvia 100 mg p.o. daily for now.  We will hold her oral antidiabetic medications.  We will check hemoglobin A1c and insulin sliding scale has been ordered.   5.   Leukocytosis, her white blood cells elevated at 15.6.  Her UA is normal.  She does not have any fever, elevated white blood cells are likely reactive.  At this point, we will monitor fever curve and repeat a CBC in the morning.   6.  Deep venous thrombosis prophylaxis.  Pneumatic compression device for now.  Defer DVT prophylaxis in postoperative period to Ortho Service.      CODE STATUS:  Discussed with the patient and patient is FULL CODE.      DISPOSITION:  Admit inpatient.  I anticipate 3-4 days of hospitalization pending postop course.         ASCENCION MCGINNIS MD             D: 2020   T: 2020   MT:       Name:     FLYNN TIRADO   MRN:      [id number]        Account:      [id number]   :      10/21/1931        Admitted:     2020                   Document: [id number]

## 2020-11-07 NOTE — ANESTHESIA PREPROCEDURE EVALUATION
Anesthesia Pre-Procedure Evaluation    Patient: Gavi Pearson   MRN: [id number] : 10/21/1931          Preoperative Diagnosis: Hip fracture, right (H) [S72.001A]    Procedure(s):  RIGHT HIP CEMENTED UNIPOLAR HEMIARTHROPLASTY    History reviewed. No pertinent past medical history.  History reviewed. No pertinent surgical history.    Anesthesia Evaluation     .             ROS/MED HX    ENT/Pulmonary:      (-) tobacco use   Neurologic:     (+)neuropathy other neuro BPPV    Cardiovascular:     (+) Dyslipidemia, hypertension----. : . . . :. dysrhythmias a-fib, . Previous cardiac testing date:results:date: results:ECG reviewed date:20 results:A-fib date: results:          METS/Exercise Tolerance:     Hematologic:         Musculoskeletal:   (+) fracture lower extremity: Femoral, -       GI/Hepatic:         Renal/Genitourinary:         Endo:     (+) type II DM Not using insulin thyroid problem hypothyroidism, .      Psychiatric:         Infectious Disease:         Malignancy:         Other:                                 Lab Results   Component Value Date    WBC 11.7 (H) 2020    HGB 13.2 2020    HCT 38.8 2020     2020     2020    POTASSIUM 3.8 2020    CHLORIDE 100 2020    CO2 28 2020    BUN 8 2020    CR 0.57 2020     (H) 2020    CHAVA 8.2 (L) 2020    ALBUMIN 3.8 2020    PROTTOTAL 7.6 2020    ALT 33 2020    AST 32 2020    ALKPHOS 88 2020    BILITOTAL 0.5 2020    INR 1.07 2020    TSH 1.37 2020    T4 1.57 (H) 2018       Preop Vitals  BP Readings from Last 3 Encounters:   20 (!) 167/83   20 110/60   20 136/80    Pulse Readings from Last 3 Encounters:   20 81   20 94   20 100      Resp Readings from Last 3 Encounters:   20 16   18 18    SpO2 Readings from Last 3 Encounters:   20 95%   20 97%   20 95%      Temp  "Readings from Last 1 Encounters:   11/07/20 36.4  C (97.5  F) (Oral)    Ht Readings from Last 1 Encounters:   11/06/20 1.753 m (5' 9\")      Wt Readings from Last 1 Encounters:   11/07/20 88.7 kg (195 lb 8.8 oz)    Estimated body mass index is 28.88 kg/m  as calculated from the following:    Height as of this encounter: 1.753 m (5' 9\").    Weight as of this encounter: 88.7 kg (195 lb 8.8 oz).       Anesthesia Plan      History & Physical Review  History and physical reviewed and following examination; no interval change.    ASA Status:  3 .    NPO Status:  > 8 hours    Plan for General with Intravenous and Propofol induction. Maintenance will be Balanced.    PONV prophylaxis:  Ondansetron (or other 5HT-3)         Postoperative Care  Postoperative pain management:  IV analgesics and Multi-modal analgesia.      Consents  Anesthetic plan, risks, benefits and alternatives discussed with:  Patient..                 Corby Cummings MD  "

## 2020-11-07 NOTE — CONSULTS
Orthopedic Consultation    Gavi Pearson MRN# [id number]   Age: 89 year old YOB: 1931     Date of Admission:  11/6/2020    Reason for consult: Right femoral neck fracture       Requesting physician: Bruna Cast       Level of consult: Consult, follow and place orders           Assessment and Plan:   Assessment:   Displaced right femoral neck fracture      Plan:   Recommend surgery .  Surgery to include: right hip cemented, unipolar, Hemiarthroplasty.  We discussed the nature of surgery including the Risks, Benefits, and alternatives.  These include but are not inclusive of: Wound healing problems, infection, potential injury to neurovascular structures and subsequent dysfunction, transfusion, failure of the hardware, and potential need for future surgery.  We discussed post operative expectations and long term expectations. The patient understands and wishes to proceed.             Chief Complaint:   Femoral neck fracture  Trochanteric fracture         History of Present Illness:   Ms. Pearson is a very pleasant 89-year-old female with past medical history of hypertension, dyslipidemia, paroxysmal atrial fibrillation, on aspirin, diabetes mellitus type 2, peripheral neuropathy, and BPPV and hypothyroidism who was brought in for evaluation of right hip pain, status post a mechanical fall.  The patient lives alone in her own house.  For ambulation, she uses a cane or a walker.  She states that earlier today she was sitting in her watching television at some point she tried to get up from her chair and she felt that her right leg gave up and she fell to the ground.  She landed on her right side.  She was not able to get up by herself as she felt very weak.   she denies any dizziness, no headache, no chest pain, no shortness of breath.  She denies loss of consciousness.  She denies palpitations.  No head trauma.  She denies any recent fevers, no coughing, no chest  pain, no shortness of breath, no nausea, no vomiting, no abdominal pain, no diarrhea, no constipation, no dysuria.  X-ray of the pelvis and right hip showed acute right femoral neck fracture with impaction.  Orthopedics was consulted for definitive care.            Past Medical History:   History reviewed. No pertinent past medical history.          Past Surgical History:   History reviewed. No pertinent surgical history.          Social History:     Social History     Tobacco Use     Smoking status: Never Smoker     Smokeless tobacco: Never Used   Substance Use Topics     Alcohol use: Yes     Alcohol/week: 0.0 standard drinks     Comment: occ             Family History:   History reviewed. No pertinent family history.          Immunizations:     VACCINE/DOSE   Diptheria   DPT   DTAP   HBIG   Hepatitis A   Hepatitis B   HIB   Influenza   Measles   Meningococcal   MMR   Mumps   Pneumococcal   Polio   Rubella   Small Pox   TDAP   Varicella   Zoster             Allergies:   No Known Allergies          Medications:     Current Facility-Administered Medications   Medication     [Auto Hold] acetaminophen (TYLENOL) tablet 650 mg     bupivacaine 0.25 % - EPINEPHrine 1:200,000 injection     ceFAZolin (ANCEF) 1 g vial to attach to  ml bag for ADULT or 50 ml bag for PEDS     [Auto Hold] glucose gel 15-30 g    Or     [Auto Hold] dextrose 50 % injection 25-50 mL    Or     [Auto Hold] glucagon injection 1 mg     fentaNYL (PF) (SUBLIMAZE) injection 25-50 mcg     [Auto Hold] hydrALAZINE (APRESOLINE) injection 10 mg     hydrALAZINE (APRESOLINE) injection 2.5-5 mg     [Auto Hold] HYDROmorphone (PF) (DILAUDID) injection 0.2-0.4 mg     HYDROmorphone (PF) (DILAUDID) injection 0.3-0.5 mg     [Auto Hold] insulin aspart (NovoLOG) injection (RAPID ACTING)     labetalol (NORMODYNE/TRANDATE) injection 10 mg     lactated ringers infusion     lactated ringers infusion     [Auto Hold] levothyroxine (SYNTHROID/LEVOTHROID) tablet 137 mcg      [Auto Hold] levothyroxine (SYNTHROID/LEVOTHROID) tablet 200 mcg     [Auto Hold] lidocaine (LMX4) cream     lidocaine 1 % 0.1-1 mL     [Auto Hold] lidocaine 1 % 0.1-1 mL     [Auto Hold] lisinopril (ZESTRIL) tablet 20 mg     [Auto Hold] melatonin tablet 1 mg     [Auto Hold] metoprolol (LOPRESSOR) injection 2.5 mg     [Auto Hold] metoprolol tartrate (LOPRESSOR) tablet 25 mg     [Auto Hold] naloxone (NARCAN) injection 0.1-0.4 mg     ondansetron (ZOFRAN-ODT) ODT tab 4 mg    Or     ondansetron (ZOFRAN) injection 4 mg     ondansetron (ZOFRAN) injection 4 mg     [Auto Hold] ondansetron (ZOFRAN-ODT) ODT tab 4 mg    Or     [Auto Hold] ondansetron (ZOFRAN) injection 4 mg     [Auto Hold] oxyCODONE (ROXICODONE) tablet 5-10 mg     [Auto Hold] polyethylene glycol (MIRALAX) Packet 17 g     povidone-iodine 10% (17 mL) (BETADINE) in 500 mL saline irrigation     [Auto Hold] pravastatin (PRAVACHOL) tablet 20 mg     prochlorperazine (COMPAZINE) injection 5 mg     [Auto Hold] senna-docusate (SENOKOT-S/PERICOLACE) 8.6-50 MG per tablet 1 tablet    Or     [Auto Hold] senna-docusate (SENOKOT-S/PERICOLACE) 8.6-50 MG per tablet 2 tablet     [Auto Hold] sodium chloride (PF) 0.9% PF flush 3 mL     [Auto Hold] sodium chloride (PF) 0.9% PF flush 3 mL     sodium chloride 0.9% (bag) irrigation     sodium chloride 0.9% (bottle) irrigation     sodium chloride 0.9% infusion     Facility-Administered Medications Ordered in Other Encounters   Medication     fentaNYL (PF) (SUBLIMAZE) injection     glycopyrrolate (ROBINUL) injection     HYDROmorphone (DILAUDID) injection     lidocaine 2% injection (MDV)     neostigmine (PROSTIGMINE) injection     ondansetron (ZOFRAN) injection     phenylephrine (CELESTE-SYNEPHRINE) injection     phenylephrine 0.1 mg/mL infusion (mcg/kg/min)     propofol (DIPRIVAN) injection 10 mg/mL vial     rocuronium injection             Review of Systems:   A Review of 10 systems is noted in the Epic chart.  No new additions           "Physical Exam:   All vitals have been reviewed  Patient Vitals for the past 24 hrs:   BP Temp Temp src Pulse Resp SpO2 Height Weight   11/07/20 1201 (!) 151/61 -- -- 73 18 98 % -- --   11/07/20 1143 (!) 145/66 -- -- 84 16 95 % -- --   11/07/20 1058 120/88 98  F (36.7  C) Temporal 70 20 96 % -- --   11/07/20 0918 (!) 167/83 -- -- 81 -- -- -- --   11/07/20 0745 -- -- -- -- 16 -- -- --   11/07/20 0729 (!) 156/90 97.5  F (36.4  C) Oral 84 16 95 % -- --   11/07/20 0600 -- -- -- -- -- -- -- 88.7 kg (195 lb 8.8 oz)   11/06/20 2345 -- -- -- -- 16 -- -- --   11/06/20 2254 (!) 155/94 97.4  F (36.3  C) Oral 92 16 97 % -- --   11/06/20 2230 (!) 157/67 97.7  F (36.5  C) Oral 90 18 93 % -- --   11/06/20 2200 (!) 165/77 97.7  F (36.5  C) Oral 93 18 97 % -- --   11/06/20 2135 -- -- -- 112 28 96 % -- --   11/06/20 2101 -- -- -- 106 10 91 % -- --   11/06/20 2100 (!) 162/123 -- -- 104 19 -- -- --   11/06/20 2045 -- -- -- 107 30 -- -- --   11/06/20 2000 -- -- -- 96 12 -- -- --   11/06/20 1945 -- -- -- 86 11 94 % -- --   11/06/20 1930 -- -- -- 96 14 97 % -- --   11/06/20 1900 (!) 183/119 -- -- 122 -- 94 % -- --   11/06/20 1727 (!) 178/100 97.8  F (36.6  C) Temporal 122 18 97 % 1.753 m (5' 9\") 87.5 kg (192 lb 14.4 oz)       Intake/Output Summary (Last 24 hours) at 11/7/2020 1636  Last data filed at 11/7/2020 1627  Gross per 24 hour   Intake 2500 ml   Output 3795 ml   Net -1295 ml     Musculoskeletal:   Right hip/leg:   No gross deformity  No trophic skin changes  No erythema or lymphangitis  Pos Stinchfield test  Pos Log roll with pain in groin  Palp DP pulse  Good sens to LT right foot  Motor: 5/5 all             Data:   All laboratory data reviewed  Results for orders placed or performed during the hospital encounter of 11/06/20   XR Pelvis w Hip Right 1 View     Status: None    Narrative    EXAM: XR PELVIS AND HIP RIGHT 1 VIEW  LOCATION: [facility]  DATE/TIME: 11/6/2020 5:55 PM    INDICATION: Twisting fall onto carpeted " floor, right hip and thigh pain  COMPARISON: None.      Impression    IMPRESSION: Acute right femoral neck fracture with impaction and coxa vara angulation. There is mild posterior and superior displacement of the distal fracture fragment. Normal joint alignment maintained. Postsurgical changes of the left femur. No   hardware complication. Mild bilateral hip and sacroiliac joint degenerative changes. Moderate degenerative changes of the lower lumbar spine. Osteopenia.   XR Femur Port Right 2 Views     Status: None    Narrative    EXAM: XR FEMUR PORT RT 2 VW  LOCATION: [facility]  DATE/TIME: 11/6/2020 6:57 PM    INDICATION: Right leg pain.  COMPARISON: 11/06/2020 radiographs of the pelvis and right hip.      Impression    IMPRESSION: Distal two thirds of the right femur are normal. No fracture. No knee joint effusion.   XR Ankle Port Right 2 Views     Status: None    Narrative    EXAM: XR ANKLE PORT RT 2 VW  LOCATION: [facility]  DATE/TIME: 11/7/2020 3:14 PM    INDICATION: Possible ankle fracture. Pain.  COMPARISON: None.      Impression    IMPRESSION: Bones are demineralized. Ankle mortise is intact. Advanced midfoot degenerative change with prominent dorsal osteophytic spurring.    Transverse fracture through the proximal fifth metatarsal appears to be acute. This would be better characterized with foot radiographs.    RECOMMENDATION: Foot radiographs   CBC with platelets differential     Status: Abnormal   Result Value Ref Range    WBC 15.6 (H) 4.0 - 11.0 10e9/L    RBC Count 4.31 3.8 - 5.2 10e12/L    Hemoglobin 13.4 11.7 - 15.7 g/dL    Hematocrit 39.1 35.0 - 47.0 %    MCV 91 78 - 100 fl    MCH 31.1 26.5 - 33.0 pg    MCHC 34.3 31.5 - 36.5 g/dL    RDW 12.4 10.0 - 15.0 %    Platelet Count 186 150 - 450 10e9/L    Diff Method Automated Method     % Neutrophils 81.2 %    % Lymphocytes 8.9 %    % Monocytes 9.0 %    % Eosinophils 0.4 %    % Basophils 0.1 %    % Immature Granulocytes 0.4 %     Nucleated RBCs 0 0 /100    Absolute Neutrophil 12.9 (H) 1.6 - 8.3 10e9/L    Absolute Lymphocytes 1.4 0.8 - 5.3 10e9/L    Absolute Monocytes 1.4 (H) 0.0 - 1.3 10e9/L    Absolute Eosinophils 0.1 0.0 - 0.7 10e9/L    Absolute Basophils 0.0 0.0 - 0.2 10e9/L    Abs Immature Granulocytes 0.1 0 - 0.4 10e9/L    Absolute Nucleated RBC 0.0    Basic metabolic panel     Status: Abnormal   Result Value Ref Range    Sodium 134 133 - 144 mmol/L    Potassium 3.4 3.4 - 5.3 mmol/L    Chloride 103 94 - 109 mmol/L    Carbon Dioxide 24 20 - 32 mmol/L    Anion Gap 7 3 - 14 mmol/L    Glucose 226 (H) 70 - 99 mg/dL    Urea Nitrogen 10 7 - 30 mg/dL    Creatinine 0.66 0.52 - 1.04 mg/dL    GFR Estimate 78 >60 mL/min/[1.73_m2]    GFR Estimate If Black >90 >60 mL/min/[1.73_m2]    Calcium 8.0 (L) 8.5 - 10.1 mg/dL   INR     Status: None   Result Value Ref Range    INR 1.07 0.86 - 1.14   UA with Microscopic     Status: Abnormal   Result Value Ref Range    Color Urine Light Yellow     Appearance Urine Clear     Glucose Urine 70 (A) NEG^Negative mg/dL    Bilirubin Urine Negative NEG^Negative    Ketones Urine 10 (A) NEG^Negative mg/dL    Specific Gravity Urine 1.009 1.003 - 1.035    Blood Urine Negative NEG^Negative    pH Urine 7.0 5.0 - 7.0 pH    Protein Albumin Urine Negative NEG^Negative mg/dL    Urobilinogen mg/dL Normal 0.0 - 2.0 mg/dL    Nitrite Urine Negative NEG^Negative    Leukocyte Esterase Urine Negative NEG^Negative    Source Catheterized Urine     WBC Urine 1 0 - 5 /HPF    RBC Urine 1 0 - 2 /HPF    Mucous Urine Present (A) NEG^Negative /LPF   Asymptomatic COVID-19 Virus (Coronavirus) by PCR     Status: None    Specimen: Nasopharyngeal   Result Value Ref Range    COVID-19 Virus PCR to [facility] - Source Nasopharyngeal     COVID-19 Virus PCR to [facility] - Result       Test received-See reflex to IDDL test SARS CoV2 (COVID-19) Virus RT-PCR   Lactic acid level STAT     Status: Abnormal   Result Value Ref Range    Lactate for Sepsis Protocol 3.3  (H) 0.7 - 2.0 mmol/L   Potassium     Status: None   Result Value Ref Range    Potassium 3.6 3.4 - 5.3 mmol/L   Hemoglobin A1c     Status: Abnormal   Result Value Ref Range    Hemoglobin A1C 7.2 (H) 0 - 5.6 %   Glucose by meter     Status: Abnormal   Result Value Ref Range    Glucose 268 (H) 70 - 99 mg/dL   Lactic acid whole blood     Status: None   Result Value Ref Range    Lactic Acid 1.3 0.7 - 2.0 mmol/L   Basic metabolic panel     Status: Abnormal   Result Value Ref Range    Sodium 134 133 - 144 mmol/L    Potassium 3.8 3.4 - 5.3 mmol/L    Chloride 100 94 - 109 mmol/L    Carbon Dioxide 28 20 - 32 mmol/L    Anion Gap 6 3 - 14 mmol/L    Glucose 276 (H) 70 - 99 mg/dL    Urea Nitrogen 8 7 - 30 mg/dL    Creatinine 0.57 0.52 - 1.04 mg/dL    GFR Estimate 82 >60 mL/min/[1.73_m2]    GFR Estimate If Black >90 >60 mL/min/[1.73_m2]    Calcium 8.2 (L) 8.5 - 10.1 mg/dL   CBC with platelets     Status: Abnormal   Result Value Ref Range    WBC 11.7 (H) 4.0 - 11.0 10e9/L    RBC Count 4.26 3.8 - 5.2 10e12/L    Hemoglobin 13.2 11.7 - 15.7 g/dL    Hematocrit 38.8 35.0 - 47.0 %    MCV 91 78 - 100 fl    MCH 31.0 26.5 - 33.0 pg    MCHC 34.0 31.5 - 36.5 g/dL    RDW 12.5 10.0 - 15.0 %    Platelet Count 190 150 - 450 10e9/L   SARS-CoV-2 COVID-19 Virus (Coronavirus) RT-PCR Nasopharyngeal     Status: None    Specimen: Nasopharyngeal   Result Value Ref Range    SARS-CoV-2 Virus Specimen Source Nasopharyngeal     SARS-CoV-2 PCR Result NEGATIVE     SARS-CoV-2 PCR Comment       Testing was performed using the Xpert Xpress SARS-CoV-2 Assay on the Cepheid Gene-Xpert   Instrument Systems. Additional information about this Emergency Use Authorization (EUA)   assay can be found via the Lab Guide.     Glucose by meter     Status: Abnormal   Result Value Ref Range    Glucose 216 (H) 70 - 99 mg/dL   Glucose by meter     Status: Abnormal   Result Value Ref Range    Glucose 232 (H) 70 - 99 mg/dL   Glucose by meter     Status: Abnormal   Result Value Ref  Range    Glucose 249 (H) 70 - 99 mg/dL   Glucose by meter     Status: Abnormal   Result Value Ref Range    Glucose 187 (H) 70 - 99 mg/dL   EKG 12-lead, tracing only     Status: None   Result Value Ref Range    Interpretation ECG Click View Image link to view waveform and result    Results for orders placed or performed in visit on 11/07/20   Airway     Status: None    Marcos Burris APRN CRNA     11/7/2020  2:49 PM  Airway   Date/Time: 11/7/2020 2:31 PM   Patient location during procedure: OR    Staff -   Performed By: CRNA    Consent for Airway   Urgency: elective    Indications and Patient Condition  Indications for airway management: elvira-procedural  Mask difficulty assessment: 2 - vent by mask + OA or adjuvant +/- NMBA    Final Airway Details  Final airway type: endotracheal airway  Successful airway:ETT - single  Endotracheal Airway Details   ETT size (mm): 7.0  Successful intubation technique: video laryngoscopy  Grade View of Cords: 1  Adjucts: stylet  Measured from: lips  Secured at (cm): 22  Secured with: pink tape  Bite block used: None    Post intubation assessment   Placement verified by: capnometry, equal breath sounds and chest rise   Number of attempts at approach: 1  Secured with:pink tape  Ease of procedure: easy  Dentition: Intact          Attestation:  I have reviewed today's vital signs, notes, medications, labs and imaging.  Amount of time performed on this consult: 30 minutes.    Enrico Ledesma MD

## 2020-11-07 NOTE — PROGRESS NOTES
RECEIVING UNIT ED HANDOFF REVIEW    ED Nurse Handoff Report was reviewed by: Ilya Mccormick RN on November 6, 2020 at 9:23 PM

## 2020-11-07 NOTE — OP NOTE
Operative Note  11/07/20  ORTHOPEDIC SURGERY  Enrico Ledesma MD      OPERATIVE NOTE   Pre-Op Diagnosis:   Hip fracture, right (H) [S72.001A]    Post-Op Diagnosis:   Hip fracture, right (H) [S72.001A]    Procedure(s):  RIGHT HIP CEMENTED UNIPOLAR HEMIARTHROPLASTY    Indications: Pain, Limitation of function; Hip fracture, right (H) [S72.001A]    Anesthesia: General endotrachial anesthesia and Local    Implants: [place]:   Size: 8 Omifit Hfx 132 deg Hip stem; Size 12 Centralizer tip; + 0 Collar; 48 mm UHR Unipolar  Head Component    Surgeon(s) and Role:  * Enrico Ledesma MD - Primary  * June Haynes PAC   - Assist    EBL: 50 cc    Specimens:  None    Drains: None    Complications: none noted    Disposition: PACU    OPERATIVE PROCEDURE:                The patient was brought to the operating room. The patient was carefully transferred to the operating table and adequate General anesthesia was obtained. The patient was carefully placed in the Right  lateral decubitus position with the hip positioner.  The Right  hip was prepped and draped in the usual sterile fashion. Appropriate antibiotic was given to the patient preoperatively within 1 hour of procedure. Preoperative time-out was performed, confirming the Right  hip and appropriate procedure. The Surgeon's houston was noted. June Haynes PAC - Assist's services were necessary during the entirety of the case due the complexity and nature of the case. Assistance was critical during transfer,positioning, preparation, draping, surgical approach, fracture reduction, implant placement,closure and placement of dressings. Their assistance allowed me to operate efficiently, decreasing surgical time and risk.    A 6 inch Longitudinal  incision was made Centered over the Greater Trochanter and extending slightly posterior.  Dissection was made through the superficial tissues using meticulous hemostasis with Electrocautery.  The IT band was identified and incised  "longitudinally.  A Charnley retractor was placed exposing the lateral abductors.  The anterior 1/3 of the Gluteus Medius and Minimus were released off the Greater Trochanter exposing the hip capsule. A \"T\" shaped arthrotomy was made in the capsule exposing the Femoral neck.  The fracture was easily identified.  The capsule was released medially exposing the Lessor Trochanter. The leg was placed in the Hip pocket of the drape and the femoral neck was identified.  A template was used to houston the femoral neck cut and the neck cut was then performed with a sagittal saw.    We then exposed the Acetabulum and the patients Femoral head was removed with the corkscrew removal handle.  The femoral head was measured to a size 48.  A  trial 48 mm head was placed and found to fit well with a good suction fit.   We placed the leg back into the hip pocket and exposed the Femoral neck and canal.  The canal was sequentially reamed to a size 8 cylindrical reamer.  We then sequentially broached the canal to a size 8 broach.  A provisional neck was placed.  Sequential head and necks were trial'd and the   + 0 collar with a 48 mm Unipolar head  provided the best tension and matched leg lengths.  Trial components were removed from the Femoral canal.  The canal was thoroughly irrigated with pulse lavage.  A real size 8 Femoral stem was impacted  in the femoral canal in un-cemented fashion and in appropriate version. The the + 0 Collar and 48 mm Unipolar head were impacted onto the Femoral neck.  The hip was reduced and placed through a range of motion.  Excellent stability was noted throughout the range of motion.    The Joint was irrigated with pulse lavage.  The hip capsule was closed with interrupted #1 Vicryl sutures.  The Gluteus Medius and Minimus were repaired back to the Greater Trochanter with Multiple #5 Ethibond sutures placed through bone  using a Joshua Benji stitch.  This was supplemented with multiple interrupted #1 Vicryl " sutures.  An excellent repair was obtained.  The IT band was closed with a running #1 Ethibond suture.      The incisional region  was thoroughly irrigated with normal saline.  Local anesthesia was obtained with 20 ml of Ropivicaine.  At this  point the case was halted.  The hip was exsanguinated of excess fluid.  The incision was closed  with interrupted O Vicryl sutures and  a 2.0 Stratofix suture.  Steri strips were applied.  Sterile dressings were applied with a Tegaderm. The patient was placed in an abductor pillow and transferred back to their hospital bed and  PACU in stable condition.         Post Op Plan of Care:   Weight baring as tolerated with use of a walker and standard hip precautions for 6 wks then may proceed to regular activity.  Recheck in 10-14 days with new xray.           Enrico Ledesma  [facility]

## 2020-11-07 NOTE — ED NOTES
[facility]  ED Nurse Handoff Report    ED Chief complaint: Hip Pain      ED Diagnosis:   Final diagnoses:   Closed fracture of neck of right femur, initial encounter (H)       Code Status: To be addressed by admitting provider    Allergies: No Known Allergies    Patient Story: Had a near fall at home. Sudden twist of the right hip and went down to the floor without an actual fall. Pain occurred following the sudden twisting movement.    Focused Assessment:  A&Ox4. Denies sob, cough, fevers, n/v/d. R femur fracture, pain controlled if not moving. Hernandez cath placed. VSS. Calm, cooperative and resting on cart.    XR Femur Port Right 2 Views   Final Result   IMPRESSION: Distal two thirds of the right femur are normal. No fracture. No knee joint effusion.      XR Pelvis w Hip Right 1 View   Final Result   IMPRESSION: Acute right femoral neck fracture with impaction and coxa vara angulation. There is mild posterior and superior displacement of the distal fracture fragment. Normal joint alignment maintained. Postsurgical changes of the left femur. No    hardware complication. Mild bilateral hip and sacroiliac joint degenerative changes. Moderate degenerative changes of the lower lumbar spine. Osteopenia.          Treatments and/or interventions provided: *  Medications   ondansetron (ZOFRAN) injection 4 mg (4 mg Intravenous Given 11/6/20 1741)   0.9% sodium chloride BOLUS (0 mLs Intravenous Stopped 11/6/20 2028)     Followed by   sodium chloride 0.9% infusion (has no administration in time range)   HYDROmorphone (PF) (DILAUDID) injection 0.5 mg (has no administration in time range)     Patient's response to treatments and/or interventions: decreased pain    To be done/followed up on inpatient unit:  Continue with plan of care per admitting MD.    Does this patient have any cognitive concerns?: None    Activity level - Baseline/Home:  Independent  Activity Level - Current:   Bed bound    Patient's Preferred  "language: English   Needed?: No    Isolation: None  Infection: Not Applicable  Patient tested for COVID 19 prior to admission: YES  Bariatric?: No    Vital Signs:   Vitals:    11/06/20 1727 11/06/20 1900 11/06/20 1930 11/06/20 2000   BP: (!) 178/100 (!) 183/119     Pulse: 122 122 96 96   Resp: 18  14 12   Temp: 97.8  F (36.6  C)      TempSrc: Temporal      SpO2: 97% 94% 97%    Weight: 87.5 kg (192 lb 14.4 oz)      Height: 1.753 m (5' 9\")          Cardiac Rhythm:     Was the PSS-3 completed:   Yes  What interventions are required if any?  NA             Family Comments: None   OBS brochure/video discussed/provided to patient/family: N/A              Name of person given brochure if not patient: NA              Relationship to patient: NA    For the majority of the shift this patient's behavior was Green.   Behavioral interventions performed were NA.    ED NURSE PHONE NUMBER: *[phone]         "

## 2020-11-07 NOTE — PLAN OF CARE
A&O x4. VSS on 2L o2. Tele: A fib CVR. CMS intact. PRN IV dilaudid for pain. PRN zofran for nausea, resolved. NPO. Bedrest, assist 2. Hernandez.

## 2020-11-07 NOTE — ANESTHESIA PROCEDURE NOTES
Airway   Date/Time: 11/7/2020 2:31 PM   Patient location during procedure: OR    Staff -   Performed By: CRNA    Consent for Airway   Urgency: elective    Indications and Patient Condition  Indications for airway management: elvira-procedural  Mask difficulty assessment: 2 - vent by mask + OA or adjuvant +/- NMBA    Final Airway Details  Final airway type: endotracheal airway  Successful airway:ETT - single  Endotracheal Airway Details   ETT size (mm): 7.0  Successful intubation technique: video laryngoscopy  Grade View of Cords: 1  Adjucts: stylet  Measured from: lips  Secured at (cm): 22  Secured with: pink tape  Bite block used: None    Post intubation assessment   Placement verified by: capnometry, equal breath sounds and chest rise   Number of attempts at approach: 1  Secured with:pink tape  Ease of procedure: easy  Dentition: Intact

## 2020-11-07 NOTE — ANESTHESIA CARE TRANSFER NOTE
Patient: Gavi Pearson    Procedure(s):  RIGHT HIP CEMENTED UNIPOLAR HEMIARTHROPLASTY    Diagnosis: Hip fracture, right (H) [S72.001A]  Diagnosis Additional Information: No value filed.    Anesthesia Type:   General     Note:  Airway :Face Mask  Patient transferred to:PACU  Comments: Neuromuscular blockade reversed after TOF 4/4, spontaneous respirations, adequate tidal volumes, followed commands to voice, extubated atraumatically, extubated with suction, airway patent after extubation.  Oxygen via facemask at 8 liters per minute to PACU. Oxygen tubing connected to wall O2 in PACU, SpO2, NiBP, and EKG monitors and alarms on and functioning, Martha Hugger warmer connected to patient gown, report on patient's clinical status given to PACU RN, RN questions answered.     /83  HR 95  RR 18  O2 93%  Temp 36.8    Handoff Report: Identifed the Patient, Identified the Reponsible Provider, Reviewed the pertinent medical history, Discussed the surgical course, Reviewed Intra-OP anesthesia mangement and issues during anesthesia, Set expectations for post-procedure period and Allowed opportunity for questions and acknowledgement of understanding      Vitals: (Last set prior to Anesthesia Care Transfer)    CRNA VITALS  11/7/2020 1608 - 11/7/2020 1645      11/7/2020             SpO2:  95 %    Resp Rate (set):  10                Electronically Signed By: Christine Marie Volp Hodgkins, CRNA, APRN CRNA  November 7, 2020  4:45 PM

## 2020-11-07 NOTE — PHARMACY-ADMISSION MEDICATION HISTORY
Pharmacy Medication History  Admission medication history interview status for the 11/6/2020  admission is complete. See EPIC admission navigator for prior to admission medications       Medication history sources: Patient  Location of interview: phone  Medication history source reliability: Moderate, unable to confirm patient's changes with sure scripts  Adherence assessment: unable to assess    Significant changes made to the medication list:  Modified asa, levothyroxine, metoprolol -Patient says she has been taking 1/2 x 50mg =25mg for years      Additional medication history information:   none    Medication reconciliation completed by provider prior to medication history? partially    Time spent in this activity: 15 minutes      Prior to Admission medications    Medication Sig Last Dose Taking? Auth Provider   aspirin 81 MG EC tablet Take 81 mg by mouth At Bedtime 11/5/2020 at hs Yes Unknown, Entered By History   glipiZIDE (GLUCOTROL) 5 MG tablet TAKE 1/2 TABLET BY MOUTH EVERY MORNING AND 2 TABLETS BY MOUTH EVERY EVENING 11/6/2020 at am Yes Aleks Maher MD   levothyroxine (SYNTHROID/LEVOTHROID) 137 MCG tablet Take 1 tablet (137 mcg) by mouth daily  Patient taking differently: Take 137 mcg by mouth daily 205.5mg (1.5 x 137mcg) on Tuesdays and Saturdays. 137 mcg daily all other days of the week 11/6/2020 at am Yes Aleks Maher MD   lisinopril (ZESTRIL) 20 MG tablet TAKE 1 TABLET(20 MG) BY MOUTH DAILY 11/6/2020 at am Yes Aleks Maher MD   lisinopril-hydrochlorothiazide (ZESTORETIC) 20-12.5 MG tablet TAKE 1 TABLET BY MOUTH EVERY MORNING 11/6/2020 at am Yes Aleks Maher MD   lovastatin (MEVACOR) 20 MG tablet Take 1 tablet (20 mg) by mouth daily 11/5/2020 at hs Yes Aleks Maher MD   metFORMIN (GLUCOPHAGE) 500 MG tablet Take 1 tablet (500 mg) by mouth 2 times daily (with meals) 11/6/2020 at am Yes Aleks Maher MD   metoprolol tartrate (LOPRESSOR) 50 MG tablet TAKE 1 TABLET BY MOUTH TWICE  DAILY.  Patient taking differently: Take 25 mg by mouth 2 times daily  11/6/2020 at am Yes Aleks Maher MD   sitagliptin (JANUVIA) 100 MG tablet Take 1 tablet (100 mg) by mouth daily 11/6/2020 at am Yes Aleks Maher MD   triamcinolone (KENALOG) 0.1 % cream Apply b.i.d. p.r.n. to dermatitis but not on the face prn Yes Aleks Maher MD   blood glucose (NO BRAND SPECIFIED) lancets standard Use to test blood sugar 2 times daily or as directed.   Aleks Maher MD   blood glucose (NO BRAND SPECIFIED) test strip Use to test blood sugars 2 times daily or as directed   Aleks Maher MD

## 2020-11-07 NOTE — PROGRESS NOTES
Sepsis Evaluation Progress Note    I was called to see Gavi MINI Lili due to abnormal vital signs triggering the Sepsis SIRS screening alert. She is not known to have an infection.     Physical Exam   Vital Signs:  Temp: 97.4  F (36.3  C) Temp src: Oral BP: (!) 155/94 Pulse: 92   Resp: 16 SpO2: 97 % O2 Device: Nasal cannula Oxygen Delivery: 2 LPM     General: in no acute distress  Mental Status: AAOx4.     Remainder of physical exam is significant for hypertension, atrial fibrillation.     Data   Lactate for Sepsis Protocol   Date Value Ref Range Status   11/06/2020 3.3 (H) 0.7 - 2.0 mmol/L Final     Comment:     Critical Value called to and read back by  ROBERT BILLS IN 55 AT 2251 BY MS         Assessment & Plan   NO EVIDENCE OF SEPSIS at this time.  Vital sign, physical exam, and lab findings are likely due to atrial fibrillation, acute stress reaction.  UA normal. Afebrile.     Repeat CBC in AM.     Give 1 L bolus NS now. Recheck lactate in 2-3 hours.     Disposition: The patient will remain on the current unit. We will continue to monitor this patient closely..  Gabby Webster MD    Sepsis Criteria   Sepsis: 2+ SIRS criteria due to infection  Severe Sepsis: Sepsis AND 1+ new sign of acute organ dysfunction (Note: lactate >2 or acute encephalopathy each qualify as organ dysfunction)  Septic Shock: Sepsis AND hypotension despite volume resuscitation with 30 ml/kg crystalloid or lactate >=4  Note: HYPOTENSION is defined as 2 BP readings measured 3 hrs apart that have a SBP <90, MAP <65, or decrease >40 mmHg, occurring 6 hrs before or after t-zero

## 2020-11-08 ENCOUNTER — APPOINTMENT (OUTPATIENT)
Dept: PHYSICAL THERAPY | Facility: CLINIC | Age: 85
DRG: 522 | End: 2020-11-08
Attending: ORTHOPAEDIC SURGERY
Payer: MEDICARE

## 2020-11-08 ENCOUNTER — APPOINTMENT (OUTPATIENT)
Dept: CARDIOLOGY | Facility: CLINIC | Age: 85
DRG: 522 | End: 2020-11-08
Attending: ORTHOPAEDIC SURGERY
Payer: MEDICARE

## 2020-11-08 LAB
GLUCOSE BLDC GLUCOMTR-MCNC: 182 MG/DL (ref 70–99)
GLUCOSE BLDC GLUCOMTR-MCNC: 206 MG/DL (ref 70–99)
GLUCOSE BLDC GLUCOMTR-MCNC: 251 MG/DL (ref 70–99)
GLUCOSE BLDC GLUCOMTR-MCNC: 271 MG/DL (ref 70–99)
GLUCOSE SERPL-MCNC: 257 MG/DL (ref 70–99)
HGB BLD-MCNC: 12 G/DL (ref 11.7–15.7)

## 2020-11-08 PROCEDURE — 97530 THERAPEUTIC ACTIVITIES: CPT | Mod: GP

## 2020-11-08 PROCEDURE — 99232 SBSQ HOSP IP/OBS MODERATE 35: CPT | Performed by: INTERNAL MEDICINE

## 2020-11-08 PROCEDURE — 999N000208 ECHOCARDIOGRAM COMPLETE

## 2020-11-08 PROCEDURE — 93306 TTE W/DOPPLER COMPLETE: CPT | Mod: 26 | Performed by: INTERNAL MEDICINE

## 2020-11-08 PROCEDURE — 258N000003 HC RX IP 258 OP 636: Performed by: ORTHOPAEDIC SURGERY

## 2020-11-08 PROCEDURE — 120N000001 HC R&B MED SURG/OB

## 2020-11-08 PROCEDURE — 258N000003 HC RX IP 258 OP 636: Performed by: INTERNAL MEDICINE

## 2020-11-08 PROCEDURE — 250N000013 HC RX MED GY IP 250 OP 250 PS 637: Performed by: ORTHOPAEDIC SURGERY

## 2020-11-08 PROCEDURE — 250N000013 HC RX MED GY IP 250 OP 250 PS 637: Performed by: INTERNAL MEDICINE

## 2020-11-08 PROCEDURE — 36415 COLL VENOUS BLD VENIPUNCTURE: CPT | Performed by: ORTHOPAEDIC SURGERY

## 2020-11-08 PROCEDURE — 85018 HEMOGLOBIN: CPT | Performed by: ORTHOPAEDIC SURGERY

## 2020-11-08 PROCEDURE — 250N000012 HC RX MED GY IP 250 OP 636 PS 637: Performed by: INTERNAL MEDICINE

## 2020-11-08 PROCEDURE — 999N001017 HC STATISTIC GLUCOSE BY METER IP

## 2020-11-08 PROCEDURE — 82947 ASSAY GLUCOSE BLOOD QUANT: CPT | Performed by: ORTHOPAEDIC SURGERY

## 2020-11-08 PROCEDURE — 97110 THERAPEUTIC EXERCISES: CPT | Mod: GP

## 2020-11-08 PROCEDURE — 250N000011 HC RX IP 250 OP 636: Performed by: ORTHOPAEDIC SURGERY

## 2020-11-08 PROCEDURE — 255N000002 HC RX 255 OP 636: Performed by: INTERNAL MEDICINE

## 2020-11-08 PROCEDURE — 97161 PT EVAL LOW COMPLEX 20 MIN: CPT | Mod: GP

## 2020-11-08 RX ORDER — DEXTROSE MONOHYDRATE 25 G/50ML
25-50 INJECTION, SOLUTION INTRAVENOUS
Status: DISCONTINUED | OUTPATIENT
Start: 2020-11-08 | End: 2020-11-11 | Stop reason: HOSPADM

## 2020-11-08 RX ORDER — NICOTINE POLACRILEX 4 MG
15-30 LOZENGE BUCCAL
Status: DISCONTINUED | OUTPATIENT
Start: 2020-11-08 | End: 2020-11-11 | Stop reason: HOSPADM

## 2020-11-08 RX ADMIN — INSULIN ASPART 6 UNITS: 100 INJECTION, SOLUTION INTRAVENOUS; SUBCUTANEOUS at 13:18

## 2020-11-08 RX ADMIN — DOCUSATE SODIUM 100 MG: 100 CAPSULE, LIQUID FILLED ORAL at 21:19

## 2020-11-08 RX ADMIN — ACETAMINOPHEN 975 MG: 325 TABLET, FILM COATED ORAL at 17:35

## 2020-11-08 RX ADMIN — SITAGLIPTIN 100 MG: 100 TABLET, FILM COATED ORAL at 14:25

## 2020-11-08 RX ADMIN — ACETAMINOPHEN 975 MG: 325 TABLET, FILM COATED ORAL at 07:38

## 2020-11-08 RX ADMIN — METOPROLOL TARTRATE 25 MG: 25 TABLET, FILM COATED ORAL at 21:19

## 2020-11-08 RX ADMIN — METFORMIN HYDROCHLORIDE 500 MG: 500 TABLET, FILM COATED ORAL at 17:36

## 2020-11-08 RX ADMIN — DOCUSATE SODIUM 50 MG AND SENNOSIDES 8.6 MG 1 TABLET: 8.6; 5 TABLET, FILM COATED ORAL at 21:19

## 2020-11-08 RX ADMIN — POLYETHYLENE GLYCOL 3350 17 G: 17 POWDER, FOR SOLUTION ORAL at 10:50

## 2020-11-08 RX ADMIN — INSULIN ASPART 2 UNITS: 100 INJECTION, SOLUTION INTRAVENOUS; SUBCUTANEOUS at 17:36

## 2020-11-08 RX ADMIN — GLIPIZIDE 2.5 MG: 5 TABLET ORAL at 10:44

## 2020-11-08 RX ADMIN — INSULIN ASPART 5 UNITS: 100 INJECTION, SOLUTION INTRAVENOUS; SUBCUTANEOUS at 07:39

## 2020-11-08 RX ADMIN — CEFAZOLIN SODIUM 2 G: 2 INJECTION, SOLUTION INTRAVENOUS at 05:53

## 2020-11-08 RX ADMIN — SODIUM CHLORIDE, POTASSIUM CHLORIDE, SODIUM LACTATE AND CALCIUM CHLORIDE: 600; 310; 30; 20 INJECTION, SOLUTION INTRAVENOUS at 07:37

## 2020-11-08 RX ADMIN — HUMAN ALBUMIN MICROSPHERES AND PERFLUTREN 9 ML: 10; .22 INJECTION, SOLUTION INTRAVENOUS at 09:17

## 2020-11-08 RX ADMIN — PRAVASTATIN SODIUM 20 MG: 20 TABLET ORAL at 21:19

## 2020-11-08 RX ADMIN — METOPROLOL TARTRATE 25 MG: 25 TABLET, FILM COATED ORAL at 10:45

## 2020-11-08 RX ADMIN — DOCUSATE SODIUM 100 MG: 100 CAPSULE, LIQUID FILLED ORAL at 10:47

## 2020-11-08 RX ADMIN — METFORMIN HYDROCHLORIDE 500 MG: 500 TABLET, FILM COATED ORAL at 14:25

## 2020-11-08 RX ADMIN — LISINOPRIL 20 MG: 20 TABLET ORAL at 10:45

## 2020-11-08 RX ADMIN — SODIUM CHLORIDE, POTASSIUM CHLORIDE, SODIUM LACTATE AND CALCIUM CHLORIDE: 600; 310; 30; 20 INJECTION, SOLUTION INTRAVENOUS at 21:24

## 2020-11-08 RX ADMIN — ASPIRIN 325 MG: 325 TABLET, COATED ORAL at 10:44

## 2020-11-08 RX ADMIN — HYDROMORPHONE HYDROCHLORIDE 2 MG: 2 TABLET ORAL at 14:25

## 2020-11-08 RX ADMIN — LEVOTHYROXINE SODIUM 137 MCG: 137 TABLET ORAL at 05:53

## 2020-11-08 RX ADMIN — DOCUSATE SODIUM 50 MG AND SENNOSIDES 8.6 MG 1 TABLET: 8.6; 5 TABLET, FILM COATED ORAL at 10:44

## 2020-11-08 ASSESSMENT — ACTIVITIES OF DAILY LIVING (ADL)
ADLS_ACUITY_SCORE: 19

## 2020-11-08 NOTE — PLAN OF CARE
Patient alert and oriented x's 2-3.  Arrived on unit from PACU at 1815.  CMS intact.  Dressing dry and intact.  Telemetry show a fib with CVR.  Patient on 3 liters via face mask.  Hernandez to straight drainage.  Tolerating clear liquids.  Denies pain.  Will continue to monitor.

## 2020-11-08 NOTE — ANESTHESIA POSTPROCEDURE EVALUATION
Patient: Gavi Pearson    Procedure(s):  RIGHT HIP CEMENTED UNIPOLAR HEMIARTHROPLASTY    Diagnosis:Hip fracture, right (H) [S72.001A]  Diagnosis Additional Information: No value filed.    Anesthesia Type:  General    Note:  Anesthesia Post Evaluation    Patient location during evaluation: PACU  Patient participation: Able to fully participate in evaluation  Level of consciousness: awake and alert  Pain management: adequate  Airway patency: patent  Cardiovascular status: acceptable  Respiratory status: acceptable  Hydration status: acceptable  PONV: none     Anesthetic complications: None          Last vitals:  Vitals:    11/07/20 2100 11/07/20 2200 11/07/20 2324   BP: 125/63 111/67 116/65   Pulse: 83 107 96   Resp:   18   Temp:   36.8  C (98.3  F)   SpO2:   95%         Electronically Signed By: Amy Baldwin MD  November 7, 2020  11:30 PM

## 2020-11-08 NOTE — PLAN OF CARE
Patient is A&O, VSS on 5L NC, also a mouth breather, CMS intact, regular diet, up with assist of heavy 2, and denies pain. Dressing CDI, IVF infusing, Hernandez discontinued- DTV, and Tele is Afib CVR. Will continue to monitor.

## 2020-11-08 NOTE — OR NURSING
1730hr - AVERY Baldwin notified Pt stable; VS & Surgical sites are stable.  Okay for pt to transfer out of PACU and Floor RN to follow up with [facility] monitoring per AVERY Baldwin.

## 2020-11-08 NOTE — PROGRESS NOTES
[facility]    Medicine Progress Note - Hospitalist Service        Date of Admission:  11/6/2020  5:22 PM    Assessment & Plan:   Ms. Pearson is a very pleasant 89-year-old female with past medical history of hypertension, dyslipidemia, hypothyroidism, diabetes mellitus type 2, paroxysmal atrial fibrillation on aspirin and polyneuropathy, who presented for evaluation of right hip pain, status post mechanical fall.      Right femoral neck fracture S/P Rt hip arthoplasty   Mechanical fall.   -Presented with a mechanical fall, no syncope or presyncope.    -x-ray of pelvis showed acute right femoral neck fracture with impaction and caudal angulation.   -underwent rt hip arthroplasty on 11/7  -routine post-op care per Orthopedic surgery   -PT/OT  -Incentive spirometry, mobilize as able.    Essential hypertension  A. fib with RVR  -Continue prior to admission metoprolol and lisinopril  -Hold hydrochlorothiazide  -Initially was tachycardic but heart rate better controlled for the most part.  -Not on anticoagulation prior to admission(reason unclear)  -Echo today  -[id number]-[id number] score is 5, will benefit from long-term anticoagulation, will discuss with patient  -Continue monitoring on telemetry      Hypothyroidism.  -Continue levothyroxine 137 mcg p.o. daily.     History of diabetes mellitus type 2:    -PTA on glipizide 2.5 mg p.o. every morning and 10 mg in the evening, Metformin 500 mg p.o. twice daily and Januvia 100 mg p.o. daily for now.    -Hemoglobin A1c is 7.2  -Continue sliding scale  -Resume prior to admission metformin and glipizide 2.5 mg p.o. daily  -Adjust oral hypoglycemic agents and sliding scale as clinically indicated.    Leukocytosis  -Likely a stress response, improving.  -No clinical evidence of infection.    Asymptomatic Covid screen-negative    Diet: Advance Diet as Tolerated: Regular Diet Adult     DVT Prophylaxis: Pneumatic Compression Devices   Hernandez Catheter: not present  Code Status:  "Full Code     Disposition Plan    Expected discharge: 2-3 days, TBD, pending PT OT evaluation  Entered: Cristi Liz MD 11/08/2020, 10:02 AM        The patient's care was discussed with the Bedside Nurse and Patient.    Cristi Liz MD  Hospitalist Service  [facility]    ______________________________________________________________________    Interval History   Underwent right hip arthroplasty yesterday.  Pain appears to be reasonably well controlled.  Heart rate well controlled for the most part with intermittent tachycardia.  Denies chest pain or dyspnea.      Data reviewed today: I reviewed all medications, new labs and imaging results over the last 24 hours. I personally reviewed the EKG tracing showing A. fib with controlled ventricular rate..    Physical Exam   Vital signs:  Temp: 98.1  F (36.7  C) Temp src: Oral BP: 125/66 Pulse: 103   Resp: 16 SpO2: 97 % O2 Device: Nasal cannula Oxygen Delivery: 5 LPM Height: 175.3 cm (5' 9\") Weight: 88.7 kg (195 lb 8.8 oz)  Estimated body mass index is 28.88 kg/m  as calculated from the following:    Height as of this encounter: 1.753 m (5' 9\").    Weight as of this encounter: 88.7 kg (195 lb 8.8 oz).      Wt Readings from Last 2 Encounters:   11/07/20 88.7 kg (195 lb 8.8 oz)   03/11/20 85.7 kg (189 lb)       Gen: AAOX3, NAD  HEENT: no pallor  Resp: CTA B/L, normal WOB  CVS: Irregular, rate controlled.  Abd/GI: Soft, non-tender. BS- normoactive.    Skin: Warm, dry no rashes  MSK: Right hip incision bandaged  Neuro- CN- intact. No focal deficits.        Data   Recent Labs   Lab 11/08/20  0632 11/07/20  0526 11/06/20  2228 11/06/20  1925   WBC  --  11.7*  --  15.6*   HGB 12.0 13.2  --  13.4   MCV  --  91  --  91   PLT  --  190  --  186   INR  --   --  1.07  --    NA  --  134  --  134   POTASSIUM  --  3.8 3.6 3.4   CHLORIDE  --  100  --  103   CO2  --  28  --  24   BUN  --  8  --  10   CR  --  0.57  --  0.66   ANIONGAP  --  6  --  7   CHAVA  --  8.2*  " --  8.0*   * 276*  --  226*       Recent Results (from the past 24 hour(s))   XR Ankle Port Right 2 Views    Narrative    EXAM: XR ANKLE PORT RT 2 VW  LOCATION: [facility]  DATE/TIME: 11/7/2020 3:14 PM    INDICATION: Possible ankle fracture. Pain.  COMPARISON: None.      Impression    IMPRESSION: Bones are demineralized. Ankle mortise is intact. Advanced midfoot degenerative change with prominent dorsal osteophytic spurring.    Transverse fracture through the proximal fifth metatarsal appears to be acute. This would be better characterized with foot radiographs.    RECOMMENDATION: Foot radiographs     Medications     lactated ringers 100 mL/hr at 11/08/20 0737       acetaminophen  975 mg Oral Q8H     aspirin  325 mg Oral Daily     docusate sodium  100 mg Oral BID     glipiZIDE  2.5 mg Oral QAM AC     insulin aspart  1-10 Units Subcutaneous TID AC     insulin aspart  1-7 Units Subcutaneous At Bedtime     levothyroxine  137 mcg Oral Once per day on Sun Mon Wed Thu Fri     levothyroxine  200 mcg Oral Once per day on Tue Sat     lisinopril  20 mg Oral Daily     metoprolol tartrate  25 mg Oral BID     polyethylene glycol  17 g Oral Daily     pravastatin  20 mg Oral At Bedtime     senna-docusate  1 tablet Oral BID     sodium chloride (PF)  3 mL Intracatheter Q8H

## 2020-11-08 NOTE — PLAN OF CARE
Discharge Planner OT   Patient plan for discharge: TCU  Current status: Orders received and chart reviewed, talked to PT after their evaluation.  Pt is hesitant about moving, below baseline and needing A of 2 for mobility.  PT is recommending TCU.  Barriers to return to prior living situation: Defer to PT  Recommendations for discharge: Defer to PT  Rationale for recommendations: Pt currently below baseline and needing A of 2 for mobility.  Plan is to go to TCU, would recommend OT evaluation at treatment at TCU to allow for medical management and progress with mobility.       Entered by: Aram Nieto 11/08/2020 12:37 PM

## 2020-11-08 NOTE — PROGRESS NOTES
11/08/20 1030   Quick Adds   Type of Visit Initial PT Evaluation   Living Environment   People in home alone   Current Living Arrangements house   Home Accessibility stairs to enter home;stairs within home   Number of Stairs, Main Entrance 2   Stair Railings, Main Entrance none  (uses shelf for support)   Number of Stairs, Within Home, Primary other (see comments)  (12 steps)   Stair Railings, Within Home, Primary railing on right side (ascending)   Transportation Anticipated car, drives self;family or friend will provide   Living Environment Comments IND in ADLs and MRADLs. Has a neice that has been dropping off a couple of meals per week, groceries delivered due to COVID   Self-Care   Usual Activity Tolerance good   Current Activity Tolerance fair   Regular Exercise No   Equipment Currently Used at Home cane, straight;walker, rolling   Activity/Exercise/Self-Care Comment Doesn't typically use AD at home. Uses walker in community.   Disability/Function   Fall history within last six months yes   Number of times patient has fallen within last six months 1   General Information   Onset of Illness/Injury or Date of Surgery 11/06/20   Referring Physician Enrico Ledesma MD   Patient/Family Therapy Goals Statement (PT) To return to complete independence   Pertinent History of Current Problem (include personal factors and/or comorbidities that impact the POC) Pt is an 89 YOF admitted s/p mechanical fall with resultant R femoral neck fx. POD 1 R hemiarthroplasty. PMH includes HTN, type II DM, and A-fib   Existing Precautions/Restrictions fall;hip;oxygen therapy device and L/min  (4.5 L/min O2 via NC throughout session. )   Weight-Bearing Status - RLE weight-bearing as tolerated   General Observations Pt anxious about mobility and moving R LE    Cognition   Orientation Status (Cognition) oriented x 3   Affect/Mental Status (Cognition) WFL   Follows Commands (Cognition) follows two-step commands   Pain Assessment    Patient Currently in Pain No  (none at rest)   Integumentary/Edema   Integumentary/Edema Comments Dressing CDI   Posture    Posture Comments Forward flexed posture   Range of Motion (ROM)   ROM Comment Limited due to pain. Not formally assessed due to pain   Strength   Strength Comments Very limited R LE due to pain. Decreased L LE however movement increased RLE pain thus not formally assessed.   Bed Mobility   Comment (Bed Mobility) Mod A of 2 supine to sit   Transfers   Transfer Safety Comments Mod A of 2 sit to stand at FWW   Gait/Stairs (Locomotion)   Comment (Gait/Stairs) Mod A of 2 x1 step   Balance   Balance Comments Fair sitting balance - difficulty attaining upright sitting initially   Sensory Examination   Sensory Perception Comments denies numbness/tingling   Clinical Impression   Criteria for Skilled Therapeutic Intervention yes, treatment indicated   PT Diagnosis (PT) impaired mobility   Influenced by the following impairments pain, weakness, decreased activity tolerance   Functional limitations due to impairments assist of 2 and device required for mobility   Clinical Presentation Stable/Uncomplicated   Clinical Presentation Rationale Pt presenting as anticipated per dx.   Clinical Decision Making (Complexity) low complexity   Therapy Frequency (PT) Daily   Predicted Duration of Therapy Intervention (days/wks) 4 days   Planned Therapy Interventions (PT) bed mobility training;gait training;transfer training;strengthening   Risk & Benefits of therapy have been explained evaluation/treatment results reviewed;care plan/treatment goals reviewed;risks/benefits reviewed   Clinical Impression Comments Pt very limited by pain and anxious about movement of R LE.   PT Discharge Planning    PT Discharge Recommendation (DC Rec) Transitional Care Facility   PT Rationale for DC Rec Pt requires assist of 2 for all mobility, has stairs to enter home and lives alone.   PT Brief overview of current status  Mod A of 2  for supine > sit and sit to stand to FWW. Mod A of 2 for stand pivot transfer bed to wheelchair. Recommend nsg use lift.   Total Evaluation Time   Total Evaluation Time (Minutes) 15

## 2020-11-09 ENCOUNTER — APPOINTMENT (OUTPATIENT)
Dept: PHYSICAL THERAPY | Facility: CLINIC | Age: 85
DRG: 522 | End: 2020-11-09
Payer: MEDICARE

## 2020-11-09 LAB
GLUCOSE BLDC GLUCOMTR-MCNC: 144 MG/DL (ref 70–99)
GLUCOSE BLDC GLUCOMTR-MCNC: 183 MG/DL (ref 70–99)
GLUCOSE BLDC GLUCOMTR-MCNC: 194 MG/DL (ref 70–99)
GLUCOSE BLDC GLUCOMTR-MCNC: 199 MG/DL (ref 70–99)
GLUCOSE BLDC GLUCOMTR-MCNC: 215 MG/DL (ref 70–99)
HGB BLD-MCNC: 11.1 G/DL (ref 11.7–15.7)

## 2020-11-09 PROCEDURE — 250N000013 HC RX MED GY IP 250 OP 250 PS 637: Performed by: ORTHOPAEDIC SURGERY

## 2020-11-09 PROCEDURE — 99232 SBSQ HOSP IP/OBS MODERATE 35: CPT | Performed by: INTERNAL MEDICINE

## 2020-11-09 PROCEDURE — 85018 HEMOGLOBIN: CPT | Performed by: ORTHOPAEDIC SURGERY

## 2020-11-09 PROCEDURE — 97530 THERAPEUTIC ACTIVITIES: CPT | Mod: GP

## 2020-11-09 PROCEDURE — 258N000003 HC RX IP 258 OP 636: Performed by: INTERNAL MEDICINE

## 2020-11-09 PROCEDURE — 36415 COLL VENOUS BLD VENIPUNCTURE: CPT | Performed by: ORTHOPAEDIC SURGERY

## 2020-11-09 PROCEDURE — 250N000013 HC RX MED GY IP 250 OP 250 PS 637: Performed by: INTERNAL MEDICINE

## 2020-11-09 PROCEDURE — 120N000001 HC R&B MED SURG/OB

## 2020-11-09 PROCEDURE — 999N001017 HC STATISTIC GLUCOSE BY METER IP

## 2020-11-09 RX ORDER — ONDANSETRON 4 MG/1
4 TABLET, ORALLY DISINTEGRATING ORAL EVERY 6 HOURS PRN
Qty: 10 TABLET | Refills: 0 | DISCHARGE
Start: 2020-11-09 | End: 2020-11-25

## 2020-11-09 RX ORDER — ACETAMINOPHEN 325 MG/1
650 TABLET ORAL EVERY 6 HOURS PRN
DISCHARGE
Start: 2020-11-09 | End: 2023-01-01

## 2020-11-09 RX ORDER — POLYETHYLENE GLYCOL 3350 17 G/17G
17 POWDER, FOR SOLUTION ORAL DAILY
DISCHARGE
Start: 2020-11-10 | End: 2023-01-01

## 2020-11-09 RX ORDER — AMOXICILLIN 250 MG
1 CAPSULE ORAL 2 TIMES DAILY PRN
DISCHARGE
Start: 2020-11-09 | End: 2020-11-25

## 2020-11-09 RX ORDER — ASPIRIN 325 MG
325 TABLET, DELAYED RELEASE (ENTERIC COATED) ORAL DAILY
Qty: 30 TABLET | Refills: 0 | DISCHARGE
Start: 2020-11-10 | End: 2023-01-01

## 2020-11-09 RX ADMIN — POLYETHYLENE GLYCOL 3350 17 G: 17 POWDER, FOR SOLUTION ORAL at 10:00

## 2020-11-09 RX ADMIN — METFORMIN HYDROCHLORIDE 500 MG: 500 TABLET, FILM COATED ORAL at 17:51

## 2020-11-09 RX ADMIN — LEVOTHYROXINE SODIUM 137 MCG: 137 TABLET ORAL at 05:59

## 2020-11-09 RX ADMIN — INSULIN ASPART 3 UNITS: 100 INJECTION, SOLUTION INTRAVENOUS; SUBCUTANEOUS at 12:31

## 2020-11-09 RX ADMIN — METOPROLOL TARTRATE 25 MG: 25 TABLET, FILM COATED ORAL at 09:59

## 2020-11-09 RX ADMIN — ACETAMINOPHEN 975 MG: 325 TABLET, FILM COATED ORAL at 00:24

## 2020-11-09 RX ADMIN — ACETAMINOPHEN 975 MG: 325 TABLET, FILM COATED ORAL at 09:58

## 2020-11-09 RX ADMIN — GLIPIZIDE 5 MG: 5 TABLET ORAL at 21:15

## 2020-11-09 RX ADMIN — SITAGLIPTIN 100 MG: 100 TABLET, FILM COATED ORAL at 09:58

## 2020-11-09 RX ADMIN — INSULIN ASPART 2 UNITS: 100 INJECTION, SOLUTION INTRAVENOUS; SUBCUTANEOUS at 16:34

## 2020-11-09 RX ADMIN — HYDROMORPHONE HYDROCHLORIDE 2 MG: 2 TABLET ORAL at 22:58

## 2020-11-09 RX ADMIN — METFORMIN HYDROCHLORIDE 500 MG: 500 TABLET, FILM COATED ORAL at 09:59

## 2020-11-09 RX ADMIN — INSULIN ASPART 4 UNITS: 100 INJECTION, SOLUTION INTRAVENOUS; SUBCUTANEOUS at 09:58

## 2020-11-09 RX ADMIN — ACETAMINOPHEN 975 MG: 325 TABLET, FILM COATED ORAL at 16:31

## 2020-11-09 RX ADMIN — PRAVASTATIN SODIUM 20 MG: 20 TABLET ORAL at 21:15

## 2020-11-09 RX ADMIN — LISINOPRIL 20 MG: 20 TABLET ORAL at 09:59

## 2020-11-09 RX ADMIN — DOCUSATE SODIUM 100 MG: 100 CAPSULE, LIQUID FILLED ORAL at 09:59

## 2020-11-09 RX ADMIN — DOCUSATE SODIUM 100 MG: 100 CAPSULE, LIQUID FILLED ORAL at 21:15

## 2020-11-09 RX ADMIN — Medication 1 MG: at 22:57

## 2020-11-09 RX ADMIN — GLIPIZIDE 2.5 MG: 5 TABLET ORAL at 05:59

## 2020-11-09 RX ADMIN — DOCUSATE SODIUM 50 MG AND SENNOSIDES 8.6 MG 1 TABLET: 8.6; 5 TABLET, FILM COATED ORAL at 21:15

## 2020-11-09 RX ADMIN — METOPROLOL TARTRATE 25 MG: 25 TABLET, FILM COATED ORAL at 21:14

## 2020-11-09 RX ADMIN — SODIUM CHLORIDE, POTASSIUM CHLORIDE, SODIUM LACTATE AND CALCIUM CHLORIDE: 600; 310; 30; 20 INJECTION, SOLUTION INTRAVENOUS at 06:07

## 2020-11-09 RX ADMIN — DOCUSATE SODIUM 50 MG AND SENNOSIDES 8.6 MG 1 TABLET: 8.6; 5 TABLET, FILM COATED ORAL at 09:58

## 2020-11-09 RX ADMIN — ASPIRIN 325 MG: 325 TABLET, COATED ORAL at 09:59

## 2020-11-09 ASSESSMENT — ACTIVITIES OF DAILY LIVING (ADL)
ADLS_ACUITY_SCORE: 19

## 2020-11-09 ASSESSMENT — MIFFLIN-ST. JEOR: SCORE: 1449.38

## 2020-11-09 NOTE — PROGRESS NOTES
Orthopedic Surgery  Gavi Pearson  2020  Admit Date:  2020  POD: 2 Days Post-Op   Procedure(s):  RIGHT HIP CEMENTED UNIPOLAR HEMIARTHROPLASTY    Patient resting comfortably in chair.    Pain controlled.  Continues to have right hip pain with WB  Tolerating oral intake.    Denies nausea or vomiting  On nasal O2    Vital Sign Ranges  Temperature Temp  Av  F (36.7  C)  Min: 97.5  F (36.4  C)  Max: 98.4  F (36.9  C)   Blood pressure Systolic (24hrs), Av , Min:90 , Max:136        Diastolic (24hrs), Av, Min:51, Max:70      Pulse Pulse  Av.6  Min: 84  Max: 115   Respirations Resp  Av  Min: 16  Max: 16   Pulse oximetry SpO2  Av.8 %  Min: 93 %  Max: 95 %       Dressing is clean, dry, and intact.   Minimal erythema of the surrounding skin.   Bilateral calves are soft, non-tender.  Right lower extremity is NVI.  Sensation intact bilateral lower extremities  Patient able to resist dorsi and plantar flexion bilaterally  +Dp pulse    Labs:  Recent Labs   Lab Test 20  0526 20  2228 20  192   POTASSIUM 3.8 3.6 3.4     Recent Labs   Lab Test 20  0634 20  0632 20  0526   HGB 11.1* 12.0 13.2     Recent Labs   Lab Test 20  2228   INR 1.07     Recent Labs   Lab Test 20  0526 20  1925 20  1044    186 208       1. PLAN:   Continue ASA x 5 weeks for DVT prophylaxis.     Mobilize with PT/OT    WBAT Right LE with walker.     Continue current pain regiment.   Dressings: Keep intact.  Change if >60% saturated    2. Disposition   Anticipate d/c to TCU when medically cleared and progressing in PT.    Elva Parham PA-C

## 2020-11-09 NOTE — PLAN OF CARE
Patient is A&O, VSS on 5L NC at night and 2L during the day, CMS intact, regular diet, turn and repo with assist of heavy 2, and denies pain. Dressing CDI, IVF infusing,  DTV- bladder scan showed 280ml at 5am, and Tele is Afib CVR. Will continue to monitor.        Voided 400 at 6am

## 2020-11-09 NOTE — PROVIDER NOTIFICATION
Notified elzbieta LAKE of patient's low urine output today of 150 ml's after catheter removed this am.  Discussed low blaldder scan volumes.  See new modified order to continue mainrenance fluid until tomorrow.

## 2020-11-09 NOTE — PLAN OF CARE
Alert and oriented x's 4.  Voided x's 1 since hernandez came out this am.  MD notified, continue IV fluids.  See flowsheet for bladder scan volumes.  Up with lift to bedside commode.  Up to chair today with physical therapy, tolerated well.  Diabetic diet, blood sugars checked.   Dilaudid po x's 1 given for pain, otherwise patient prefers to take scheduled Tylenol.   2-4 Liters of oxygen via nasal cannula.  Will continue to monitor.

## 2020-11-09 NOTE — PROGRESS NOTES
Care Transitions Team: Following for CC, discharge planning, and disposition.       Per [facility] Liaison Handoff:   Writer spoke with patient via phone, introduced myself and explained my role in her care.   Living situation:   Support:Nephew (Nicola)     Available transportation: SW to schedule    Home environment:    Stairs-had rails? 2 steps with railing    Equipment available : Cane and rolling walker.   Increase service or equipment needs: None noted.    Prior Function level:   Ambulation No assistive device in home.   ADL's Independent    Current home care service - None    Family/patient discharge goal: Return to baseline level of function.    Barriers to Discharge: Medically stable    Discharge Plan of care:  Short rehab stay TCU, PT and OT.    TCU - Medicare compare TCU list provided - CM discussed Medicare compare website and star ratings.   1.  [facility]  2.  [facility]  3. [facility]  4. [facility]      Orders needed for services: Post Op Plan of Care - TCU short rehab, PT, OT to eval and treat.    Weight bearing status and Hip precautions: WBAT with use of walker    Transportation:  to schedule transport, patient aware that transport is typically not covered.        Will follow in collaboration with [facility] -[phone] [facility] for discharge planning.

## 2020-11-09 NOTE — PLAN OF CARE
Date/Time  11/9 7-3:30    Service: Trauma  Behavior/Aggression tool color: Green  Diagnosis: R carolyn arthroplasty hip  POD#:2  Mental Status: A&O  Activity/dangle:up with SBA of 2 walker  Diet: CHO  Pain:  tylenol  Hernandez/Voiding: BSC  Tele/Restraints/Iso: Tele: HI blum cvr  02/LDA:   D/C Date: Tomorrow???

## 2020-11-09 NOTE — PROGRESS NOTES
[facility]    Medicine Progress Note - Hospitalist Service        Date of Admission:  11/6/2020  5:22 PM    Assessment & Plan:   Ms. Pearson is a very pleasant 89-year-old female with past medical history of hypertension, dyslipidemia, hypothyroidism, diabetes mellitus type 2, paroxysmal atrial fibrillation on aspirin and polyneuropathy, who presented for evaluation of right hip pain, status post mechanical fall.      Right femoral neck fracture S/P Rt hip arthoplasty   Mechanical fall.   -Presented with a mechanical fall, no syncope or presyncope.    -x-ray of pelvis showed acute right femoral neck fracture with impaction and caudal angulation.   -underwent rt hip arthroplasty on 11/7  -routine post-op care per Orthopedic surgery   -PT/OT  -Incentive spirometry, mobilize as able.  -Eventually will need TCU    Essential hypertension  A. fib with RVR  -Continue prior to admission metoprolol and lisinopril  -Hold hydrochlorothiazide  -Initially was tachycardic but heart rate better controlled for the most part.  -Echo done on 11/8 with normal EF of 65-70%  -Not on anticoagulation prior to admission.  Apparently did not like the idea of frequent blood checks with Coumadin.  I had a long discussion with her and advised that NOACs are available now which would not require frequent blood checks to monitor levels.  She however would like to discuss with her PCP, does not want to get started on anticoagulation just yet  -[id number] score is 5, will benefit from long-term anticoagulation, discussed above, patient does not want to start anticoagulation right away.  Advised her that she should talk with her PCP during outpatient follow-up visit.      Hypothyroidism.  -Continue levothyroxine 137 mcg p.o. daily.     History of diabetes mellitus type 2:    -PTA on glipizide 2.5 mg p.o. every morning and 10 mg in the evening, Metformin 500 mg p.o. twice daily and Januvia 100 mg p.o. daily for now.    -Hemoglobin  "A1c is 7.2  -Continue sliding scale  -Continue prior to admission metformin and glipizide 2.5 mg p.o. daily in a.m. and 5 mg p.o. in p.m.    Leukocytosis  -Likely a stress response, improving.  -No clinical evidence of infection.    Asymptomatic Covid screen-negative    Diet: Moderate Consistent CHO Diet  Advance Diet as Tolerated     DVT Prophylaxis: Pneumatic Compression Devices   Hernandez Catheter: not present  Code Status: Full Code     Disposition Plan    Expected discharge: 1-2 days, likely to TCU  Entered: Cristi Liz MD 11/09/2020, 12:14 PM        The patient's care was discussed with the Bedside Nurse and Patient.    Cristi Liz MD  Hospitalist Service  [facility]    ______________________________________________________________________    Interval History   Pain adequately controlled for the most part.  Heart rate controlled for most part however gets tachycardic with activity.  Denies chest pain or dyspnea.    Data reviewed today: I reviewed all medications, new labs and imaging results over the last 24 hours. I personally reviewed the EKG tracing showing A. fib with controlled ventricular rate..    Physical Exam   Vital signs:  Temp: 97.4  F (36.3  C) Temp src: Oral BP: 136/63 Pulse: 94   Resp: 16 SpO2: 96 % O2 Device: None (Room air) Oxygen Delivery: 5 LPM Height: 175.3 cm (5' 9\") Weight: 96 kg (211 lb 10.3 oz)  Estimated body mass index is 31.25 kg/m  as calculated from the following:    Height as of this encounter: 1.753 m (5' 9\").    Weight as of this encounter: 96 kg (211 lb 10.3 oz).      Wt Readings from Last 2 Encounters:   11/09/20 96 kg (211 lb 10.3 oz)   03/11/20 85.7 kg (189 lb)       Gen: AAOX3, NAD  Resp: CTA B/L, normal WOB  CVS: Irregular, rate controlled.  Abd/GI: Soft, non-tender. BS- normoactive.    Skin: Warm, dry no rashes  MSK: Right hip incision-dry  Neuro- CN- intact. No focal deficits.        Data   Recent Labs   Lab 11/09/20  0634 11/08/20  0632 " 11/07/20  0526 11/06/20 2228 11/06/20  1925   WBC  --   --  11.7*  --  15.6*   HGB 11.1* 12.0 13.2  --  13.4   MCV  --   --  91  --  91   PLT  --   --  190  --  186   INR  --   --   --  1.07  --    NA  --   --  134  --  134   POTASSIUM  --   --  3.8 3.6 3.4   CHLORIDE  --   --  100  --  103   CO2  --   --  28  --  24   BUN  --   --  8  --  10   CR  --   --  0.57  --  0.66   ANIONGAP  --   --  6  --  7   CHAVA  --   --  8.2*  --  8.0*   GLC  --  257* 276*  --  226*       No results found for this or any previous visit (from the past 24 hour(s)).  Medications     lactated ringers 100 mL/hr at 11/09/20 0607       acetaminophen  975 mg Oral Q8H     aspirin  325 mg Oral Daily     docusate sodium  100 mg Oral BID     glipiZIDE  2.5 mg Oral QAM AC     glipiZIDE  5 mg Oral QPM     insulin aspart  1-10 Units Subcutaneous TID AC     insulin aspart  1-7 Units Subcutaneous At Bedtime     levothyroxine  137 mcg Oral Once per day on Sun Mon Wed Thu Fri     levothyroxine  200 mcg Oral Once per day on Tue Sat     lisinopril  20 mg Oral Daily     metFORMIN  500 mg Oral BID w/meals     metoprolol tartrate  25 mg Oral BID     polyethylene glycol  17 g Oral Daily     pravastatin  20 mg Oral At Bedtime     senna-docusate  1 tablet Oral BID     sitagliptin  100 mg Oral Daily     sodium chloride (PF)  3 mL Intracatheter Q8H

## 2020-11-10 ENCOUNTER — APPOINTMENT (OUTPATIENT)
Dept: PHYSICAL THERAPY | Facility: CLINIC | Age: 85
DRG: 522 | End: 2020-11-10
Payer: MEDICARE

## 2020-11-10 LAB
GLUCOSE BLDC GLUCOMTR-MCNC: 137 MG/DL (ref 70–99)
GLUCOSE BLDC GLUCOMTR-MCNC: 154 MG/DL (ref 70–99)
GLUCOSE BLDC GLUCOMTR-MCNC: 155 MG/DL (ref 70–99)
GLUCOSE BLDC GLUCOMTR-MCNC: 176 MG/DL (ref 70–99)
GLUCOSE BLDC GLUCOMTR-MCNC: 182 MG/DL (ref 70–99)

## 2020-11-10 PROCEDURE — 97110 THERAPEUTIC EXERCISES: CPT | Mod: GP

## 2020-11-10 PROCEDURE — 120N000001 HC R&B MED SURG/OB

## 2020-11-10 PROCEDURE — 99232 SBSQ HOSP IP/OBS MODERATE 35: CPT | Performed by: INTERNAL MEDICINE

## 2020-11-10 PROCEDURE — 250N000013 HC RX MED GY IP 250 OP 250 PS 637: Performed by: ORTHOPAEDIC SURGERY

## 2020-11-10 PROCEDURE — 250N000013 HC RX MED GY IP 250 OP 250 PS 637: Performed by: INTERNAL MEDICINE

## 2020-11-10 PROCEDURE — 999N001017 HC STATISTIC GLUCOSE BY METER IP

## 2020-11-10 PROCEDURE — 97530 THERAPEUTIC ACTIVITIES: CPT | Mod: GP

## 2020-11-10 RX ORDER — METOPROLOL TARTRATE 50 MG
50 TABLET ORAL 2 TIMES DAILY
Status: DISCONTINUED | OUTPATIENT
Start: 2020-11-10 | End: 2020-11-11 | Stop reason: HOSPADM

## 2020-11-10 RX ORDER — LISINOPRIL 10 MG/1
10 TABLET ORAL DAILY
Status: DISCONTINUED | OUTPATIENT
Start: 2020-11-11 | End: 2020-11-11

## 2020-11-10 RX ADMIN — INSULIN ASPART 1 UNITS: 100 INJECTION, SOLUTION INTRAVENOUS; SUBCUTANEOUS at 16:44

## 2020-11-10 RX ADMIN — LISINOPRIL 20 MG: 20 TABLET ORAL at 08:31

## 2020-11-10 RX ADMIN — METOPROLOL TARTRATE 50 MG: 50 TABLET, FILM COATED ORAL at 20:45

## 2020-11-10 RX ADMIN — ACETAMINOPHEN 975 MG: 325 TABLET, FILM COATED ORAL at 08:30

## 2020-11-10 RX ADMIN — INSULIN ASPART 2 UNITS: 100 INJECTION, SOLUTION INTRAVENOUS; SUBCUTANEOUS at 12:06

## 2020-11-10 RX ADMIN — DOCUSATE SODIUM 100 MG: 100 CAPSULE, LIQUID FILLED ORAL at 20:45

## 2020-11-10 RX ADMIN — ASPIRIN 325 MG: 325 TABLET, COATED ORAL at 08:30

## 2020-11-10 RX ADMIN — PRAVASTATIN SODIUM 20 MG: 20 TABLET ORAL at 22:13

## 2020-11-10 RX ADMIN — INSULIN ASPART 1 UNITS: 100 INJECTION, SOLUTION INTRAVENOUS; SUBCUTANEOUS at 08:32

## 2020-11-10 RX ADMIN — DOCUSATE SODIUM 50 MG AND SENNOSIDES 8.6 MG 1 TABLET: 8.6; 5 TABLET, FILM COATED ORAL at 08:30

## 2020-11-10 RX ADMIN — LEVOTHYROXINE SODIUM 200 MCG: 100 TABLET ORAL at 06:56

## 2020-11-10 RX ADMIN — SITAGLIPTIN 100 MG: 100 TABLET, FILM COATED ORAL at 08:31

## 2020-11-10 RX ADMIN — GLIPIZIDE 2.5 MG: 5 TABLET ORAL at 06:56

## 2020-11-10 RX ADMIN — GLIPIZIDE 5 MG: 5 TABLET ORAL at 20:45

## 2020-11-10 RX ADMIN — ACETAMINOPHEN 975 MG: 325 TABLET, FILM COATED ORAL at 02:40

## 2020-11-10 RX ADMIN — METFORMIN HYDROCHLORIDE 500 MG: 500 TABLET, FILM COATED ORAL at 18:32

## 2020-11-10 RX ADMIN — METOPROLOL TARTRATE 25 MG: 25 TABLET, FILM COATED ORAL at 08:30

## 2020-11-10 RX ADMIN — DOCUSATE SODIUM 50 MG AND SENNOSIDES 8.6 MG 1 TABLET: 8.6; 5 TABLET, FILM COATED ORAL at 20:44

## 2020-11-10 RX ADMIN — DOCUSATE SODIUM 100 MG: 100 CAPSULE, LIQUID FILLED ORAL at 08:30

## 2020-11-10 RX ADMIN — METFORMIN HYDROCHLORIDE 500 MG: 500 TABLET, FILM COATED ORAL at 08:31

## 2020-11-10 ASSESSMENT — ACTIVITIES OF DAILY LIVING (ADL)
ADLS_ACUITY_SCORE: 20
ADLS_ACUITY_SCORE: 19
ADLS_ACUITY_SCORE: 20
ADLS_ACUITY_SCORE: 19
ADLS_ACUITY_SCORE: 19
ADLS_ACUITY_SCORE: 20

## 2020-11-10 ASSESSMENT — MIFFLIN-ST. JEOR: SCORE: 1365.38

## 2020-11-10 NOTE — PLAN OF CARE
Service: Trauma  Behavior/Aggression tool color: Green  Diagnosis: R carolyn arthroplasty hip  POD#:2  Mental Status: A&O  Activity/dangle:up with SBA of 2 walker  Diet: CHO  Pain:  tylenol  Hernandez/Voiding: BSC  Tele/Restraints/Iso: Tele: HI blum cvr  02/LDA:   D/C Date: Tomorrow???  Other Info: Blood sugars

## 2020-11-10 NOTE — PLAN OF CARE
A&O x4. Up with assist of two GB and walker. VSS. Lungs clear. Tolerating diet. Voiding without difficulty. BM per shift. Plan is to discharge to TCU when bed verified.     17:47 EDIT: Sliding scale used for slightly elevated BG. PT/OT. Tele A fib CVR now.

## 2020-11-10 NOTE — PROGRESS NOTES
Orthopedic Surgery  Gavi Pearson  11/10/2020  Admit Date:  2020  POD 3 Days Post-Op  S/P Procedure(s):  RIGHT HIP CEMENTED UNIPOLAR HEMIARTHROPLASTY    Patient resting comfortably in bed.    Pain controlled.  Tolerating oral intake.    Denies nausea or vomiting  Denies chest pain or shortness of breath  No events overnight.     Alert and orient to person, place, and time.  Vital Sign Ranges  Temperature Temp  Av.8  F (36.6  C)  Min: 97.4  F (36.3  C)  Max: 98  F (36.7  C)   Blood pressure Systolic (24hrs), Av , Min:136 , Max:158        Diastolic (24hrs), Av, Min:63, Max:78      Pulse Pulse  Av.8  Min: 66  Max: 94   Respirations Resp  Av.7  Min: 16  Max: 18   Pulse oximetry SpO2  Av.6 %  Min: 92 %  Max: 96 %       Dressing is clean, dry, and intact.   Minimal erythema of the surrounding skin.   Bilateral calves are soft, non-tender.  Bilateral lower extremity is NVI.  Sensation intact bilateral lower extremities  5/5 motor with resisted dorsi and plantar flexion bilaterally  +Dp pulse    Labs:  Recent Labs   Lab Test 20  0526 208 20   POTASSIUM 3.8 3.6 3.4     Recent Labs   Lab Test 20  0634 20  0632 20  0526   HGB 11.1* 12.0 13.2     Recent Labs   Lab Test 20   INR 1.07     Recent Labs   Lab Test 20  0526 20  1925 20  1044    186 208       A/P  1. S/p right hip hemiarthroplasty s/t femoral neck fracture from fall from standing   Continue ASA for DVT prophylaxis.     Mobilize with PT/OT    WBAT with walker.   Posterior hip precautions   Leave dressing in place     Continue current pain regiment.    2. Disposition   Anticipate d/c to TCU based on placement and medical clearance.    Alea Zelaya PA-C

## 2020-11-10 NOTE — PROGRESS NOTES
[facility]    Medicine Progress Note - Hospitalist Service        Date of Admission:  11/6/2020  5:22 PM    Assessment & Plan:   Ms. Pearson is a very pleasant 89-year-old female with past medical history of hypertension, dyslipidemia, hypothyroidism, diabetes mellitus type 2, paroxysmal atrial fibrillation on aspirin and polyneuropathy, who presented for evaluation of right hip pain, status post mechanical fall.      Right femoral neck fracture S/P Rt hip arthoplasty   Mechanical fall.   -Presented with a mechanical fall, no syncope or presyncope.    -x-ray of pelvis showed acute right femoral neck fracture with impaction and caudal angulation.   -underwent rt hip arthroplasty on 11/7  -routine post-op care per Orthopedic surgery   -PT/OT  -Incentive spirometry, mobilize as able.  -Eventually will need TCU    Essential hypertension  A. fib with RVR  -Prior to admission on metoprolol, hydrochlorothiazide and lisinopril  -Hold hydrochlorothiazide  -Echo done on 11/8 with normal EF of 65-70%  -Not on anticoagulation prior to admission.  Apparently did not like the idea of frequent blood checks with Coumadin.  I had a long discussion with her and advised that NOACs are available now which would not require frequent blood checks to monitor levels.  She however would like to discuss with her PCP, does not want to get started on anticoagulation just yet  -[id number] score is 5, will benefit from long-term anticoagulation, discussed above, patient does not want to start anticoagulation right away.  Advised her that she should talk with her PCP during outpatient follow-up visit.  -Heart rate still suboptimally controlled intermittently, increase metoprolol to 50 mg twice a day  -Decrease lisinopril to 10 mg daily(to make room for metoprolol)      Hypothyroidism.  -Continue levothyroxine 137 mcg p.o. daily.     History of diabetes mellitus type 2:    -PTA on glipizide 2.5 mg p.o. every morning and 10 mg in  "the evening, Metformin 500 mg p.o. twice daily and Januvia 100 mg p.o. daily for now.    -Hemoglobin A1c is 7.2  -Continue sliding scale  -Continue prior to admission metformin, Januvia and glipizide 2.5 mg p.o. daily in a.m. and 5 mg p.o. in p.m.    Leukocytosis  -Likely a stress response, improving.  -No clinical evidence of infection.    Asymptomatic Covid screen-negative    Diet: Moderate Consistent CHO Diet  Advance Diet as Tolerated     DVT Prophylaxis: Pneumatic Compression Devices   Hernandez Catheter: not present  Code Status: Full Code     Disposition Plan    Expected discharge: 1-2 days, to TCU  Entered: Cristi Liz MD 11/10/2020, 12:02 PM        The patient's care was discussed with the Bedside Nurse and Patient.    Cristi Liz MD  Hospitalist Service  [facility]    ______________________________________________________________________    Interval History   Pain adequately controlled.  Off oxygen.  Denies dyspnea.  Intermittently still tachycardic especially with activity.    Data reviewed today: I reviewed all medications, new labs and imaging results over the last 24 hours. I personally reviewed the EKG tracing showing A. fib with controlled ventricular rate..    Physical Exam   Vital signs:  Temp: 98  F (36.7  C) Temp src: Oral BP: 118/67 Pulse: 73   Resp: 16 SpO2: 92 % O2 Device: None (Room air) Oxygen Delivery: 5 LPM Height: 175.3 cm (5' 9\") Weight: 87.6 kg (193 lb 2 oz)  Estimated body mass index is 28.52 kg/m  as calculated from the following:    Height as of this encounter: 1.753 m (5' 9\").    Weight as of this encounter: 87.6 kg (193 lb 2 oz).      Wt Readings from Last 2 Encounters:   11/10/20 87.6 kg (193 lb 2 oz)   03/11/20 85.7 kg (189 lb)       Gen: AAOX3, NAD  Resp: CTA B/L, normal WOB  CVS: Irregular, tachycardic  Abd/GI: Soft, non-tender. BS- normoactive.    Skin: Warm, dry no rashes  MSK: Right hip incision-dry  Neuro- CN- intact. No focal deficits.        Data "   Recent Labs   Lab 11/09/20  0634 11/08/20  0632 11/07/20  0526 11/06/20 2228 11/06/20  1925   WBC  --   --  11.7*  --  15.6*   HGB 11.1* 12.0 13.2  --  13.4   MCV  --   --  91  --  91   PLT  --   --  190  --  186   INR  --   --   --  1.07  --    NA  --   --  134  --  134   POTASSIUM  --   --  3.8 3.6 3.4   CHLORIDE  --   --  100  --  103   CO2  --   --  28  --  24   BUN  --   --  8  --  10   CR  --   --  0.57  --  0.66   ANIONGAP  --   --  6  --  7   CHAVA  --   --  8.2*  --  8.0*   GLC  --  257* 276*  --  226*       No results found for this or any previous visit (from the past 24 hour(s)).  Medications       acetaminophen  975 mg Oral Q8H     aspirin  325 mg Oral Daily     docusate sodium  100 mg Oral BID     glipiZIDE  2.5 mg Oral QAM AC     glipiZIDE  5 mg Oral QPM     insulin aspart  1-10 Units Subcutaneous TID AC     insulin aspart  1-7 Units Subcutaneous At Bedtime     levothyroxine  137 mcg Oral Once per day on Sun Mon Wed Thu Fri     levothyroxine  200 mcg Oral Once per day on Tue Sat     [START ON 11/11/2020] lisinopril  10 mg Oral Daily     metFORMIN  500 mg Oral BID w/meals     metoprolol tartrate  50 mg Oral BID     polyethylene glycol  17 g Oral Daily     pravastatin  20 mg Oral At Bedtime     senna-docusate  1 tablet Oral BID     sitagliptin  100 mg Oral Daily     sodium chloride (PF)  3 mL Intracatheter Q8H

## 2020-11-10 NOTE — PLAN OF CARE
A&Ox4. Assist X2 with gait belt and walker to [facility]-gets quite anxious when trying to stand or move. VSS on RA. CMS intact. Dressing C/D/I. Pain controlled with PO Dilaudid and Tylenol. Tolerating Consistent carb Diet. Progressing per POC. Will Cont to monitor.

## 2020-11-10 NOTE — CONSULTS
Care Management Initial Consult    General Information  Assessment completed with: Patient;Family, pt (Gavi) and niece (Selena, via speakerphone [phone])   Type of CM/SW Visit: Initial Assessment  Primary Care Provider verified and updated as needed: Yes(Axel Goncalvesy CAMPBELL  phone [phone])   Readmission within the last 30 days:        Reason for Consult: discharge planning;facility placement  Advance Care Planning:  No ACP documents on file        Communication Assessment  Patient's communication style: spoken language (English or Bilingual)    Hearing Difficulty or Deaf: no   Wear Glasses or Blind: yes    Cognitive  Cognitive/Neuro/Behavioral: WDL  Level of Consciousness: alert  Arousal Level: opens eyes spontaneously  Orientation: oriented x 4  Mood/Behavior: anxious  Best Language: 0 - No aphasia  Speech: clear    Living Environment:   People in home: alone     Current living Arrangements: house      Able to return to prior arrangements:         Family/Social Support:  Care provided by:    Provides care for:    Marital Status: Single  Other (specify)(niece)          Description of Support System: Supportive;Involved         Current Resources:   Skilled Home Care Services:    Community Resources: None  Equipment currently used at home: cane, straight;walker, rolling  Supplies currently used at home:      Employment/Financial:  Employment Status:  n/a  Finance Comments: active MEDICARE/MEDICARE and [place]BS[place]  insurance    Lifestyle & Psychosocial Needs:  Socioeconomic History     Marital status: Single     Spouse name: Not on file     Number of children: Not on file     Years of education: Not on file     Highest education level: Not on file     Tobacco Use     Smoking status: Never Smoker     Smokeless tobacco: Never Used   Substance and Sexual Activity     Alcohol use: Yes     Alcohol/week: 0.0 standard drinks     Comment: occ     Drug use: No     Sexual activity: Not Currently     Functional  "Status:  Prior to admission patient needed assistance: none    Mental Health Status:    WDL      Chemical Dependency Status:    none            Values/Beliefs:  Spiritual, Cultural Beliefs, Nondenominational Practices, Values that affect care:            Values/Beliefs Comment: Prem    Additional Information:  Met with patient in room, introduced self and role in discharge planning.  Reviewed recommendation.  Pt would like referral to TCU sent to [facility] (done).  States second choice is [place] [facility] and \"I really don't want to put a 3rd choice down\" and asks about [facility].  CM-RN sent [facility] referral, then per chart review found pt is [facility] Care Plan patient with a note from [facility] D[facility] 2020  4:58 PM listing the followin.  [facility] (referral sent)  2.  [facility]  3. [facility]  4. [facility]     Per Ju's note, SW to schedule transportation at discharge.    Temi Conti RN, BSN, PHN  [facility]  Inpatient Care Management  Direct Mobile: [phone]        "

## 2020-11-11 ENCOUNTER — APPOINTMENT (OUTPATIENT)
Dept: PHYSICAL THERAPY | Facility: CLINIC | Age: 85
DRG: 522 | End: 2020-11-11
Payer: MEDICARE

## 2020-11-11 VITALS
HEIGHT: 69 IN | WEIGHT: 207.23 LBS | BODY MASS INDEX: 30.69 KG/M2 | DIASTOLIC BLOOD PRESSURE: 106 MMHG | HEART RATE: 97 BPM | OXYGEN SATURATION: 92 % | SYSTOLIC BLOOD PRESSURE: 171 MMHG | RESPIRATION RATE: 16 BRPM | TEMPERATURE: 98.2 F

## 2020-11-11 LAB
ANION GAP SERPL CALCULATED.3IONS-SCNC: 5 MMOL/L (ref 3–14)
BUN SERPL-MCNC: 12 MG/DL (ref 7–30)
CALCIUM SERPL-MCNC: 8.7 MG/DL (ref 8.5–10.1)
CHLORIDE SERPL-SCNC: 97 MMOL/L (ref 94–109)
CO2 SERPL-SCNC: 29 MMOL/L (ref 20–32)
CREAT SERPL-MCNC: 0.59 MG/DL (ref 0.52–1.04)
GFR SERPL CREATININE-BSD FRML MDRD: 81 ML/MIN/{1.73_M2}
GLUCOSE BLDC GLUCOMTR-MCNC: 125 MG/DL (ref 70–99)
GLUCOSE BLDC GLUCOMTR-MCNC: 131 MG/DL (ref 70–99)
GLUCOSE BLDC GLUCOMTR-MCNC: 179 MG/DL (ref 70–99)
GLUCOSE SERPL-MCNC: 198 MG/DL (ref 70–99)
POTASSIUM SERPL-SCNC: 3.4 MMOL/L (ref 3.4–5.3)
SODIUM SERPL-SCNC: 131 MMOL/L (ref 133–144)

## 2020-11-11 PROCEDURE — 97530 THERAPEUTIC ACTIVITIES: CPT | Mod: GP | Performed by: PHYSICAL THERAPIST

## 2020-11-11 PROCEDURE — 250N000013 HC RX MED GY IP 250 OP 250 PS 637: Performed by: INTERNAL MEDICINE

## 2020-11-11 PROCEDURE — 36415 COLL VENOUS BLD VENIPUNCTURE: CPT | Performed by: INTERNAL MEDICINE

## 2020-11-11 PROCEDURE — 250N000013 HC RX MED GY IP 250 OP 250 PS 637: Performed by: ORTHOPAEDIC SURGERY

## 2020-11-11 PROCEDURE — 99238 HOSP IP/OBS DSCHRG MGMT 30/<: CPT | Performed by: INTERNAL MEDICINE

## 2020-11-11 PROCEDURE — 80048 BASIC METABOLIC PNL TOTAL CA: CPT | Performed by: INTERNAL MEDICINE

## 2020-11-11 PROCEDURE — 999N001017 HC STATISTIC GLUCOSE BY METER IP

## 2020-11-11 RX ORDER — LISINOPRIL 20 MG/1
20 TABLET ORAL 2 TIMES DAILY
Status: DISCONTINUED | OUTPATIENT
Start: 2020-11-11 | End: 2020-11-11 | Stop reason: HOSPADM

## 2020-11-11 RX ORDER — HYDROMORPHONE HYDROCHLORIDE 2 MG/1
1-2 TABLET ORAL EVERY 4 HOURS PRN
Qty: 30 TABLET | Refills: 0 | Status: SHIPPED | OUTPATIENT
Start: 2020-11-11 | End: 2020-11-13

## 2020-11-11 RX ORDER — HYDROCHLOROTHIAZIDE 12.5 MG/1
12.5 CAPSULE ORAL DAILY
Status: DISCONTINUED | OUTPATIENT
Start: 2020-11-11 | End: 2020-11-11 | Stop reason: HOSPADM

## 2020-11-11 RX ORDER — GLIPIZIDE 10 MG/1
10 TABLET ORAL
Status: DISCONTINUED | OUTPATIENT
Start: 2020-11-11 | End: 2020-11-11 | Stop reason: HOSPADM

## 2020-11-11 RX ORDER — LISINOPRIL 20 MG/1
20 TABLET ORAL DAILY
Status: DISCONTINUED | OUTPATIENT
Start: 2020-11-12 | End: 2020-11-11

## 2020-11-11 RX ORDER — LISINOPRIL 10 MG/1
10 TABLET ORAL ONCE
Status: COMPLETED | OUTPATIENT
Start: 2020-11-11 | End: 2020-11-11

## 2020-11-11 RX ADMIN — LEVOTHYROXINE SODIUM 137 MCG: 137 TABLET ORAL at 11:44

## 2020-11-11 RX ADMIN — HYDROCHLOROTHIAZIDE 12.5 MG: 12.5 CAPSULE ORAL at 11:44

## 2020-11-11 RX ADMIN — ASPIRIN 325 MG: 325 TABLET, COATED ORAL at 09:13

## 2020-11-11 RX ADMIN — LISINOPRIL 10 MG: 10 TABLET ORAL at 11:44

## 2020-11-11 RX ADMIN — METOPROLOL TARTRATE 50 MG: 50 TABLET, FILM COATED ORAL at 09:13

## 2020-11-11 RX ADMIN — POLYETHYLENE GLYCOL 3350 17 G: 17 POWDER, FOR SOLUTION ORAL at 09:13

## 2020-11-11 RX ADMIN — METFORMIN HYDROCHLORIDE 500 MG: 500 TABLET, FILM COATED ORAL at 09:13

## 2020-11-11 RX ADMIN — DOCUSATE SODIUM 50 MG AND SENNOSIDES 8.6 MG 1 TABLET: 8.6; 5 TABLET, FILM COATED ORAL at 09:13

## 2020-11-11 RX ADMIN — LISINOPRIL 10 MG: 10 TABLET ORAL at 09:13

## 2020-11-11 RX ADMIN — SITAGLIPTIN 100 MG: 100 TABLET, FILM COATED ORAL at 09:13

## 2020-11-11 RX ADMIN — HYDROMORPHONE HYDROCHLORIDE 2 MG: 2 TABLET ORAL at 09:13

## 2020-11-11 RX ADMIN — INSULIN ASPART 2 UNITS: 100 INJECTION, SOLUTION INTRAVENOUS; SUBCUTANEOUS at 12:42

## 2020-11-11 RX ADMIN — DOCUSATE SODIUM 100 MG: 100 CAPSULE, LIQUID FILLED ORAL at 09:13

## 2020-11-11 RX ADMIN — GLIPIZIDE 2.5 MG: 5 TABLET ORAL at 07:03

## 2020-11-11 ASSESSMENT — MIFFLIN-ST. JEOR: SCORE: 1429.38

## 2020-11-11 ASSESSMENT — ACTIVITIES OF DAILY LIVING (ADL)
ADLS_ACUITY_SCORE: 18

## 2020-11-11 NOTE — PLAN OF CARE
Physical Therapy Discharge Summary    Reason for therapy discharge:    Discharged to transitional care facility.    Progress towards therapy goal(s). See goals on Care Plan in [facility] electronic health record for goal details.  Goals not met.  Barriers to achieving goals:   discharge from facility.    Therapy recommendation(s):    Continued therapy is recommended.  Rationale/Recommendations:  To progress independence and safety with functional mobility.

## 2020-11-11 NOTE — PLAN OF CARE
Pt A&0. VSS on RA. CMS intact. Dressing CDI. Up x 2 BSC with walker/GB. On mod cho diet. Daysi pain. Placement pending.

## 2020-11-11 NOTE — PROGRESS NOTES
Orthopedic Surgery  Gavi Pearson  2020  Admit Date:  2020  POD 4 Days Post-Op  S/P Procedure(s):  RIGHT HIP CEMENTED UNIPOLAR HEMIARTHROPLASTY    Patient resting comfortably in bed.    Pain controlled.  Tolerating oral intake.    Denies nausea or vomiting  Denies chest pain or shortness of breath  No events overnight.     Alert and orient to person, place, and time.  Vital Sign Ranges  Temperature Temp  Av.1  F (36.7  C)  Min: 97.8  F (36.6  C)  Max: 98.4  F (36.9  C)   Blood pressure Systolic (24hrs), Av , Min:118 , Max:171        Diastolic (24hrs), Av, Min:67, Max:106      Pulse Pulse  Av.2  Min: 73  Max: 97   Respirations Resp  Av  Min: 16  Max: 16   Pulse oximetry SpO2  Av.3 %  Min: 92 %  Max: 95 %       Dressing is clean, dry, and intact.   Minimal erythema of the surrounding skin.   Bilateral calves are soft, non-tender.  Bilateral lower extremity is NVI.  Sensation intact bilateral lower extremities  5/5 motor with resisted dorsi and plantar flexion bilaterally  +Dp pulse    Labs:  Recent Labs   Lab Test 20  0526 208 20   POTASSIUM 3.8 3.6 3.4     Recent Labs   Lab Test 20  0634 20  0632 20  0526   HGB 11.1* 12.0 13.2     Recent Labs   Lab Test 20   INR 1.07     Recent Labs   Lab Test 20  0526 205 20  1044    186 208       A/P  1. S/p right hip hemiarthroplasty   Continue ASA for DVT prophylaxis.     Mobilize with PT/OT    WBAT with walker.     Leave dressings in place   Continue current pain regiment.    2. Disposition   Anticipate d/c to TCU today     Alea Zelaya PA-C

## 2020-11-11 NOTE — PROGRESS NOTES
[facility]    Medicine Progress Note - Hospitalist Service       Date of Admission:  11/6/2020  Assessment & Plan       Ms. Pearson is a very pleasant 89-year-old female with past medical history of hypertension, dyslipidemia, hypothyroidism, diabetes mellitus type 2, paroxysmal atrial fibrillation on aspirin and polyneuropathy, who presented for evaluation of right hip pain, status post mechanical fall.  X-ray of pelvis showed acute right femoral neck fracture with impaction and caudal angulation.      Right femoral neck fracture S/P Rt hip arthoplasty   Mechanical fall  S/p R hip arthroplasty on 11/7    - routine post-op care per Orthopedic surgery   - PT/OT  - Incentive spirometry, mobilize as able.  - Eventually will need TCU     Essential hypertension  A. fib with RVR  Echo done on 11/8 with normal EF of 65-70%    - HR controlled and BP up on 11/11/20.    - Continues on PTA metoprolol and lisinopril (increase back to PTA dose of 20 mg BID on 11/11/20).   - Resume PTA hydrochlorothiazide on 11/11/20   - Check BMP on 11/11/20.    - Long-term anticoagulation indicated. But not on anticoagulation PTA, [id number] score 5. Apparently did not like the idea of frequent blood checks with Coumadin. We discussed NOACs are available now which would not require frequent blood checks to monitor levels.  She however would like to discuss with her PCP, does not want to get started on anticoagulation prior to discharge.       Hypothyroidism.    - Continue levothyroxine 137 mcg p.o. daily.      History of diabetes mellitus type 2:  Hemoglobin A1c is 7.2    - Continues on PTA glipizide. Continue 2.5 mg p.o. in AM, and increased to PTA dose of 10 mg in the evening on 11/11/20  - Continues on PTA Metformin 500 mg p.o. twice daily and Januvia 100 mg p.o. daily.   - sliding scale insulin    Recent Labs   Lab 11/11/20  0629 11/11/20  0139 11/10/20  2205 11/10/20  1640 11/10/20  1136 11/10/20  0802  11/08/20  0632 11/08/20  0632 11/07/20  0526 11/07/20  0526 11/06/20 1925 11/06/20 1925   GLC  --   --   --   --   --   --   --  257*  --  276*  --  226*   * 125* 137* 154* 182* 155*   < >  --    < >  --    < >  --     < > = values in this interval not displayed.          Leukocytosis Likely a stress response, improving. No clinical evidence of infection.  Recent Labs   Lab 11/07/20 0526 11/06/20 1925   WBC 11.7* 15.6*        Asymptomatic Covid screen-negative       Diet: Moderate Consistent CHO Diet  Advance Diet as Tolerated    DVT Prophylaxis:  mg per ortho team, see anticoagulation discussion above.   Hernandez Catheter: not present  Code Status: Full Code           Disposition Plan   Expected discharge: Today or tomorrow, once bed is found,Entered: Jessica Naranjo MD 11/11/2020, 7:32 AM       The patient's care was discussed with the Patient.    Jessica Naranjo MD  Hospitalist Service  [facility]  Contact information available via [facility] Paging/Directory    ______________________________________________________________________    Interval History   Events over last 24 hours as outlined above. Patient denies any SOB, CP, abdominal pain, N/V/D.   Patient feels ready to go to TCU. Social work is working on referrals.     Data reviewed today: I reviewed all medications, new labs and imaging results over the last 24 hours. I personally reviewed no images or EKG's today.    Physical Exam   Vital Signs: Temp: 97.8  F (36.6  C) Temp src: Oral BP: 138/78 Pulse: 78   Resp: 16 SpO2: 94 % O2 Device: None (Room air)    Weight: 207 lbs 3.72 oz  Constitutional:  NAD,   Neuropsyche:  alert and oriented, answers questions appropriately.   Respiratory:  Breathing comfortably, good air exchange, no wheezes, no crackles.   Cardiovascular:  Irregular rate and rhythm, trace - 1+ edema.  GI:  soft, NT/ND, BS normal  Skin/Integumen:  No acute rash, bruising or sign of bleeding.       Data    Recent Labs   Lab 11/09/20  0634 11/08/20  0632 11/07/20  0526 11/06/20 2228 11/06/20  1925   WBC  --   --  11.7*  --  15.6*   HGB 11.1* 12.0 13.2  --  13.4   MCV  --   --  91  --  91   PLT  --   --  190  --  186   INR  --   --   --  1.07  --    NA  --   --  134  --  134   POTASSIUM  --   --  3.8 3.6 3.4   CHLORIDE  --   --  100  --  103   CO2  --   --  28  --  24   BUN  --   --  8  --  10   CR  --   --  0.57  --  0.66   ANIONGAP  --   --  6  --  7   CHAVA  --   --  8.2*  --  8.0*   GLC  --  257* 276*  --  226*     No results found for this or any previous visit (from the past 24 hour(s)).  Medications       aspirin  325 mg Oral Daily     docusate sodium  100 mg Oral BID     glipiZIDE  2.5 mg Oral QAM AC     glipiZIDE  5 mg Oral QPM     insulin aspart  1-10 Units Subcutaneous TID AC     insulin aspart  1-7 Units Subcutaneous At Bedtime     levothyroxine  137 mcg Oral Once per day on Sun Mon Wed Thu Fri     levothyroxine  200 mcg Oral Once per day on Tue Sat     lisinopril  10 mg Oral Daily     metFORMIN  500 mg Oral BID w/meals     metoprolol tartrate  50 mg Oral BID     polyethylene glycol  17 g Oral Daily     pravastatin  20 mg Oral At Bedtime     senna-docusate  1 tablet Oral BID     sitagliptin  100 mg Oral Daily     sodium chloride (PF)  3 mL Intracatheter Q8H

## 2020-11-11 NOTE — PLAN OF CARE
Vitals stable. Taking oral dilaudid. Right hip dressing clean and dry. CMS intact. Voiding on commode.

## 2020-11-11 NOTE — PROGRESS NOTES
Care Management Discharge Note    Discharge Date: 11/11/20  Expected Time of Departure: via skyway wheelchair 1300    Discharge Disposition: Skilled Nursing Facilty;Transitional Care    Discharge Services:      Discharge DME:      Discharge Transportation: health plan transportation    Private pay costs discussed: Not applicable    PAS Confirmation Code:    Patient/family educated on Medicare website which has current facility and service quality ratings: yes    Education Provided on the Discharge Plan:    Persons Notified of Discharge Plans: yes  Patient/Family in Agreement with the Plan: yes    Handoff Referral Completed: Yes    Additional Information:  Call received from [place] that patient was accepted. Ride scheduled via [employer] today at 1300. Spoke to patient to inform and she is in agreement. Discussed quarantine time period and she understands. Requested writer contact her nephew to inform him. Provided address and phone number to facility to nephew Nicola. Orders faxed and PAS completed and faxed.    PAS-RR    D: Per DHS regulation, SW completed and submitted PAS-RR to [place] [employer] Direct Connect via the Senior LinkAge Line.  PAS-RR confirmation # is : [id number]    I: SW spoke with patient and they are aware a PAS-RR has been submitted.  SW reviewed with patient that they may be contacted for a follow up appointment within 10 days of hospital discharge if their SNF stay is < 30 days.  Contact information for Senior LinkAge Line was also provided.    A: patient verbalized understanding.    P: Further questions may be directed to [facility] LinkAge Line at #[phone], option #4 for PAS-RR staff.          Felicitas Cespedes, SABINA  Inpatient   [facility]

## 2020-11-11 NOTE — PROGRESS NOTES
Care Management Follow Up    Length of Stay (days): 5    Expected Discharge Date: 11/11/20     Concerns to be Addressed: discharge planning     Patient plan of care discussed at interdisciplinary rounds: Yes    Anticipated Discharge Disposition: Transitional Care     Anticipated Discharge Services:    Anticipated Discharge DME:      Patient/family educated on Medicare website which has current facility and service quality ratings: yes  Education Provided on the Discharge Plan:    Patient/Family in Agreement with the Plan:      Referrals Placed by CM/SW: Transportation  Private pay costs discussed: Not applicable    Additional Information:  Per consult note, additional referrals sent to Kiet Cerda and Lenin Carranza via [facility].     Will continue to follow    SABINA Lira  Inpatient   [facility]

## 2020-11-12 ENCOUNTER — NURSING HOME VISIT (OUTPATIENT)
Dept: GERIATRICS | Facility: CLINIC | Age: 85
End: 2020-11-12
Payer: MEDICARE

## 2020-11-12 VITALS
HEART RATE: 80 BPM | BODY MASS INDEX: 30.69 KG/M2 | RESPIRATION RATE: 18 BRPM | DIASTOLIC BLOOD PRESSURE: 76 MMHG | WEIGHT: 207.23 LBS | OXYGEN SATURATION: 96 % | HEIGHT: 69 IN | SYSTOLIC BLOOD PRESSURE: 136 MMHG | TEMPERATURE: 97.6 F

## 2020-11-12 DIAGNOSIS — S72.001D CLOSED FRACTURE OF NECK OF RIGHT FEMUR WITH ROUTINE HEALING: Primary | ICD-10-CM

## 2020-11-12 DIAGNOSIS — I48.0 PAROXYSMAL A-FIB (H): ICD-10-CM

## 2020-11-12 DIAGNOSIS — I10 BENIGN ESSENTIAL HYPERTENSION: ICD-10-CM

## 2020-11-12 DIAGNOSIS — R53.81 PHYSICAL DECONDITIONING: ICD-10-CM

## 2020-11-12 DIAGNOSIS — H81.10 BPPV (BENIGN PAROXYSMAL POSITIONAL VERTIGO), UNSPECIFIED LATERALITY: ICD-10-CM

## 2020-11-12 DIAGNOSIS — E03.4 HYPOTHYROIDISM DUE TO ACQUIRED ATROPHY OF THYROID: ICD-10-CM

## 2020-11-12 DIAGNOSIS — G63 POLYNEUROPATHY ASSOCIATED WITH UNDERLYING DISEASE (H): ICD-10-CM

## 2020-11-12 DIAGNOSIS — E11.9 TYPE 2 DIABETES MELLITUS WITHOUT COMPLICATION, WITHOUT LONG-TERM CURRENT USE OF INSULIN (H): ICD-10-CM

## 2020-11-12 PROBLEM — S72.001A CLOSED FRACTURE OF NECK OF RIGHT FEMUR, INITIAL ENCOUNTER (H): Status: RESOLVED | Noted: 2020-11-06 | Resolved: 2020-11-12

## 2020-11-12 PROCEDURE — 99310 SBSQ NF CARE HIGH MDM 45: CPT | Performed by: NURSE PRACTITIONER

## 2020-11-12 ASSESSMENT — MIFFLIN-ST. JEOR: SCORE: 1429.37

## 2020-11-12 NOTE — PROGRESS NOTES
[place] [facility]  INITIAL VISIT NOTE  November 13, 2020    PRIMARY CARE PROVIDER AND CLINIC:  Aleks Maher [facility] [address] / [place] [address]    CHIEF COMPLAINT:  Hospital follow-up/Initial visit    HPI:    Gavi Pearson is a 89 year old  (10/21/1931) female who was seen at [facility] on November 13, 2020 for an initial visit.     Medical history is notable for hypertension, dyslipidemia, DM type II, hypothyroidism, and paroxysmal atrial fibrillation.    Summary of hospital course:  Patient was hospitalized at [facility] from November 6 through November 11, 2020 after presenting for evaluation of right hip pain shortly after her leg gave out and she fell.  Work-up in the emergency department revealed unremarkable chemistry profile except for blood glucose of 268.  Hematology profile was significant for white count of 15.6 and normal hemoglobin of 13.4.  UA was unremarkable.  Test for COVID-19 was negative.  EKG showed atrial fibrillation with a heart rate of 100 bpm and lateral ST-T changes.  Lactic acid was elevated at 3.3.  Imaging studies revealed acute right femoral neck fracture with impaction and coxa vara angulation as well as possible transverse fracture through the proximal right fifth metatarsal bone.  Patient was seen by Ortho service and underwent right hip unipolar hemiarthroplasty on November 7, 2020.  EBL was 50 mL.  Notably echocardiogram on November 8 showed normal LV systolic function with EF of 65-70%, moderate-severe biatrial enlargement, 1-2+ tricuspid regurgitation, and borderline dilated ascending aorta.  Her [id number] score was calculated at 5 and chronic anticoagulant therapy was indicated.  However patient was not interested in warfarin and also wished to discuss DOACs with her primary care physician before getting started.  Notably, hemoglobin postop dropped to 11.1. TCU was recommended by therapies.    Patient is admitted to this  facility for medical management, nursing care, and rehab.     Today's visit:  Patient was seen in her room, while sitting in a chair comfortably.  She reports very mild right hip and knee pain today.  She denies fever, chills, chest pain, palpitation, dyspnea, nausea, vomiting, abdominal pain, or urinary symptoms.  She had a bowel movement earlier today.    CODE STATUS:   CPR/Full code     PAST MEDICAL HISTORY:   Hypertension  Dyslipidemia  DM type II  Hypothyroidism  Paroxysmal atrial fibrillation  BPPV  Neuropathy  Fall resulting in acute right femoral neck and transverse proximal right fifth metatarsal bone fracture    PAST SURGICAL HISTORY:     Past Surgical History:   Procedure Laterality Date     OPEN REDUCTION INTERNAL FIXATION HIP BIPOLAR Right 11/7/2020    Procedure: RIGHT HIP CEMENTED UNIPOLAR HEMIARTHROPLASTY;  Surgeon: Enrico Ledesma MD;  Location:  OR       FAMILY HISTORY:   Reviewed and non-contributory.    SOCIAL HISTORY:  Patient is single and lives in a house.  She occasionally uses either a cane or walker for ambulation.    Social History     Tobacco Use     Smoking status: Never Smoker     Smokeless tobacco: Never Used   Substance Use Topics     Alcohol use: Yes     Alcohol/week: 0.0 standard drinks     Comment: occ       MEDICATIONS:  Current Outpatient Medications   Medication Sig Dispense Refill     acetaminophen (TYLENOL) 325 MG tablet Take 2 tablets (650 mg) by mouth every 6 hours as needed for mild pain       aspirin (ASA) 325 MG EC tablet Take 1 tablet (325 mg) by mouth daily 30 tablet 0     blood glucose (NO BRAND SPECIFIED) lancets standard Use to test blood sugar 2 times daily or as directed. 200 each 3     blood glucose (NO BRAND SPECIFIED) test strip Use to test blood sugars 2 times daily or as directed 200 strip 3     glipiZIDE (GLUCOTROL) 5 MG tablet TAKE 1/2 TABLET BY MOUTH EVERY MORNING AND 2 TABLETS BY MOUTH EVERY EVENING 225 tablet 3     HYDROmorphone (DILAUDID) 2 MG  tablet Take 0.5-1 tablets (1-2 mg) by mouth every 4 hours as needed for moderate to severe pain (half tab for moderate pain, whole tab for severe pain) 30 tablet 0     levothyroxine (SYNTHROID/LEVOTHROID) 137 MCG tablet Take 1 tablet (137 mcg) by mouth daily (Patient taking differently: Take 137 mcg by mouth daily 205.5mg (1.5 x 137mcg) on Tuesdays and Saturdays. 137 mcg daily all other days of the week) 90 tablet 3     lisinopril (ZESTRIL) 20 MG tablet TAKE 1 TABLET(20 MG) BY MOUTH DAILY 90 tablet 3     lisinopril-hydrochlorothiazide (ZESTORETIC) 20-12.5 MG tablet TAKE 1 TABLET BY MOUTH EVERY MORNING 90 tablet 3     lovastatin (MEVACOR) 20 MG tablet Take 1 tablet (20 mg) by mouth daily 90 tablet 3     metFORMIN (GLUCOPHAGE) 500 MG tablet Take 1 tablet (500 mg) by mouth 2 times daily (with meals) 180 tablet 3     metoprolol tartrate (LOPRESSOR) 50 MG tablet TAKE 1 TABLET BY MOUTH TWICE DAILY. (Patient taking differently: Take 25 mg by mouth 2 times daily ) 180 tablet 3     ondansetron (ZOFRAN-ODT) 4 MG ODT tab Take 1 tablet (4 mg) by mouth every 6 hours as needed for nausea or vomiting 10 tablet 0     polyethylene glycol (MIRALAX) 17 g packet Take 17 g by mouth daily       senna-docusate (SENOKOT-S/PERICOLACE) 8.6-50 MG tablet Take 1 tablet by mouth 2 times daily as needed for constipation       sitagliptin (JANUVIA) 100 MG tablet Take 1 tablet (100 mg) by mouth daily 90 tablet 0     triamcinolone (KENALOG) 0.1 % cream Apply b.i.d. p.r.n. to dermatitis but not on the face 45 g 1       ALLERGIES:  No Known Allergies    ROS:  10 point ROS neg other than the symptoms noted above in the HPI.    PHYSICAL EXAM:  Vital signs were reviewed in the chart.  Blood pressure 136/82, heart rate 100, respiratory rate 16, temperature 96.9  F, oxygen saturation 99%  Gen: Comfortable and in no acute distress  HEENT: Normocephalic; oropharynx clear  Card: Normal S1, S2, RRR  Resp: Lungs clear to auscultation bilaterally  GI: Abdomen  soft, non-tender, non-distended, +BS  Ext: 1+ bilateral LE edema  Neuro: CX II-XII grossly intact; ROM in all four extremities grossly intact  Psych: Alert and oriented x3; normal affect  Skin: No acute rash    LABORATORY/IMAGING DATA:    Most Recent 3 CBC's:  Recent Labs   Lab Test 11/13/20  0000 11/09/20  0634 11/08/20  0632 11/07/20  0526 11/06/20  1925 03/11/20  1044   WBC  --   --   --  11.7* 15.6* 13.6*   HGB 11.4* 11.1* 12.0 13.2 13.4 14.6   MCV  --   --   --  91 91 91   PLT  --   --   --  190 186 208     Most Recent 3 BMP's:  Recent Labs   Lab Test 11/13/20  0000 11/11/20  1107 11/08/20  0632 11/07/20  0526   * 131*  --  134   POTASSIUM 3.4 3.4  --  3.8   CHLORIDE 97 97  --  100   CO2 27 29  --  28   BUN 12 12  --  8   CR 0.64 0.59  --  0.57   ANIONGAP 7 5  --  6   CHAVA 8.4* 8.7  --  8.2*   * 198* 257* 276*     Most Recent 2 LFT's:  Recent Labs   Lab Test 01/23/20  1517 07/16/18  1032   AST 32 26   ALT 33 32   ALKPHOS 88 82   BILITOTAL 0.5 0.7     Most Recent Cholesterol Panel:  Recent Labs   Lab Test 01/23/20  1517   CHOL 151   LDL 73   HDL 50   TRIG 141     Most Recent TSH and T4:  Recent Labs   Lab Test 01/23/20  1517 07/16/18  1032 07/16/18  1032   TSH 1.37   < > 0.35*   T4  --   --  1.57*    < > = values in this interval not displayed.     Most Recent Hemoglobin A1c:  Recent Labs   Lab Test 11/06/20  2228   A1C 7.2*       ASSESSMENT/PLAN:  Fall, subsequent encounter,  Closed right femoral neck fracture, status post unipolar right hip hemiarthroplasty on November 7, 2020,  Physical deconditioning.  Patient is hemodynamically stable.  Surgical pain is adequately controlled.  Plan:  Fall precautions  Pain management with as needed acetaminophen and hydromorphone  Continue aspirin 325 mg p.o. daily for DVT prophylaxis  WBAT with walker  Continue PT/OT evaluation and therapy  Follow-up with Ortho as scheduled    Acute blood loss anemia, secondary to surgery.  Hemoglobin was 13.4 upon admission to  the hospital which dropped to 11.1 on November 9 due to blood loss of surgery.  Today's Hgb is stable at 11.4  Plan:  Monitor hemoglobin periodically    Atrial fibrillation with RVR.  It seems that patient has a history of PAF.  EKG in the hospital revealed atrial fibrillation with RVR. Echocardiogram on November 8 showed normal LV systolic function with EF of 65-70%, moderate-severe biatrial enlargement, 1-2+ tricuspid regurgitation, and borderline dilated ascending aorta.  Her [id number] score is 5 and chronic anticoagulant therapy is indicated.  Patient however is not interested in warfarin and wishes to discuss DOACs with her primary care physician before getting started.  Rate is currently around 100.  Plan:  Continue aspirin 325 mg p.lo. daily  Continue metoprolol 50 mg p.o. twice daily  Monitor heart rate  Follow up with PCP after discharge from TCU to discuss anticoagulation options    Essential hypertension.  Blood pressure is currently controlled.  Plan:  Continue lisinopril 20 mg p.o. daily, metoprolol 50 mg p.o. twice daily, and lisinopril-HCTZ 20-12.5 mg, 1 tablet p.o. daily  Continue to monitor blood pressure     Dyslipidemia.  Plan:  Continue PTA lovastatin 20 mg p.o. daily    Hypothyroidism.  Last TSH was 1.37 in January 2020.  Plan:  Continue PTA levothyroxine 137 mcg p.o. daily  Repeat TSH in January 2021    DM type II.  Last hemoglobin A1c was 7.2% on November 6, 2020.  Blood glucose is currently in the range of 154-269.  Plan:  Continue PTA metformin 500 g p.o. twice daily, glipizide 2.5 mg p.o. daily in a.m., glipizide 10 mg p.o. nightly, and Januvia 100 mg p.o. daily  Continue diabetic diet  Continue to monitor blood glucose    Hyponatremia.  Mild.  Sodium was normal at 134 on admission to the hospital but gradually trended down to 131 on November 11.  Today's Na level is 131.  Plan  Monitor sodium level periodically while on HCTZ    Slow transit constipation.  Plan:  Continue the bowel  regimen    Recommendation by provider at facility:  None.      Electronically signed by:  Magali Hopson MD

## 2020-11-12 NOTE — PROGRESS NOTES
[place] [facility]  PRIMARY CARE PROVIDER AND CLINIC:  Aleks Maher MD, [address] / [place] [address]  Chief Complaint   Patient presents with     Hospital F/U     [place] Medical Record Number:  [id number]  Place of Service where encounter took place:  [facility] [[id number]]    Gavi Pearson  is a 89 year old  (10/21/1931), admitted to the above facility from  [facility]. Hospital stay 11/6/2020 through 11/11/2020..  Admitted to this facility for  rehab, medical management and nursing care.    HPI:    HPI information obtained from: facility chart records, facility staff, patient report and [facility] chart review.   Brief Summary of Hospital Course: recent hospitalization due to fall and right hip pain. PMH includes hypertension, hypothyroidism, DM2, PAF, peripheral neuropathy, and BPPV. Work up in ED revealed WBC 15.6, elevated BP- 178/100, . XRAY showed acute right femoral neck fracture with impaction and coxa vara angulation , mild posterior and superior displacement of distal fragments. Orthopedics was consulted. Patient underwent right hip arthoplasty on 11/7. Post operative issues included tachycardia-metoprolol was increased and lisinopril was reduced 10mg q day. Although meds upon transfer resumed PTA lisinopril. Once stable she was transferred to TCU for rehab.   Updates on Status Since Skilled nursing Admission: today she reports some pain when she puts weight on right leg. No shortness of breath. She is a bit hard of hearing.     CODE STATUS/ADVANCE DIRECTIVES DISCUSSION:   CPR/Full code   Patient's living condition: lives alone  ALLERGIES: Patient has no known allergies.  PAST MEDICAL HISTORY:  has no past medical history on file.  PAST SURGICAL HISTORY:   has a past surgical history that includes Open reduction internal fixation hip bipolar (Right, 11/7/2020).  FAMILY HISTORY: family history is not on file.  SOCIAL HISTORY:    reports that she has never smoked. She has never used smokeless tobacco. She reports current alcohol use. She reports that she does not use drugs.    Post Discharge Medication Reconciliation Status: discharge medications reconciled, continue medications without change    Current Outpatient Medications   Medication Sig Dispense Refill     acetaminophen (TYLENOL) 325 MG tablet Take 2 tablets (650 mg) by mouth every 6 hours as needed for mild pain       aspirin (ASA) 325 MG EC tablet Take 1 tablet (325 mg) by mouth daily 30 tablet 0     blood glucose (NO BRAND SPECIFIED) lancets standard Use to test blood sugar 2 times daily or as directed. 200 each 3     blood glucose (NO BRAND SPECIFIED) test strip Use to test blood sugars 2 times daily or as directed 200 strip 3     glipiZIDE (GLUCOTROL) 5 MG tablet TAKE 1/2 TABLET BY MOUTH EVERY MORNING AND 2 TABLETS BY MOUTH EVERY EVENING 225 tablet 3     HYDROmorphone (DILAUDID) 2 MG tablet Take 0.5-1 tablets (1-2 mg) by mouth every 4 hours as needed for moderate to severe pain (half tab for moderate pain, whole tab for severe pain) 30 tablet 0     levothyroxine (SYNTHROID/LEVOTHROID) 137 MCG tablet Take 1 tablet (137 mcg) by mouth daily (Patient taking differently: Take 137 mcg by mouth daily 205.5mg (1.5 x 137mcg) on Tuesdays and Saturdays. 137 mcg daily all other days of the week) 90 tablet 3     lisinopril (ZESTRIL) 20 MG tablet TAKE 1 TABLET(20 MG) BY MOUTH DAILY 90 tablet 3     lisinopril-hydrochlorothiazide (ZESTORETIC) 20-12.5 MG tablet TAKE 1 TABLET BY MOUTH EVERY MORNING 90 tablet 3     lovastatin (MEVACOR) 20 MG tablet Take 1 tablet (20 mg) by mouth daily 90 tablet 3     metFORMIN (GLUCOPHAGE) 500 MG tablet Take 1 tablet (500 mg) by mouth 2 times daily (with meals) 180 tablet 3     metoprolol tartrate (LOPRESSOR) 50 MG tablet TAKE 1 TABLET BY MOUTH TWICE DAILY. (Patient taking differently: Take 25 mg by mouth 2 times daily ) 180 tablet 3     ondansetron (ZOFRAN-ODT)  "4 MG ODT tab Take 1 tablet (4 mg) by mouth every 6 hours as needed for nausea or vomiting 10 tablet 0     polyethylene glycol (MIRALAX) 17 g packet Take 17 g by mouth daily       senna-docusate (SENOKOT-S/PERICOLACE) 8.6-50 MG tablet Take 1 tablet by mouth 2 times daily as needed for constipation       sitagliptin (JANUVIA) 100 MG tablet Take 1 tablet (100 mg) by mouth daily 90 tablet 0     triamcinolone (KENALOG) 0.1 % cream Apply b.i.d. p.r.n. to dermatitis but not on the face 45 g 1         ROS:  4 point ROS including Respiratory, CV, GI and , other than that noted in the HPI,  is negative    Vitals:  /76   Pulse 80   Temp 97.6  F (36.4  C)   Resp 18   Ht 1.753 m (5' 9\")   Wt 94 kg (207 lb 3.7 oz)   SpO2 96%   BMI 30.60 kg/m    Exam: limited due to covid precautions  GENERAL APPEARANCE:  Alert, in no distress  ENT:  Mouth and posterior oropharynx normal, moist mucous membranes, hearing acuity [place]   EYES:  EOM, conjunctivae, lids, pupils and irises normal  RESP:  respiratory effort normal, no respiratory distress,   CV:   Edema none  M/S:   Gait and station not observed, Digits and nails normal   SKIN:  Inspection/Palpation of skin and subcutaneous tissue right hip incision is covered  NEURO: 2-12 in normal limits and at patient's baseline  PSYCH:  insight and judgement, memory intact , affect and mood normal    Lab/Diagnostic data:  CBC RESULTS:   Recent Labs   Lab Test 11/09/20  0634 11/08/20  0632 11/07/20  0526 11/06/20  1925   WBC  --   --  11.7* 15.6*   RBC  --   --  4.26 4.31   HGB 11.1* 12.0 13.2 13.4   HCT  --   --  38.8 39.1   MCV  --   --  91 91   MCH  --   --  31.0 31.1   MCHC  --   --  34.0 34.3   RDW  --   --  12.5 12.4   PLT  --   --  190 186       Last Basic Metabolic Panel:  Recent Labs   Lab Test 11/11/20  1107 11/08/20  0632 11/07/20  0526   *  --  134   POTASSIUM 3.4  --  3.8   CHLORIDE 97  --  100   CHAVA 8.7  --  8.2*   CO2 29  --  28   BUN 12  --  8   CR 0.59  --  0.57 "   * 257* 276*       Liver Function Studies -   Recent Labs   Lab Test 01/23/20  1517 07/16/18  1032   PROTTOTAL 7.6 7.6   ALBUMIN 3.8 3.7   BILITOTAL 0.5 0.7   ALKPHOS 88 82   AST 32 26   ALT 33 32       TSH   Date Value Ref Range Status   01/23/2020 1.37 0.40 - 4.00 mU/L Final   10/01/2019 1.16 0.40 - 4.00 mU/L Final   ]    Lab Results   Component Value Date    A1C 7.2 11/06/2020    A1C 8.2 03/11/2020           ASSESSMENT/PLAN:  Closed fracture of neck of right femur with routine healing  Recent hospitalization due to fall at home. Patient underwent right hip arthroplasty on 11/7. There were no stated complications.   -WBAT  -DVT prophylaxis ASA 325mg for 30d  -acetaminophen 650mg q 6 h prn for pain  -dilaudid 1-2mg q 4 h prn-wean after one week  -follow up with ortho in two weeks- TATE Perez will follow here.   -hgb 11/13  Paroxysmal A-fib (H)  Patient did has some tachycardia in hospital. Metoprolol was increased to 50mg BId  CHADSVASc 5. Anticoagulation recommended but deferred to PCP  Echo on 11/8 showed EF 65-70%  HR in TCU 80  -continue metoprolol 50mg BID-increased from PTA 25mg BID    Benign essential hypertension  During hospital stay metop was increased and lisinopril was reduced.   BPs in /76, 143/74, 118/78  She was transferred to TCU on PTA lisinpril  -continue metop 50mg BId  -continue lisinopril 20mg q day and lisinopril-hydrochlorothiazide 20-12.5mg q day  -BMP 11/13    Type 2 diabetes mellitus without complication, without long-term current use of insulin (H)  A1C 7.2  BG in TCu 190, 208, 154  -continue glipizide 2.5 mg q am and 10mg q hs, metformin 500mg BID, sitagliptan 100mg q day  -continue lovastatin 20mg q day  Hypothyroidism due to acquired atrophy of thyroid  Euthyroid  -continue levothyroxine 137 mcg q day    BPPV (benign paroxysmal positional vertigo), unspecified laterality  Polyneuropathy associated with underlying disease (H)  Physical deconditioning  Lives in house alone.  She hopes to return there. Co morbidities increase risk for falls  -physical therapy and OCCUPATIONAL THERAPY   -SW to set up care conference for discharge planning        Total time spent with patient visit at the skilled nursing facility was 38 minutes including patient visit and review of past records. Greater than 50% of total time spent with counseling and coordinating care due to review of events during hospitalization. Medications for pain reviewed with patient  and activity recommendations reviewed with patient . I also reviewed DVT prophlaxis with patient. She understands she will take ASA for 30d.  Polst discussed-she will continue this discussion with SW.     Electronically signed by:  RAJIV Betancur CNP

## 2020-11-13 ENCOUNTER — HOSPITAL LABORATORY (OUTPATIENT)
Dept: OTHER | Facility: CLINIC | Age: 85
End: 2020-11-13

## 2020-11-13 ENCOUNTER — NURSING HOME VISIT (OUTPATIENT)
Dept: GERIATRICS | Facility: CLINIC | Age: 85
End: 2020-11-13
Payer: MEDICARE

## 2020-11-13 ENCOUNTER — PATIENT OUTREACH (OUTPATIENT)
Dept: CARE COORDINATION | Facility: CLINIC | Age: 85
End: 2020-11-13

## 2020-11-13 VITALS
WEIGHT: 207 LBS | TEMPERATURE: 96.9 F | OXYGEN SATURATION: 99 % | HEART RATE: 100 BPM | BODY MASS INDEX: 30.66 KG/M2 | DIASTOLIC BLOOD PRESSURE: 82 MMHG | HEIGHT: 69 IN | RESPIRATION RATE: 16 BRPM | SYSTOLIC BLOOD PRESSURE: 136 MMHG

## 2020-11-13 DIAGNOSIS — E03.9 HYPOTHYROIDISM, UNSPECIFIED TYPE: ICD-10-CM

## 2020-11-13 DIAGNOSIS — I48.91 ATRIAL FIBRILLATION WITH RVR (H): ICD-10-CM

## 2020-11-13 DIAGNOSIS — R53.81 PHYSICAL DECONDITIONING: ICD-10-CM

## 2020-11-13 DIAGNOSIS — E11.9 TYPE 2 DIABETES MELLITUS WITHOUT COMPLICATION, WITHOUT LONG-TERM CURRENT USE OF INSULIN (H): ICD-10-CM

## 2020-11-13 DIAGNOSIS — S72.001A CLOSED FRACTURE OF NECK OF RIGHT FEMUR, INITIAL ENCOUNTER (H): ICD-10-CM

## 2020-11-13 DIAGNOSIS — E78.5 DYSLIPIDEMIA: ICD-10-CM

## 2020-11-13 DIAGNOSIS — D62 ANEMIA DUE TO BLOOD LOSS, ACUTE: ICD-10-CM

## 2020-11-13 DIAGNOSIS — Z71.89 OTHER SPECIFIED COUNSELING: ICD-10-CM
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
## 2020-11-13 DIAGNOSIS — E87.1 HYPONATREMIA: ICD-10-CM

## 2020-11-13 DIAGNOSIS — S72.001D CLOSED FRACTURE OF NECK OF RIGHT FEMUR WITH ROUTINE HEALING: ICD-10-CM

## 2020-11-13 DIAGNOSIS — S72.91XA FRACTURE OF RIGHT FEMUR (H): Primary | ICD-10-CM

## 2020-11-13 DIAGNOSIS — I10 ESSENTIAL HYPERTENSION: ICD-10-CM

## 2020-11-13 LAB
ANION GAP SERPL CALCULATED.3IONS-SCNC: 7 MMOL/L (ref 3–14)
BUN SERPL-MCNC: 12 MG/DL (ref 7–30)
CALCIUM SERPL-MCNC: 8.4 MG/DL (ref 8.5–10.1)
CHLORIDE SERPL-SCNC: 97 MMOL/L (ref 94–109)
CO2 SERPL-SCNC: 27 MMOL/L (ref 20–32)
CREAT SERPL-MCNC: 0.64 MG/DL (ref 0.52–1.04)
GFR SERPL CREATININE-BSD FRML MDRD: 79 ML/MIN/{1.73_M2}
GLUCOSE SERPL-MCNC: 167 MG/DL (ref 70–99)
HGB BLD-MCNC: 11.4 G/DL (ref 11.7–15.7)
POTASSIUM SERPL-SCNC: 3.4 MMOL/L (ref 3.4–5.3)
SODIUM SERPL-SCNC: 131 MMOL/L (ref 133–144)

## 2020-11-13 PROCEDURE — 99305 1ST NF CARE MODERATE MDM 35: CPT | Mod: AI | Performed by: INTERNAL MEDICINE

## 2020-11-13 RX ORDER — HYDROMORPHONE HYDROCHLORIDE 2 MG/1
1-2 TABLET ORAL EVERY 4 HOURS PRN
Qty: 15 TABLET | Refills: 0 | Status: SHIPPED | OUTPATIENT
Start: 2020-11-13 | End: 2020-11-16

## 2020-11-13 ASSESSMENT — MIFFLIN-ST. JEOR: SCORE: 1428.33

## 2020-11-13 NOTE — PROGRESS NOTES
Clinic Care Coordination Contact  Care Coordination Transition Communication    Clinical Data:   Hospital admission  11/6-11/11/2020  Fall right hip pain Closed fracture of right femur    Transition to Facility:              Facility Name:[facility]             Contact name and phone number/fax: CC faxed contact information to call back when the patient is discharged     Plan: RN/SW Care Coordinator will await notification from facility staff informing RN/SW Care Coordinator of patient's discharge plans/needs. RN/SW Care Coordinator will review chart and outreach to facility staff every 4 weeks and as needed.     [facility]     Anitha Abrams  RN Care Coordinator   [facility] / [facility]   Phone: [phone]  Email :  [email or web address]

## 2020-11-13 NOTE — LETTER
To:             Please give to facility    From:   Anitha Abrams RN, Care Coordinator   [facility] -Care Coordination  [facility]   E-mail [email or web address]   [phone]    Patient Name:  Gavi Pearson  YOB: 1931   Admit date:       *Information Needed:  Please contact me when the patient will discharge (or if they will move to long term care)- include the discharge date, disposition, and main diagnosis   - If the patient is discharged with home care services, please provide the name of the agency    Also- Please inform me if a care conference is being held.   Anitha Abrams RN, Care Coordinator   [facility] -Care Coordination  [facility]   E-mail [email or web address]   [phone]                              Thank you

## 2020-11-16 DIAGNOSIS — S72.001A CLOSED FRACTURE OF NECK OF RIGHT FEMUR, INITIAL ENCOUNTER (H): ICD-10-CM

## 2020-11-16 RX ORDER — HYDROMORPHONE HYDROCHLORIDE 2 MG/1
1-2 TABLET ORAL EVERY 4 HOURS PRN
Qty: 20 TABLET | Refills: 0 | Status: SHIPPED | OUTPATIENT
Start: 2020-11-16 | End: 2020-11-17

## 2020-11-17 ENCOUNTER — HOSPITAL LABORATORY (OUTPATIENT)
Dept: OTHER | Facility: CLINIC | Age: 85
End: 2020-11-17

## 2020-11-17 DIAGNOSIS — S72.001A CLOSED FRACTURE OF NECK OF RIGHT FEMUR, INITIAL ENCOUNTER (H): ICD-10-CM

## 2020-11-17 LAB
ALBUMIN UR-MCNC: NEGATIVE MG/DL
APPEARANCE UR: CLEAR
BACTERIA #/AREA URNS HPF: ABNORMAL /HPF
BILIRUB UR QL STRIP: NEGATIVE
COLOR UR AUTO: YELLOW
GLUCOSE UR STRIP-MCNC: NEGATIVE MG/DL
HGB UR QL STRIP: NEGATIVE
KETONES UR STRIP-MCNC: NEGATIVE MG/DL
LEUKOCYTE ESTERASE UR QL STRIP: ABNORMAL
NITRATE UR QL: NEGATIVE
PH UR STRIP: 7.5 PH (ref 5–7)
RBC #/AREA URNS AUTO: <1 /HPF (ref 0–2)
SOURCE: ABNORMAL
SP GR UR STRIP: 1.01 (ref 1–1.03)
SQUAMOUS #/AREA URNS AUTO: 1 /HPF (ref 0–1)
UROBILINOGEN UR STRIP-MCNC: NORMAL MG/DL (ref 0–2)
WBC #/AREA URNS AUTO: 1 /HPF (ref 0–5)

## 2020-11-17 RX ORDER — HYDROMORPHONE HYDROCHLORIDE 2 MG/1
1-2 TABLET ORAL EVERY 4 HOURS PRN
Qty: 20 TABLET | Refills: 0 | Status: SHIPPED | OUTPATIENT
Start: 2020-11-17 | End: 2020-11-25

## 2020-11-18 ENCOUNTER — NURSING HOME VISIT (OUTPATIENT)
Dept: GERIATRICS | Facility: CLINIC | Age: 85
End: 2020-11-18
Payer: MEDICARE

## 2020-11-18 ENCOUNTER — HOSPITAL LABORATORY (OUTPATIENT)
Dept: OTHER | Facility: CLINIC | Age: 85
End: 2020-11-18

## 2020-11-18 VITALS
OXYGEN SATURATION: 96 % | HEIGHT: 69 IN | DIASTOLIC BLOOD PRESSURE: 68 MMHG | TEMPERATURE: 97.9 F | WEIGHT: 180 LBS | HEART RATE: 77 BPM | RESPIRATION RATE: 18 BRPM | BODY MASS INDEX: 26.66 KG/M2 | SYSTOLIC BLOOD PRESSURE: 123 MMHG

## 2020-11-18 DIAGNOSIS — K59.01 SLOW TRANSIT CONSTIPATION: ICD-10-CM

## 2020-11-18 DIAGNOSIS — S72.001A CLOSED FRACTURE OF NECK OF RIGHT FEMUR, INITIAL ENCOUNTER (H): Primary | ICD-10-CM

## 2020-11-18 DIAGNOSIS — R53.81 PHYSICAL DECONDITIONING: ICD-10-CM

## 2020-11-18 DIAGNOSIS — E11.9 TYPE 2 DIABETES MELLITUS WITHOUT COMPLICATION, WITHOUT LONG-TERM CURRENT USE OF INSULIN (H): ICD-10-CM
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
## 2020-11-18 DIAGNOSIS — E87.1 HYPONATREMIA: ICD-10-CM

## 2020-11-18 DIAGNOSIS — E03.9 HYPOTHYROIDISM, UNSPECIFIED TYPE: ICD-10-CM

## 2020-11-18 DIAGNOSIS — I10 ESSENTIAL HYPERTENSION: ICD-10-CM

## 2020-11-18 DIAGNOSIS — E78.5 DYSLIPIDEMIA: ICD-10-CM

## 2020-11-18 DIAGNOSIS — D62 ANEMIA DUE TO BLOOD LOSS, ACUTE: ICD-10-CM

## 2020-11-18 DIAGNOSIS — I48.91 ATRIAL FIBRILLATION WITH RVR (H): ICD-10-CM

## 2020-11-18 PROCEDURE — 99309 SBSQ NF CARE MODERATE MDM 30: CPT | Performed by: NURSE PRACTITIONER

## 2020-11-18 ASSESSMENT — MIFFLIN-ST. JEOR: SCORE: 1305.85

## 2020-11-18 NOTE — PROGRESS NOTES
[facility]  [facility] Record Number:  [id number]  Place of Service where encounter took place:  [facility] - [facility] ([facility]) [[id number]]  Chief Complaint   Patient presents with     RECHECK       HPI:    Gavi Pearson  is a 89 year old (10/21/1931), who is being seen today for an episodic care visit.  HPI information obtained from: facility chart records, facility staff, patient report and [facility] chart review.     Per recent TCU provider progress notes:  89 year old female with hx of HTN, HLD, DM2, hypothyroidism, and paroxysmal atrial fibrillation hospitalized with right hip pain after fall, acute right femoral neck fracture with impaction and coxa vara angulation as well as possible transverse fracture through the proximal right fifth metatarsal bone, now s/p right hip unipolar hemiarthroplasty on November 7, 2020. ECHO 11/8 normal LV systolic function with EF of 65-70%, moderate-severe biatrial enlargement, 1-2+ tricuspid regurgitation, and borderline dilated ascending aorta.  [id number] score was calculated at 5 and chronic anticoagulant therapy was indicated but patient did not want to start Coumadin and will discuss with PCP.      Today's concern is:  Patient seen for episodic TCU visit. Reports doing well, no pain. Denies headaches, dizziness, chest pain, dyspnea, bowel or bladder concerns. Per EMR review note weight stable. BP range 115-145/58-78 and HR 60-90s. BG range 127-257 and sats 96% on room air. Patient walks 30-40 Ft with 2WW and SLUMS 19/30.     Past Medical and Surgical History reviewed in Epic today.    MEDICATIONS:  Current Outpatient Medications   Medication Sig Dispense Refill     acetaminophen (TYLENOL) 325 MG tablet Take 2 tablets (650 mg) by mouth every 6 hours as needed for mild pain       aspirin (ASA) 325 MG EC tablet Take 1 tablet (325 mg) by mouth daily 30 tablet 0     blood glucose (NO BRAND SPECIFIED) lancets standard Use to test blood sugar 2  times daily or as directed. 200 each 3     blood glucose (NO BRAND SPECIFIED) test strip Use to test blood sugars 2 times daily or as directed 200 strip 3     glipiZIDE (GLUCOTROL) 5 MG tablet TAKE 1/2 TABLET BY MOUTH EVERY MORNING AND 2 TABLETS BY MOUTH EVERY EVENING 225 tablet 3     HYDROmorphone (DILAUDID) 2 MG tablet Take 0.5-1 tablets (1-2 mg) by mouth every 4 hours as needed for moderate to severe pain (half tab for moderate pain, whole tab for severe pain) 20 tablet 0     levothyroxine (SYNTHROID/LEVOTHROID) 137 MCG tablet Take 1 tablet (137 mcg) by mouth daily (Patient taking differently: Take 137 mcg by mouth daily 205.5mg (1.5 x 137mcg) on Tuesdays and Saturdays. 137 mcg daily all other days of the week) 90 tablet 3     lisinopril (ZESTRIL) 20 MG tablet TAKE 1 TABLET(20 MG) BY MOUTH DAILY 90 tablet 3     lisinopril-hydrochlorothiazide (ZESTORETIC) 20-12.5 MG tablet TAKE 1 TABLET BY MOUTH EVERY MORNING 90 tablet 3     lovastatin (MEVACOR) 20 MG tablet Take 1 tablet (20 mg) by mouth daily 90 tablet 3     metFORMIN (GLUCOPHAGE) 500 MG tablet Take 1 tablet (500 mg) by mouth 2 times daily (with meals) 180 tablet 3     metoprolol tartrate (LOPRESSOR) 50 MG tablet TAKE 1 TABLET BY MOUTH TWICE DAILY. (Patient taking differently: Take 25 mg by mouth 2 times daily ) 180 tablet 3     ondansetron (ZOFRAN-ODT) 4 MG ODT tab Take 1 tablet (4 mg) by mouth every 6 hours as needed for nausea or vomiting 10 tablet 0     polyethylene glycol (MIRALAX) 17 g packet Take 17 g by mouth daily       senna-docusate (SENOKOT-S/PERICOLACE) 8.6-50 MG tablet Take 1 tablet by mouth 2 times daily as needed for constipation       sitagliptin (JANUVIA) 100 MG tablet Take 1 tablet (100 mg) by mouth daily 90 tablet 0     triamcinolone (KENALOG) 0.1 % cream Apply b.i.d. p.r.n. to dermatitis but not on the face 45 g 1       REVIEW OF SYSTEMS:  4 point ROS including Respiratory, CV, GI and , other than that noted in the HPI,  is  "negative    Objective:  /68   Pulse 77   Temp 97.9  F (36.6  C)   Resp 18   Ht 1.753 m (5' 9\")   Wt 81.6 kg (180 lb)   SpO2 96%   BMI 26.58 kg/m    Exam:  Exam is limited due to COVID-19 precautions  GENERAL APPEARANCE:  Alert, in no distress, cooperative  ENT:  Mouth normal, moist mucous membranes, normal hearing acuity  EYES:  Conjunctiva and lids normal  RESP:  no respiratory distress, on room air  CV: no LE edema  NEURO:   No facial asymmetry, speech clear  PSYCH:  oriented X 3, affect and mood normal     Labs:   Most Recent 3 CBC's:  Recent Labs   Lab Test 11/13/20  0000 11/09/20  0634 11/08/20  0632 11/07/20  0526 11/06/20  1925 03/11/20  1044   WBC  --   --   --  11.7* 15.6* 13.6*   HGB 11.4* 11.1* 12.0 13.2 13.4 14.6   MCV  --   --   --  91 91 91   PLT  --   --   --  190 186 208     Most Recent 3 BMP's:  Recent Labs   Lab Test 11/13/20  0000 11/11/20  1107 11/08/20  0632 11/07/20  0526   * 131*  --  134   POTASSIUM 3.4 3.4  --  3.8   CHLORIDE 97 97  --  100   CO2 27 29  --  28   BUN 12 12  --  8   CR 0.64 0.59  --  0.57   ANIONGAP 7 5  --  6   CHAVA 8.4* 8.7  --  8.2*   * 198* 257* 276*     Most Recent 2 LFT's:  Recent Labs   Lab Test 01/23/20  1517 07/16/18  1032   AST 32 26   ALT 33 32   ALKPHOS 88 82   BILITOTAL 0.5 0.7     Most Recent TSH and T4:  Recent Labs   Lab Test 01/23/20  1517 07/16/18  1032 07/16/18  1032   TSH 1.37   < > 0.35*   T4  --   --  1.57*    < > = values in this interval not displayed.     Most Recent Hemoglobin A1c:  Recent Labs   Lab Test 11/06/20  2228   A1C 7.2*       ASSESSMENT/PLAN:  Fall, subsequent encounter  Closed right femoral neck fracture, status post unipolar right hip hemiarthroplasty   Physical deconditioning  Acute on chronic, pain managed well with tylenol and dilaudid PRN. Therapies - f/u with progress next visit. F/U ortho as planned.     Acute blood loss anemia, secondary to surgery  Acute post op, stable last check. Monitor PRN.     Atrial " fibrillation with RVR  Noted at hospital, on ASA and metoprolol, not interested in AC: f/u with PCP for further management.     Essential hypertension  Chronic, managed well. Continue lisinopril 20 mg p.o. daily, metoprolol 50 mg p.o. twice daily, and lisinopril-HCTZ 20-12.5 mg 1 tablet p.o. daily. VS, wt monitoring per routine.     Dyslipidemia  Chronic, stable. Continue PTA lovastatin 20 mg p.o. daily    Hypothyroidism  Chronic, managed with PTA levothyroxine 137 mcg p.o. daily. Monitor per PCP routine.     DM type II  Chronic, well managed. Continue PTA metformin 500 g p.o. twice daily, glipizide 2.5 mg p.o. daily in a.m., glipizide 10 mg p.o. nightly, and Januvia 100 mg p.o. daily. Monitor BG and f/u with ranges next visit.     Hyponatremia  Noted at hospital, remains on HCTZ. Monitor BMP periodically.     Slow transit constipation  By history- no issues today. Monitor.     Orders written by provider at facility:  BMP on 11/20 diagnosis hyponatremia    Electronically signed by:  RAJIV Babin CNP

## 2020-11-18 NOTE — DISCHARGE SUMMARY
[facility]  Hospitalist Discharge Summary      Date of Admission:  11/6/2020  Date of Discharge:  11/11/2020  1:20 PM  Discharging Provider: Jessica Naranjo MD      Discharge Diagnoses   Right femoral neck fracture S/P Rt hip arthoplasty   Mechanical fall  S/p R hip arthroplasty on 11/7    See hospital course for further diagnoses.     Follow-ups Needed After Discharge   Follow-up Appointments     Follow Up and recommended labs and tests      Follow up with  , at [facility], within 14   days  to evaluate after surgery. No follow up labs or test are needed   prior to visit. At this visit x-rays will be taken, sutures/staples   removed, and questions to be answered.  Bring any needed forms with to appointment.  Call for appointment (Britatny- Patient Coordinator): [phone]  After hours: [phone]         Follow Up and recommended labs and tests      Follow up with NH physician regarding blood pressure and blood sugar   management.             Unresulted Labs Ordered in the Past 30 Days of this Admission     No orders found from 10/7/2020 to 11/7/2020.          Discharge Disposition   Discharged to nursing home  Condition at discharge: Good  Patient ready to discharge to a skilled nursing facility as soon as possible in order to create capacity for patients related to the COVID-19 pandemic.    Hospital Course    Ms. Pearson is a very pleasant 89-year-old female with past medical history of hypertension, dyslipidemia, hypothyroidism, diabetes mellitus type 2, paroxysmal atrial fibrillation on aspirin and polyneuropathy, who presented for evaluation of right hip pain, status post mechanical fall.  X-ray of pelvis showed acute right femoral neck fracture with impaction and caudal angulation.      Right femoral neck fracture S/P Rt hip arthoplasty   Mechanical fall  S/p R hip arthroplasty on 11/7     - routine post-op care per Orthopedic surgery   - PT/OT  -  Incentive spirometry, mobilize as able.  - Discharged to TCU     Essential hypertension  A. fib with RVR  Echo done on 11/8 with normal EF of 65-70%     - HR controlled and BP up on 11/11/20.    - Continues on PTA metoprolol and lisinopril (increase back to PTA dose of 20 mg BID on 11/11/20).   - Resumed PTA hydrochlorothiazide on 11/11/20   - Long-term anticoagulation indicated. But not on anticoagulation PTA, [id number] score 5. Apparently did not like the idea of frequent blood checks with Coumadin. We discussed NOACs are available now which would not require frequent blood checks to monitor levels.  She however would like to discuss with her PCP, does not want to get started on anticoagulation prior to discharge.     Mild hyponatremia (131 on day of discharge)   - F/u post discharge. If this remains low, I would stop hydrochlorothiazide.       Hypothyroidism.     - Continue levothyroxine 137 mcg p.o. daily.      History of diabetes mellitus type 2:  Hemoglobin A1c is 7.2     - Continues on PTA glipizide. Continue 2.5 mg p.o. in AM, and increased to PTA dose of 10 mg in the evening on 11/11/20  - Continues on PTA Metformin 500 mg p.o. twice daily and Januvia 100 mg p.o. daily.   - sliding scale insulin                    Recent Labs   Lab 11/11/20  0629 11/11/20  0139 11/10/20  2205 11/10/20  1640 11/10/20  1136 11/10/20  0802 11/08/20  0632 11/08/20  0632 11/07/20  0526 11/07/20  0526 11/06/20 1925 11/06/20 1925   GLC  --   --   --   --   --   --   --  257*  --  276*  --  226*   * 125* 137* 154* 182* 155*   < >  --    < >  --    < >  --     < > = values in this interval not displayed.            Leukocytosis Likely a stress response, improving. No clinical evidence of infection.       Recent Labs   Lab 11/07/20  0526 11/06/20 1925   WBC 11.7* 15.6*         Asymptomatic Covid screen-negative      Consultations This Hospital Stay   ORTHOPEDIC SURGERY IP CONSULT  SOCIAL WORK IP CONSULT  PHYSICAL THERAPY  ADULT IP CONSULT  OCCUPATIONAL THERAPY ADULT IP CONSULT  PHYSICAL THERAPY ADULT IP CONSULT  OCCUPATIONAL THERAPY ADULT IP CONSULT    Code Status   Prior    Time Spent on this Encounter   I, Jessica Naranjo MD, personally saw the patient today and spent less than or equal to 30 minutes discharging this patient.       Jessica Naranjo MD  [place] [address] [facility]  [address] [place] [address]  Phone: [phone]  ______________________________________________________________________    Physical Exam   Vital Signs:                   Weight: 207 lbs 3.72 oz  Please see my note from this day.        Primary Care Physician   Aleks Maher    Discharge Orders      General info for SNF    Length of Stay Estimate: Short Term Care: Estimated # of Days <30  Condition at Discharge: Improving  Level of care:skilled   Rehabilitation Potential: Good  Admission H&P remains valid and up-to-date: Yes  Recent Chemotherapy: N/A  Use Nursing Home Standing Orders: Yes     Mantoux instructions    Give two-step Mantoux (PPD) Per Facility Policy Yes     Reason for your hospital stay    Right hip hemiarthroplasty secondary to femoral neck fracture     Weight bearing status    WBAT with walker     Activity - Up with assistive device     Follow Up and recommended labs and tests    [facility]  to evaluate after surgery. No follow up labs or test are needed prior to visit. At this visit x-rays will be taken, sutures/staples removed, and questions to be answered.  Bring any needed forms with to appointment.  Call for appointment (Brittany- Patient Coordinator): [phone]  After hours: [phone]     Wound care    Site:   Right hip  Instructions:  Leave dressing intact, remove if over 60 % saturated and replace with gauze and tape     General info for SNF    Length of Stay Estimate: Short Term Care: Estimated # of Days <30  Condition at  Discharge: Improving  Level of care:skilled   Rehabilitation Potential: Fair  Admission H&P remains valid and up-to-date: Yes  Recent Chemotherapy: N/A  Use Nursing Home Standing Orders: Yes     Mantoux instructions    Give two-step Mantoux (PPD) Per Facility Policy Yes     Reason for your hospital stay    You were admitted after a fall with a right hip fracture.     Glucose monitor nursing POCT    Before meals and at bedtime     Follow Up and recommended labs and tests    Follow up with NH physician regarding blood pressure and blood sugar management.     Physical Therapy Adult Consult    Evaluate and treat as clinically indicated.    Reason:  Right hip hemiarthroplasty secondary to femoral neck fracture     Occupational Therapy Adult Consult    Evaluate and treat as clinically indicated.    Reason:  Right hip hemiarthroplasty secondary to femoral neck fracture     Hip precautions     Fall precautions     Advance Diet as Tolerated    Follow this diet upon discharge: Orders Placed This Encounter      Moderate Consistent CHO Diet     Advance Diet as Tolerated    Follow this diet upon discharge: Orders Placed This Encounter      Moderate Consistent CHO Diet      Advance Diet as Tolerated       Significant Results and Procedures   Most Recent 3 CBC's:  Recent Labs   Lab Test 11/09/20  0634 11/08/20  0632 11/07/20  0526 11/06/20  1925 03/11/20  1044   WBC  --   --  11.7* 15.6* 13.6*   HGB 11.1* 12.0 13.2 13.4 14.6   MCV  --   --  91 91 91   PLT  --   --  190 186 208     Most Recent 3 BMP's:  Recent Labs   Lab Test 11/11/20  1107 11/08/20  0632 11/07/20  0526   *  --  134   POTASSIUM 3.4  --  3.8   CHLORIDE 97  --  100   CO2 29  --  28   BUN 12  --  8   CR 0.59  --  0.57   ANIONGAP 5  --  6   CHAVA 8.7  --  8.2*   * 257* 276*   ,   Results for orders placed or performed during the hospital encounter of 11/06/20   XR Pelvis w Hip Right 1 View    Narrative    EXAM: XR PELVIS AND HIP RIGHT 1 VIEW  LOCATION:  [facility]  DATE/TIME: 2020 5:55 PM    INDICATION: Twisting fall onto carpeted floor, right hip and thigh pain  COMPARISON: None.      Impression    IMPRESSION: Acute right femoral neck fracture with impaction and coxa vara angulation. There is mild posterior and superior displacement of the distal fracture fragment. Normal joint alignment maintained. Postsurgical changes of the left femur. No   hardware complication. Mild bilateral hip and sacroiliac joint degenerative changes. Moderate degenerative changes of the lower lumbar spine. Osteopenia.   XR Femur Port Right 2 Views    Narrative    EXAM: XR FEMUR PORT RT 2 VW  LOCATION: [facility]  DATE/TIME: 2020 6:57 PM    INDICATION: Right leg pain.  COMPARISON: 2020 radiographs of the pelvis and right hip.      Impression    IMPRESSION: Distal two thirds of the right femur are normal. No fracture. No knee joint effusion.   XR Ankle Port Right 2 Views    Narrative    EXAM: XR ANKLE PORT RT 2 VW  LOCATION: [facility]  DATE/TIME: 2020 3:14 PM    INDICATION: Possible ankle fracture. Pain.  COMPARISON: None.      Impression    IMPRESSION: Bones are demineralized. Ankle mortise is intact. Advanced midfoot degenerative change with prominent dorsal osteophytic spurring.    Transverse fracture through the proximal fifth metatarsal appears to be acute. This would be better characterized with foot radiographs.    RECOMMENDATION: Foot radiographs   Echocardiogram Complete    Narrative    [id number]  [id number]  [id number]^RADHA^MARANDA^F           [facility]  Echocardiography Laboratory  [address] [place] [address]        Name: FLYNN TIRADO  MRN: [id number]  : 10/21/1931  Study Date: 2020 08:48 AM  Age: 89 yrs  Gender: Female  Patient Location: [facility]  Reason For Study: Atrial Fibrillation  Ordering Physician: MARANDA GARCIA  Referring Physician: Aleks Maher  By: Moon Marie     BSA: 2.0 m2  Height: 69 in  Weight: 195 lb  HR: 106  BP: 121/74 mmHg  _____________________________________________________________________________  __        Procedure  Complete Portable Echo Adult. Optison (NDC #[id number]) given intravenously.  _____________________________________________________________________________  __        Interpretation Summary     The rhythm was rapid atrial fibrillation.  The right ventricular systolic pressure is approximated at 45mmHg plus the  right atrial pressure.  IVC diameter and respiratory changes fall into an intermediate range  suggesting an RA pressure of 8 mmHg.  The right ventricle is moderately dilated.  The right ventricular systolic function is normal.  The visual ejection fraction is estimated at 65-70%.  There is mod-severe biatrial enlargement.  There is mild to moderate (1-2+) tricuspid regurgitation.  The ascending aorta is Borderline dilated.  There is no comparison study available.  _____________________________________________________________________________  __        Left Ventricle  The left ventricle is normal in size. There is normal left ventricular wall  thickness. The visual ejection fraction is estimated at 65-70%. Diastolic  function not assessed due to atrial fibrillation. Normal left ventricular wall  motion.     Right Ventricle  The right ventricle is moderately dilated. The right ventricular systolic  function is normal.     Atria  There is mod-severe biatrial enlargement. Intact atrial septum.     Mitral Valve  The mitral valve is normal in structure and function. There is mild mitral  annular calcification. There is trace mitral regurgitation.        Tricuspid Valve  The tricuspid valve is normal in structure and function. There is mild to  moderate (1-2+) tricuspid regurgitation. The right ventricular systolic  pressure is approximated at 45mmHg plus the right atrial pressure.     Aortic Valve  The aortic valve is trileaflet  with aortic valve sclerosis.     Pulmonic Valve  The pulmonic valve is not well visualized. There is trace pulmonic valvular  regurgitation.     Vessels  Normal size aorta. The ascending aorta is Borderline dilated. IVC diameter and  respiratory changes fall into an intermediate range suggesting an RA pressure  of 8 mmHg.     Pericardium  The pericardium appears normal.        Rhythm  The rhythm was rapid atrial fibrillation.  _____________________________________________________________________________  __  MMode/2D Measurements & Calculations  IVSd: 1.2 cm     LVIDd: 4.7 cm  LVIDs: 2.7 cm  LVPWd: 1.1 cm  FS: 42.3 %  LV mass(C)d: 197.7 grams  LV mass(C)dI: 96.8 grams/m2  Ao root diam: 3.2 cm  LA dimension: 4.1 cm  asc Aorta Diam: 3.7 cm  LA/Ao: 1.3  LA Volume (BP): 98.4 ml  LA Volume Index (BP): 48.2 ml/m2  RWT: 0.46           Doppler Measurements & Calculations  MV E max joellen: 103.0 cm/sec  MV dec time: 0.16 sec  PA acc time: 0.08 sec  PI end-d joellen: 119.8 cm/sec  TR max joellen: 333.5 cm/sec  TR max P.7 mmHg  E/E' av.0  Lateral E/e': 11.3  Medial E/e': 16.6           _____________________________________________________________________________  __           Report approved by: Oswaldo Mcmullen 2020 10:56 AM            Discharge Medications   Discharge Medication List as of 2020 12:16 PM      START taking these medications    Details   acetaminophen (TYLENOL) 325 MG tablet Take 2 tablets (650 mg) by mouth every 6 hours as needed for mild pain, Transitional      ondansetron (ZOFRAN-ODT) 4 MG ODT tab Take 1 tablet (4 mg) by mouth every 6 hours as needed for nausea or vomiting, Disp-10 tablet, R-0, Transitional      polyethylene glycol (MIRALAX) 17 g packet Take 17 g by mouth daily, Transitional      senna-docusate (SENOKOT-S/PERICOLACE) 8.6-50 MG tablet Take 1 tablet by mouth 2 times daily as needed for constipation, Transitional      HYDROmorphone (DILAUDID) 2 MG tablet Take 0.5-1 tablets (1-2  mg) by mouth every 4 hours as needed for moderate to severe pain (half tab for moderate pain, whole tab for severe pain), Disp-30 tablet, R-0, Local Print         CONTINUE these medications which have CHANGED    Details   aspirin (ASA) 325 MG EC tablet Take 1 tablet (325 mg) by mouth daily, Disp-30 tablet, R-0, Transitional         CONTINUE these medications which have NOT CHANGED    Details   blood glucose (NO BRAND SPECIFIED) lancets standard Use to test blood sugar 2 times daily or as directed.Disp-200 each, [id number] step ultra      blood glucose (NO BRAND SPECIFIED) test strip Use to test blood sugars 2 times daily or as directed, Disp-200 strip, R-3, E-Prescribe      glipiZIDE (GLUCOTROL) 5 MG tablet TAKE 1/2 TABLET BY MOUTH EVERY MORNING AND 2 TABLETS BY MOUTH EVERY EVENING, Disp-225 tablet, R-3, E-PrescribeOverdue for apt- was due in July      levothyroxine (SYNTHROID/LEVOTHROID) 137 MCG tablet Take 1 tablet (137 mcg) by mouth daily, Disp-90 tablet,R-3, E-Prescribe      lisinopril (ZESTRIL) 20 MG tablet TAKE 1 TABLET(20 MG) BY MOUTH DAILY, Disp-90 tablet,R-3, E-Prescribe      lisinopril-hydrochlorothiazide (ZESTORETIC) 20-12.5 MG tablet TAKE 1 TABLET BY MOUTH EVERY MORNING, Disp-90 tablet,R-3, E-Prescribe      lovastatin (MEVACOR) 20 MG tablet Take 1 tablet (20 mg) by mouth daily, Disp-90 tablet, R-3, E-Prescribe      metFORMIN (GLUCOPHAGE) 500 MG tablet Take 1 tablet (500 mg) by mouth 2 times daily (with meals), Disp-180 tablet, R-3, E-PrescribeOverdue for apt- was due in july      metoprolol tartrate (LOPRESSOR) 50 MG tablet TAKE 1 TABLET BY MOUTH TWICE DAILY., Disp-180 tablet, R-3, E-Prescribe      sitagliptin (JANUVIA) 100 MG tablet Take 1 tablet (100 mg) by mouth daily, Disp-90 tablet, R-0, E-Prescribe      triamcinolone (KENALOG) 0.1 % cream Apply b.i.d. p.r.n. to dermatitis but not on the faceDisp-45 g, [id number]           Allergies   No Known Allergies

## 2020-11-19 LAB
SARS-COV-2 RNA SPEC QL NAA+PROBE: NOT DETECTED
SPECIMEN SOURCE: NORMAL

## 2020-11-20 ENCOUNTER — HOSPITAL LABORATORY (OUTPATIENT)
Dept: OTHER | Facility: CLINIC | Age: 85
End: 2020-11-20

## 2020-11-20 LAB
ANION GAP SERPL CALCULATED.3IONS-SCNC: 7 MMOL/L (ref 3–14)
BUN SERPL-MCNC: 8 MG/DL (ref 7–30)
CALCIUM SERPL-MCNC: 8.8 MG/DL (ref 8.5–10.1)
CHLORIDE SERPL-SCNC: 99 MMOL/L (ref 94–109)
CO2 SERPL-SCNC: 27 MMOL/L (ref 20–32)
CREAT SERPL-MCNC: 0.61 MG/DL (ref 0.52–1.04)
GFR SERPL CREATININE-BSD FRML MDRD: 80 ML/MIN/{1.73_M2}
GLUCOSE SERPL-MCNC: 139 MG/DL (ref 70–99)
POTASSIUM SERPL-SCNC: 3.8 MMOL/L (ref 3.4–5.3)
SODIUM SERPL-SCNC: 133 MMOL/L (ref 133–144)

## 2020-11-24 ENCOUNTER — HOSPITAL LABORATORY (OUTPATIENT)
Dept: OTHER | Facility: CLINIC | Age: 85
End: 2020-11-24

## 2020-11-25 ENCOUNTER — NURSING HOME VISIT (OUTPATIENT)
Dept: GERIATRICS | Facility: CLINIC | Age: 85
End: 2020-11-25
Payer: MEDICARE

## 2020-11-25 VITALS
TEMPERATURE: 97.8 F | RESPIRATION RATE: 16 BRPM | BODY MASS INDEX: 28.66 KG/M2 | DIASTOLIC BLOOD PRESSURE: 85 MMHG | WEIGHT: 193.5 LBS | HEART RATE: 72 BPM | OXYGEN SATURATION: 97 % | HEIGHT: 69 IN | SYSTOLIC BLOOD PRESSURE: 150 MMHG

## 2020-11-25 DIAGNOSIS — I10 BENIGN ESSENTIAL HYPERTENSION: ICD-10-CM

## 2020-11-25 DIAGNOSIS — E78.5 DYSLIPIDEMIA: ICD-10-CM

## 2020-11-25 DIAGNOSIS — S72.001A CLOSED FRACTURE OF NECK OF RIGHT FEMUR, INITIAL ENCOUNTER (H): ICD-10-CM

## 2020-11-25 DIAGNOSIS — I48.91 ATRIAL FIBRILLATION WITH RVR (H): ICD-10-CM
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
## 2020-11-25 DIAGNOSIS — D62 ANEMIA DUE TO BLOOD LOSS, ACUTE: ICD-10-CM

## 2020-11-25 DIAGNOSIS — E03.9 HYPOTHYROIDISM, UNSPECIFIED TYPE: ICD-10-CM

## 2020-11-25 DIAGNOSIS — E03.4 HYPOTHYROIDISM DUE TO ACQUIRED ATROPHY OF THYROID: ICD-10-CM

## 2020-11-25 DIAGNOSIS — I10 ESSENTIAL HYPERTENSION: ICD-10-CM

## 2020-11-25 DIAGNOSIS — R53.81 PHYSICAL DECONDITIONING: ICD-10-CM

## 2020-11-25 DIAGNOSIS — E87.1 HYPONATREMIA: ICD-10-CM

## 2020-11-25 DIAGNOSIS — E11.9 TYPE 2 DIABETES MELLITUS WITHOUT COMPLICATION, WITHOUT LONG-TERM CURRENT USE OF INSULIN (H): ICD-10-CM

## 2020-11-25 DIAGNOSIS — K59.01 SLOW TRANSIT CONSTIPATION: ICD-10-CM

## 2020-11-25 PROCEDURE — 99316 NF DSCHRG MGMT 30 MIN+: CPT | Performed by: NURSE PRACTITIONER

## 2020-11-25 RX ORDER — METOPROLOL TARTRATE 50 MG
50 TABLET ORAL 2 TIMES DAILY
Qty: 60 TABLET | Refills: 0 | Status: SHIPPED | OUTPATIENT
Start: 2020-11-25 | End: 2020-11-27

## 2020-11-25 RX ORDER — HYDROMORPHONE HYDROCHLORIDE 2 MG/1
1-2 TABLET ORAL EVERY 4 HOURS PRN
Qty: 5 TABLET | Refills: 0 | Status: SHIPPED | OUTPATIENT
Start: 2020-11-25 | End: 2021-02-03

## 2020-11-25 RX ORDER — LEVOTHYROXINE SODIUM 137 UG/1
137 TABLET ORAL DAILY
Qty: 30 TABLET | Refills: 0 | Status: SHIPPED | OUTPATIENT
Start: 2020-11-25 | End: 2021-02-03

## 2020-11-25 ASSESSMENT — MIFFLIN-ST. JEOR: SCORE: 1367.09

## 2020-11-25 NOTE — PROGRESS NOTES
[place] [facility] DISCHARGE SUMMARY  PATIENT'S NAME: Gavi Pearson  YOB: 1931  MEDICAL RECORD NUMBER:  [id number]  Place of Service where encounter took place:  [facility] [[id number]]    PRIMARY CARE PROVIDER AND CLINIC RESPONSIBLE AFTER TRANSFER:   Aleks Maher MD, [address] / [place] [address]    FMG Provider     Transferring providers: RJAIV Babin CNP, Magali Hopson MD  Recent Hospitalization/ED:  [facility] Hospital stay 11/6/2020 to 11/11/2020.  Date of SNF Admission: November / 11 / 2020  Date of SNF (anticipated) Discharge: December / 04 / 2020  Discharged to: previous independent home  Cognitive Scores: SLUMS: 19/30  Physical Function: Ambulating 160 ft with 2ww and SBA  DME: Walker    CODE STATUS/ADVANCE DIRECTIVES DISCUSSION:  Full Code   ALLERGIES: Patient has no known allergies.    Per recent TCU provider progress notes:  89 year old female with hx of HTN, HLD, DM2, hypothyroidism, and paroxysmal atrial fibrillation hospitalized with right hip pain after fall, acute right femoral neck fracture with impaction and coxa vara angulation as well as possible transverse fracture through the proximal right fifth metatarsal bone, now s/p right hip unipolar hemiarthroplasty on November 7, 2020. ECHO 11/8 normal LV systolic function with EF of 65-70%, moderate-severe biatrial enlargement, 1-2+ tricuspid regurgitation, and borderline dilated ascending aorta.  [id number]-DOPh score was calculated at 5 and chronic anticoagulant therapy was indicated but patient did not want to start Coumadin and will discuss with PCP.      Today patient reports she is doing well, looking forward to discharge. No new complaints physically. Per EMR note weight down 9 lbs since admission. BP range 128-151/62-85 and HR . BG range 109-151 and sats 97% on room air.     DISCHARGE DIAGNOSIS/NURSING FACILITY COURSE:  Fall, subsequent  encounter  Closed right femoral neck fracture, status post unipolar right hip hemiarthroplasty   Physical deconditioning  Acute on chronic, pain managed well with tylenol and dilaudid PRN. Progressing in therapies - ready to discharge home next week with HC for PT, OT, RN, HHA and SW services. F/U ortho as planned - repeat xray today to be reviewed by ortho CORINNA Perez.     Acute blood loss anemia, secondary to surgery  Acute post op, improved last check. Monitor PRN.     Atrial fibrillation with RVR  Noted at hospital, on ASA and metoprolol, not interested in AC: f/u with PCP for further management.     Essential hypertension  Chronic, managed well. Continue lisinopril 20 mg p.o. daily, metoprolol 50 mg p.o. twice daily, and lisinopril-HCTZ 20-12.5 mg 1 tablet p.o. daily. VS, wt monitoring per routine.     Dyslipidemia  Chronic, stable. Continue PTA lovastatin 20 mg p.o. daily    Hypothyroidism  Chronic, managed with PTA levothyroxine 137 mcg p.o. daily. Monitor per PCP routine.     DM type II  Chronic, well managed. Continue PTA metformin 500 g p.o. twice daily, glipizide 2.5 mg p.o. daily in a.m., glipizide 10 mg p.o. nightly, and Januvia 100 mg p.o. daily. Monitor BG and f/u with ranges at PCP visit.      Hyponatremia  Noted at hospital, remains on HCTZ. Normal Na level at TCU. Monitor BMP periodically.     Slow transit constipation  By history- no issues today. Monitor.     Past Medical History:  has no past medical history on file.    Discharge Medications:  Current Outpatient Medications   Medication Sig Dispense Refill     acetaminophen (TYLENOL) 325 MG tablet Take 2 tablets (650 mg) by mouth every 6 hours as needed for mild pain       aspirin (ASA) 325 MG EC tablet Take 1 tablet (325 mg) by mouth daily 30 tablet 0     blood glucose (NO BRAND SPECIFIED) lancets standard Use to test blood sugar 2 times daily or as directed. 200 each 3     blood glucose (NO BRAND SPECIFIED) test strip Use to test blood sugars 2  times daily or as directed 200 strip 3     glipiZIDE (GLUCOTROL) 5 MG tablet TAKE 1/2 TABLET BY MOUTH EVERY MORNING AND 2 TABLETS BY MOUTH EVERY EVENING 225 tablet 3     HYDROmorphone (DILAUDID) 2 MG tablet Take 0.5-1 tablets (1-2 mg) by mouth every 4 hours as needed for moderate to severe pain (half tab for moderate pain, whole tab for severe pain) 20 tablet 0     levothyroxine (SYNTHROID/LEVOTHROID) 137 MCG tablet Take 1 tablet (137 mcg) by mouth daily (Patient taking differently: Take 137 mcg by mouth daily 205.5mg (1.5 x 137mcg) on Tuesdays and Saturdays. 137 mcg daily all other days of the week) 90 tablet 3     lisinopril (ZESTRIL) 20 MG tablet TAKE 1 TABLET(20 MG) BY MOUTH DAILY 90 tablet 3     lisinopril-hydrochlorothiazide (ZESTORETIC) 20-12.5 MG tablet TAKE 1 TABLET BY MOUTH EVERY MORNING 90 tablet 3     lovastatin (MEVACOR) 20 MG tablet Take 1 tablet (20 mg) by mouth daily 90 tablet 3     metFORMIN (GLUCOPHAGE) 500 MG tablet Take 1 tablet (500 mg) by mouth 2 times daily (with meals) 180 tablet 3     metoprolol tartrate (LOPRESSOR) 50 MG tablet TAKE 1 TABLET BY MOUTH TWICE DAILY. (Patient taking differently: Take 25 mg by mouth 2 times daily ) 180 tablet 3     ondansetron (ZOFRAN-ODT) 4 MG ODT tab Take 1 tablet (4 mg) by mouth every 6 hours as needed for nausea or vomiting 10 tablet 0     polyethylene glycol (MIRALAX) 17 g packet Take 17 g by mouth daily       senna-docusate (SENOKOT-S/PERICOLACE) 8.6-50 MG tablet Take 1 tablet by mouth 2 times daily as needed for constipation       sitagliptin (JANUVIA) 100 MG tablet Take 1 tablet (100 mg) by mouth daily 90 tablet 0     triamcinolone (KENALOG) 0.1 % cream Apply b.i.d. p.r.n. to dermatitis but not on the face 45 g 1       Medication Changes/Rationale:     none    Controlled medications sent with patient:   Medication Dilaudid 2 mg tabs #5 tabs electronically prescribed to patient's  pharmacy     ROS:   4 point ROS including Respiratory, CV, GI and , other  "than that noted in the HPI,  is negative    Physical Exam:   Vitals: BP (!) 150/85   Pulse 72   Temp 97.8  F (36.6  C)   Resp 16   Ht 1.753 m (5' 9\")   Wt 87.8 kg (193 lb 8 oz)   SpO2 97%   BMI 28.57 kg/m    BMI= Body mass index is 28.57 kg/m .  Exam is limited due to COVID-19 precautions  GENERAL APPEARANCE:  Alert, in no distress, cooperative  ENT:  Mouth normal, moist mucous membranes, normal hearing acuity  EYES:  Conjunctiva and lids normal  RESP:  no respiratory distress, on room air  CV: no LE edema  NEURO:   No facial asymmetry, speech clear  PSYCH:  oriented X 3, affect and mood normal     SNF labs:   Most Recent 3 CBC's:  Recent Labs   Lab Test 11/13/20  0000 11/09/20  0634 11/08/20  0632 11/07/20  0526 11/06/20  1925 03/11/20  1044   WBC  --   --   --  11.7* 15.6* 13.6*   HGB 11.4* 11.1* 12.0 13.2 13.4 14.6   MCV  --   --   --  91 91 91   PLT  --   --   --  190 186 208     Most Recent 3 BMP's:  Recent Labs   Lab Test 11/20/20  0743 11/13/20  0000 11/11/20  1107    131* 131*   POTASSIUM 3.8 3.4 3.4   CHLORIDE 99 97 97   CO2 27 27 29   BUN 8 12 12   CR 0.61 0.64 0.59   ANIONGAP 7 7 5   CHAVA 8.8 8.4* 8.7   * 167* 198*       DISCHARGE PLAN:    Follow up labs: No labs orders/due    Medical Follow Up:      Follow up with primary care provider in 2-3 weeks  Follow up with specialist Dr [facility] at [facility] in three weeks after discharge.     MTM referral needed and placed by this provider: No    Current [place] scheduled appointments:   No future appointments.     Discharge Services: Home Care:  Occupational Therapy, Physical Therapy, Registered Nurse, Home Health Aide,  and From:  [facility]    Discharge Instructions Verbalized to Patient at Discharge:     Monitor blood glucose monitoring 4 times a day. Keep a record and bring it to your next primary provider visit.     Discontinue daryl carrizales    TOTAL DISCHARGE TIME:   Greater than 30 minutes  Electronically signed by:  Damaris REDMAN" RAJIV Montanez CNP         Documentation of Face-to-Face and Certification for Home Health Services     Patient: Gavi Pearson   YOB: 1931  MR Number: [id number]  Today's Date: 12/1/2020    I certify that patient: Gavi Pearson is under my care and that I, or a nurse practitioner or physician's assistant working with me, had a face-to-face encounter that meets the physician face-to-face encounter requirements with this patient on: 11/25/2020.    This encounter with the patient was in whole, or in part, for the following medical condition, which is the primary reason for home health care: weakness, pain.    I certify that, based on my findings, the following services are medically necessary home health services: Nursing, Occupational Therapy, Physical Therapy, Social Work and HHA.    My clinical findings support the need for the above services because: Nurse is needed: To assess status, vs, pain after changes in medications or other medical regimen, Occupational Therapy Services are needed to assess and treat cognitive ability and address ADL safety due to impairment in self care ability, Physical Therapy Services are needed to assess and treat the following functional impairments: weakness and debility and HHA for bathing.  SW to assist with coordinating services    Further, I certify that my clinical findings support that this patient is homebound (i.e. absences from home require considerable and taxing effort and are for medical reasons or Confucianism services or infrequently or of short duration when for other reasons) because: Requires assistance of another person or specialized equipment to access medical services because patient: Is unable to exit home safely on own due to: weakness and pain...    Based on the above findings. I certify that this patient is confined to the home and needs intermittent skilled nursing care, physical therapy and/or speech therapy.  The patient is under my care, and I have  initiated the establishment of the plan of care.  This patient will be followed by a physician who will periodically review the plan of care.  Physician/Provider to provide follow up care: Aleks Maher    Responsible Medicare certified PECOS Physician: RAJIV Babin CNP   Physician Signature: See electronic signature associated with these discharge orders.  Date: 12/1/2020

## 2020-11-26 LAB
SARS-COV-2 RNA SPEC QL NAA+PROBE: NOT DETECTED
SPECIMEN SOURCE: NORMAL

## 2020-11-27 ENCOUNTER — TELEPHONE (OUTPATIENT)
Dept: GERIATRICS | Facility: CLINIC | Age: 85
End: 2020-11-27

## 2020-11-27 RX ORDER — METOPROLOL TARTRATE 50 MG
TABLET ORAL
Qty: 60 TABLET | Refills: 0 | Status: SHIPPED | OUTPATIENT
Start: 2020-11-27 | End: 2021-02-03

## 2020-11-27 NOTE — TELEPHONE ENCOUNTER
Ortho consult:    Gavi Pearson is a 89 year old female who resides at [facility]    Patient has x-rays of right hip S/P Matteo-Arthroplasty now 3 weeks out from Hip fracture:      No past medical history on file.   Past Surgical History:   Procedure Laterality Date     OPEN REDUCTION INTERNAL FIXATION HIP BIPOLAR Right 11/7/2020    Procedure: RIGHT HIP CEMENTED UNIPOLAR HEMIARTHROPLASTY;  Surgeon: Enrico Ledesma MD;  Location:  OR        No Known Allergies   There were no vitals taken for this visit.           X-rays show : cemented Matteo-arthroplasty in good position and alignment, will continue to monitor progress, and have patient F/U with Surgeon in 3 weeks/            [facility]  [phone] Cell

## 2020-12-02 ENCOUNTER — HOSPITAL LABORATORY (OUTPATIENT)
Dept: OTHER | Facility: CLINIC | Age: 85
End: 2020-12-02

## 2020-12-03 LAB
SARS-COV-2 RNA SPEC QL NAA+PROBE: NOT DETECTED
SPECIMEN SOURCE: NORMAL

## 2020-12-05 ENCOUNTER — NURSE TRIAGE (OUTPATIENT)
Dept: NURSING | Facility: CLINIC | Age: 85
End: 2020-12-05

## 2020-12-05 NOTE — TELEPHONE ENCOUNTER
Home care [facility] requesting placement of order;    1)  Requesting delay in  start of care until 12/06/20  for skilled  nursing PT OT home health aid and SW    Advised to proceed per standing order FNA   Will Route to PCP to place and signs order   Lynda Benitez RN  FNA       Reason for Disposition    [1] Caller requesting NON-URGENT health information AND [2] PCP's office is the best resource    Protocols used: INFORMATION ONLY CALL-A-AH

## 2020-12-07 ENCOUNTER — TELEPHONE (OUTPATIENT)
Dept: FAMILY MEDICINE | Facility: CLINIC | Age: 85
End: 2020-12-07

## 2020-12-07 NOTE — TELEPHONE ENCOUNTER
"ED / Discharge Outreach Protocol    Patient Contact    Attempt # 1    Was call answered?  Yes.  \"May I please speak with <Gavi>\"  Is patient available?   Yes    ED/Discharge Protocol    \"Hi, my name is Temi George RN, a registered nurse, and I am calling on behalf of Dr. Maher's office at [place].  I am calling to follow up and see how things are going for you after your recent visit.\"    \"I see that you were in the (ER/UC/IP) on 11/6-11/11.    How are you doing now that you are home?\" trying to get HHA help. Nurse cam out on Sunday - did an assessment     Is patient experiencing symptoms that may require a hospital visit?  No     Discharge Instructions    \"Let's review your discharge instructions.  What is/are the follow-up recommendations?  Pt. Response: [facility] is going to come out to help her     \"Were you instructed to make a follow-up appointment?\"  Pt. Response: Yes.  Has appointment been made?   No.  \"Can I help you schedule that appointment?\" yes - ride is difficult but will try to find a ride, if she cannot then she will call back to reschedule       \"When you see the provider, I would recommend that you bring your discharge instructions with you.    Medications    \"How many new medications are you on since your hospitalization/ED visit?\"    0-1  \"How many of your current medicines changed (dose, timing, name, etc.) while you were in the hospital/ED visit?\"   0-1  \"Do you have questions about your medications?\"   No  \"Were you newly diagnosed with heart failure, COPD, diabetes or did you have a heart attack?\"   No  For patients on insulin: \"Did you start on insulin in the hospital or did you have your insulin dose changed?\"   No  Post Discharge Medication Reconciliation Status: discharge medications reconciled, continue medications without change.    Was MTM referral placed (*Make sure to put transitions as reason for referral)?   No    Call Summary    \"Do you have any questions or concerns about " "your condition or care plan at the moment?\"    No  Triage nurse advice given    Patient was in ER 1x in the past year (assess appropriateness of ER visits.)      \"If you have questions or things don't continue to improve, we encourage you contact us through the main clinic number,  ([phone])  .  Even if the clinic is not open, triage nurses are available 24/7 to help you.     We would like you to know that our clinic has extended hours (provide information).  We also have urgent care (provide details on closest location and hours/contact info)\"      \"Thank you for your time and take care!\"    Temi THOMPSON RN    "

## 2020-12-08 ENCOUNTER — OFFICE VISIT (OUTPATIENT)
Dept: FAMILY MEDICINE | Facility: CLINIC | Age: 85
End: 2020-12-08
Payer: MEDICARE

## 2020-12-08 ENCOUNTER — TELEPHONE (OUTPATIENT)
Dept: FAMILY MEDICINE | Facility: CLINIC | Age: 85
End: 2020-12-08

## 2020-12-08 DIAGNOSIS — E03.4 HYPOTHYROIDISM DUE TO ACQUIRED ATROPHY OF THYROID: ICD-10-CM

## 2020-12-08 DIAGNOSIS — S72.001A CLOSED DISPLACED FRACTURE OF RIGHT FEMORAL NECK (H): Primary | ICD-10-CM

## 2020-12-08 DIAGNOSIS — L30.9 DERMATITIS: ICD-10-CM

## 2020-12-08 DIAGNOSIS — E53.8 VITAMIN B12 DEFICIENCY (NON ANEMIC): ICD-10-CM

## 2020-12-08 DIAGNOSIS — E11.9 TYPE 2 DIABETES MELLITUS WITHOUT COMPLICATION, WITHOUT LONG-TERM CURRENT USE OF INSULIN (H): ICD-10-CM

## 2020-12-08 DIAGNOSIS — G63 POLYNEUROPATHY ASSOCIATED WITH UNDERLYING DISEASE (H): ICD-10-CM

## 2020-12-08 DIAGNOSIS — E78.01 FAMILIAL HYPERCHOLESTEROLEMIA: ICD-10-CM

## 2020-12-08 DIAGNOSIS — I48.0 PAROXYSMAL A-FIB (H): ICD-10-CM

## 2020-12-08 DIAGNOSIS — E03.9 ACQUIRED HYPOTHYROIDISM: ICD-10-CM

## 2020-12-08 LAB
ERYTHROCYTE [DISTWIDTH] IN BLOOD BY AUTOMATED COUNT: 12.8 % (ref 10–15)
HBA1C MFR BLD: 6.8 % (ref 0–5.6)
HCT VFR BLD AUTO: 37.1 % (ref 35–47)
HGB BLD-MCNC: 12.4 G/DL (ref 11.7–15.7)
MCH RBC QN AUTO: 31.2 PG (ref 26.5–33)
MCHC RBC AUTO-ENTMCNC: 33.4 G/DL (ref 31.5–36.5)
MCV RBC AUTO: 93 FL (ref 78–100)
PLATELET # BLD AUTO: 262 10E9/L (ref 150–450)
RBC # BLD AUTO: 3.98 10E12/L (ref 3.8–5.2)
WBC # BLD AUTO: 11.7 10E9/L (ref 4–11)

## 2020-12-08 PROCEDURE — 80048 BASIC METABOLIC PNL TOTAL CA: CPT | Performed by: INTERNAL MEDICINE

## 2020-12-08 PROCEDURE — 83036 HEMOGLOBIN GLYCOSYLATED A1C: CPT | Performed by: INTERNAL MEDICINE

## 2020-12-08 PROCEDURE — 84439 ASSAY OF FREE THYROXINE: CPT | Performed by: INTERNAL MEDICINE

## 2020-12-08 PROCEDURE — 99214 OFFICE O/P EST MOD 30 MIN: CPT | Performed by: INTERNAL MEDICINE

## 2020-12-08 PROCEDURE — 85027 COMPLETE CBC AUTOMATED: CPT | Performed by: INTERNAL MEDICINE

## 2020-12-08 PROCEDURE — 36415 COLL VENOUS BLD VENIPUNCTURE: CPT | Performed by: INTERNAL MEDICINE

## 2020-12-08 PROCEDURE — 84443 ASSAY THYROID STIM HORMONE: CPT | Performed by: INTERNAL MEDICINE

## 2020-12-08 ASSESSMENT — MIFFLIN-ST. JEOR: SCORE: 1373.89

## 2020-12-08 NOTE — PROGRESS NOTES
"Subjective     Gavi Pearson is a 89 year old female who presents to clinic today for the following health issues:    HPI           Hospital Follow-up Visit:    Hospital/Nursing Home/IP Rehab Facility: [facility]  Date of Admission: 11/06/2020  Date of Discharge: 11/11/2020  Reason(s) for Admission:       Right femoral neck fracture S/P Rt hip arthoplasty   Mechanical fall  S/p R hip arthroplasty on 11/7      Was your hospitalization related to COVID-19? No   Problems taking medications regularly:  None  Medication changes since discharge: None  Problems adhering to non-medication therapy:  None    Summary of hospitalization:  [facility] discharge summary reviewed  Diagnostic Tests/Treatments reviewed.  Follow up needed: none  Other Healthcare Providers Involved in Patient s Care:         None  Update since discharge: improved. Post Discharge Medication Reconciliation: discharge medications reconciled, continue medications without change.  Plan of care communicated with patient          Patient transferred to TCU after her fractured hip surgery.  She continues to work on walking with her walker, [facility]  AODM  Her weight is stable eating preprepared meals from [employer]  Fasting a.m. blood sugar 119    Review of Systems   Constitutional, HEENT, cardiovascular, pulmonary, gi and gu systems are negative, except as otherwise noted.      Objective    There were no vitals taken for this visit.  There is no height or weight on file to calculate BMI.  Physical Exam BP (!) 157/92 (BP Location: Left arm, Patient Position: Sitting, Cuff Size: Adult Regular)   Pulse 117   Temp 96.8  F (36  C) (Temporal)   Ht 1.753 m (5' 9\")   Wt 88.5 kg (195 lb)   SpO2 97%   BMI 28.80 kg/m    /80  GENERAL: healthy, alert and no distress  NECK: no adenopathy, no asymmetry, masses, or scars and thyroid normal to palpation  RESP: lungs clear to auscultation - no rales, rhonchi or wheezes  CV: Irregularly irregular " "rhythm, normal S1 S2, no S3 or S4, no murmur, click or rub and peripheral pulses strong  ABDOMEN: soft, nontender, no hepatosplenomegaly, no masses and bowel sounds normal  MS: no gross musculoskeletal defects noted, 1+ pretibial edema            Assessment & Plan     Closed displaced fracture of right femoral neck (H)    - CBC with platelets  - Basic metabolic panel  - T4 free    Type 2 diabetes mellitus without complication, without long-term current use of insulin (H)    - Hemoglobin A1c    Paroxysmal A-fib (H)      Polyneuropathy associated with underlying disease (H)      Vitamin B12 deficiency (non anemic)      Familial hypercholesterolemia      Acquired hypothyroidism      Hypothyroidism due to acquired atrophy of thyroid    - TSH with free T4 reflex    Dermatitis         BMI:   Estimated body mass index is 28.8 kg/m  as calculated from the following:    Height as of this encounter: 1.753 m (5' 9\").    Weight as of this encounter: 88.5 kg (195 lb).            FUTURE APPOINTMENTS:3 mo    No follow-ups on file.    Aleks Maher MD  [facility]    "

## 2020-12-08 NOTE — TELEPHONE ENCOUNTER
Add more Home care Order: [place]     *physical therapy 1 time a week for 1 week   And 2 time a week for 3 week   [place] training: Fall Prevention - physical therapy exercises    Call back Number:[phone]

## 2020-12-08 NOTE — TELEPHONE ENCOUNTER
Called [place] with [facility] to provide verbal orders for PT, OT, Speech, HHA, and SN as requested    Zane REDD RN

## 2020-12-08 NOTE — TELEPHONE ENCOUNTER
General Call:     Who is calling:  [email or web address]    Reason for Call:  Li is requesting the following orders for start of care for Home Care:    Skilled nursing once a week for 6 weeks.  Home health aid for bathing assist 1 time a week for 4 weeks.  PT/OT/Speach therapy - all do their own evaluation and treatment.    She also wants the Dr to be aware that Gavi only checks blood sugars as needed, not 4x a day.    Okay to leave a detailed message:Yes at Other phone number:  [phone] or [phone]*

## 2020-12-09 ENCOUNTER — TELEPHONE (OUTPATIENT)
Dept: FAMILY MEDICINE | Facility: CLINIC | Age: 85
End: 2020-12-09

## 2020-12-09 LAB
ANION GAP SERPL CALCULATED.3IONS-SCNC: 10 MMOL/L (ref 3–14)
BUN SERPL-MCNC: 15 MG/DL (ref 7–30)
CALCIUM SERPL-MCNC: 9 MG/DL (ref 8.5–10.1)
CHLORIDE SERPL-SCNC: 95 MMOL/L (ref 94–109)
CO2 SERPL-SCNC: 23 MMOL/L (ref 20–32)
CREAT SERPL-MCNC: 0.76 MG/DL (ref 0.52–1.04)
GFR SERPL CREATININE-BSD FRML MDRD: 70 ML/MIN/{1.73_M2}
GLUCOSE SERPL-MCNC: 155 MG/DL (ref 70–99)
POTASSIUM SERPL-SCNC: 3.8 MMOL/L (ref 3.4–5.3)
SODIUM SERPL-SCNC: 128 MMOL/L (ref 133–144)
T4 FREE SERPL-MCNC: 1.56 NG/DL (ref 0.76–1.46)
TSH SERPL DL<=0.005 MIU/L-ACNC: 9.64 MU/L (ref 0.4–4)

## 2020-12-09 NOTE — TELEPHONE ENCOUNTER
Reason for Call:  Other     Detailed comments: Therapist [facility] called need order for OT 1 X WEEK FOR 3 WEEKS    Phone Number Patient can be reached at: Other phone number:  [phone]    Best Time: anytime    Can we leave a detailed message on this number? YES    Call taken on 12/9/2020 at 3:56 PM by Ghazala Valiente

## 2020-12-14 ENCOUNTER — TRANSFERRED RECORDS (OUTPATIENT)
Dept: HEALTH INFORMATION MANAGEMENT | Facility: CLINIC | Age: 85
End: 2020-12-14

## 2020-12-18 ENCOUNTER — MEDICAL CORRESPONDENCE (OUTPATIENT)
Dept: HEALTH INFORMATION MANAGEMENT | Facility: CLINIC | Age: 85
End: 2020-12-18

## 2020-12-21 ENCOUNTER — PATIENT OUTREACH (OUTPATIENT)
Dept: NURSING | Facility: CLINIC | Age: 85
End: 2020-12-21
Payer: MEDICARE

## 2020-12-21 SDOH — SOCIAL STABILITY: SOCIAL NETWORK: HOW OFTEN DO YOU GET TOGETHER WITH FRIENDS OR RELATIVES?: TWICE A WEEK

## 2020-12-21 SDOH — ECONOMIC STABILITY: INCOME INSECURITY: HOW HARD IS IT FOR YOU TO PAY FOR THE VERY BASICS LIKE FOOD, HOUSING, MEDICAL CARE, AND HEATING?: NOT HARD AT ALL

## 2020-12-21 SDOH — SOCIAL STABILITY: SOCIAL NETWORK: IN A TYPICAL WEEK, HOW MANY TIMES DO YOU TALK ON THE PHONE WITH FAMILY, FRIENDS, OR NEIGHBORS?: TWICE A WEEK

## 2020-12-21 SDOH — ECONOMIC STABILITY: TRANSPORTATION INSECURITY
IN THE PAST 12 MONTHS, HAS LACK OF TRANSPORTATION KEPT YOU FROM MEETINGS, WORK, OR FROM GETTING THINGS NEEDED FOR DAILY LIVING?: NO

## 2020-12-21 SDOH — ECONOMIC STABILITY: TRANSPORTATION INSECURITY
IN THE PAST 12 MONTHS, HAS THE LACK OF TRANSPORTATION KEPT YOU FROM MEDICAL APPOINTMENTS OR FROM GETTING MEDICATIONS?: NO

## 2020-12-21 SDOH — HEALTH STABILITY: MENTAL HEALTH
STRESS IS WHEN SOMEONE FEELS TENSE, NERVOUS, ANXIOUS, OR CAN'T SLEEP AT NIGHT BECAUSE THEIR MIND IS TROUBLED. HOW STRESSED ARE YOU?: NOT AT ALL

## 2020-12-21 SDOH — ECONOMIC STABILITY: FOOD INSECURITY: WITHIN THE PAST 12 MONTHS, THE FOOD YOU BOUGHT JUST DIDN'T LAST AND YOU DIDN'T HAVE MONEY TO GET MORE.: NEVER TRUE

## 2020-12-21 SDOH — ECONOMIC STABILITY: FOOD INSECURITY: WITHIN THE PAST 12 MONTHS, YOU WORRIED THAT YOUR FOOD WOULD RUN OUT BEFORE YOU GOT MONEY TO BUY MORE.: NEVER TRUE

## 2020-12-21 SDOH — HEALTH STABILITY: PHYSICAL HEALTH: ON AVERAGE, HOW MANY MINUTES DO YOU ENGAGE IN EXERCISE AT THIS LEVEL?: 0 MIN

## 2020-12-21 SDOH — HEALTH STABILITY: PHYSICAL HEALTH: ON AVERAGE, HOW MANY DAYS PER WEEK DO YOU ENGAGE IN MODERATE TO STRENUOUS EXERCISE (LIKE A BRISK WALK)?: 0 DAYS

## 2020-12-21 NOTE — PROGRESS NOTES
Clinic Care Coordination Contact    Clinic Care Coordination Contact  OUTREACH    Referral Information:  Referral Source: IP Report    Primary Diagnosis: Orthopedic    Chief Complaint   Patient presents with     Clinic Care Coordination - Follow-up     Clinic Care Coordination RN        Universal Utilization:   [facility]  Hospitalist Discharge Summary       Date of Admission:  11/6/2020  Date of Discharge:  11/11/2020  1:20 PM  Discharging Provider: Jessica Naranjo MD      Discharge Diagnoses     Right femoral neck fracture S/P Rt hip arthoplasty   Mechanical fall  S/p R hip arthroplasty on 11/7  Clinic Utilization  Difficulty keeping appointments:: No  Compliance Concerns: No  No-Show Concerns: No  No PCP office visit in Past Year: No  Utilization    Last refreshed: 12/16/2020  4:32 AM: Hospital Admissions 1           Last refreshed: 12/16/2020  4:32 AM: ED Visits 1           Last refreshed: 12/16/2020  4:32 AM: No Show Count (past year) 0              Current as of: 12/16/2020  4:32 AM              Clinical Concerns:  Current Medical Concerns:  Patient reports she has 1 more week of [facility].  Patient is getting around with a walker and plans to do her own HC PT exercises to keep her strength up.  Patient has a Niece and Nephew who live close by and they check on her often.  Patient has an adjustable recliner and a new mobile phone that she carries with her at all times for emergencies     Current Behavioral Concerns: Not discussed     Education Provided to patient: CC available in the future for questions or concerns    Pain  Pain (GOAL):: No  Health Maintenance Reviewed: Not assessed  Clinical Pathway: None    Medication Management:  Not discussed      Functional Status:  Dependent ADLs:: Ambulation-walker  Mobility Status: Independent w/Device  Fallen 2 or more times in the past year?: Yes  Any fall with injury in the past year?: Yes    Living Situation:  Current living  arrangement:: I live in a private home    Lifestyle & Psychosocial Needs:  Lifestyle     Physical activity     Days per week: 0 days     Minutes per session: 0 min     Stress: Not at all     Social Needs     Financial resource strain: Not hard at all     Food insecurity     Worry: Never true     Inability: Never true     Transportation needs     Medical: No     Non-medical: No     Diet:: Regular  Inadequate nutrition (GOAL):: No  Tube Feeding: No  Inadequate activity/exercise (GOAL):: No  Significant changes in sleep pattern (GOAL): No  Transportation means:: Family     Catholic or spiritual beliefs that impact treatment:: No  Mental health DX:: No  Mental health management concern (GOAL):: No  Chemical Dependency Status: No Current Concerns  Informal Support system:: Friends   Socioeconomic History     Marital status: Single     Spouse name: Not on file     Number of children: Not on file     Years of education: Not on file     Highest education level: Not on file   Relationships     Social connections     Talks on phone: Twice a week     Gets together: Twice a week     Attends Yazidi service: Not on file     Active member of club or organization: Not on file     Attends meetings of clubs or organizations: Not on file     Relationship status: Not on file     Intimate partner violence     Fear of current or ex partner: Not on file     Emotionally abused: Not on file     Physically abused: Not on file     Forced sexual activity: Not on file     Tobacco Use     Smoking status: Never Smoker     Smokeless tobacco: Never Used   Substance and Sexual Activity     Alcohol use: Yes     Alcohol/week: 0.0 standard drinks     Comment: occ     Drug use: No     Sexual activity: Not Currently               Resources and Interventions:  Current Resources:      Community Resources: None  Supplies used at home:: None  Equipment Currently Used at Home: walker, rolling  Employment Status: retired)    Advance Care  Plan/Directive  Advanced Care Plans/Directives on file:: No    Referrals Placed: None       Plan:   No unmet needs, no further care coordination needed at this time     [facility]     Anitha Abrams  RN Care Coordinator   [facility]   Phone: [phone]  Email :  [email or web address]

## 2020-12-29 ENCOUNTER — TELEPHONE (OUTPATIENT)
Dept: FAMILY MEDICINE | Facility: CLINIC | Age: 85
End: 2020-12-29

## 2020-12-29 NOTE — TELEPHONE ENCOUNTER
Francheska from [facility] [phone] called for verbal orders to:  To discontinue HHA. She now has private day care providers    Provided verbal orders    Zane REDD RN

## 2021-01-04 VITALS
SYSTOLIC BLOOD PRESSURE: 134 MMHG | HEART RATE: 117 BPM | HEIGHT: 69 IN | OXYGEN SATURATION: 97 % | TEMPERATURE: 96.8 F | DIASTOLIC BLOOD PRESSURE: 80 MMHG | BODY MASS INDEX: 28.88 KG/M2 | WEIGHT: 195 LBS

## 2021-01-15 ENCOUNTER — MEDICAL CORRESPONDENCE (OUTPATIENT)
Dept: HEALTH INFORMATION MANAGEMENT | Facility: CLINIC | Age: 86
End: 2021-01-15

## 2021-02-03 ENCOUNTER — OFFICE VISIT (OUTPATIENT)
Dept: FAMILY MEDICINE | Facility: CLINIC | Age: 86
End: 2021-02-03
Payer: MEDICARE

## 2021-02-03 VITALS
BODY MASS INDEX: 27.55 KG/M2 | DIASTOLIC BLOOD PRESSURE: 67 MMHG | OXYGEN SATURATION: 97 % | SYSTOLIC BLOOD PRESSURE: 160 MMHG | WEIGHT: 186 LBS | TEMPERATURE: 97.3 F | HEIGHT: 69 IN

## 2021-02-03 DIAGNOSIS — I10 BENIGN ESSENTIAL HYPERTENSION: ICD-10-CM

## 2021-02-03 DIAGNOSIS — E78.5 HYPERLIPIDEMIA LDL GOAL <100: ICD-10-CM

## 2021-02-03 DIAGNOSIS — E03.4 HYPOTHYROIDISM DUE TO ACQUIRED ATROPHY OF THYROID: ICD-10-CM

## 2021-02-03 DIAGNOSIS — E11.9 TYPE 2 DIABETES MELLITUS WITHOUT COMPLICATION, WITHOUT LONG-TERM CURRENT USE OF INSULIN (H): ICD-10-CM

## 2021-02-03 LAB
BASOPHILS # BLD AUTO: 0 10E9/L (ref 0–0.2)
BASOPHILS NFR BLD AUTO: 0.3 %
DIFFERENTIAL METHOD BLD: ABNORMAL
EOSINOPHIL # BLD AUTO: 0.2 10E9/L (ref 0–0.7)
EOSINOPHIL NFR BLD AUTO: 1.8 %
ERYTHROCYTE [DISTWIDTH] IN BLOOD BY AUTOMATED COUNT: 13.1 % (ref 10–15)
HCT VFR BLD AUTO: 41.5 % (ref 35–47)
HGB BLD-MCNC: 13.9 G/DL (ref 11.7–15.7)
LYMPHOCYTES # BLD AUTO: 2.7 10E9/L (ref 0.8–5.3)
LYMPHOCYTES NFR BLD AUTO: 23.2 %
MCH RBC QN AUTO: 30.8 PG (ref 26.5–33)
MCHC RBC AUTO-ENTMCNC: 33.5 G/DL (ref 31.5–36.5)
MCV RBC AUTO: 92 FL (ref 78–100)
MONOCYTES # BLD AUTO: 1.2 10E9/L (ref 0–1.3)
MONOCYTES NFR BLD AUTO: 10.3 %
NEUTROPHILS # BLD AUTO: 7.4 10E9/L (ref 1.6–8.3)
NEUTROPHILS NFR BLD AUTO: 64.4 %
PLATELET # BLD AUTO: 223 10E9/L (ref 150–450)
RBC # BLD AUTO: 4.51 10E12/L (ref 3.8–5.2)
WBC # BLD AUTO: 11.4 10E9/L (ref 4–11)

## 2021-02-03 PROCEDURE — 85025 COMPLETE CBC W/AUTO DIFF WBC: CPT | Performed by: INTERNAL MEDICINE

## 2021-02-03 PROCEDURE — 99214 OFFICE O/P EST MOD 30 MIN: CPT | Performed by: INTERNAL MEDICINE

## 2021-02-03 PROCEDURE — 84443 ASSAY THYROID STIM HORMONE: CPT | Performed by: INTERNAL MEDICINE

## 2021-02-03 PROCEDURE — 80053 COMPREHEN METABOLIC PANEL: CPT | Performed by: INTERNAL MEDICINE

## 2021-02-03 PROCEDURE — 36415 COLL VENOUS BLD VENIPUNCTURE: CPT | Performed by: INTERNAL MEDICINE

## 2021-02-03 RX ORDER — METOPROLOL TARTRATE 50 MG
50 TABLET ORAL 2 TIMES DAILY
Qty: 60 TABLET | Refills: 0 | Status: SHIPPED | OUTPATIENT
Start: 2021-02-03 | End: 2021-02-05

## 2021-02-03 RX ORDER — LOVASTATIN 20 MG
20 TABLET ORAL DAILY
Qty: 90 TABLET | Refills: 3 | Status: SHIPPED | OUTPATIENT
Start: 2021-02-03 | End: 2022-02-03

## 2021-02-03 RX ORDER — LEVOTHYROXINE SODIUM 137 UG/1
137 TABLET ORAL DAILY
Qty: 30 TABLET | Refills: 0 | Status: SHIPPED | OUTPATIENT
Start: 2021-02-03 | End: 2021-02-24

## 2021-02-03 ASSESSMENT — MIFFLIN-ST. JEOR: SCORE: 1333.07

## 2021-02-03 NOTE — LETTER
[facility]  [address]  [address]  [place]  [address]  Tel: [phone]    February 5, 2021    Gavi MINI Lili  [address]  [place] [address]        Dear Ms. Pearson,    Your lab tests are actually slightly improved.  Your sodium is back up to its usual levels.  Continue to reduce the amount of sweets and starches that you eat as your blood glucose continues to be elevated.     We will check your lipid panel in the near future when you return fasting.       Best regards    Kane Wick MD/SML      Enclosure: Lab Results  Results for orders placed or performed in visit on 02/03/21   TSH with free T4 reflex     Status: None   Result Value Ref Range    TSH 2.10 0.40 - 4.00 mU/L   Comprehensive metabolic panel (BMP + Alb, Alk Phos, ALT, AST, Total. Bili, TP)     Status: Abnormal   Result Value Ref Range    Sodium 131 (L) 133 - 144 mmol/L    Potassium 3.8 3.4 - 5.3 mmol/L    Chloride 100 94 - 109 mmol/L    Carbon Dioxide 24 20 - 32 mmol/L    Anion Gap 7 3 - 14 mmol/L    Glucose 168 (H) 70 - 99 mg/dL    Urea Nitrogen 14 7 - 30 mg/dL    Creatinine 0.60 0.52 - 1.04 mg/dL    GFR Estimate 80 >60 mL/min/[1.73_m2]    GFR Estimate If Black >90 >60 mL/min/[1.73_m2]    Calcium 9.7 8.5 - 10.1 mg/dL    Bilirubin Total 0.5 0.2 - 1.3 mg/dL    Albumin 3.8 3.4 - 5.0 g/dL    Protein Total 8.1 6.8 - 8.8 g/dL    Alkaline Phosphatase 102 40 - 150 U/L    ALT 15 0 - 50 U/L    AST 18 0 - 45 U/L   CBC with platelets and differential     Status: Abnormal   Result Value Ref Range    WBC 11.4 (H) 4.0 - 11.0 10e9/L    RBC Count 4.51 3.8 - 5.2 10e12/L    Hemoglobin 13.9 11.7 - 15.7 g/dL    Hematocrit 41.5 35.0 - 47.0 %    MCV 92 78 - 100 fl    MCH 30.8 26.5 - 33.0 pg    MCHC 33.5 31.5 - 36.5 g/dL    RDW 13.1 10.0 - 15.0 %    Platelet Count 223 150 - 450 10e9/L    % Neutrophils 64.4 %    % Lymphocytes 23.2 %    % Monocytes 10.3 %    % Eosinophils 1.8 %    % Basophils 0.3 %    Absolute Neutrophil 7.4 1.6 - 8.3 10e9/L     Absolute Lymphocytes 2.7 0.8 - 5.3 10e9/L    Absolute Monocytes 1.2 0.0 - 1.3 10e9/L    Absolute Eosinophils 0.2 0.0 - 0.7 10e9/L    Absolute Basophils 0.0 0.0 - 0.2 10e9/L    Diff Method Automated Method

## 2021-02-03 NOTE — PATIENT INSTRUCTIONS
Very nice to meet you.  I would like to evaluate some lab work today to look at your cholesterol and thyroid levels.    I appreciate your understanding.    I would like to see you back in 3 months  Best regards  Kane

## 2021-02-03 NOTE — PROGRESS NOTES
"  Assessment & Plan     Hyperlipidemia LDL goal <100  Patient currently on lovastatin and tolerating the medication without any side effects  - lovastatin (MEVACOR) 20 MG tablet; Take 1 tablet (20 mg) by mouth daily    Benign essential hypertension  Current blood pressure control is good  - metoprolol tartrate (LOPRESSOR) 50 MG tablet; Take 1 tablet (50 mg) by mouth 2 times daily    Hypothyroidism due to acquired atrophy of thyroid  Recent thyroid testing is confusing she has slight elevation of both TSH and T4.  - levothyroxine (SYNTHROID/LEVOTHROID) 137 MCG tablet; Take 1 tablet (137 mcg) by mouth daily  - TSH with free T4 reflex    Type 2 diabetes mellitus without complication, without long-term current use of insulin (H)  We will refill her Januvia.  No abdominal pain with Januvia.  - sitagliptin (JANUVIA) 100 MG tablet; Take 1 tablet (100 mg) by mouth daily  - Lipid panel reflex to direct LDL Fasting; Future  - Comprehensive metabolic panel (BMP + Alb, Alk Phos, ALT, AST, Total. Bili, TP)  - CBC with platelets and differential    It should be noted that this patient has paroxysmal atrial fibrillation.  She refuses anticoagulation.  She is aware that atrial fibrillation can lead to stroke and heart failure.  The patient is not interested in taking an anticoagulant.  She is afraid of side effects and bleeding.  She is taking 1 325 mg aspirin nightly.  I discussed the risk factor and the yearly risk of stroke.  She understands that her yearly stroke risk is likely greater than 10%.  Despite this she again refuses anticoagulation other than aspirin.                       BMI:   Estimated body mass index is 27.47 kg/m  as calculated from the following:    Height as of this encounter: 1.753 m (5' 9\").    Weight as of this encounter: 84.4 kg (186 lb).             Return in about 3 months (around 5/3/2021).    Kane Wick MD  [facility] [place] is a 89 year old who presents to " clinic today for the following health issues     HPI       New Patient/Transfer of Care - due for several refills. Former [facility] patient.        Review of Systems         Objective    There were no vitals taken for this visit.  There is no height or weight on file to calculate BMI.  Physical Exam

## 2021-02-04 LAB
ALBUMIN SERPL-MCNC: 3.8 G/DL (ref 3.4–5)
ALP SERPL-CCNC: 102 U/L (ref 40–150)
ALT SERPL W P-5'-P-CCNC: 15 U/L (ref 0–50)
ANION GAP SERPL CALCULATED.3IONS-SCNC: 7 MMOL/L (ref 3–14)
AST SERPL W P-5'-P-CCNC: 18 U/L (ref 0–45)
BILIRUB SERPL-MCNC: 0.5 MG/DL (ref 0.2–1.3)
BUN SERPL-MCNC: 14 MG/DL (ref 7–30)
CALCIUM SERPL-MCNC: 9.7 MG/DL (ref 8.5–10.1)
CHLORIDE SERPL-SCNC: 100 MMOL/L (ref 94–109)
CO2 SERPL-SCNC: 24 MMOL/L (ref 20–32)
CREAT SERPL-MCNC: 0.6 MG/DL (ref 0.52–1.04)
GFR SERPL CREATININE-BSD FRML MDRD: 80 ML/MIN/{1.73_M2}
GLUCOSE SERPL-MCNC: 168 MG/DL (ref 70–99)
POTASSIUM SERPL-SCNC: 3.8 MMOL/L (ref 3.4–5.3)
PROT SERPL-MCNC: 8.1 G/DL (ref 6.8–8.8)
SODIUM SERPL-SCNC: 131 MMOL/L (ref 133–144)
TSH SERPL DL<=0.005 MIU/L-ACNC: 2.1 MU/L (ref 0.4–4)

## 2021-02-05 RX ORDER — METOPROLOL TARTRATE 50 MG
TABLET ORAL
Qty: 180 TABLET | Refills: 3 | Status: SHIPPED | OUTPATIENT
Start: 2021-02-05 | End: 2021-02-24

## 2021-02-10 ENCOUNTER — IMMUNIZATION (OUTPATIENT)
Dept: NURSING | Facility: CLINIC | Age: 86
End: 2021-02-10
Payer: MEDICARE

## 2021-02-10 PROCEDURE — 91300 PR COVID VAC PFIZER DIL RECON 30 MCG/0.3 ML IM: CPT

## 2021-02-10 PROCEDURE — 0001A PR COVID VAC PFIZER DIL RECON 30 MCG/0.3 ML IM: CPT

## 2021-02-23 DIAGNOSIS — I10 BENIGN ESSENTIAL HYPERTENSION: ICD-10-CM

## 2021-02-23 DIAGNOSIS — E03.4 HYPOTHYROIDISM DUE TO ACQUIRED ATROPHY OF THYROID: ICD-10-CM

## 2021-02-24 RX ORDER — LEVOTHYROXINE SODIUM 137 UG/1
137 TABLET ORAL DAILY
Qty: 90 TABLET | Refills: 0 | Status: SHIPPED | OUTPATIENT
Start: 2021-02-24 | End: 2021-05-26

## 2021-02-24 RX ORDER — METOPROLOL TARTRATE 50 MG
TABLET ORAL
Qty: 180 TABLET | Refills: 0 | OUTPATIENT
Start: 2021-02-24

## 2021-02-24 NOTE — TELEPHONE ENCOUNTER
LOV 2-3-2021 [place]    Next 5 appointments (look out 90 days)    May 04, 2021  1:00 PM  Office Visit with Kane Wick MD  [facility] ([facility] ) [address] [address]  [phone]          Tawnya Cool RT (R)

## 2021-03-03 ENCOUNTER — IMMUNIZATION (OUTPATIENT)
Dept: NURSING | Facility: CLINIC | Age: 86
End: 2021-03-03
Attending: INTERNAL MEDICINE
Payer: MEDICARE

## 2021-03-03 PROCEDURE — 0002A PR COVID VAC PFIZER DIL RECON 30 MCG/0.3 ML IM: CPT

## 2021-03-03 PROCEDURE — 91300 PR COVID VAC PFIZER DIL RECON 30 MCG/0.3 ML IM: CPT

## 2021-05-04 ENCOUNTER — OFFICE VISIT (OUTPATIENT)
Dept: FAMILY MEDICINE | Facility: CLINIC | Age: 86
End: 2021-05-04
Payer: MEDICARE

## 2021-05-04 VITALS
WEIGHT: 186.9 LBS | TEMPERATURE: 97.9 F | OXYGEN SATURATION: 95 % | DIASTOLIC BLOOD PRESSURE: 94 MMHG | BODY MASS INDEX: 27.68 KG/M2 | HEIGHT: 69 IN | HEART RATE: 77 BPM | SYSTOLIC BLOOD PRESSURE: 174 MMHG

## 2021-05-04 DIAGNOSIS — J06.9 UPPER RESPIRATORY TRACT INFECTION, UNSPECIFIED TYPE: Primary | ICD-10-CM

## 2021-05-04 DIAGNOSIS — E11.9 TYPE 2 DIABETES MELLITUS WITHOUT COMPLICATION, WITHOUT LONG-TERM CURRENT USE OF INSULIN (H): ICD-10-CM

## 2021-05-04 LAB
ANION GAP SERPL CALCULATED.3IONS-SCNC: 4 MMOL/L (ref 3–14)
BUN SERPL-MCNC: 13 MG/DL (ref 7–30)
CALCIUM SERPL-MCNC: 9.5 MG/DL (ref 8.5–10.1)
CHLORIDE SERPL-SCNC: 100 MMOL/L (ref 94–109)
CO2 SERPL-SCNC: 29 MMOL/L (ref 20–32)
CREAT SERPL-MCNC: 0.65 MG/DL (ref 0.52–1.04)
GFR SERPL CREATININE-BSD FRML MDRD: 78 ML/MIN/{1.73_M2}
GLUCOSE SERPL-MCNC: 113 MG/DL (ref 70–99)
HBA1C MFR BLD: 7 % (ref 0–5.6)
POTASSIUM SERPL-SCNC: 3.9 MMOL/L (ref 3.4–5.3)
SARS-COV-2 RNA RESP QL NAA+PROBE: NORMAL
SODIUM SERPL-SCNC: 133 MMOL/L (ref 133–144)
SPECIMEN SOURCE: NORMAL

## 2021-05-04 PROCEDURE — 80048 BASIC METABOLIC PNL TOTAL CA: CPT | Performed by: INTERNAL MEDICINE

## 2021-05-04 PROCEDURE — U0005 INFEC AGEN DETEC AMPLI PROBE: HCPCS | Performed by: INTERNAL MEDICINE

## 2021-05-04 PROCEDURE — 99214 OFFICE O/P EST MOD 30 MIN: CPT | Mod: CS | Performed by: INTERNAL MEDICINE

## 2021-05-04 PROCEDURE — 83036 HEMOGLOBIN GLYCOSYLATED A1C: CPT | Performed by: INTERNAL MEDICINE

## 2021-05-04 PROCEDURE — 36415 COLL VENOUS BLD VENIPUNCTURE: CPT | Performed by: INTERNAL MEDICINE

## 2021-05-04 PROCEDURE — U0003 INFECTIOUS AGENT DETECTION BY NUCLEIC ACID (DNA OR RNA); SEVERE ACUTE RESPIRATORY SYNDROME CORONAVIRUS 2 (SARS-COV-2) (CORONAVIRUS DISEASE [COVID-19]), AMPLIFIED PROBE TECHNIQUE, MAKING USE OF HIGH THROUGHPUT TECHNOLOGIES AS DESCRIBED BY CMS-2020-01-R: HCPCS | Performed by: INTERNAL MEDICINE

## 2021-05-04 RX ORDER — PREDNISONE 20 MG/1
20 TABLET ORAL 2 TIMES DAILY
Qty: 10 TABLET | Refills: 1 | Status: SHIPPED | OUTPATIENT
Start: 2021-05-04 | End: 2023-01-01

## 2021-05-04 ASSESSMENT — MIFFLIN-ST. JEOR: SCORE: 1337.15

## 2021-05-04 NOTE — PROGRESS NOTES
"    Assessment & Plan     Upper respiratory tract infection, unspecified type  Patient presents with shortness of breath wheezing low oxygen levels.  Received both COVID-19 vaccinations  - predniSONE (DELTASONE) 20 MG tablet; Take 1 tablet (20 mg) by mouth 2 times daily  - Symptomatic COVID-19 Virus (Coronavirus) by PCR    Type 2 diabetes mellitus without complication, without long-term current use of insulin (H)  Type II would like to reassess the hemoglobin A1c today.  - Basic metabolic panel  (Ca, Cl, CO2, Creat, Gluc, K, Na, BUN)  - Hemoglobin A1c       BMI:   Estimated body mass index is 27.6 kg/m  as calculated from the following:    Height as of this encounter: 1.753 m (5' 9\").    Weight as of this encounter: 84.8 kg (186 lb 14.4 oz).       See Patient Instructions    No follow-ups on file.    Kane Wick MD  [facility] [place] is a 89 year old who presents for the following health issues     HPI patient presents for follow-up.  Today she feels short of breath.  She has congestion.  She has a cough.  Despite the symptoms the patient was lead to our examination room in the general clinic.    It should be noted that she feels fatigued.  She states that she has never really felt like this through her lungs before never taken inhaler.  No orthopnea.  She has had both COVID-19 vaccinations.    She lives in the community setting.  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  3 month f/up            Review of Systems   Constitutional, HEENT, cardiovascular, pulmonary, gi and gu systems are negative, except as otherwise noted.      Objective    BP (!) 174/94 (BP Location: Right arm, Cuff Size: Adult Large)   Pulse 77   Temp 97.9  F (36.6  C) (Oral)   Ht 1.753 m (5' 9\")   Wt 84.8 kg (186 lb 14.4 oz)   SpO2 95%   BMI 27.60 kg/m    Body mass index is 27.6 kg/m .  Physical Exam   Patient has diffuse rhonchi scattered the patient also has O2 sats at 94% " and a respiratory rate of 16    Heart 77 no gallop no cyanosis of the fingers noted extremities no edema  Office Visit on 02/03/2021   Component Date Value Ref Range Status     TSH 02/03/2021 2.10  0.40 - 4.00 mU/L Final     Sodium 02/03/2021 131* 133 - 144 mmol/L Final     Potassium 02/03/2021 3.8  3.4 - 5.3 mmol/L Final     Chloride 02/03/2021 100  94 - 109 mmol/L Final     Carbon Dioxide 02/03/2021 24  20 - 32 mmol/L Final     Anion Gap 02/03/2021 7  3 - 14 mmol/L Final     Glucose 02/03/2021 168* 70 - 99 mg/dL Final     Urea Nitrogen 02/03/2021 14  7 - 30 mg/dL Final     Creatinine 02/03/2021 0.60  0.52 - 1.04 mg/dL Final     GFR Estimate 02/03/2021 80  >60 mL/min/[1.73_m2] Final    Comment: Non  GFR Calc  Starting 12/18/2018, serum creatinine based estimated GFR (eGFR) will be   calculated using the Chronic Kidney Disease Epidemiology Collaboration   (CKD-EPI) equation.       GFR Estimate If Black 02/03/2021 >90  >60 mL/min/[1.73_m2] Final    Comment:  GFR Calc  Starting 12/18/2018, serum creatinine based estimated GFR (eGFR) will be   calculated using the Chronic Kidney Disease Epidemiology Collaboration   (CKD-EPI) equation.       Calcium 02/03/2021 9.7  8.5 - 10.1 mg/dL Final     Bilirubin Total 02/03/2021 0.5  0.2 - 1.3 mg/dL Final     Albumin 02/03/2021 3.8  3.4 - 5.0 g/dL Final     Protein Total 02/03/2021 8.1  6.8 - 8.8 g/dL Final     Alkaline Phosphatase 02/03/2021 102  40 - 150 U/L Final     ALT 02/03/2021 15  0 - 50 U/L Final     AST 02/03/2021 18  0 - 45 U/L Final     WBC 02/03/2021 11.4* 4.0 - 11.0 10e9/L Final     RBC Count 02/03/2021 4.51  3.8 - 5.2 10e12/L Final     Hemoglobin 02/03/2021 13.9  11.7 - 15.7 g/dL Final     Hematocrit 02/03/2021 41.5  35.0 - 47.0 % Final     MCV 02/03/2021 92  78 - 100 fl Final     MCH 02/03/2021 30.8  26.5 - 33.0 pg Final     MCHC 02/03/2021 33.5  31.5 - 36.5 g/dL Final     RDW 02/03/2021 13.1  10.0 - 15.0 % Final     Platelet Count  02/03/2021 223  150 - 450 10e9/L Final     % Neutrophils 02/03/2021 64.4  % Final     % Lymphocytes 02/03/2021 23.2  % Final     % Monocytes 02/03/2021 10.3  % Final     % Eosinophils 02/03/2021 1.8  % Final     % Basophils 02/03/2021 0.3  % Final     Absolute Neutrophil 02/03/2021 7.4  1.6 - 8.3 10e9/L Final     Absolute Lymphocytes 02/03/2021 2.7  0.8 - 5.3 10e9/L Final     Absolute Monocytes 02/03/2021 1.2  0.0 - 1.3 10e9/L Final     Absolute Eosinophils 02/03/2021 0.2  0.0 - 0.7 10e9/L Final     Absolute Basophils 02/03/2021 0.0  0.0 - 0.2 10e9/L Final     Diff Method 02/03/2021 Automated Method   Final

## 2021-05-04 NOTE — LETTER
May 6, 2021      Gavi MINI Lili  [address] [place] [place] [address]        Dear ,    We are writing to inform you of your test results.    Your lab results came back normal. If you have any questions please don't hesitate to contact the clinic.      Thanks,       Dr. Wick    Resulted Orders   Symptomatic COVID-19 Virus (Coronavirus) by PCR   Result Value Ref Range    COVID-19 Virus PCR to [facility] - Source Nasopharyngeal     COVID-19 Virus PCR to [facility] - Result       Test received-See reflex to IDDL test SARS CoV2 (COVID-19) Virus RT-PCR   Basic metabolic panel  (Ca, Cl, CO2, Creat, Gluc, K, Na, BUN)   Result Value Ref Range    Sodium 133 133 - 144 mmol/L    Potassium 3.9 3.4 - 5.3 mmol/L    Chloride 100 94 - 109 mmol/L    Carbon Dioxide 29 20 - 32 mmol/L    Anion Gap 4 3 - 14 mmol/L    Glucose 113 (H) 70 - 99 mg/dL    Urea Nitrogen 13 7 - 30 mg/dL    Creatinine 0.65 0.52 - 1.04 mg/dL    GFR Estimate 78 >60 mL/min/[1.73_m2]      Comment:      Non  GFR Calc  Starting 12/18/2018, serum creatinine based estimated GFR (eGFR) will be   calculated using the Chronic Kidney Disease Epidemiology Collaboration   (CKD-EPI) equation.      GFR Estimate If Black >90 >60 mL/min/[1.73_m2]      Comment:       GFR Calc  Starting 12/18/2018, serum creatinine based estimated GFR (eGFR) will be   calculated using the Chronic Kidney Disease Epidemiology Collaboration   (CKD-EPI) equation.      Calcium 9.5 8.5 - 10.1 mg/dL   Hemoglobin A1c   Result Value Ref Range    Hemoglobin A1C 7.0 (H) 0 - 5.6 %      Comment:      Normal <5.7% Prediabetes 5.7-6.4%  Diabetes 6.5% or higher - adopted from ADA   consensus guidelines.     SARS-CoV-2 COVID-19 Virus (Coronavirus) by PCR   Result Value Ref Range    SARS-CoV-2 Virus Specimen Source Nasopharyngeal     SARS-CoV-2 PCR Result NEGATIVE       Comment:      SARS-CoV2 (COVID-19) RNA not detected, presumed negative.    SARS-CoV-2 PCR Comment       Testing was  performed using the wes SARS-CoV-2 assay on the wes QuanDx0 System.2      Comment:      This test should be ordered for the detection of SARS-CoV-2 in individuals who   meet SARS-CoV-2 clinical and/or epidemiological criteria. Test performance is   unknown in asymptomatic patients.  This test is for in vitro diagnostic use under the FDA EUA for laboratories   certified under CLIA to perform high and/or moderate complexity testing. This   test has not been FDA cleared or approved.  A negative result does not rule out the presence of PCR inhibitors in the   specimen or target RNA in concentration below the limit of detection for the   assay. The possibility of a false negative should be considered if the   patient's recent exposure or clinical presentation suggests COVID-19.  This test was validated by the [facility]   [facility]. This laboratory is certified under the Clinical   Laboratory Improvement Amendments of 1988 (CLIA-88) as qualified to perform   high and/or moderate complexity laboratory testing.

## 2021-05-04 NOTE — PATIENT INSTRUCTIONS
Mrs. Pearson;  I am concerned about your lung congestion.  This may reflect an underlying viral illness.  It would appear the both sides of the lung are congested.  I am concerned about this possibly being COVID-19.    Please stay at home until diagnosis is made.    I would like to evaluate a couple of other labs today while we are assessing your nasal swab    The medication for the significant congestion sent to your pharmacy.  This is an anti-inflammatory medication which may be very helpful.    Best regards  Kane

## 2021-05-05 DIAGNOSIS — E11.9 TYPE 2 DIABETES MELLITUS WITHOUT COMPLICATION, WITHOUT LONG-TERM CURRENT USE OF INSULIN (H): ICD-10-CM

## 2021-05-05 LAB
LABORATORY COMMENT REPORT: NORMAL
SARS-COV-2 RNA RESP QL NAA+PROBE: NEGATIVE
SPECIMEN SOURCE: NORMAL

## 2021-05-05 NOTE — TELEPHONE ENCOUNTER
Kavonuvia 100 mg tablets    Summary: Take 1 tablet (100 mg) by mouth daily, Disp-90 tablet, R-0, E-Prescribe   Dose, Route, Frequency: 100 mg, Oral, DAILY  Start: 2/3/2021  Ord/Sold: 2/3/2021

## 2021-05-24 DIAGNOSIS — E03.4 HYPOTHYROIDISM DUE TO ACQUIRED ATROPHY OF THYROID: ICD-10-CM

## 2021-05-24 DIAGNOSIS — I10 BENIGN ESSENTIAL HYPERTENSION: ICD-10-CM

## 2021-05-26 RX ORDER — LEVOTHYROXINE SODIUM 137 UG/1
137 TABLET ORAL DAILY
Qty: 90 TABLET | Refills: 0 | Status: SHIPPED | OUTPATIENT
Start: 2021-05-26 | End: 2021-08-24

## 2021-05-26 RX ORDER — LISINOPRIL AND HYDROCHLOROTHIAZIDE 12.5; 2 MG/1; MG/1
1 TABLET ORAL EVERY MORNING
Qty: 90 TABLET | Refills: 3 | Status: SHIPPED | OUTPATIENT
Start: 2021-05-26 | End: 2022-05-12

## 2021-05-26 RX ORDER — LISINOPRIL 20 MG/1
20 TABLET ORAL DAILY
Qty: 90 TABLET | Refills: 3 | Status: SHIPPED | OUTPATIENT
Start: 2021-05-26 | End: 2022-05-12

## 2021-05-26 NOTE — PROGRESS NOTES
Daily Note     Today's date: 2021  Patient name: Chris Urban  : 1951  MRN: [id number]  Referring provider: Amy Johnson MD  Dx:   Encounter Diagnosis     ICD-10-CM    1  Left hip pain  M25 552    2  Lumbar radiculopathy  M54 16                   Subjective: Chris Urban states her hip/back pain continues to improve  Her shoulder was in a lot of pain for a couple days after last session but has recovered since then  Objective: See treatment diary below      Assessment: Tolerated treatment well  Patient continues to demo improving hip strength and endurance with decreasing pain levels  She continues with signs of left hip OA and bursitis impacting functional mobility  Plan: Plan to discharge to  HEP next session for her left hip pain then begin therapy with formal therapy for left shoulder          Precautions: None  HEP initiated as per oanh: [id number] progressed HEP on     Manuals    PROM hip IR in prone                                Neuro Re-Ed        Fwd lunge   10xB     SLS   Hip abd  5x10s B   Sup scap retr   15x5s     Stand hip hike 2x10 5s       Tandem stance EC        Sit to stand [id number]       Tandem walk fwd/retro    5x20ft    Side step    5x20ft    Ice skaters ML    15x5s    Squat weight shift 2x10 5s       Fwd step up    2x10 6" B    Lat step up        Ther Ex        Hook LS rot 15x 20x  15x 15x   Hook knee fallouts        Rec bike L3 6'       SL hip abd    2x10 B 5x B   SL hip IR/ER        SL clamshell         Hook KATARZYNA str 2x30s B   2x30s    Hook ITB str 2x30s B 2x30s B Stand 2x30s 3x30s    Seated Ball roll out - L/M/R        Seated hip flex/ER (cross Leg)        Seated piriformis str        Sup quad str w/SOS        Prone hip IR/ER        Prone hip ext     5x B   Hook cane flex  3x10 2x10     Sup cane ER 45 abd  3x10      0 abd ER  3x10 SL ER 2x10 Orthopedic Surgery  Gavi Pearson  2020  Admit Date:  2020  POD 1 Day Post-Op  S/P Procedure(s):  RIGHT HIP CEMENTED UNIPOLAR HEMIARTHROPLASTY    Patient with out compliants.  Pain controlled.  Tolerating oral intake.  No events overnight.     Alert and orient to person, place, and time.  Vital Sign Ranges  Temperature Temp  Av.1  F (36.7  C)  Min: 97.8  F (36.6  C)  Max: 98.4  F (36.9  C)   Blood pressure Systolic (24hrs), Av , Min:90 , Max:134        Diastolic (24hrs), Av, Min:51, Max:74      Pulse Pulse  Av.9  Min: 77  Max: 115   Respirations Resp  Av.6  Min: 16  Max: 18   Pulse oximetry SpO2  Av.2 %  Min: 92 %  Max: 97 %       Right hip dressing  is clean, dry, and intact. Minimal erythema of the surrounding skin.  Bilateral calves are soft, non-tender.  Right  lower extremity is NVI.    Labs:  Recent Labs   Lab Test 20  0526 20  2228 20  192   POTASSIUM 3.8 3.6 3.4     Recent Labs   Lab Test 20  0632 20  0526 20   HGB 12.0 13.2 13.4     Recent Labs   Lab Test 20  2228   INR 1.07     Recent Labs   Lab Test 20  0526 20  1925 20  1044    186 208       A/P  1. RIGHT HIP CEMENTED UNIPOLAR HEMIARTHROPLASTY   Continue ASA, BONNY's, SCD's for DVT prophylaxis.     Mobilize with PT/OT WBAT.     Continue current pain regiment.    2. Disposition   Anticipate d/c to SNF When bed available and meets criteria.      Enrico Ledesma MD  [facility]  [phone]

## 2021-08-21 DIAGNOSIS — E03.4 HYPOTHYROIDISM DUE TO ACQUIRED ATROPHY OF THYROID: ICD-10-CM

## 2021-08-24 RX ORDER — LEVOTHYROXINE SODIUM 137 UG/1
TABLET ORAL
Qty: 90 TABLET | Refills: 1 | Status: SHIPPED | OUTPATIENT
Start: 2021-08-24 | End: 2022-02-03

## 2021-08-24 NOTE — TELEPHONE ENCOUNTER
TSH   Date Value Ref Range Status   02/03/2021 2.10 0.40 - 4.00 mU/L Final     Refilled per [facility] protocol.  Maria Eugenia Hardin RN

## 2021-10-22 DIAGNOSIS — E11.9 TYPE 2 DIABETES MELLITUS WITHOUT COMPLICATION, WITHOUT LONG-TERM CURRENT USE OF INSULIN (H): ICD-10-CM

## 2021-10-25 RX ORDER — SITAGLIPTIN 100 MG/1
TABLET, FILM COATED ORAL
Qty: 90 TABLET | Refills: 1 | OUTPATIENT
Start: 2021-10-25

## 2021-10-25 RX ORDER — SITAGLIPTIN 100 MG/1
TABLET, FILM COATED ORAL
Qty: 90 TABLET | Refills: 0 | Status: SHIPPED | OUTPATIENT
Start: 2021-10-25 | End: 2021-11-04

## 2021-10-25 NOTE — TELEPHONE ENCOUNTER
Prescription approved per [facility] Refill Protocol.    Rubina DICKERSON RN  EP Triage

## 2021-11-04 ENCOUNTER — OFFICE VISIT (OUTPATIENT)
Dept: FAMILY MEDICINE | Facility: CLINIC | Age: 86
End: 2021-11-04
Payer: MEDICARE

## 2021-11-04 VITALS
BODY MASS INDEX: 28.38 KG/M2 | WEIGHT: 191.6 LBS | HEART RATE: 81 BPM | DIASTOLIC BLOOD PRESSURE: 98 MMHG | RESPIRATION RATE: 24 BRPM | TEMPERATURE: 99.1 F | HEIGHT: 69 IN | OXYGEN SATURATION: 95 % | SYSTOLIC BLOOD PRESSURE: 180 MMHG

## 2021-11-04 DIAGNOSIS — R20.0 NUMBNESS: ICD-10-CM

## 2021-11-04 DIAGNOSIS — E03.9 ACQUIRED HYPOTHYROIDISM: ICD-10-CM

## 2021-11-04 DIAGNOSIS — I48.20 CHRONIC ATRIAL FIBRILLATION (H): ICD-10-CM

## 2021-11-04 DIAGNOSIS — E11.9 TYPE 2 DIABETES MELLITUS WITHOUT COMPLICATION, WITHOUT LONG-TERM CURRENT USE OF INSULIN (H): Primary | ICD-10-CM

## 2021-11-04 DIAGNOSIS — Z23 HIGH PRIORITY FOR 2019-NCOV VACCINE: ICD-10-CM

## 2021-11-04 LAB
BASOPHILS # BLD AUTO: 0 10E3/UL (ref 0–0.2)
BASOPHILS NFR BLD AUTO: 0 %
EOSINOPHIL # BLD AUTO: 0.1 10E3/UL (ref 0–0.7)
EOSINOPHIL NFR BLD AUTO: 1 %
ERYTHROCYTE [DISTWIDTH] IN BLOOD BY AUTOMATED COUNT: 12.8 % (ref 10–15)
HBA1C MFR BLD: 7.5 % (ref 0–5.6)
HCT VFR BLD AUTO: 43.3 % (ref 35–47)
HGB BLD-MCNC: 14.9 G/DL (ref 11.7–15.7)
LYMPHOCYTES # BLD AUTO: 2.6 10E3/UL (ref 0.8–5.3)
LYMPHOCYTES NFR BLD AUTO: 22 %
MCH RBC QN AUTO: 31.6 PG (ref 26.5–33)
MCHC RBC AUTO-ENTMCNC: 34.4 G/DL (ref 31.5–36.5)
MCV RBC AUTO: 92 FL (ref 78–100)
MONOCYTES # BLD AUTO: 1.2 10E3/UL (ref 0–1.3)
MONOCYTES NFR BLD AUTO: 10 %
NEUTROPHILS # BLD AUTO: 7.9 10E3/UL (ref 1.6–8.3)
NEUTROPHILS NFR BLD AUTO: 67 %
PLATELET # BLD AUTO: 202 10E3/UL (ref 150–450)
RBC # BLD AUTO: 4.72 10E6/UL (ref 3.8–5.2)
VIT B12 SERPL-MCNC: 270 PG/ML (ref 193–986)
WBC # BLD AUTO: 11.9 10E3/UL (ref 4–11)

## 2021-11-04 PROCEDURE — 80053 COMPREHEN METABOLIC PANEL: CPT | Performed by: INTERNAL MEDICINE

## 2021-11-04 PROCEDURE — 82607 VITAMIN B-12: CPT | Performed by: INTERNAL MEDICINE

## 2021-11-04 PROCEDURE — 0004A COVID-19,PF,PFIZER (12+ YRS): CPT | Performed by: INTERNAL MEDICINE

## 2021-11-04 PROCEDURE — 84443 ASSAY THYROID STIM HORMONE: CPT | Performed by: INTERNAL MEDICINE

## 2021-11-04 PROCEDURE — 85025 COMPLETE CBC W/AUTO DIFF WBC: CPT | Performed by: INTERNAL MEDICINE

## 2021-11-04 PROCEDURE — 99214 OFFICE O/P EST MOD 30 MIN: CPT | Mod: 25 | Performed by: INTERNAL MEDICINE

## 2021-11-04 PROCEDURE — 91300 COVID-19,PF,PFIZER (12+ YRS): CPT | Performed by: INTERNAL MEDICINE

## 2021-11-04 PROCEDURE — 36415 COLL VENOUS BLD VENIPUNCTURE: CPT | Performed by: INTERNAL MEDICINE

## 2021-11-04 PROCEDURE — 80061 LIPID PANEL: CPT | Performed by: INTERNAL MEDICINE

## 2021-11-04 PROCEDURE — 83036 HEMOGLOBIN GLYCOSYLATED A1C: CPT | Performed by: INTERNAL MEDICINE

## 2021-11-04 ASSESSMENT — MIFFLIN-ST. JEOR: SCORE: 1353.47

## 2021-11-04 NOTE — LETTER
November 8, 2021      Gavi Pearson  [address] [place] [address]        Dear ,    We are writing to inform you of your test results.    All of the labs are stable except the hemoglobin A1c which is higher. I would recommend that you increase the metformin to two in the AM and one at night.   We should recheck your labs in 3 months.       Resulted Orders   Lipid panel reflex to direct LDL Fasting   Result Value Ref Range    Cholesterol 159 <200 mg/dL    Triglycerides 110 <150 mg/dL    Direct Measure HDL 46 (L) >=50 mg/dL    LDL Cholesterol Calculated 91 <=100 mg/dL    Non HDL Cholesterol 113 <130 mg/dL    Patient Fasting > 8hrs? Yes     Narrative    Cholesterol  Desirable:  <200 mg/dL    Triglycerides  Normal:  Less than 150 mg/dL  Borderline High:  150-199 mg/dL  High:  200-499 mg/dL  Very High:  Greater than or equal to 500 mg/dL    Direct Measure HDL  Female:  Greater than or equal to 50 mg/dL   Male:  Greater than or equal to 40 mg/dL    LDL Cholesterol  Desirable:  <100mg/dL  Above Desirable:  100-129 mg/dL   Borderline High:  130-159 mg/dL   High:  160-189 mg/dL   Very High:  >= 190 mg/dL    Non HDL Cholesterol  Desirable:  130 mg/dL  Above Desirable:  130-159 mg/dL  Borderline High:  160-189 mg/dL  High:  190-219 mg/dL  Very High:  Greater than or equal to 220 mg/dL   Comprehensive metabolic panel (BMP + Alb, Alk Phos, ALT, AST, Total. Bili, TP)   Result Value Ref Range    Sodium 132 (L) 133 - 144 mmol/L    Potassium 4.3 3.4 - 5.3 mmol/L    Chloride 99 94 - 109 mmol/L    Carbon Dioxide (CO2) 22 20 - 32 mmol/L    Anion Gap 11 3 - 14 mmol/L    Urea Nitrogen 13 7 - 30 mg/dL    Creatinine 0.74 0.52 - 1.04 mg/dL    Calcium 10.1 8.5 - 10.1 mg/dL    Glucose 188 (H) 70 - 99 mg/dL    Alkaline Phosphatase 85 40 - 150 U/L    AST 19 0 - 45 U/L    ALT 23 0 - 50 U/L    Protein Total 7.8 6.8 - 8.8 g/dL    Albumin 3.7 3.4 - 5.0 g/dL    Bilirubin Total 0.8 0.2 - 1.3 mg/dL    GFR Estimate 72 >60 mL/min/1.73m2       Comment:      As of July 11, 2021, eGFR is calculated by the CKD-EPI creatinine equation, without race adjustment. eGFR can be influenced by muscle mass, exercise, and diet. The reported eGFR is an estimation only and is only applicable if the renal function is stable.   TSH with free T4 reflex   Result Value Ref Range    TSH 2.86 0.40 - 4.00 mU/L   Hemoglobin A1c   Result Value Ref Range    Hemoglobin A1C 7.5 (H) 0.0 - 5.6 %      Comment:      Normal <5.7%   Prediabetes 5.7-6.4%    Diabetes 6.5% or higher     Note: Adopted from ADA consensus guidelines.   Vitamin B12   Result Value Ref Range    Vitamin B12 270 193 - 986 pg/mL   CBC with platelets and differential   Result Value Ref Range    WBC Count 11.9 (H) 4.0 - 11.0 10e3/uL    RBC Count 4.72 3.80 - 5.20 10e6/uL    Hemoglobin 14.9 11.7 - 15.7 g/dL    Hematocrit 43.3 35.0 - 47.0 %    MCV 92 78 - 100 fL    MCH 31.6 26.5 - 33.0 pg    MCHC 34.4 31.5 - 36.5 g/dL    RDW 12.8 10.0 - 15.0 %    Platelet Count 202 150 - 450 10e3/uL    % Neutrophils 67 %    % Lymphocytes 22 %    % Monocytes 10 %    % Eosinophils 1 %    % Basophils 0 %    Absolute Neutrophils 7.9 1.6 - 8.3 10e3/uL    Absolute Lymphocytes 2.6 0.8 - 5.3 10e3/uL    Absolute Monocytes 1.2 0.0 - 1.3 10e3/uL    Absolute Eosinophils 0.1 0.0 - 0.7 10e3/uL    Absolute Basophils 0.0 0.0 - 0.2 10e3/uL       If you have any questions or concerns, please call the clinic at the number listed above.       Sincerely,      Kane Wick MD

## 2021-11-04 NOTE — PROGRESS NOTES
"  Assessment & Plan     Type 2 diabetes mellitus without complication, without long-term current use of insulin (H)  We will evaluate her hemoglobin A1c today  - sitagliptin (JANUVIA) 100 MG tablet; Take 1 tablet (100 mg) by mouth daily  - Comprehensive metabolic panel (BMP + Alb, Alk Phos, ALT, AST, Total. Bili, TP); Future  - CBC with platelets and differential; Future  - Hemoglobin A1c; Future  - Lipid panel reflex to direct LDL Fasting  - Comprehensive metabolic panel (BMP + Alb, Alk Phos, ALT, AST, Total. Bili, TP)  - CBC with platelets and differential  - Hemoglobin A1c    Acquired hypothyroidism  Energy levels are reasonable.  - TSH with free T4 reflex; Future  - TSH with free T4 reflex    Numbness  Likely related to diabetes/neuropathy.  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  She does not wish to be on an anticoagulant.  She is fearful of anticoagulants and bleeding.  Her and I discussed her risk factors of stroke given her atrial fibrillation, age, diabetes, and hypertension.  Despite an annual risk greater than 10% the patient is not interested in warfarin or Doacs.       BMI:   Estimated body mass index is 28.29 kg/m  as calculated from the following:    Height as of this encounter: 1.753 m (5' 9\").    Weight as of this encounter: 86.9 kg (191 lb 9.6 oz).       See Patient Instructions    Return in about 6 months (around 5/4/2022) for Follow up.    Kane Wick MD  [facility] FLORESITA Gatica is a 89 year old who presents for the following health issues     HPI 89-year-old female with type 2 diabetes, hypertension, chronic atrial fibrillation.    Patient's rate today is 80.  Blood " "pressure is 160/85.  The patient and I discussed her atrial fibrillation and her risk of stroke given the fact she is on no anticoagulants.  Her [id number] score is roughly 5 and her annual rate stroke is greater than 10%.  Despite this she is not interested in anticoagulation.  The patient states that every new medication causes her at least 1 to 2 months to adjust to and she feels like she would rather enjoy her life and its limited duration.    The patient denies chest pain or shortness of breath has some numbness in the extremities.  Energy levels are decent.  She is compliant with all of her medications and requests refills today.  6 month f/up  Needs refills          Review of Systems   Constitutional, HEENT, cardiovascular, pulmonary, gi and gu systems are negative, except as otherwise noted.      Objective    BP (!) 180/98 (BP Location: Left arm, Cuff Size: Adult Large)   Pulse 81   Temp 99.1  F (37.3  C) (Tympanic)   Resp 24   Ht 1.753 m (5' 9\")   Wt 86.9 kg (191 lb 9.6 oz)   SpO2 95%   BMI 28.29 kg/m    Body mass index is 28.29 kg/m .  Physical Exam   GENERAL: healthy, alert and no distress  EYES: Eyes grossly normal to inspection, PERRL and conjunctivae and sclerae normal  HENT: ear canals and TM's normal, nose and mouth without ulcers or lesions  NECK: no adenopathy, no asymmetry, masses, or scars and thyroid normal to palpation  RESP: lungs clear to auscultation - no rales, rhonchi or wheezes  CV: Irregularly irregular, normal S1 S2, no S3 or S4, no murmur, click or rub, no peripheral edema and peripheral pulses strong  ABDOMEN: soft, nontender, no hepatosplenomegaly, no masses and bowel sounds normal  MS: no gross musculoskeletal defects noted, no edema  SKIN: no suspicious lesions or rashes  NEURO: Normal strength and tone, mentation intact and speech normal  PSYCH: mentation appears normal, affect normal/bright    Office Visit on 05/04/2021   Component Date Value Ref Range Status     " COVID-19 Virus PCR to [facility] - So* 05/04/2021 Nasopharyngeal   Final     COVID-19 Virus PCR to [facility] - Re* 05/04/2021 Test received-See reflex to IDDL test SARS CoV2 (COVID-19) Virus RT-PCR   Final     Sodium 05/04/2021 133  133 - 144 mmol/L Final     Potassium 05/04/2021 3.9  3.4 - 5.3 mmol/L Final     Chloride 05/04/2021 100  94 - 109 mmol/L Final     Carbon Dioxide 05/04/2021 29  20 - 32 mmol/L Final     Anion Gap 05/04/2021 4  3 - 14 mmol/L Final     Glucose 05/04/2021 113* 70 - 99 mg/dL Final     Urea Nitrogen 05/04/2021 13  7 - 30 mg/dL Final     Creatinine 05/04/2021 0.65  0.52 - 1.04 mg/dL Final     GFR Estimate 05/04/2021 78  >60 mL/min/[1.73_m2] Final    Comment: Non  GFR Calc  Starting 12/18/2018, serum creatinine based estimated GFR (eGFR) will be   calculated using the Chronic Kidney Disease Epidemiology Collaboration   (CKD-EPI) equation.       GFR Estimate If Black 05/04/2021 >90  >60 mL/min/[1.73_m2] Final    Comment:  GFR Calc  Starting 12/18/2018, serum creatinine based estimated GFR (eGFR) will be   calculated using the Chronic Kidney Disease Epidemiology Collaboration   (CKD-EPI) equation.       Calcium 05/04/2021 9.5  8.5 - 10.1 mg/dL Final     Hemoglobin A1C 05/04/2021 7.0* 0 - 5.6 % Final    Comment: Normal <5.7% Prediabetes 5.7-6.4%  Diabetes 6.5% or higher - adopted from ADA   consensus guidelines.       SARS-CoV-2 Virus Specimen Source 05/04/2021 Nasopharyngeal   Final     SARS-CoV-2 PCR Result 05/04/2021 NEGATIVE   Final    SARS-CoV2 (COVID-19) RNA not detected, presumed negative.     SARS-CoV-2 PCR Comment 05/04/2021 Testing was performed using the wes SARS-CoV-2 assay on the wes [employer] System.2   Final    Comment: This test should be ordered for the detection of SARS-CoV-2 in individuals who   meet SARS-CoV-2 clinical and/or epidemiological criteria. Test performance is   unknown in asymptomatic patients.  This test is for in vitro diagnostic use  under the FDA EUA for laboratories   certified under CLIA to perform high and/or moderate complexity testing. This   test has not been FDA cleared or approved.  A negative result does not rule out the presence of PCR inhibitors in the   specimen or target RNA in concentration below the limit of detection for the   assay. The possibility of a false negative should be considered if the   patient's recent exposure or clinical presentation suggests COVID-19.  This test was validated by the [facility]   Diagnostic [facility]. This laboratory is certified under the Clinical   Laboratory Improvement Amendments of 1988 (CLIA-88) as qualified to perform   high and/or moderate complexity laboratory testing.

## 2021-11-05 LAB
ALBUMIN SERPL-MCNC: 3.7 G/DL (ref 3.4–5)
ALP SERPL-CCNC: 85 U/L (ref 40–150)
ALT SERPL W P-5'-P-CCNC: 23 U/L (ref 0–50)
ANION GAP SERPL CALCULATED.3IONS-SCNC: 11 MMOL/L (ref 3–14)
AST SERPL W P-5'-P-CCNC: 19 U/L (ref 0–45)
BILIRUB SERPL-MCNC: 0.8 MG/DL (ref 0.2–1.3)
BUN SERPL-MCNC: 13 MG/DL (ref 7–30)
CALCIUM SERPL-MCNC: 10.1 MG/DL (ref 8.5–10.1)
CHLORIDE BLD-SCNC: 99 MMOL/L (ref 94–109)
CHOLEST SERPL-MCNC: 159 MG/DL
CO2 SERPL-SCNC: 22 MMOL/L (ref 20–32)
CREAT SERPL-MCNC: 0.74 MG/DL (ref 0.52–1.04)
FASTING STATUS PATIENT QL REPORTED: YES
GFR SERPL CREATININE-BSD FRML MDRD: 72 ML/MIN/1.73M2
GLUCOSE BLD-MCNC: 188 MG/DL (ref 70–99)
HDLC SERPL-MCNC: 46 MG/DL
LDLC SERPL CALC-MCNC: 91 MG/DL
NONHDLC SERPL-MCNC: 113 MG/DL
POTASSIUM BLD-SCNC: 4.3 MMOL/L (ref 3.4–5.3)
PROT SERPL-MCNC: 7.8 G/DL (ref 6.8–8.8)
SODIUM SERPL-SCNC: 132 MMOL/L (ref 133–144)
TRIGL SERPL-MCNC: 110 MG/DL
TSH SERPL DL<=0.005 MIU/L-ACNC: 2.86 MU/L (ref 0.4–4)

## 2022-01-01 ENCOUNTER — OFFICE VISIT (OUTPATIENT)
Dept: FAMILY MEDICINE | Facility: CLINIC | Age: 87
End: 2022-01-01
Payer: MEDICARE

## 2022-01-01 VITALS
SYSTOLIC BLOOD PRESSURE: 184 MMHG | BODY MASS INDEX: 27.7 KG/M2 | DIASTOLIC BLOOD PRESSURE: 92 MMHG | HEIGHT: 69 IN | HEART RATE: 70 BPM | TEMPERATURE: 96.8 F | RESPIRATION RATE: 22 BRPM | OXYGEN SATURATION: 97 % | WEIGHT: 187 LBS

## 2022-01-01 DIAGNOSIS — Z23 NEED FOR COVID-19 VACCINE: ICD-10-CM

## 2022-01-01 DIAGNOSIS — E11.9 TYPE 2 DIABETES MELLITUS WITHOUT COMPLICATION, WITHOUT LONG-TERM CURRENT USE OF INSULIN (H): ICD-10-CM

## 2022-01-01 DIAGNOSIS — I10 BENIGN ESSENTIAL HYPERTENSION: ICD-10-CM

## 2022-01-01 DIAGNOSIS — Z23 NEED FOR IMMUNIZATION AGAINST INFLUENZA: ICD-10-CM

## 2022-01-01 DIAGNOSIS — M19.012 PRIMARY OSTEOARTHRITIS OF BOTH SHOULDERS: ICD-10-CM

## 2022-01-01 DIAGNOSIS — Z13.220 SCREENING FOR HYPERLIPIDEMIA: ICD-10-CM

## 2022-01-01 DIAGNOSIS — M19.011 PRIMARY OSTEOARTHRITIS OF BOTH SHOULDERS: ICD-10-CM

## 2022-01-01 DIAGNOSIS — Z00.00 WELLNESS EXAMINATION: Primary | ICD-10-CM

## 2022-01-01 LAB
CHOLEST SERPL-MCNC: 147 MG/DL
FASTING STATUS PATIENT QL REPORTED: YES
HBA1C MFR BLD: 6.9 % (ref 0–5.6)
HDLC SERPL-MCNC: 57 MG/DL
LDLC SERPL CALC-MCNC: 68 MG/DL
NONHDLC SERPL-MCNC: 90 MG/DL
TRIGL SERPL-MCNC: 109 MG/DL

## 2022-01-01 PROCEDURE — 36415 COLL VENOUS BLD VENIPUNCTURE: CPT | Performed by: INTERNAL MEDICINE

## 2022-01-01 PROCEDURE — 90662 IIV NO PRSV INCREASED AG IM: CPT | Performed by: INTERNAL MEDICINE

## 2022-01-01 PROCEDURE — 83036 HEMOGLOBIN GLYCOSYLATED A1C: CPT | Performed by: INTERNAL MEDICINE

## 2022-01-01 PROCEDURE — 80061 LIPID PANEL: CPT | Performed by: INTERNAL MEDICINE

## 2022-01-01 PROCEDURE — G0439 PPPS, SUBSEQ VISIT: HCPCS | Performed by: INTERNAL MEDICINE

## 2022-01-01 PROCEDURE — 91312 COVID-19,PF,PFIZER BOOSTER BIVALENT: CPT | Performed by: INTERNAL MEDICINE

## 2022-01-01 PROCEDURE — G0008 ADMIN INFLUENZA VIRUS VAC: HCPCS | Performed by: INTERNAL MEDICINE

## 2022-01-01 PROCEDURE — 0124A COVID-19,PF,PFIZER BOOSTER BIVALENT: CPT | Performed by: INTERNAL MEDICINE

## 2022-01-01 RX ORDER — GLIPIZIDE 5 MG/1
TABLET ORAL
Qty: 225 TABLET | Refills: 0 | Status: SHIPPED | OUTPATIENT
Start: 2022-01-01 | End: 2023-01-01

## 2022-01-01 ASSESSMENT — ACTIVITIES OF DAILY LIVING (ADL)
CURRENT_FUNCTION: TRANSPORTATION REQUIRES ASSISTANCE
CURRENT_FUNCTION: PREPARING MEALS REQUIRES ASSISTANCE
CURRENT_FUNCTION: HOUSEWORK REQUIRES ASSISTANCE
CURRENT_FUNCTION: SHOPPING REQUIRES ASSISTANCE

## 2022-02-01 DIAGNOSIS — E78.5 HYPERLIPIDEMIA LDL GOAL <100: ICD-10-CM

## 2022-02-01 DIAGNOSIS — E03.4 HYPOTHYROIDISM DUE TO ACQUIRED ATROPHY OF THYROID: ICD-10-CM

## 2022-02-01 DIAGNOSIS — I10 BENIGN ESSENTIAL HYPERTENSION: ICD-10-CM

## 2022-02-03 RX ORDER — LEVOTHYROXINE SODIUM 137 UG/1
TABLET ORAL
Qty: 90 TABLET | Refills: 1 | Status: SHIPPED | OUTPATIENT
Start: 2022-02-03 | End: 2022-05-12

## 2022-02-03 RX ORDER — METOPROLOL TARTRATE 50 MG
TABLET ORAL
Qty: 180 TABLET | Refills: 2 | Status: SHIPPED | OUTPATIENT
Start: 2022-02-03 | End: 2022-05-12

## 2022-02-03 RX ORDER — LOVASTATIN 20 MG
TABLET ORAL
Qty: 90 TABLET | Refills: 2 | Status: SHIPPED | OUTPATIENT
Start: 2022-02-03 | End: 2022-05-12

## 2022-02-03 NOTE — TELEPHONE ENCOUNTER
Routing refill request to provider for review/approval because:  BP out of range:        BP Readings from Last 3 Encounters:   11/04/21 (!) 180/98   05/04/21 (!) 174/94   02/03/21 (!) 160/67

## 2022-02-28 ENCOUNTER — HOSPITAL ENCOUNTER (EMERGENCY)
Facility: CLINIC | Age: 87
Discharge: HOME | End: 2022-03-01
Attending: EMERGENCY MEDICINE | Admitting: EMERGENCY MEDICINE
Payer: MEDICARE
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
## 2022-02-28 DIAGNOSIS — S00.03XA HEMATOMA OF SCALP, INITIAL ENCOUNTER: ICD-10-CM

## 2022-02-28 DIAGNOSIS — S09.90XA INJURY OF HEAD, INITIAL ENCOUNTER: ICD-10-CM

## 2022-02-28 PROCEDURE — 99284 EMERGENCY DEPT VISIT MOD MDM: CPT | Mod: 25

## 2022-02-28 ASSESSMENT — ENCOUNTER SYMPTOMS
BACK PAIN: 0
ABDOMINAL PAIN: 0
VOMITING: 0

## 2022-03-01 ENCOUNTER — APPOINTMENT (OUTPATIENT)
Dept: CT IMAGING | Facility: CLINIC | Age: 87
End: 2022-03-01
Attending: EMERGENCY MEDICINE
Payer: MEDICARE

## 2022-03-01 VITALS
TEMPERATURE: 98.9 F | HEART RATE: 83 BPM | DIASTOLIC BLOOD PRESSURE: 78 MMHG | HEIGHT: 69 IN | SYSTOLIC BLOOD PRESSURE: 153 MMHG | OXYGEN SATURATION: 95 % | BODY MASS INDEX: 28.29 KG/M2 | RESPIRATION RATE: 16 BRPM

## 2022-03-01 PROCEDURE — G1004 CDSM NDSC: HCPCS

## 2022-03-01 NOTE — ED TRIAGE NOTES
Pt was walking out of house and tripped on one step. Pt fell back and hit head. Pt has lump on the back of head. Pt denies LOC. Pt denies pain elsewhere. Pt states is not on blood thinners

## 2022-03-01 NOTE — ED PROVIDER NOTES
"  History   Chief Complaint:  Fall       HPI   Gavi Pearson is a 89 year old female with history of hypertension, diabetes, and atrial fibrillation  who presents for evaluation after a fall. The patient reports that she was walking out of the house and tripped and fell. She hit the back of her head and did not lose consciousness. She denies vomiting, back pain, or abdominal pain. She denies other pain or injuries from the fall.    Review of Systems   Gastrointestinal: Negative for abdominal pain and vomiting.   Musculoskeletal: Negative for back pain.   Neurological: Negative for syncope.   All other systems reviewed and are negative.    Allergies:  The patient has no known allergies.     Medications:  Aspirin  Glipizide  Synthroid  Lisinopril  Lovastatin  Metformin  Metoprolol  Miralax  Prednisone  Januvia    Past Medical History:     Hypertension  Hyperlipidemia  Hypothyroidism  Atrial fibrillation  Diabetes  Positional vertigo    Past Surgical History:    ORIF hip    Social History:  Patient presents with her daughter    Physical Exam     Patient Vitals for the past 24 hrs:   BP Temp Temp src Pulse Resp SpO2 Height   03/01/22 0100 (!) 153/78 -- -- 83 16 95 % --   03/01/22 0020 (!) 166/105 -- -- 83 -- 97 % --   03/01/22 0000 (!) 198/101 -- -- 98 -- 97 % --   02/28/22 1844 (!) 171/110 -- -- -- -- -- --   02/28/22 1841 (!) 197/113 98.9  F (37.2  C) Oral 105 18 95 % 1.753 m (5' 9\")       Physical Exam  General: Sitting up in bed  Eyes:  The pupils are equal and round    Conjunctivae and sclerae are normal  ENT:    Hematoma on posterior scalp  Neck:  Normal range of motion, no midline neck tenderness  CV:  Tachycardic rate, regular rhythm    Skin warm and well perfused   Resp:  Non labored breathing on room air    No tachypnea    No cough heard    Lungs clear bilaterally  MS:  No tenderness on pelvis or spine  Skin:  No rash or acute skin lesions noted  Neuro:   Awake, alert.      GCS 15    Speech is normal and " fluent.    Face is symmetric.     Moves all extremities equally  Psych: Normal affect.  Appropriate interactions.    Emergency Department Course     Imaging:  Head CT w/o contrast   Final Result   IMPRESSION:   1.  No CT finding of a mass, hemorrhage or focal area suggestive of acute infarct.   2.  Diffuse age related changes.        Report per radiology    Emergency Department Course:    Reviewed:  I reviewed nursing notes, vitals, past medical history and Care Everywhere    Assessments:  2353 I obtained history and examined the patient as noted above.   0058 I rechecked the patient and explained findings.     Disposition:  The patient was discharged to home.     Impression & Plan     Medical Decision Making:  Gavi Pearson is a 89 year old female who presents for evaluation following a fall.  The fall was clearly mechanical in nature based on description. The differential diagnosis includes skull fracture, epidural hematoma, subdural hematoma, intracerebral hemorrhage, and traumatic subarachnoid hemorrhage.  There are no physical signs of skull fracture or other bony fracture on exam and the patient is well-appearing. CT imaging of the head was obtained.  Fortunately, no signs of intracerebral bleed or skull fracture were detected during this visit on CT imaging.  Cleared cervical spine clinically. No other injuries from the fall. Return to ED if develop any new or worsening symptoms.    Diagnosis:    ICD-10-CM    1. Fall, initial encounter  W19.XXXA    2. Hematoma of scalp, initial encounter  S00.03XA    3. Injury of head, initial encounter  S09.90XA      Scribe Disclosure:  I, Priscilla Geronimo, am serving as a scribe at 11:52 PM on 2/28/2022 to document services personally performed by Tressa Duarte MD based on my observations and the provider's statements to me.        Tressa Duarte MD  03/01/22 [id number]

## 2022-03-02 ENCOUNTER — PATIENT OUTREACH (OUTPATIENT)
Dept: CARE COORDINATION | Facility: CLINIC | Age: 87
End: 2022-03-02
Payer: MEDICARE

## 2022-03-02 DIAGNOSIS — Z71.89 OTHER SPECIFIED COUNSELING: ICD-10-CM

## 2022-03-02 NOTE — PROGRESS NOTES
Clinic Care Coordination Contact  [facility]/Voicemail       Clinical Data: Care Coordinator Outreach  Outreach attempted x 1. Unable to leave a message on patient's voicemail with call back information.  Plan:  Care Coordinator will try to reach patient again in 1-2 business days.    Daija Yao  Community Health Worker  [facility]  Ph:[phone]

## 2022-03-03 NOTE — PROGRESS NOTES
Clinic Care Coordination Contact  [facility]       Clinical Data: Care Coordinator Outreach  Outreach attempted x 2.  Unable to leave [facility].  Plan:Care Coordinator will do no further outreaches at this time.    Betsy Tavares  Community Health Worker  [facility] [place], [facility]  [phone]

## 2022-05-12 ENCOUNTER — TELEPHONE (OUTPATIENT)
Dept: FAMILY MEDICINE | Facility: CLINIC | Age: 87
End: 2022-05-12

## 2022-05-12 ENCOUNTER — OFFICE VISIT (OUTPATIENT)
Dept: FAMILY MEDICINE | Facility: CLINIC | Age: 87
End: 2022-05-12
Payer: MEDICARE

## 2022-05-12 VITALS
SYSTOLIC BLOOD PRESSURE: 186 MMHG | DIASTOLIC BLOOD PRESSURE: 105 MMHG | TEMPERATURE: 96.9 F | HEIGHT: 69 IN | HEART RATE: 83 BPM | RESPIRATION RATE: 17 BRPM | BODY MASS INDEX: 28.14 KG/M2 | OXYGEN SATURATION: 94 % | WEIGHT: 190 LBS

## 2022-05-12 DIAGNOSIS — E03.4 HYPOTHYROIDISM DUE TO ACQUIRED ATROPHY OF THYROID: ICD-10-CM

## 2022-05-12 DIAGNOSIS — I10 BENIGN ESSENTIAL HYPERTENSION: ICD-10-CM

## 2022-05-12 DIAGNOSIS — Z23 HIGH PRIORITY FOR 2019-NCOV VACCINE: ICD-10-CM

## 2022-05-12 DIAGNOSIS — M19.012 PRIMARY OSTEOARTHRITIS OF BOTH SHOULDERS: ICD-10-CM

## 2022-05-12 DIAGNOSIS — M19.011 PRIMARY OSTEOARTHRITIS OF BOTH SHOULDERS: ICD-10-CM

## 2022-05-12 DIAGNOSIS — L30.9 DERMATITIS: ICD-10-CM

## 2022-05-12 DIAGNOSIS — E11.9 TYPE 2 DIABETES MELLITUS WITHOUT COMPLICATION, WITHOUT LONG-TERM CURRENT USE OF INSULIN (H): ICD-10-CM

## 2022-05-12 DIAGNOSIS — E53.8 VITAMIN B12 DEFICIENCY (NON ANEMIC): Primary | ICD-10-CM

## 2022-05-12 DIAGNOSIS — E78.5 HYPERLIPIDEMIA LDL GOAL <100: ICD-10-CM

## 2022-05-12 LAB — HBA1C MFR BLD: 6.9 % (ref 0–5.6)

## 2022-05-12 PROCEDURE — 36415 COLL VENOUS BLD VENIPUNCTURE: CPT | Performed by: INTERNAL MEDICINE

## 2022-05-12 PROCEDURE — 83036 HEMOGLOBIN GLYCOSYLATED A1C: CPT | Performed by: INTERNAL MEDICINE

## 2022-05-12 PROCEDURE — 99214 OFFICE O/P EST MOD 30 MIN: CPT | Mod: 25 | Performed by: INTERNAL MEDICINE

## 2022-05-12 PROCEDURE — 91305 COVID-19,PF,PFIZER (12+ YRS): CPT | Performed by: INTERNAL MEDICINE

## 2022-05-12 PROCEDURE — 82043 UR ALBUMIN QUANTITATIVE: CPT | Performed by: INTERNAL MEDICINE

## 2022-05-12 PROCEDURE — 0054A COVID-19,PF,PFIZER (12+ YRS): CPT | Performed by: INTERNAL MEDICINE

## 2022-05-12 RX ORDER — LEVOTHYROXINE SODIUM 137 UG/1
TABLET ORAL
Qty: 90 TABLET | Refills: 3 | Status: SHIPPED | OUTPATIENT
Start: 2022-05-12 | End: 2023-01-01

## 2022-05-12 RX ORDER — MELOXICAM 7.5 MG/1
7.5 TABLET ORAL DAILY
Qty: 30 TABLET | Refills: 0 | Status: SHIPPED | OUTPATIENT
Start: 2022-05-12 | End: 2022-08-19

## 2022-05-12 RX ORDER — UREA 10 %
500 LOTION (ML) TOPICAL DAILY
Qty: 100 TABLET | Refills: 1 | Status: SHIPPED | OUTPATIENT
Start: 2022-05-12 | End: 2023-01-01

## 2022-05-12 RX ORDER — TRIAMCINOLONE ACETONIDE 1 MG/G
CREAM TOPICAL
Qty: 45 G | Refills: 1 | Status: ON HOLD | OUTPATIENT
Start: 2022-05-12 | End: 2023-01-01

## 2022-05-12 RX ORDER — LISINOPRIL 20 MG/1
20 TABLET ORAL DAILY
Qty: 90 TABLET | Refills: 3 | Status: SHIPPED | OUTPATIENT
Start: 2022-05-12 | End: 2023-01-01

## 2022-05-12 RX ORDER — METOPROLOL TARTRATE 50 MG
TABLET ORAL
Qty: 180 TABLET | Refills: 3 | Status: SHIPPED | OUTPATIENT
Start: 2022-05-12 | End: 2023-01-01

## 2022-05-12 RX ORDER — LISINOPRIL AND HYDROCHLOROTHIAZIDE 12.5; 2 MG/1; MG/1
1 TABLET ORAL EVERY MORNING
Qty: 90 TABLET | Refills: 3 | Status: SHIPPED | OUTPATIENT
Start: 2022-05-12 | End: 2023-01-01

## 2022-05-12 RX ORDER — LOVASTATIN 20 MG
20 TABLET ORAL DAILY
Qty: 90 TABLET | Refills: 3 | Status: SHIPPED | OUTPATIENT
Start: 2022-05-12 | End: 2023-01-01

## 2022-05-12 RX ORDER — GLIPIZIDE 5 MG/1
TABLET ORAL
Qty: 225 TABLET | Refills: 1 | Status: SHIPPED | OUTPATIENT
Start: 2022-05-12 | End: 2022-09-20

## 2022-05-12 ASSESSMENT — PAIN SCALES - GENERAL: PAINLEVEL: MILD PAIN (3)

## 2022-05-12 NOTE — PROGRESS NOTES
Assessment & Plan     Hyperlipidemia LDL goal <100    - lovastatin (MEVACOR) 20 MG tablet; Take 1 tablet (20 mg) by mouth daily    Benign essential hypertension    - metoprolol tartrate (LOPRESSOR) 50 MG tablet; TAKE 1 TABLET(50 MG) BY MOUTH TWICE DAILY  - lisinopril-hydrochlorothiazide (ZESTORETIC) 20-12.5 MG tablet; Take 1 tablet by mouth every morning  - lisinopril (ZESTRIL) 20 MG tablet; Take 1 tablet (20 mg) by mouth daily    Hypothyroidism due to acquired atrophy of thyroid    - levothyroxine (SYNTHROID/LEVOTHROID) 137 MCG tablet; TAKE 1 TABLET(137 MCG) BY MOUTH DAILY    Type 2 diabetes mellitus without complication, without long-term current use of insulin (H)    - glipiZIDE (GLUCOTROL) 5 MG tablet; Take 0.5 tablets (2.5 mg) by mouth every morning AND 2 tablets (10 mg) every evening.  - metFORMIN (GLUCOPHAGE) 500 MG tablet; TAKE 1 TABLET(500 MG) BY MOUTH TWICE DAILY WITH MEALS  - sitagliptin (JANUVIA) 100 MG tablet; Take 1 tablet (100 mg) by mouth daily  - Albumin Random Urine Quantitative with Creat Ratio; Future  - HEMOGLOBIN A1C; Future  - Albumin Random Urine Quantitative with Creat Ratio  - HEMOGLOBIN A1C    Dermatitis    - triamcinolone (KENALOG) 0.1 % external cream; Apply b.i.d. p.r.n. to dermatitis but not on the face    Vitamin B12 deficiency (non anemic)    - cyanocobalamin (VITAMIN B-12) 500 MCG tablet; Take 1 tablet (500 mcg) by mouth daily    Primary osteoarthritis of both shoulders  Bilaterally and distribution.  A short trial of meloxicam will be utilized.  I will refer the patient to orthopedics as well.  I mentioned to the patient the importance of good hydration additionally.    - meloxicam (MOBIC) 7.5 MG tablet; Take 1 tablet (7.5 mg) by mouth daily  - Orthopedic  Referral; Future    High priority for 2019-nCoV vaccine    - COVID-19,PF,PFIZER (12+ Yrs GRAY LABEL)             BMI:   Estimated body mass index is 28.06 kg/m  as calculated from the following:    Height as of this  "encounter: 1.753 m (5' 9\").    Weight as of this encounter: 86.2 kg (190 lb).       See Patient Instructions    Return in about 6 months (around 11/12/2022) for Follow up.    Kane Wick MD  [facility] FLORESITA Gatica is a 89 year old who presents for the following health issues     HPI patient presents to clinic today primarily with issues of bilateral shoulder pain.  She has had steroid injections historically.  She states that the pain is increasing to the point that some activities of daily living are becoming more more problematic.  She has difficulty with any lifting or grasping above the level of the shoulder.    No chest pain no shortness of breath.  She does have a history of low B12 levels.  Does mention perhaps some issues with balance but also has difficulty with the level of osteoarthritis.          Review of Systems   Constitutional, HEENT, cardiovascular, pulmonary, gi and gu systems are negative, except as otherwise noted.      Objective    BP (!) 186/105 (BP Location: Left arm, Patient Position: Chair, Cuff Size: Adult Regular)   Pulse 83   Temp 96.9  F (36.1  C) (Temporal)   Resp 17   Ht 1.753 m (5' 9\")   Wt 86.2 kg (190 lb)   SpO2 94%   BMI 28.06 kg/m    Body mass index is 28.06 kg/m .  Physical Exam   Crepitus noted bilateral shoulders no erythema no warmth.    Blood pressure is elevated today.  He has been under control at home.    Office Visit on 11/04/2021   Component Date Value Ref Range Status     Cholesterol 11/04/2021 159  <200 mg/dL Final     Triglycerides 11/04/2021 110  <150 mg/dL Final     Direct Measure HDL 11/04/2021 46 (A) >=50 mg/dL Final     LDL Cholesterol Calculated 11/04/2021 91  <=100 mg/dL Final     Non HDL Cholesterol 11/04/2021 113  <130 mg/dL Final     Patient Fasting > 8hrs? 11/04/2021 Yes   Final     Sodium 11/04/2021 132 (A) 133 - 144 mmol/L Final     Potassium 11/04/2021 4.3  3.4 - 5.3 mmol/L Final     Chloride 11/04/2021 99  94 - " 109 mmol/L Final     Carbon Dioxide (CO2) 11/04/2021 22  20 - 32 mmol/L Final     Anion Gap 11/04/2021 11  3 - 14 mmol/L Final     Urea Nitrogen 11/04/2021 13  7 - 30 mg/dL Final     Creatinine 11/04/2021 0.74  0.52 - 1.04 mg/dL Final     Calcium 11/04/2021 10.1  8.5 - 10.1 mg/dL Final     Glucose 11/04/2021 188 (A) 70 - 99 mg/dL Final     Alkaline Phosphatase 11/04/2021 85  40 - 150 U/L Final     AST 11/04/2021 19  0 - 45 U/L Final     ALT 11/04/2021 23  0 - 50 U/L Final     Protein Total 11/04/2021 7.8  6.8 - 8.8 g/dL Final     Albumin 11/04/2021 3.7  3.4 - 5.0 g/dL Final     Bilirubin Total 11/04/2021 0.8  0.2 - 1.3 mg/dL Final     GFR Estimate 11/04/2021 72  >60 mL/min/1.73m2 Final    As of July 11, 2021, eGFR is calculated by the CKD-EPI creatinine equation, without race adjustment. eGFR can be influenced by muscle mass, exercise, and diet. The reported eGFR is an estimation only and is only applicable if the renal function is stable.     TSH 11/04/2021 2.86  0.40 - 4.00 mU/L Final     Hemoglobin A1C 11/04/2021 7.5 (A) 0.0 - 5.6 % Final    Normal <5.7%   Prediabetes 5.7-6.4%    Diabetes 6.5% or higher     Note: Adopted from ADA consensus guidelines.     Vitamin B12 11/04/2021 270  193 - 986 pg/mL Final     WBC Count 11/04/2021 11.9 (A) 4.0 - 11.0 10e3/uL Final     RBC Count 11/04/2021 4.72  3.80 - 5.20 10e6/uL Final     Hemoglobin 11/04/2021 14.9  11.7 - 15.7 g/dL Final     Hematocrit 11/04/2021 43.3  35.0 - 47.0 % Final     MCV 11/04/2021 92  78 - 100 fL Final     MCH 11/04/2021 31.6  26.5 - 33.0 pg Final     MCHC 11/04/2021 34.4  31.5 - 36.5 g/dL Final     RDW 11/04/2021 12.8  10.0 - 15.0 % Final     Platelet Count 11/04/2021 202  150 - 450 10e3/uL Final     % Neutrophils 11/04/2021 67  % Final     % Lymphocytes 11/04/2021 22  % Final     % Monocytes 11/04/2021 10  % Final     % Eosinophils 11/04/2021 1  % Final     % Basophils 11/04/2021 0  % Final     Absolute Neutrophils 11/04/2021 7.9  1.6 - 8.3 10e3/uL  Final     Absolute Lymphocytes 11/04/2021 2.6  0.8 - 5.3 10e3/uL Final     Absolute Monocytes 11/04/2021 1.2  0.0 - 1.3 10e3/uL Final     Absolute Eosinophils 11/04/2021 0.1  0.0 - 0.7 10e3/uL Final     Absolute Basophils 11/04/2021 0.0  0.0 - 0.2 10e3/uL Final

## 2022-05-12 NOTE — TELEPHONE ENCOUNTER
Please advise    Patient had appt today regarding arthritis pain and states Prednisone was discussed at visit.       -Pt asking if Prednisone script was forgotten? Pt assumed it was going to be sent to pharmacy.      Can we leave a detailed message on this number? NO  Phone number patient can be reached at: Cell number on file:    Telephone Information:   Mobile [phone]       Kandice Mccoy RN  [facility] Triage

## 2022-05-12 NOTE — TELEPHONE ENCOUNTER
Prescribed meloxicam.    With steroid is what the orthopedist may use to help her with her shoulder pain.  Frequently they utilize glucocorticoid injections.    Kane Wick MD on 5/12/2022 at 2:44 PM

## 2022-05-12 NOTE — PATIENT INSTRUCTIONS
Gavi;  I have referred you for orthopedics.  You have severe bilateral shoulder arthritis.  I have prescribed a medication called meloxicam which should reduce your discomfort.  Make certain to keep yourself well-hydrated while taking this medication.  Orthopedics may give you a glucocorticoid or steroid injection which will help with your discomfort as well.    Today we will evaluate your blood work.  I will check your blood sugar carefully.    You do have a history of low B12.  I recommend 500 mcg of B12 daily.    We will recheck your blood pressure today as it was initially high.    Your medications will be refilled additionally.    Best regards  Kane

## 2022-05-13 LAB
CREAT UR-MCNC: 70 MG/DL
MICROALBUMIN UR-MCNC: 60 MG/L
MICROALBUMIN/CREAT UR: 85.71 MG/G CR (ref 0–25)

## 2022-05-13 NOTE — RESULT ENCOUNTER NOTE
I am happy to see you that your hemoglobin A1c is much better than it was.  You are doing a great job.    Reduce your salt intake by just a bit.    Best regards  Kane

## 2022-05-16 ENCOUNTER — TRANSFERRED RECORDS (OUTPATIENT)
Dept: HEALTH INFORMATION MANAGEMENT | Facility: CLINIC | Age: 87
End: 2022-05-16
Payer: MEDICARE

## 2022-06-06 ENCOUNTER — TRANSFERRED RECORDS (OUTPATIENT)
Dept: HEALTH INFORMATION MANAGEMENT | Facility: CLINIC | Age: 87
End: 2022-06-06

## 2022-08-16 DIAGNOSIS — M19.012 PRIMARY OSTEOARTHRITIS OF BOTH SHOULDERS: ICD-10-CM

## 2022-08-16 DIAGNOSIS — M19.011 PRIMARY OSTEOARTHRITIS OF BOTH SHOULDERS: ICD-10-CM

## 2022-08-19 RX ORDER — MELOXICAM 7.5 MG/1
TABLET ORAL
Qty: 30 TABLET | Refills: 0 | Status: SHIPPED | OUTPATIENT
Start: 2022-08-19 | End: 2022-01-01

## 2022-08-19 NOTE — TELEPHONE ENCOUNTER
Routing refill request to provider for review/approval because:  Labs out of range:    WBC   Date Value Ref Range Status   02/03/2021 11.4 (H) 4.0 - 11.0 10e9/L Final     WBC Count   Date Value Ref Range Status   11/04/2021 11.9 (H) 4.0 - 11.0 10e3/uL Final      BP Readings from Last 3 Encounters:   05/12/22 (!) 186/105   03/01/22 (!) 153/78   11/04/21 (!) 180/98    Patient is over 64 years old.  Louisa Villanueva RN

## 2022-09-20 DIAGNOSIS — E11.9 TYPE 2 DIABETES MELLITUS WITHOUT COMPLICATION, WITHOUT LONG-TERM CURRENT USE OF INSULIN (H): ICD-10-CM

## 2022-09-20 RX ORDER — GLIPIZIDE 5 MG/1
TABLET ORAL
Qty: 225 TABLET | Refills: 0 | Status: SHIPPED | OUTPATIENT
Start: 2022-09-20 | End: 2022-01-01

## 2022-09-20 NOTE — TELEPHONE ENCOUNTER
Prescription approved per [facility] Refill Protocol.  Kandice Mccoy RN

## 2022-11-11 NOTE — NURSING NOTE
"    Initial BP:  BP (!) 184/92   Pulse 70   Temp 96.8  F (36  C) (Temporal)   Resp 22   Ht 1.753 m (5' 9\")   Wt 84.8 kg (187 lb)   SpO2 97%   BMI 27.62 kg/m       70  Disposition: provider notified while patient in the clinic      Ondina Banres CMA    "

## 2022-11-11 NOTE — PROGRESS NOTES
"SUBJECTIVE:   Gavi is a 89 year old who presents for Preventive Visit.      Patient has been advised of split billing requirements and indicates understanding: Yes  Are you in the first 12 months of your Medicare coverage?  No    History of Present Illness       Diabetes:   She presents for follow up of diabetes.  She is checking home blood glucose a few times a week. She checks blood glucose before meals.  Blood glucose is never over 200 and never under 70. When her blood glucose is low, the patient is asymptomatic for confusion, blurred vision, lethargy and reports not feeling dizzy, shaky, or weak.  She has no concerns regarding her diabetes at this time.  She is having numbness in feet. The patient has not had a diabetic eye exam in the last 12 months.         Hypertension: She presents for follow up of hypertension.  She does not check blood pressure  regularly outside of the clinic. Outside blood pressures have been over 140/90. She does not follow a low salt diet.     Hypothyroidism:     Since last visit, patient describes the following symptoms::  None   She is not taking prescribed medications regularly due to None.  Healthy Habits:     In general, how would you rate your overall health?  Very good    Frequency of exercise:  None    Duration of exercise:  Other    Do you usually eat at least 4 servings of fruit and vegetables a day, include whole grains    & fiber and avoid regularly eating high fat or \"junk\" foods?  Yes    Taking medications regularly:  Yes    Barriers to taking medications:  None    Medication side effects:  None    Ability to successfully perform activities of daily living:  Transportation requires assistance, shopping requires assistance, preparing meals requires assistance and housework requires assistance    Home Safety:  No safety concerns identified    Hearing Impairment:  Difficulty following a conversation in a noisy restaurant or crowded room, need to ask people to speak up or " Lab Results   Component Value Date    HGBA1C 10 1 (H) 11/23/2019       Recent Labs     01/12/20  1616 01/12/20  2220 01/12/20  2350 01/13/20  0212   POCGLU 260* 282* 231* 154*       Blood Sugar Average: Last 72 hrs:  (P) 231 75     Continue monitor glucoses and utilize insulin drip per protocol  Check hemoglobin A1c, previously poorly controlled  repeat themselves and difficulty understanding soft or whispered speech    In the past 6 months, have you been bothered by leaking of urine? Yes    In general, how would you rate your overall mental or emotional health?  Very good      PHQ-2 Total Score: 0    Additional concerns today:  Yes    Do you feel safe in your environment? Yes    Have you ever done Advance Care Planning? (For example, a Health Directive, POLST, or a discussion with a medical provider or your loved ones about your wishes): Yes, patient states has an Advance Care Planning document and will bring a copy to the clinic.    Fall risk  Fallen 2 or more times in the past year?: No  Any fall with injury in the past year?: No    Cognitive Screening   1) Repeat 3 items (Leader, Season, Table)    2) Clock draw: NORMAL  3) 3 item recall: Recalls 2 objects   Results: NORMAL clock, 1-2 items recalled: COGNITIVE IMPAIRMENT LESS LIKELY    Mini-CogTM Copyright KATLIN Underwood. Licensed by the author for use in [facility]; reprinted with permission (mathew@[facility]). All rights reserved.      Do you have sleep apnea, excessive snoring or daytime drowsiness?: no    Reviewed and updated as needed this visit by clinical staff                  Reviewed and updated as needed this visit by Provider                 Social History     Tobacco Use     Smoking status: Never     Smokeless tobacco: Never   Substance Use Topics     Alcohol use: Yes     Alcohol/week: 0.0 standard drinks     Comment: occ     If you drink alcohol do you typically have >3 drinks per day or >7 drinks per week? No    Alcohol Use 1/23/2020   Prescreen: >3 drinks/day or >7 drinks/week? No       Current providers sharing in care for this patient include:   Patient Care Team:  aKne Wick MD as PCP - General (Internal Medicine)  Kane Wick MD as Assigned PCP    The following health maintenance items are reviewed in Epic and correct as of today:  Health Maintenance   Topic Date Due      "ZOSTER IMMUNIZATION (1 of 2) Never done     MEDICARE ANNUAL WELLNESS VISIT  01/23/2021     DIABETIC FOOT EXAM  01/23/2021     EYE EXAM  02/21/2021     COVID-19 Vaccine (5 - Booster for Pfizer series) 07/07/2022     INFLUENZA VACCINE (1) 09/01/2022     BMP  11/04/2022     LIPID  11/04/2022     A1C  11/12/2022     MICROALBUMIN  05/12/2023     ANNUAL REVIEW OF HM ORDERS  05/12/2023     FALL RISK ASSESSMENT  05/12/2023     ADVANCE CARE PLANNING  01/26/2025     DTAP/TDAP/TD IMMUNIZATION (4 - Td or Tdap) 01/23/2030     PHQ-2 (once per calendar year)  Completed     Pneumococcal Vaccine: 65+ Years  Completed     IPV IMMUNIZATION  Aged Out     MENINGITIS IMMUNIZATION  Aged Out     MAMMO SCREENING  Discontinued     Labs reviewed in EPIC    Pertinent mammograms are reviewed under the imaging tab.    Review of Systems  Constitutional, HEENT, cardiovascular, pulmonary, GI, , musculoskeletal, neuro, skin, endocrine and psych systems are negative, except as otherwise noted.    OBJECTIVE:   BP (!) 184/92   Pulse 70   Temp 96.8  F (36  C) (Temporal)   Resp 22   Ht 1.753 m (5' 9\")   Wt 84.8 kg (187 lb)   SpO2 97%   BMI 27.62 kg/m   Estimated body mass index is 28.06 kg/m  as calculated from the following:    Height as of 5/12/22: 1.753 m (5' 9\").    Weight as of 5/12/22: 86.2 kg (190 lb).  Physical Exam  GENERAL: alert and no distress  EYES: Eyes grossly normal to inspection, PERRL and conjunctivae and sclerae normal  HENT: ear canals and TM's normal, nose and mouth without ulcers or lesions  NECK: no adenopathy, no asymmetry, masses, or scars and thyroid normal to palpation  RESP: lungs clear to auscultation  CV: regular rate and rhythm  ABDOMEN: soft, nontender, no hepatosplenomegaly, no masses and bowel sounds normal  MS: no gross musculoskeletal defects noted, no edema  SKIN: no suspicious lesions or rashes  NEURO: Normal strength and tone, mentation intact and speech normal  PSYCH: mentation appears normal, affect " "normal/bright    Diagnostic Test Results:  Labs reviewed in Epic    ASSESSMENT / PLAN:   Gavi was seen today for establish care, physical and health maintenance.    Diagnoses and all orders for this visit:    Wellness examination    Type 2 diabetes mellitus without complication, without long-term current use of insulin (H)    Benign essential hypertension    Screening for hyperlipidemia    Primary osteoarthritis of both shoulders        Patient has been advised of split billing requirements and indicates understanding: Yes      COUNSELING:  Reviewed preventive health counseling, as reflected in patient instructions  Special attention given to:       Regular exercise       Healthy diet/nutrition    Estimated body mass index is 28.06 kg/m  as calculated from the following:    Height as of 5/12/22: 1.753 m (5' 9\").    Weight as of 5/12/22: 86.2 kg (190 lb).        She reports that she has never smoked. She has never used smokeless tobacco.      Appropriate preventive services were discussed with this patient, including applicable screening as appropriate for cardiovascular disease, diabetes, osteopenia/osteoporosis, and glaucoma.  As appropriate for age/gender, discussed screening for colorectal cancer, prostate cancer, breast cancer, and cervical cancer. Checklist reviewing preventive services available has been given to the patient.    Reviewed patients plan of care and provided an AVS. The Basic Care Plan (routine screening as documented in Health Maintenance) for Gavi meets the Care Plan requirement. This Care Plan has been established and reviewed with the Patient.    Counseling Resources:  ATP IV Guidelines  Pooled Cohorts Equation Calculator  Breast Cancer Risk Calculator  Breast Cancer: Medication to Reduce Risk  FRAX Risk Assessment  ICSI Preventive Guidelines  Dietary Guidelines for Americans, 2010  USDA's MyPlate  ASA Prophylaxis  Lung CA Screening    Niki Mcmullen MD  [facility] " FLORESITA    Identified Health Risks:

## 2023-01-01 ENCOUNTER — TRANSFERRED RECORDS (OUTPATIENT)
Dept: HEALTH INFORMATION MANAGEMENT | Facility: CLINIC | Age: 88
End: 2023-01-01

## 2023-01-01 ENCOUNTER — APPOINTMENT (OUTPATIENT)
Dept: ULTRASOUND IMAGING | Facility: CLINIC | Age: 88
DRG: 375 | End: 2023-01-01
Attending: INTERNAL MEDICINE
Payer: MEDICARE

## 2023-01-01 ENCOUNTER — APPOINTMENT (OUTPATIENT)
Dept: INTERVENTIONAL RADIOLOGY/VASCULAR | Facility: CLINIC | Age: 88
DRG: 375 | End: 2023-01-01
Attending: HOSPITALIST
Payer: MEDICARE

## 2023-01-01 ENCOUNTER — APPOINTMENT (OUTPATIENT)
Dept: ULTRASOUND IMAGING | Facility: CLINIC | Age: 88
DRG: 375 | End: 2023-01-01
Attending: EMERGENCY MEDICINE
Payer: MEDICARE

## 2023-01-01 ENCOUNTER — NURSE TRIAGE (OUTPATIENT)
Dept: FAMILY MEDICINE | Facility: CLINIC | Age: 88
End: 2023-01-01
Payer: MEDICARE

## 2023-01-01 ENCOUNTER — TELEPHONE (OUTPATIENT)
Dept: FAMILY MEDICINE | Facility: CLINIC | Age: 88
End: 2023-01-01
Payer: MEDICARE

## 2023-01-01 ENCOUNTER — HOSPITAL ENCOUNTER (EMERGENCY)
Facility: CLINIC | Age: 88
Discharge: HOME | End: 2023-06-05
Attending: EMERGENCY MEDICINE | Admitting: EMERGENCY MEDICINE
Payer: MEDICARE

## 2023-01-01 ENCOUNTER — APPOINTMENT (OUTPATIENT)
Dept: PHYSICAL THERAPY | Facility: CLINIC | Age: 88
DRG: 375 | End: 2023-01-01
Attending: INTERNAL MEDICINE
Payer: MEDICARE

## 2023-01-01 ENCOUNTER — HOSPITAL ENCOUNTER (INPATIENT)
Facility: CLINIC | Age: 88
LOS: 8 days | Discharge: HOSPICE | DRG: 375 | End: 2023-06-23
Attending: EMERGENCY MEDICINE | Admitting: INTERNAL MEDICINE
Payer: MEDICARE

## 2023-01-01 ENCOUNTER — VIRTUAL VISIT (OUTPATIENT)
Dept: FAMILY MEDICINE | Facility: CLINIC | Age: 88
End: 2023-01-01
Payer: MEDICARE

## 2023-01-01 ENCOUNTER — ANCILLARY PROCEDURE (OUTPATIENT)
Dept: CT IMAGING | Facility: CLINIC | Age: 88
DRG: 375 | End: 2023-01-01
Attending: PREVENTIVE MEDICINE
Payer: MEDICARE

## 2023-01-01 ENCOUNTER — PATIENT OUTREACH (OUTPATIENT)
Dept: FAMILY MEDICINE | Facility: CLINIC | Age: 88
End: 2023-01-01
Payer: MEDICARE

## 2023-01-01 ENCOUNTER — MEDICAL CORRESPONDENCE (OUTPATIENT)
Dept: HEALTH INFORMATION MANAGEMENT | Facility: CLINIC | Age: 88
End: 2023-01-01
Payer: MEDICARE

## 2023-01-01 ENCOUNTER — NURSING HOME VISIT (OUTPATIENT)
Dept: GERIATRICS | Facility: CLINIC | Age: 88
End: 2023-01-01
Payer: MEDICARE

## 2023-01-01 ENCOUNTER — OFFICE VISIT (OUTPATIENT)
Dept: PEDIATRICS | Facility: CLINIC | Age: 88
End: 2023-01-01
Payer: MEDICARE

## 2023-01-01 ENCOUNTER — OFFICE VISIT (OUTPATIENT)
Dept: URGENT CARE | Facility: URGENT CARE | Age: 88
End: 2023-01-01
Payer: MEDICARE

## 2023-01-01 ENCOUNTER — ANCILLARY PROCEDURE (OUTPATIENT)
Dept: GENERAL RADIOLOGY | Facility: CLINIC | Age: 88
End: 2023-01-01
Attending: PHYSICIAN ASSISTANT
Payer: MEDICARE

## 2023-01-01 ENCOUNTER — OFFICE VISIT (OUTPATIENT)
Dept: INTERNAL MEDICINE | Facility: CLINIC | Age: 88
End: 2023-01-01
Payer: MEDICARE

## 2023-01-01 VITALS
BODY MASS INDEX: 27.17 KG/M2 | DIASTOLIC BLOOD PRESSURE: 72 MMHG | SYSTOLIC BLOOD PRESSURE: 124 MMHG | RESPIRATION RATE: 16 BRPM | WEIGHT: 184 LBS | OXYGEN SATURATION: 97 % | HEART RATE: 99 BPM | TEMPERATURE: 98.7 F

## 2023-01-01 VITALS
OXYGEN SATURATION: 94 % | HEART RATE: 91 BPM | SYSTOLIC BLOOD PRESSURE: 134 MMHG | DIASTOLIC BLOOD PRESSURE: 88 MMHG | BODY MASS INDEX: 27.76 KG/M2 | TEMPERATURE: 97.7 F | WEIGHT: 188 LBS

## 2023-01-01 VITALS
TEMPERATURE: 97.6 F | HEART RATE: 91 BPM | HEIGHT: 69 IN | DIASTOLIC BLOOD PRESSURE: 74 MMHG | SYSTOLIC BLOOD PRESSURE: 124 MMHG | BODY MASS INDEX: 27.27 KG/M2 | WEIGHT: 184.1 LBS | OXYGEN SATURATION: 98 %

## 2023-01-01 VITALS
WEIGHT: 170 LBS | SYSTOLIC BLOOD PRESSURE: 128 MMHG | TEMPERATURE: 95.7 F | DIASTOLIC BLOOD PRESSURE: 76 MMHG | RESPIRATION RATE: 18 BRPM | HEART RATE: 74 BPM | BODY MASS INDEX: 25.18 KG/M2 | HEIGHT: 69 IN | OXYGEN SATURATION: 97 %

## 2023-01-01 VITALS
TEMPERATURE: 97.8 F | OXYGEN SATURATION: 96 % | HEART RATE: 72 BPM | RESPIRATION RATE: 20 BRPM | SYSTOLIC BLOOD PRESSURE: 163 MMHG | DIASTOLIC BLOOD PRESSURE: 82 MMHG

## 2023-01-01 VITALS
OXYGEN SATURATION: 98 % | HEART RATE: 81 BPM | HEIGHT: 69 IN | TEMPERATURE: 97.1 F | DIASTOLIC BLOOD PRESSURE: 84 MMHG | BODY MASS INDEX: 25.18 KG/M2 | RESPIRATION RATE: 14 BRPM | SYSTOLIC BLOOD PRESSURE: 125 MMHG | WEIGHT: 170 LBS

## 2023-01-01 VITALS
TEMPERATURE: 97.1 F | OXYGEN SATURATION: 98 % | HEART RATE: 103 BPM | SYSTOLIC BLOOD PRESSURE: 154 MMHG | WEIGHT: 170.8 LBS | BODY MASS INDEX: 25.3 KG/M2 | RESPIRATION RATE: 16 BRPM | DIASTOLIC BLOOD PRESSURE: 98 MMHG | HEIGHT: 69 IN

## 2023-01-01 DIAGNOSIS — R52 PAIN: ICD-10-CM

## 2023-01-01 DIAGNOSIS — K59.00 CONSTIPATION, UNSPECIFIED CONSTIPATION TYPE: ICD-10-CM

## 2023-01-01 DIAGNOSIS — I48.0 PAROXYSMAL A-FIB (H): ICD-10-CM

## 2023-01-01 DIAGNOSIS — I10 BENIGN ESSENTIAL HYPERTENSION: ICD-10-CM

## 2023-01-01 DIAGNOSIS — C57.9 GYNECOLOGIC MALIGNANCY (H): Primary | ICD-10-CM

## 2023-01-01 DIAGNOSIS — K76.9 LIVER LESION: ICD-10-CM

## 2023-01-01 DIAGNOSIS — E11.9 TYPE 2 DIABETES MELLITUS WITHOUT COMPLICATION, WITHOUT LONG-TERM CURRENT USE OF INSULIN (H): ICD-10-CM

## 2023-01-01 DIAGNOSIS — D50.8 OTHER IRON DEFICIENCY ANEMIA: ICD-10-CM

## 2023-01-01 DIAGNOSIS — E78.5 HYPERLIPIDEMIA LDL GOAL <100: ICD-10-CM

## 2023-01-01 DIAGNOSIS — G47.00 INSOMNIA, UNSPECIFIED TYPE: ICD-10-CM

## 2023-01-01 DIAGNOSIS — K38.9 APPENDIX DISEASE: ICD-10-CM

## 2023-01-01 DIAGNOSIS — C78.6 PERITONEAL CARCINOMATOSIS (H): ICD-10-CM

## 2023-01-01 DIAGNOSIS — I48.20 CHRONIC ATRIAL FIBRILLATION (H): ICD-10-CM

## 2023-01-01 DIAGNOSIS — R11.0 NAUSEA: Primary | ICD-10-CM

## 2023-01-01 DIAGNOSIS — E78.01 FAMILIAL HYPERCHOLESTEROLEMIA: ICD-10-CM

## 2023-01-01 DIAGNOSIS — N94.89 ADNEXAL MASS: ICD-10-CM

## 2023-01-01 DIAGNOSIS — E03.4 HYPOTHYROIDISM DUE TO ACQUIRED ATROPHY OF THYROID: ICD-10-CM

## 2023-01-01 DIAGNOSIS — R11.0 NAUSEA: ICD-10-CM

## 2023-01-01 DIAGNOSIS — R10.84 ABDOMINAL PAIN, GENERALIZED: Primary | ICD-10-CM

## 2023-01-01 DIAGNOSIS — C57.9 GYNECOLOGIC MALIGNANCY (H): ICD-10-CM

## 2023-01-01 DIAGNOSIS — R79.89 ELEVATED TROPONIN: ICD-10-CM

## 2023-01-01 DIAGNOSIS — K21.9 GASTROESOPHAGEAL REFLUX DISEASE, UNSPECIFIED WHETHER ESOPHAGITIS PRESENT: ICD-10-CM

## 2023-01-01 DIAGNOSIS — R60.0 LEG EDEMA: ICD-10-CM

## 2023-01-01 DIAGNOSIS — R53.81 PHYSICAL DECONDITIONING: ICD-10-CM

## 2023-01-01 DIAGNOSIS — Z71.89 ADVANCED CARE PLANNING/COUNSELING DISCUSSION: Primary | ICD-10-CM

## 2023-01-01 DIAGNOSIS — R54 ADVANCED AGE: ICD-10-CM

## 2023-01-01 DIAGNOSIS — H04.123 DRY EYES: ICD-10-CM

## 2023-01-01 DIAGNOSIS — R79.89 ELEVATED TSH: ICD-10-CM

## 2023-01-01 DIAGNOSIS — Z51.5 HOSPICE CARE PATIENT: ICD-10-CM

## 2023-01-01 DIAGNOSIS — E87.1 HYPONATREMIA: ICD-10-CM

## 2023-01-01 DIAGNOSIS — K21.9 GASTROESOPHAGEAL REFLUX DISEASE WITHOUT ESOPHAGITIS: Primary | ICD-10-CM

## 2023-01-01 DIAGNOSIS — F41.9 ANXIETY: ICD-10-CM

## 2023-01-01 DIAGNOSIS — K04.7 TOOTH INFECTION: ICD-10-CM

## 2023-01-01 DIAGNOSIS — K21.00 GASTROESOPHAGEAL REFLUX DISEASE WITH ESOPHAGITIS WITHOUT HEMORRHAGE: Primary | ICD-10-CM

## 2023-01-01 DIAGNOSIS — R31.9 URINARY TRACT INFECTION WITH HEMATURIA, SITE UNSPECIFIED: ICD-10-CM

## 2023-01-01 DIAGNOSIS — K21.00 GASTROESOPHAGEAL REFLUX DISEASE WITH ESOPHAGITIS, UNSPECIFIED WHETHER HEMORRHAGE: ICD-10-CM

## 2023-01-01 DIAGNOSIS — K21.9 GASTROESOPHAGEAL REFLUX DISEASE, UNSPECIFIED WHETHER ESOPHAGITIS PRESENT: Primary | ICD-10-CM

## 2023-01-01 DIAGNOSIS — R18.8 OTHER ASCITES: ICD-10-CM

## 2023-01-01 DIAGNOSIS — N39.0 URINARY TRACT INFECTION WITH HEMATURIA, SITE UNSPECIFIED: ICD-10-CM

## 2023-01-01 LAB
% LINING CELLS, BODY FLUID: 3 %
ABSOLUTE NEUTROPHILS, BODY FLUID: 26.4 /UL
ALBUMIN BODY FLUID SOURCE: NORMAL
ALBUMIN FLD-MCNC: 2.7 G/DL
ALBUMIN SERPL BCG-MCNC: 2.6 G/DL (ref 3.5–5.2)
ALBUMIN SERPL BCG-MCNC: 3.2 G/DL (ref 3.5–5.2)
ALBUMIN SERPL BCG-MCNC: 3.7 G/DL (ref 3.5–5.2)
ALBUMIN SERPL BCG-MCNC: 4.2 G/DL (ref 3.5–5.2)
ALBUMIN UR-MCNC: 30 MG/DL
ALBUMIN UR-MCNC: 30 MG/DL
ALP SERPL-CCNC: 68 U/L (ref 35–104)
ALP SERPL-CCNC: 68 U/L (ref 35–104)
ALP SERPL-CCNC: 69 U/L (ref 35–104)
ALT SERPL W P-5'-P-CCNC: 5 U/L (ref 0–50)
ALT SERPL W P-5'-P-CCNC: 5 U/L (ref 10–35)
ALT SERPL W P-5'-P-CCNC: 6 U/L (ref 10–35)
AMYLASE SERPL-CCNC: 57 U/L (ref 28–100)
ANION GAP SERPL CALCULATED.3IONS-SCNC: 12 MMOL/L (ref 7–15)
ANION GAP SERPL CALCULATED.3IONS-SCNC: 13 MMOL/L (ref 7–15)
ANION GAP SERPL CALCULATED.3IONS-SCNC: 14 MMOL/L (ref 7–15)
ANION GAP SERPL CALCULATED.3IONS-SCNC: 15 MMOL/L (ref 7–15)
ANION GAP SERPL CALCULATED.3IONS-SCNC: 16 MMOL/L (ref 7–15)
APPEARANCE FLD: ABNORMAL
APPEARANCE UR: ABNORMAL
APPEARANCE UR: ABNORMAL
AST SERPL W P-5'-P-CCNC: 19 U/L (ref 0–45)
AST SERPL W P-5'-P-CCNC: 21 U/L (ref 10–35)
AST SERPL W P-5'-P-CCNC: 22 U/L (ref 10–35)
ATRIAL RATE - MUSE: NORMAL BPM
BACTERIA #/AREA URNS HPF: ABNORMAL /HPF
BACTERIA #/AREA URNS HPF: ABNORMAL /HPF
BACTERIA UR CULT: NO GROWTH
BACTERIA UR CULT: NORMAL
BASOPHILS # BLD AUTO: 0 10E3/UL (ref 0–0.2)
BASOPHILS NFR BLD AUTO: 0 %
BILIRUB SERPL-MCNC: 0.5 MG/DL
BILIRUB SERPL-MCNC: 0.6 MG/DL
BILIRUB SERPL-MCNC: 0.6 MG/DL
BILIRUB UR QL STRIP: ABNORMAL
BILIRUB UR QL STRIP: NEGATIVE
BUN SERPL-MCNC: 11.4 MG/DL (ref 8–23)
BUN SERPL-MCNC: 16.9 MG/DL (ref 8–23)
BUN SERPL-MCNC: 17.7 MG/DL (ref 8–23)
BUN SERPL-MCNC: 17.8 MG/DL (ref 8–23)
BUN SERPL-MCNC: 18.4 MG/DL (ref 8–23)
CALCIUM SERPL-MCNC: 8.1 MG/DL (ref 8.2–9.6)
CALCIUM SERPL-MCNC: 8.9 MG/DL (ref 8.2–9.6)
CALCIUM SERPL-MCNC: 9 MG/DL (ref 8.2–9.6)
CALCIUM SERPL-MCNC: 9 MG/DL (ref 8.2–9.6)
CALCIUM SERPL-MCNC: 9.6 MG/DL (ref 8.2–9.6)
CANCER AG125 SERPL-ACNC: 414 U/ML
CANCER AG19-9 SERPL IA-ACNC: 8 U/ML
CEA SERPL-MCNC: 2 NG/ML
CELL COUNT BODY FLUID SOURCE: ABNORMAL
CHLORIDE SERPL-SCNC: 92 MMOL/L (ref 98–107)
CHLORIDE SERPL-SCNC: 92 MMOL/L (ref 98–107)
CHLORIDE SERPL-SCNC: 93 MMOL/L (ref 98–107)
CHLORIDE SERPL-SCNC: 93 MMOL/L (ref 98–107)
CHLORIDE SERPL-SCNC: 94 MMOL/L (ref 98–107)
COLOR FLD: YELLOW
COLOR UR AUTO: YELLOW
COLOR UR AUTO: YELLOW
CREAT SERPL-MCNC: 0.73 MG/DL (ref 0.51–0.95)
CREAT SERPL-MCNC: 0.96 MG/DL (ref 0.51–0.95)
CREAT SERPL-MCNC: 0.98 MG/DL (ref 0.51–0.95)
CREAT SERPL-MCNC: 0.99 MG/DL (ref 0.51–0.95)
CREAT SERPL-MCNC: 1.06 MG/DL (ref 0.51–0.95)
DEPRECATED HCO3 PLAS-SCNC: 24 MMOL/L (ref 22–29)
DEPRECATED HCO3 PLAS-SCNC: 25 MMOL/L (ref 22–29)
DEPRECATED HCO3 PLAS-SCNC: 26 MMOL/L (ref 22–29)
DIASTOLIC BLOOD PRESSURE - MUSE: NORMAL MMHG
EOSINOPHIL # BLD AUTO: 0.1 10E3/UL (ref 0–0.7)
EOSINOPHIL # BLD AUTO: 0.1 10E3/UL (ref 0–0.7)
EOSINOPHIL # BLD AUTO: 0.2 10E3/UL (ref 0–0.7)
EOSINOPHIL # BLD AUTO: 0.2 10E3/UL (ref 0–0.7)
EOSINOPHIL NFR BLD AUTO: 1 %
EOSINOPHIL NFR BLD AUTO: 1 %
EOSINOPHIL NFR BLD AUTO: 2 %
EOSINOPHIL NFR BLD AUTO: 2 %
ERYTHROCYTE [DISTWIDTH] IN BLOOD BY AUTOMATED COUNT: 13.5 % (ref 10–15)
ERYTHROCYTE [DISTWIDTH] IN BLOOD BY AUTOMATED COUNT: 13.5 % (ref 10–15)
ERYTHROCYTE [DISTWIDTH] IN BLOOD BY AUTOMATED COUNT: 13.6 % (ref 10–15)
ERYTHROCYTE [DISTWIDTH] IN BLOOD BY AUTOMATED COUNT: 13.8 % (ref 10–15)
ERYTHROCYTE [DISTWIDTH] IN BLOOD BY AUTOMATED COUNT: 13.9 % (ref 10–15)
GFR SERPL CREATININE-BSD FRML MDRD: 49 ML/MIN/1.73M2
GFR SERPL CREATININE-BSD FRML MDRD: 54 ML/MIN/1.73M2
GFR SERPL CREATININE-BSD FRML MDRD: 54 ML/MIN/1.73M2
GFR SERPL CREATININE-BSD FRML MDRD: 56 ML/MIN/1.73M2
GFR SERPL CREATININE-BSD FRML MDRD: 77 ML/MIN/1.73M2
GLUCOSE BLDC GLUCOMTR-MCNC: 145 MG/DL (ref 70–99)
GLUCOSE BLDC GLUCOMTR-MCNC: 156 MG/DL (ref 70–99)
GLUCOSE BLDC GLUCOMTR-MCNC: 73 MG/DL (ref 70–99)
GLUCOSE SERPL-MCNC: 101 MG/DL (ref 70–99)
GLUCOSE SERPL-MCNC: 117 MG/DL (ref 70–99)
GLUCOSE SERPL-MCNC: 132 MG/DL (ref 70–99)
GLUCOSE SERPL-MCNC: 136 MG/DL (ref 70–99)
GLUCOSE SERPL-MCNC: 171 MG/DL (ref 70–99)
GLUCOSE UR STRIP-MCNC: NEGATIVE MG/DL
GLUCOSE UR STRIP-MCNC: NEGATIVE MG/DL
HBA1C MFR BLD: 6.2 % (ref 0–5.6)
HCT VFR BLD AUTO: 39.3 % (ref 35–47)
HCT VFR BLD AUTO: 39.4 % (ref 35–47)
HCT VFR BLD AUTO: 40.3 % (ref 35–47)
HCT VFR BLD AUTO: 42 % (ref 35–47)
HCT VFR BLD AUTO: 42.5 % (ref 35–47)
HGB BLD-MCNC: 13.1 G/DL (ref 11.7–15.7)
HGB BLD-MCNC: 13.1 G/DL (ref 11.7–15.7)
HGB BLD-MCNC: 13.5 G/DL (ref 11.7–15.7)
HGB BLD-MCNC: 13.8 G/DL (ref 11.7–15.7)
HGB BLD-MCNC: 14.2 G/DL (ref 11.7–15.7)
HGB UR QL STRIP: NEGATIVE
HGB UR QL STRIP: NEGATIVE
HOLD SPECIMEN: NORMAL
IMM GRANULOCYTES # BLD: 0.1 10E3/UL
IMM GRANULOCYTES NFR BLD: 1 %
INTERPRETATION ECG - MUSE: NORMAL
KETONES UR STRIP-MCNC: 15 MG/DL
KETONES UR STRIP-MCNC: ABNORMAL MG/DL
LEUKOCYTE ESTERASE UR QL STRIP: ABNORMAL
LEUKOCYTE ESTERASE UR QL STRIP: ABNORMAL
LIPASE SERPL-CCNC: 20 U/L (ref 13–60)
LIPASE SERPL-CCNC: 27 U/L (ref 13–60)
LYMPHOCYTES # BLD AUTO: 1.1 10E3/UL (ref 0.8–5.3)
LYMPHOCYTES # BLD AUTO: 1.4 10E3/UL (ref 0.8–5.3)
LYMPHOCYTES # BLD AUTO: 1.8 10E3/UL (ref 0.8–5.3)
LYMPHOCYTES # BLD AUTO: 1.9 10E3/UL (ref 0.8–5.3)
LYMPHOCYTES NFR BLD AUTO: 13 %
LYMPHOCYTES NFR BLD AUTO: 18 %
LYMPHOCYTES NFR BLD AUTO: 19 %
LYMPHOCYTES NFR BLD AUTO: 20 %
LYMPHOCYTES NFR FLD MANUAL: 46 %
MAGNESIUM SERPL-MCNC: 1 MG/DL (ref 1.7–2.3)
MAGNESIUM SERPL-MCNC: 2.4 MG/DL (ref 1.7–2.3)
MCH RBC QN AUTO: 29.6 PG (ref 26.5–33)
MCH RBC QN AUTO: 29.9 PG (ref 26.5–33)
MCH RBC QN AUTO: 30.1 PG (ref 26.5–33)
MCH RBC QN AUTO: 30.2 PG (ref 26.5–33)
MCH RBC QN AUTO: 30.9 PG (ref 26.5–33)
MCHC RBC AUTO-ENTMCNC: 32.9 G/DL (ref 31.5–36.5)
MCHC RBC AUTO-ENTMCNC: 33.2 G/DL (ref 31.5–36.5)
MCHC RBC AUTO-ENTMCNC: 33.3 G/DL (ref 31.5–36.5)
MCHC RBC AUTO-ENTMCNC: 33.4 G/DL (ref 31.5–36.5)
MCHC RBC AUTO-ENTMCNC: 33.5 G/DL (ref 31.5–36.5)
MCV RBC AUTO: 89 FL (ref 78–100)
MCV RBC AUTO: 90 FL (ref 78–100)
MCV RBC AUTO: 90 FL (ref 78–100)
MCV RBC AUTO: 91 FL (ref 78–100)
MCV RBC AUTO: 93 FL (ref 78–100)
MONOCYTES # BLD AUTO: 1.1 10E3/UL (ref 0–1.3)
MONOCYTES # BLD AUTO: 1.2 10E3/UL (ref 0–1.3)
MONOCYTES # BLD AUTO: 1.2 10E3/UL (ref 0–1.3)
MONOCYTES # BLD AUTO: 1.3 10E3/UL (ref 0–1.3)
MONOCYTES NFR BLD AUTO: 13 %
MONOCYTES NFR BLD AUTO: 15 %
MONOS+MACROS NFR FLD MANUAL: 45 %
MUCOUS THREADS #/AREA URNS LPF: PRESENT /LPF
NEUTROPHILS # BLD AUTO: 5.2 10E3/UL (ref 1.6–8.3)
NEUTROPHILS # BLD AUTO: 6 10E3/UL (ref 1.6–8.3)
NEUTROPHILS # BLD AUTO: 6.1 10E3/UL (ref 1.6–8.3)
NEUTROPHILS # BLD AUTO: 6.2 10E3/UL (ref 1.6–8.3)
NEUTROPHILS NFR BLD AUTO: 64 %
NEUTROPHILS NFR BLD AUTO: 65 %
NEUTROPHILS NFR BLD AUTO: 65 %
NEUTROPHILS NFR BLD AUTO: 73 %
NEUTS BAND NFR FLD MANUAL: 4 %
NITRATE UR QL: NEGATIVE
NITRATE UR QL: NEGATIVE
NRBC # BLD AUTO: 0 10E3/UL
NRBC # BLD AUTO: 0 10E3/UL
NRBC BLD AUTO-RTO: 0 /100
NRBC BLD AUTO-RTO: 0 /100
OTHER CELLS FLD MANUAL: 2 %
P AXIS - MUSE: NORMAL DEGREES
PATH REPORT.COMMENTS IMP SPEC: ABNORMAL
PATH REPORT.COMMENTS IMP SPEC: ABNORMAL
PATH REPORT.COMMENTS IMP SPEC: YES
PATH REPORT.FINAL DX SPEC: ABNORMAL
PATH REPORT.GROSS SPEC: ABNORMAL
PATH REPORT.MICROSCOPIC SPEC OTHER STN: ABNORMAL
PATH REPORT.RELEVANT HX SPEC: ABNORMAL
PH UR STRIP: 5.5 [PH] (ref 5–7)
PH UR STRIP: 6 [PH] (ref 5–7)
PHOSPHATE SERPL-MCNC: 2.8 MG/DL (ref 2.5–4.5)
PLATELET # BLD AUTO: 198 10E3/UL (ref 150–450)
PLATELET # BLD AUTO: 209 10E3/UL (ref 150–450)
PLATELET # BLD AUTO: 282 10E3/UL (ref 150–450)
PLATELET # BLD AUTO: 285 10E3/UL (ref 150–450)
PLATELET # BLD AUTO: 301 10E3/UL (ref 150–450)
POTASSIUM SERPL-SCNC: 3.3 MMOL/L (ref 3.4–5.3)
POTASSIUM SERPL-SCNC: 3.5 MMOL/L (ref 3.4–5.3)
POTASSIUM SERPL-SCNC: 3.8 MMOL/L (ref 3.4–5.3)
POTASSIUM SERPL-SCNC: 3.8 MMOL/L (ref 3.4–5.3)
POTASSIUM SERPL-SCNC: 4.4 MMOL/L (ref 3.4–5.3)
PR INTERVAL - MUSE: NORMAL MS
PROT FLD-MCNC: 4.6 G/DL
PROT SERPL-MCNC: 6.5 G/DL (ref 6.4–8.3)
PROT SERPL-MCNC: 7.2 G/DL (ref 6.4–8.3)
PROT SERPL-MCNC: 7.3 G/DL (ref 6.4–8.3)
PROTEIN BODY FLUID SOURCE: NORMAL
QRS DURATION - MUSE: 74 MS
QT - MUSE: 380 MS
QTC - MUSE: 433 MS
R AXIS - MUSE: 119 DEGREES
RADIOLOGIST FLAGS: ABNORMAL
RBC # BLD AUTO: 4.34 10E6/UL (ref 3.8–5.2)
RBC # BLD AUTO: 4.35 10E6/UL (ref 3.8–5.2)
RBC # BLD AUTO: 4.51 10E6/UL (ref 3.8–5.2)
RBC # BLD AUTO: 4.59 10E6/UL (ref 3.8–5.2)
RBC # BLD AUTO: 4.67 10E6/UL (ref 3.8–5.2)
RBC #/AREA URNS AUTO: ABNORMAL /HPF
RBC URINE: 2 /HPF
SODIUM SERPL-SCNC: 131 MMOL/L (ref 136–145)
SODIUM SERPL-SCNC: 131 MMOL/L (ref 136–145)
SODIUM SERPL-SCNC: 132 MMOL/L (ref 136–145)
SODIUM SERPL-SCNC: 133 MMOL/L (ref 136–145)
SODIUM SERPL-SCNC: 134 MMOL/L (ref 136–145)
SP GR UR STRIP: 1.03 (ref 1–1.03)
SP GR UR STRIP: >=1.03 (ref 1–1.03)
SQUAMOUS #/AREA URNS AUTO: ABNORMAL /LPF
SQUAMOUS EPITHELIAL: 13 /HPF
SYSTOLIC BLOOD PRESSURE - MUSE: NORMAL MMHG
T AXIS - MUSE: -37 DEGREES
T4 FREE SERPL-MCNC: 1.58 NG/DL (ref 0.9–1.7)
T4 FREE SERPL-MCNC: 1.83 NG/DL (ref 0.9–1.7)
TRANSITIONAL EPI: 2 /HPF
TROPONIN T SERPL HS-MCNC: 22 NG/L
TROPONIN T SERPL HS-MCNC: 23 NG/L
TROPONIN T SERPL HS-MCNC: 49 NG/L
TROPONIN T SERPL HS-MCNC: 51 NG/L
TSH SERPL DL<=0.005 MIU/L-ACNC: 4.8 UIU/ML (ref 0.3–4.2)
TSH SERPL DL<=0.005 MIU/L-ACNC: 6.13 UIU/ML (ref 0.3–4.2)
UROBILINOGEN UR STRIP-ACNC: 2 E.U./DL
UROBILINOGEN UR STRIP-MCNC: NORMAL MG/DL
VENTRICULAR RATE- MUSE: 78 BPM
WBC # BLD AUTO: 11 10E3/UL (ref 4–11)
WBC # BLD AUTO: 8 10E3/UL (ref 4–11)
WBC # BLD AUTO: 8.5 10E3/UL (ref 4–11)
WBC # BLD AUTO: 9.5 10E3/UL (ref 4–11)
WBC # BLD AUTO: 9.5 10E3/UL (ref 4–11)
WBC # FLD AUTO: 661 /UL
WBC #/AREA URNS AUTO: ABNORMAL /HPF
WBC URINE: 51 /HPF

## 2023-01-01 PROCEDURE — 82310 ASSAY OF CALCIUM: CPT | Performed by: INTERNAL MEDICINE

## 2023-01-01 PROCEDURE — 99239 HOSP IP/OBS DSCHRG MGMT >30: CPT | Performed by: HOSPITALIST

## 2023-01-01 PROCEDURE — 99285 EMERGENCY DEPT VISIT HI MDM: CPT | Mod: 25

## 2023-01-01 PROCEDURE — 250N000013 HC RX MED GY IP 250 OP 250 PS 637: Performed by: HOSPITALIST

## 2023-01-01 PROCEDURE — 97530 THERAPEUTIC ACTIVITIES: CPT | Mod: GP | Performed by: PHYSICAL THERAPIST

## 2023-01-01 PROCEDURE — 36415 COLL VENOUS BLD VENIPUNCTURE: CPT | Performed by: PHYSICIAN ASSISTANT

## 2023-01-01 PROCEDURE — 250N000013 HC RX MED GY IP 250 OP 250 PS 637: Performed by: INTERNAL MEDICINE

## 2023-01-01 PROCEDURE — 36415 COLL VENOUS BLD VENIPUNCTURE: CPT | Performed by: PREVENTIVE MEDICINE

## 2023-01-01 PROCEDURE — 120N000001 HC R&B MED SURG/OB

## 2023-01-01 PROCEDURE — 0W9G30Z DRAINAGE OF PERITONEAL CAVITY WITH DRAINAGE DEVICE, PERCUTANEOUS APPROACH: ICD-10-PCS | Performed by: RADIOLOGY

## 2023-01-01 PROCEDURE — 99232 SBSQ HOSP IP/OBS MODERATE 35: CPT | Performed by: STUDENT IN AN ORGANIZED HEALTH CARE EDUCATION/TRAINING PROGRAM

## 2023-01-01 PROCEDURE — 99214 OFFICE O/P EST MOD 30 MIN: CPT | Mod: 95 | Performed by: INTERNAL MEDICINE

## 2023-01-01 PROCEDURE — 84484 ASSAY OF TROPONIN QUANT: CPT | Performed by: EMERGENCY MEDICINE

## 2023-01-01 PROCEDURE — 36415 COLL VENOUS BLD VENIPUNCTURE: CPT | Performed by: EMERGENCY MEDICINE

## 2023-01-01 PROCEDURE — 85025 COMPLETE CBC W/AUTO DIFF WBC: CPT | Performed by: EMERGENCY MEDICINE

## 2023-01-01 PROCEDURE — 80050 GENERAL HEALTH PANEL: CPT | Performed by: PREVENTIVE MEDICINE

## 2023-01-01 PROCEDURE — 93005 ELECTROCARDIOGRAM TRACING: CPT

## 2023-01-01 PROCEDURE — 99316 NF DSCHRG MGMT 30 MIN+: CPT | Mod: GV | Performed by: NURSE PRACTITIONER

## 2023-01-01 PROCEDURE — 82150 ASSAY OF AMYLASE: CPT | Performed by: PHYSICIAN ASSISTANT

## 2023-01-01 PROCEDURE — 84443 ASSAY THYROID STIM HORMONE: CPT | Performed by: INTERNAL MEDICINE

## 2023-01-01 PROCEDURE — 250N000011 HC RX IP 250 OP 636: Performed by: NURSE PRACTITIONER

## 2023-01-01 PROCEDURE — 80053 COMPREHEN METABOLIC PANEL: CPT | Performed by: INTERNAL MEDICINE

## 2023-01-01 PROCEDURE — 93000 ELECTROCARDIOGRAM COMPLETE: CPT | Performed by: PHYSICIAN ASSISTANT

## 2023-01-01 PROCEDURE — 86304 IMMUNOASSAY TUMOR CA 125: CPT | Performed by: EMERGENCY MEDICINE

## 2023-01-01 PROCEDURE — 84439 ASSAY OF FREE THYROXINE: CPT | Performed by: PREVENTIVE MEDICINE

## 2023-01-01 PROCEDURE — 86301 IMMUNOASSAY TUMOR CA 19-9: CPT | Performed by: EMERGENCY MEDICINE

## 2023-01-01 PROCEDURE — 250N000011 HC RX IP 250 OP 636: Mod: JZ | Performed by: PREVENTIVE MEDICINE

## 2023-01-01 PROCEDURE — 83690 ASSAY OF LIPASE: CPT | Performed by: PREVENTIVE MEDICINE

## 2023-01-01 PROCEDURE — 85004 AUTOMATED DIFF WBC COUNT: CPT | Performed by: INTERNAL MEDICINE

## 2023-01-01 PROCEDURE — 99215 OFFICE O/P EST HI 40 MIN: CPT | Performed by: PREVENTIVE MEDICINE

## 2023-01-01 PROCEDURE — 83690 ASSAY OF LIPASE: CPT | Performed by: PHYSICIAN ASSISTANT

## 2023-01-01 PROCEDURE — 99223 1ST HOSP IP/OBS HIGH 75: CPT | Mod: A1 | Performed by: INTERNAL MEDICINE

## 2023-01-01 PROCEDURE — 81003 URINALYSIS AUTO W/O SCOPE: CPT | Performed by: EMERGENCY MEDICINE

## 2023-01-01 PROCEDURE — 250N000009 HC RX 250: Performed by: NURSE PRACTITIONER

## 2023-01-01 PROCEDURE — 84484 ASSAY OF TROPONIN QUANT: CPT | Performed by: PHYSICIAN ASSISTANT

## 2023-01-01 PROCEDURE — 99232 SBSQ HOSP IP/OBS MODERATE 35: CPT | Performed by: HOSPITALIST

## 2023-01-01 PROCEDURE — C1769 GUIDE WIRE: HCPCS

## 2023-01-01 PROCEDURE — 99223 1ST HOSP IP/OBS HIGH 75: CPT | Performed by: NURSE PRACTITIONER

## 2023-01-01 PROCEDURE — 71046 X-RAY EXAM CHEST 2 VIEWS: CPT | Mod: TC | Performed by: RADIOLOGY

## 2023-01-01 PROCEDURE — 97116 GAIT TRAINING THERAPY: CPT | Mod: GP | Performed by: PHYSICAL THERAPIST

## 2023-01-01 PROCEDURE — 89051 BODY FLUID CELL COUNT: CPT | Performed by: EMERGENCY MEDICINE

## 2023-01-01 PROCEDURE — 84484 ASSAY OF TROPONIN QUANT: CPT | Performed by: PREVENTIVE MEDICINE

## 2023-01-01 PROCEDURE — 36415 COLL VENOUS BLD VENIPUNCTURE: CPT | Performed by: INTERNAL MEDICINE

## 2023-01-01 PROCEDURE — 88341 IMHCHEM/IMCYTCHM EA ADD ANTB: CPT | Mod: 26 | Performed by: PATHOLOGY

## 2023-01-01 PROCEDURE — 81001 URINALYSIS AUTO W/SCOPE: CPT | Performed by: PHYSICIAN ASSISTANT

## 2023-01-01 PROCEDURE — 74177 CT ABD & PELVIS W/CONTRAST: CPT | Mod: MG

## 2023-01-01 PROCEDURE — 99232 SBSQ HOSP IP/OBS MODERATE 35: CPT | Performed by: OBSTETRICS & GYNECOLOGY

## 2023-01-01 PROCEDURE — 85027 COMPLETE CBC AUTOMATED: CPT | Performed by: INTERNAL MEDICINE

## 2023-01-01 PROCEDURE — 999N000111 HC STATISTIC OT IP EVAL DEFER: Performed by: OCCUPATIONAL THERAPIST

## 2023-01-01 PROCEDURE — 88342 IMHCHEM/IMCYTCHM 1ST ANTB: CPT | Mod: 26 | Performed by: PATHOLOGY

## 2023-01-01 PROCEDURE — 99214 OFFICE O/P EST MOD 30 MIN: CPT | Performed by: INTERNAL MEDICINE

## 2023-01-01 PROCEDURE — 84157 ASSAY OF PROTEIN OTHER: CPT | Performed by: EMERGENCY MEDICINE

## 2023-01-01 PROCEDURE — 99417 PROLNG OP E/M EACH 15 MIN: CPT | Performed by: PREVENTIVE MEDICINE

## 2023-01-01 PROCEDURE — 99233 SBSQ HOSP IP/OBS HIGH 50: CPT | Performed by: NURSE PRACTITIONER

## 2023-01-01 PROCEDURE — 87086 URINE CULTURE/COLONY COUNT: CPT | Performed by: EMERGENCY MEDICINE

## 2023-01-01 PROCEDURE — 250N000011 HC RX IP 250 OP 636: Performed by: INTERNAL MEDICINE

## 2023-01-01 PROCEDURE — 49418 INSERT TUN IP CATH PERC: CPT

## 2023-01-01 PROCEDURE — 82378 CARCINOEMBRYONIC ANTIGEN: CPT | Performed by: EMERGENCY MEDICINE

## 2023-01-01 PROCEDURE — 84439 ASSAY OF FREE THYROXINE: CPT | Performed by: INTERNAL MEDICINE

## 2023-01-01 PROCEDURE — 250N000011 HC RX IP 250 OP 636: Performed by: STUDENT IN AN ORGANIZED HEALTH CARE EDUCATION/TRAINING PROGRAM

## 2023-01-01 PROCEDURE — 99233 SBSQ HOSP IP/OBS HIGH 50: CPT | Performed by: STUDENT IN AN ORGANIZED HEALTH CARE EDUCATION/TRAINING PROGRAM

## 2023-01-01 PROCEDURE — 88305 TISSUE EXAM BY PATHOLOGIST: CPT | Mod: TC | Performed by: EMERGENCY MEDICINE

## 2023-01-01 PROCEDURE — 96361 HYDRATE IV INFUSION ADD-ON: CPT

## 2023-01-01 PROCEDURE — 83735 ASSAY OF MAGNESIUM: CPT | Performed by: INTERNAL MEDICINE

## 2023-01-01 PROCEDURE — 93971 EXTREMITY STUDY: CPT | Mod: RT

## 2023-01-01 PROCEDURE — 80048 BASIC METABOLIC PNL TOTAL CA: CPT | Performed by: EMERGENCY MEDICINE

## 2023-01-01 PROCEDURE — 0W9G3ZZ DRAINAGE OF PERITONEAL CAVITY, PERCUTANEOUS APPROACH: ICD-10-PCS | Performed by: RADIOLOGY

## 2023-01-01 PROCEDURE — 85025 COMPLETE CBC W/AUTO DIFF WBC: CPT | Performed by: PHYSICIAN ASSISTANT

## 2023-01-01 PROCEDURE — 99152 MOD SED SAME PHYS/QHP 5/>YRS: CPT

## 2023-01-01 PROCEDURE — 88305 TISSUE EXAM BY PATHOLOGIST: CPT | Mod: 26 | Performed by: PATHOLOGY

## 2023-01-01 PROCEDURE — 96360 HYDRATION IV INFUSION INIT: CPT

## 2023-01-01 PROCEDURE — 272N000706 US PARACENTESIS WITHOUT ALBUMIN

## 2023-01-01 PROCEDURE — 83036 HEMOGLOBIN GLYCOSYLATED A1C: CPT | Performed by: INTERNAL MEDICINE

## 2023-01-01 PROCEDURE — 97161 PT EVAL LOW COMPLEX 20 MIN: CPT | Mod: GP | Performed by: PHYSICAL THERAPIST

## 2023-01-01 PROCEDURE — 82042 OTHER SOURCE ALBUMIN QUAN EA: CPT | Performed by: EMERGENCY MEDICINE

## 2023-01-01 PROCEDURE — 258N000003 HC RX IP 258 OP 636: Performed by: EMERGENCY MEDICINE

## 2023-01-01 PROCEDURE — G1010 CDSM STANSON: HCPCS

## 2023-01-01 PROCEDURE — 272N000500 HC NEEDLE CR2

## 2023-01-01 PROCEDURE — 80053 COMPREHEN METABOLIC PANEL: CPT | Performed by: PHYSICIAN ASSISTANT

## 2023-01-01 PROCEDURE — 93000 ELECTROCARDIOGRAM COMPLETE: CPT | Performed by: PREVENTIVE MEDICINE

## 2023-01-01 PROCEDURE — C1729 CATH, DRAINAGE: HCPCS

## 2023-01-01 PROCEDURE — 88112 CYTOPATH CELL ENHANCE TECH: CPT | Mod: TC | Performed by: EMERGENCY MEDICINE

## 2023-01-01 PROCEDURE — 99233 SBSQ HOSP IP/OBS HIGH 50: CPT | Performed by: HOSPITALIST

## 2023-01-01 PROCEDURE — 99214 OFFICE O/P EST MOD 30 MIN: CPT | Performed by: PHYSICIAN ASSISTANT

## 2023-01-01 PROCEDURE — 250N000009 HC RX 250: Performed by: RADIOLOGY

## 2023-01-01 PROCEDURE — 99284 EMERGENCY DEPT VISIT MOD MDM: CPT

## 2023-01-01 PROCEDURE — 99310 SBSQ NF CARE HIGH MDM 45: CPT | Mod: GV | Performed by: NURSE PRACTITIONER

## 2023-01-01 PROCEDURE — 87086 URINE CULTURE/COLONY COUNT: CPT | Performed by: PHYSICIAN ASSISTANT

## 2023-01-01 RX ORDER — CEFDINIR 300 MG/1
300 CAPSULE ORAL 2 TIMES DAILY
Qty: 14 CAPSULE | Refills: 0 | Status: SHIPPED | OUTPATIENT
Start: 2023-01-01 | End: 2023-01-01

## 2023-01-01 RX ORDER — FENTANYL CITRATE 0.05 MG/ML
25-50 INJECTION, SOLUTION INTRAMUSCULAR; INTRAVENOUS EVERY 5 MIN PRN
Status: DISCONTINUED | OUTPATIENT
Start: 2023-01-01 | End: 2023-01-01 | Stop reason: HOSPADM

## 2023-01-01 RX ORDER — METOPROLOL TARTRATE 50 MG
TABLET ORAL
Qty: 180 TABLET | Refills: 3 | Status: ON HOLD | OUTPATIENT
Start: 2023-01-01 | End: 2023-01-01

## 2023-01-01 RX ORDER — ONDANSETRON 4 MG/1
4 TABLET, ORALLY DISINTEGRATING ORAL EVERY 8 HOURS PRN
Qty: 12 TABLET | Refills: 0 | Status: SHIPPED | OUTPATIENT
Start: 2023-01-01 | End: 2023-01-01

## 2023-01-01 RX ORDER — GLIPIZIDE 5 MG/1
TABLET ORAL
Qty: 225 TABLET | Refills: 0 | Status: SHIPPED | OUTPATIENT
Start: 2023-01-01 | End: 2023-01-01

## 2023-01-01 RX ORDER — METOPROLOL TARTRATE 25 MG/1
25 TABLET, FILM COATED ORAL 2 TIMES DAILY
DISCHARGE
Start: 2023-01-01

## 2023-01-01 RX ORDER — MORPHINE SULFATE 20 MG/ML
5 SOLUTION ORAL
Status: DISCONTINUED | OUTPATIENT
Start: 2023-01-01 | End: 2023-01-01

## 2023-01-01 RX ORDER — MORPHINE SULFATE 10 MG/5ML
5 SOLUTION ORAL
Status: DISCONTINUED | OUTPATIENT
Start: 2023-01-01 | End: 2023-01-01

## 2023-01-01 RX ORDER — LORAZEPAM 2 MG/ML
0.5 CONCENTRATE ORAL EVERY 6 HOURS PRN
Qty: 30 ML | Refills: 0 | Status: SHIPPED | OUTPATIENT
Start: 2023-01-01

## 2023-01-01 RX ORDER — LIDOCAINE 40 MG/G
CREAM TOPICAL
Status: DISCONTINUED | OUTPATIENT
Start: 2023-01-01 | End: 2023-01-01 | Stop reason: HOSPADM

## 2023-01-01 RX ORDER — FUROSEMIDE 40 MG
40 TABLET ORAL DAILY
Status: DISCONTINUED | OUTPATIENT
Start: 2023-01-01 | End: 2023-01-01 | Stop reason: HOSPADM

## 2023-01-01 RX ORDER — FAMOTIDINE 20 MG/1
20 TABLET, FILM COATED ORAL 2 TIMES DAILY
Qty: 60 TABLET | Refills: 3 | Status: ON HOLD | OUTPATIENT
Start: 2023-01-01 | End: 2023-01-01

## 2023-01-01 RX ORDER — NALOXONE HYDROCHLORIDE 0.4 MG/ML
0.2 INJECTION, SOLUTION INTRAMUSCULAR; INTRAVENOUS; SUBCUTANEOUS
Status: DISCONTINUED | OUTPATIENT
Start: 2023-01-01 | End: 2023-01-01 | Stop reason: HOSPADM

## 2023-01-01 RX ORDER — ONDANSETRON 4 MG/1
4 TABLET, ORALLY DISINTEGRATING ORAL EVERY 6 HOURS PRN
DISCHARGE
Start: 2023-01-01

## 2023-01-01 RX ORDER — FLUMAZENIL 0.1 MG/ML
0.2 INJECTION, SOLUTION INTRAVENOUS
Status: DISCONTINUED | OUTPATIENT
Start: 2023-01-01 | End: 2023-01-01 | Stop reason: HOSPADM

## 2023-01-01 RX ORDER — ONDANSETRON 2 MG/ML
4 INJECTION INTRAMUSCULAR; INTRAVENOUS EVERY 6 HOURS PRN
Status: DISCONTINUED | OUTPATIENT
Start: 2023-01-01 | End: 2023-01-01 | Stop reason: HOSPADM

## 2023-01-01 RX ORDER — IOPAMIDOL 755 MG/ML
100 INJECTION, SOLUTION INTRAVASCULAR ONCE
Status: COMPLETED | OUTPATIENT
Start: 2023-01-01 | End: 2023-01-01

## 2023-01-01 RX ORDER — LIDOCAINE HYDROCHLORIDE 10 MG/ML
10 INJECTION, SOLUTION EPIDURAL; INFILTRATION; INTRACAUDAL; PERINEURAL ONCE
Status: COMPLETED | OUTPATIENT
Start: 2023-01-01 | End: 2023-01-01

## 2023-01-01 RX ORDER — POTASSIUM CHLORIDE 1500 MG/1
40 TABLET, EXTENDED RELEASE ORAL ONCE
Status: DISCONTINUED | OUTPATIENT
Start: 2023-01-01 | End: 2023-01-01

## 2023-01-01 RX ORDER — AMOXICILLIN 500 MG/1
500 CAPSULE ORAL 3 TIMES DAILY
Qty: 30 CAPSULE | Refills: 0 | Status: SHIPPED | OUTPATIENT
Start: 2023-01-01 | End: 2023-01-01

## 2023-01-01 RX ORDER — NALOXONE HYDROCHLORIDE 0.4 MG/ML
0.1 INJECTION, SOLUTION INTRAMUSCULAR; INTRAVENOUS; SUBCUTANEOUS
Status: DISCONTINUED | OUTPATIENT
Start: 2023-01-01 | End: 2023-01-01 | Stop reason: HOSPADM

## 2023-01-01 RX ORDER — LISINOPRIL AND HYDROCHLOROTHIAZIDE 12.5; 2 MG/1; MG/1
1 TABLET ORAL EVERY MORNING
Qty: 90 TABLET | Refills: 3 | Status: ON HOLD | OUTPATIENT
Start: 2023-01-01 | End: 2023-01-01

## 2023-01-01 RX ORDER — HYDROXYZINE HYDROCHLORIDE 25 MG/1
25 TABLET, FILM COATED ORAL ONCE
Status: COMPLETED | OUTPATIENT
Start: 2023-01-01 | End: 2023-01-01

## 2023-01-01 RX ORDER — NALOXONE HYDROCHLORIDE 0.4 MG/ML
0.4 INJECTION, SOLUTION INTRAMUSCULAR; INTRAVENOUS; SUBCUTANEOUS
Status: DISCONTINUED | OUTPATIENT
Start: 2023-01-01 | End: 2023-01-01

## 2023-01-01 RX ORDER — MORPHINE SULFATE 20 MG/ML
5 SOLUTION ORAL EVERY 4 HOURS PRN
Status: DISCONTINUED | OUTPATIENT
Start: 2023-01-01 | End: 2023-01-01 | Stop reason: HOSPADM

## 2023-01-01 RX ORDER — POTASSIUM CHLORIDE 1500 MG/1
40 TABLET, EXTENDED RELEASE ORAL ONCE
Status: COMPLETED | OUTPATIENT
Start: 2023-01-01 | End: 2023-01-01

## 2023-01-01 RX ORDER — FAMOTIDINE 20 MG/1
20 TABLET, FILM COATED ORAL 2 TIMES DAILY
Qty: 60 TABLET | Refills: 3 | Status: SHIPPED | OUTPATIENT
Start: 2023-01-01 | End: 2023-01-01

## 2023-01-01 RX ORDER — FAMOTIDINE 20 MG/1
20 TABLET, FILM COATED ORAL AT BEDTIME
DISCHARGE
Start: 2023-01-01

## 2023-01-01 RX ORDER — MAGNESIUM SULFATE HEPTAHYDRATE 40 MG/ML
4 INJECTION, SOLUTION INTRAVENOUS EVERY 4 HOURS
Status: COMPLETED | OUTPATIENT
Start: 2023-01-01 | End: 2023-01-01

## 2023-01-01 RX ORDER — FUROSEMIDE 10 MG/ML
40 INJECTION INTRAMUSCULAR; INTRAVENOUS DAILY
Status: DISCONTINUED | OUTPATIENT
Start: 2023-01-01 | End: 2023-01-01

## 2023-01-01 RX ORDER — FUROSEMIDE 10 MG/ML
40 INJECTION INTRAMUSCULAR; INTRAVENOUS 2 TIMES DAILY
Status: DISCONTINUED | OUTPATIENT
Start: 2023-01-01 | End: 2023-01-01

## 2023-01-01 RX ORDER — AMOXICILLIN 250 MG
1 CAPSULE ORAL 2 TIMES DAILY PRN
Status: DISCONTINUED | OUTPATIENT
Start: 2023-01-01 | End: 2023-01-01 | Stop reason: HOSPADM

## 2023-01-01 RX ORDER — LORAZEPAM 2 MG/ML
0.5 CONCENTRATE ORAL EVERY 6 HOURS PRN
Status: DISCONTINUED | OUTPATIENT
Start: 2023-01-01 | End: 2023-01-01 | Stop reason: HOSPADM

## 2023-01-01 RX ORDER — ASPIRIN 325 MG
325 TABLET, DELAYED RELEASE (ENTERIC COATED) ORAL DAILY
Status: DISCONTINUED | OUTPATIENT
Start: 2023-01-01 | End: 2023-01-01

## 2023-01-01 RX ORDER — ASPIRIN 325 MG
325 TABLET, DELAYED RELEASE (ENTERIC COATED) ORAL DAILY
COMMUNITY
Start: 2023-01-01 | End: 2023-01-01

## 2023-01-01 RX ORDER — ACETAMINOPHEN 325 MG/1
650 TABLET ORAL EVERY 6 HOURS PRN
DISCHARGE
Start: 2023-01-01

## 2023-01-01 RX ORDER — NALOXONE HYDROCHLORIDE 0.4 MG/ML
0.4 INJECTION, SOLUTION INTRAMUSCULAR; INTRAVENOUS; SUBCUTANEOUS
Status: DISCONTINUED | OUTPATIENT
Start: 2023-01-01 | End: 2023-01-01 | Stop reason: HOSPADM

## 2023-01-01 RX ORDER — ONDANSETRON 4 MG/1
4 TABLET, ORALLY DISINTEGRATING ORAL EVERY 6 HOURS PRN
Status: DISCONTINUED | OUTPATIENT
Start: 2023-01-01 | End: 2023-01-01 | Stop reason: HOSPADM

## 2023-01-01 RX ORDER — MORPHINE SULFATE 20 MG/ML
5 SOLUTION ORAL EVERY 4 HOURS PRN
Qty: 30 ML | Refills: 0 | Status: SHIPPED | OUTPATIENT
Start: 2023-01-01

## 2023-01-01 RX ORDER — OXYCODONE HYDROCHLORIDE 5 MG/1
5 TABLET ORAL EVERY 4 HOURS PRN
Status: DISCONTINUED | OUTPATIENT
Start: 2023-01-01 | End: 2023-01-01

## 2023-01-01 RX ORDER — NALOXONE HYDROCHLORIDE 0.4 MG/ML
0.2 INJECTION, SOLUTION INTRAMUSCULAR; INTRAVENOUS; SUBCUTANEOUS
Status: DISCONTINUED | OUTPATIENT
Start: 2023-01-01 | End: 2023-01-01

## 2023-01-01 RX ORDER — LEVOTHYROXINE SODIUM 137 UG/1
TABLET ORAL
Qty: 90 TABLET | Refills: 3 | Status: SHIPPED | OUTPATIENT
Start: 2023-01-01

## 2023-01-01 RX ORDER — ACETAMINOPHEN 325 MG/1
650 TABLET ORAL EVERY 6 HOURS PRN
Status: DISCONTINUED | OUTPATIENT
Start: 2023-01-01 | End: 2023-01-01 | Stop reason: HOSPADM

## 2023-01-01 RX ORDER — POLYETHYLENE GLYCOL 3350 17 G/17G
17 POWDER, FOR SOLUTION ORAL DAILY PRN
Qty: 510 G | DISCHARGE
Start: 2023-01-01

## 2023-01-01 RX ORDER — FAMOTIDINE 20 MG/1
20 TABLET, FILM COATED ORAL AT BEDTIME
Status: DISCONTINUED | OUTPATIENT
Start: 2023-01-01 | End: 2023-01-01 | Stop reason: HOSPADM

## 2023-01-01 RX ORDER — GLIPIZIDE 5 MG/1
2.5 TABLET ORAL
Qty: 90 TABLET | Refills: 3 | Status: ON HOLD | OUTPATIENT
Start: 2023-01-01 | End: 2023-01-01

## 2023-01-01 RX ORDER — CEFAZOLIN SODIUM 2 G/100ML
2 INJECTION, SOLUTION INTRAVENOUS
Status: COMPLETED | OUTPATIENT
Start: 2023-01-01 | End: 2023-01-01

## 2023-01-01 RX ORDER — LISINOPRIL 20 MG/1
20 TABLET ORAL DAILY
Status: DISCONTINUED | OUTPATIENT
Start: 2023-01-01 | End: 2023-01-01

## 2023-01-01 RX ORDER — NICOTINE POLACRILEX 4 MG
15-30 LOZENGE BUCCAL
Status: DISCONTINUED | OUTPATIENT
Start: 2023-01-01 | End: 2023-01-01

## 2023-01-01 RX ORDER — LORAZEPAM 0.5 MG/1
0.5 TABLET ORAL
Status: DISCONTINUED | OUTPATIENT
Start: 2023-01-01 | End: 2023-01-01

## 2023-01-01 RX ORDER — LISINOPRIL 20 MG/1
TABLET ORAL
Qty: 90 TABLET | Refills: 3 | Status: ON HOLD | OUTPATIENT
Start: 2023-01-01 | End: 2023-01-01

## 2023-01-01 RX ORDER — CARBOXYMETHYLCELLULOSE SODIUM 5 MG/ML
1-2 SOLUTION/ DROPS OPHTHALMIC
Status: DISCONTINUED | OUTPATIENT
Start: 2023-01-01 | End: 2023-01-01 | Stop reason: HOSPADM

## 2023-01-01 RX ORDER — AMOXICILLIN 250 MG
2 CAPSULE ORAL 2 TIMES DAILY PRN
Status: DISCONTINUED | OUTPATIENT
Start: 2023-01-01 | End: 2023-01-01 | Stop reason: HOSPADM

## 2023-01-01 RX ORDER — DEXTROSE MONOHYDRATE 25 G/50ML
25-50 INJECTION, SOLUTION INTRAVENOUS
Status: DISCONTINUED | OUTPATIENT
Start: 2023-01-01 | End: 2023-01-01

## 2023-01-01 RX ORDER — PRAVASTATIN SODIUM 20 MG
20 TABLET ORAL DAILY
Status: DISCONTINUED | OUTPATIENT
Start: 2023-01-01 | End: 2023-01-01

## 2023-01-01 RX ORDER — POLYETHYLENE GLYCOL 3350 17 G/17G
17 POWDER, FOR SOLUTION ORAL DAILY PRN
Status: DISCONTINUED | OUTPATIENT
Start: 2023-01-01 | End: 2023-01-01 | Stop reason: HOSPADM

## 2023-01-01 RX ORDER — LOVASTATIN 20 MG
TABLET ORAL
Qty: 90 TABLET | Refills: 1 | Status: ON HOLD | OUTPATIENT
Start: 2023-01-01 | End: 2023-01-01

## 2023-01-01 RX ORDER — SPIRONOLACTONE 25 MG/1
50 TABLET ORAL DAILY
Status: DISCONTINUED | OUTPATIENT
Start: 2023-01-01 | End: 2023-01-01

## 2023-01-01 RX ORDER — OMEPRAZOLE 40 MG/1
40 CAPSULE, DELAYED RELEASE ORAL DAILY
Qty: 90 CAPSULE | Refills: 3 | Status: SHIPPED | OUTPATIENT
Start: 2023-01-01 | End: 2023-01-01

## 2023-01-01 RX ORDER — ACETAMINOPHEN 650 MG/1
650 SUPPOSITORY RECTAL EVERY 6 HOURS PRN
Status: DISCONTINUED | OUTPATIENT
Start: 2023-01-01 | End: 2023-01-01 | Stop reason: HOSPADM

## 2023-01-01 RX ORDER — OLANZAPINE 5 MG/1
5 TABLET, ORALLY DISINTEGRATING ORAL EVERY 6 HOURS PRN
Status: DISCONTINUED | OUTPATIENT
Start: 2023-01-01 | End: 2023-01-01

## 2023-01-01 RX ORDER — LORAZEPAM 2 MG/ML
0.5 INJECTION INTRAMUSCULAR
Status: DISCONTINUED | OUTPATIENT
Start: 2023-01-01 | End: 2023-01-01

## 2023-01-01 RX ORDER — METOPROLOL TARTRATE 50 MG
50 TABLET ORAL 2 TIMES DAILY
Status: DISCONTINUED | OUTPATIENT
Start: 2023-01-01 | End: 2023-01-01

## 2023-01-01 RX ORDER — CARBOXYMETHYLCELLULOSE SODIUM 5 MG/ML
1-2 SOLUTION/ DROPS OPHTHALMIC
DISCHARGE
Start: 2023-01-01

## 2023-01-01 RX ORDER — FUROSEMIDE 40 MG
40 TABLET ORAL DAILY
DISCHARGE
Start: 2023-01-01 | End: 2023-01-01

## 2023-01-01 RX ORDER — METOPROLOL TARTRATE 25 MG/1
25 TABLET, FILM COATED ORAL 2 TIMES DAILY
Status: DISCONTINUED | OUTPATIENT
Start: 2023-01-01 | End: 2023-01-01 | Stop reason: HOSPADM

## 2023-01-01 RX ADMIN — SENNOSIDES AND DOCUSATE SODIUM 2 TABLET: 50; 8.6 TABLET ORAL at 10:08

## 2023-01-01 RX ADMIN — HYDROXYZINE HYDROCHLORIDE 25 MG: 25 TABLET, FILM COATED ORAL at 01:23

## 2023-01-01 RX ADMIN — SPIRONOLACTONE 50 MG: 25 TABLET, FILM COATED ORAL at 10:17

## 2023-01-01 RX ADMIN — FENTANYL CITRATE 25 MCG: 50 INJECTION, SOLUTION INTRAMUSCULAR; INTRAVENOUS at 09:03

## 2023-01-01 RX ADMIN — MAGNESIUM SULFATE HEPTAHYDRATE 4 G: 40 INJECTION, SOLUTION INTRAVENOUS at 21:26

## 2023-01-01 RX ADMIN — METOPROLOL TARTRATE 25 MG: 25 TABLET, FILM COATED ORAL at 09:01

## 2023-01-01 RX ADMIN — ACETAMINOPHEN 650 MG: 325 TABLET ORAL at 09:54

## 2023-01-01 RX ADMIN — SPIRONOLACTONE 50 MG: 25 TABLET, FILM COATED ORAL at 17:58

## 2023-01-01 RX ADMIN — SODIUM CHLORIDE 1 BAG: 9 INJECTION, SOLUTION INTRAVENOUS at 09:04

## 2023-01-01 RX ADMIN — METOPROLOL TARTRATE 50 MG: 50 TABLET, FILM COATED ORAL at 21:26

## 2023-01-01 RX ADMIN — CEFAZOLIN SODIUM 2 G: 2 INJECTION, SOLUTION INTRAVENOUS at 08:56

## 2023-01-01 RX ADMIN — ACETAMINOPHEN 650 MG: 325 TABLET ORAL at 05:45

## 2023-01-01 RX ADMIN — METOPROLOL TARTRATE 25 MG: 25 TABLET, FILM COATED ORAL at 21:19

## 2023-01-01 RX ADMIN — SITAGLIPTIN 100 MG: 50 TABLET, FILM COATED ORAL at 10:22

## 2023-01-01 RX ADMIN — FUROSEMIDE 40 MG: 40 TABLET ORAL at 08:33

## 2023-01-01 RX ADMIN — FUROSEMIDE 40 MG: 10 INJECTION, SOLUTION INTRAVENOUS at 10:34

## 2023-01-01 RX ADMIN — SODIUM CHLORIDE 1000 ML: 9 INJECTION, SOLUTION INTRAVENOUS at 12:32

## 2023-01-01 RX ADMIN — LEVOTHYROXINE SODIUM 137 MCG: 112 TABLET ORAL at 10:15

## 2023-01-01 RX ADMIN — METOPROLOL TARTRATE 25 MG: 25 TABLET, FILM COATED ORAL at 18:19

## 2023-01-01 RX ADMIN — MIDAZOLAM 0.5 MG: 1 INJECTION INTRAMUSCULAR; INTRAVENOUS at 09:00

## 2023-01-01 RX ADMIN — LEVOTHYROXINE SODIUM 137 MCG: 112 TABLET ORAL at 17:58

## 2023-01-01 RX ADMIN — PRAVASTATIN SODIUM 20 MG: 20 TABLET ORAL at 17:58

## 2023-01-01 RX ADMIN — FAMOTIDINE 20 MG: 20 TABLET ORAL at 21:22

## 2023-01-01 RX ADMIN — METOPROLOL TARTRATE 25 MG: 25 TABLET, FILM COATED ORAL at 10:04

## 2023-01-01 RX ADMIN — FUROSEMIDE 40 MG: 40 TABLET ORAL at 10:04

## 2023-01-01 RX ADMIN — LISINOPRIL 20 MG: 20 TABLET ORAL at 10:15

## 2023-01-01 RX ADMIN — FAMOTIDINE 20 MG: 20 TABLET ORAL at 21:19

## 2023-01-01 RX ADMIN — LIDOCAINE HYDROCHLORIDE 10 ML: 10 INJECTION, SOLUTION EPIDURAL; INFILTRATION; INTRACAUDAL; PERINEURAL at 13:52

## 2023-01-01 RX ADMIN — ACETAMINOPHEN 650 MG: 325 TABLET ORAL at 13:49

## 2023-01-01 RX ADMIN — FUROSEMIDE 40 MG: 10 INJECTION, SOLUTION INTRAVENOUS at 09:01

## 2023-01-01 RX ADMIN — FUROSEMIDE 40 MG: 10 INJECTION, SOLUTION INTRAVENOUS at 10:18

## 2023-01-01 RX ADMIN — FUROSEMIDE 40 MG: 40 TABLET ORAL at 10:01

## 2023-01-01 RX ADMIN — LIDOCAINE HYDROCHLORIDE 20 ML: 10 INJECTION, SOLUTION INFILTRATION; PERINEURAL at 09:15

## 2023-01-01 RX ADMIN — ONDANSETRON 4 MG: 4 TABLET, ORALLY DISINTEGRATING ORAL at 21:59

## 2023-01-01 RX ADMIN — PRAVASTATIN SODIUM 20 MG: 20 TABLET ORAL at 10:17

## 2023-01-01 RX ADMIN — FUROSEMIDE 40 MG: 10 INJECTION, SOLUTION INTRAVENOUS at 10:40

## 2023-01-01 RX ADMIN — FAMOTIDINE 20 MG: 20 TABLET ORAL at 21:43

## 2023-01-01 RX ADMIN — ASPIRIN 325 MG: 325 TABLET, COATED ORAL at 17:58

## 2023-01-01 RX ADMIN — FAMOTIDINE 20 MG: 20 TABLET ORAL at 22:28

## 2023-01-01 RX ADMIN — FAMOTIDINE 20 MG: 20 TABLET ORAL at 22:01

## 2023-01-01 RX ADMIN — ACETAMINOPHEN 650 MG: 325 TABLET ORAL at 13:40

## 2023-01-01 RX ADMIN — SITAGLIPTIN 50 MG: 50 TABLET, FILM COATED ORAL at 19:21

## 2023-01-01 RX ADMIN — FAMOTIDINE 20 MG: 20 TABLET ORAL at 21:52

## 2023-01-01 RX ADMIN — FUROSEMIDE 40 MG: 10 INJECTION, SOLUTION INTRAVENOUS at 21:26

## 2023-01-01 RX ADMIN — MAGNESIUM SULFATE HEPTAHYDRATE 4 G: 40 INJECTION, SOLUTION INTRAVENOUS at 23:53

## 2023-01-01 RX ADMIN — IOPAMIDOL 100 ML: 755 INJECTION, SOLUTION INTRAVENOUS at 10:37

## 2023-01-01 RX ADMIN — FAMOTIDINE 20 MG: 20 TABLET ORAL at 21:14

## 2023-01-01 RX ADMIN — METOPROLOL TARTRATE 50 MG: 50 TABLET, FILM COATED ORAL at 10:15

## 2023-01-01 RX ADMIN — FUROSEMIDE 40 MG: 40 TABLET ORAL at 09:01

## 2023-01-01 RX ADMIN — POLYETHYLENE GLYCOL 3350 17 G: 17 POWDER, FOR SOLUTION ORAL at 17:44

## 2023-01-01 RX ADMIN — FAMOTIDINE 20 MG: 20 TABLET ORAL at 21:26

## 2023-01-01 ASSESSMENT — ACTIVITIES OF DAILY LIVING (ADL)
ADLS_ACUITY_SCORE: 37
ADLS_ACUITY_SCORE: 33
ADLS_ACUITY_SCORE: 34
ADLS_ACUITY_SCORE: 33
ADLS_ACUITY_SCORE: 31
ADLS_ACUITY_SCORE: 34
ADLS_ACUITY_SCORE: 33
ADLS_ACUITY_SCORE: 34
ADLS_ACUITY_SCORE: 33
ADLS_ACUITY_SCORE: 34
ADLS_ACUITY_SCORE: 33
ADLS_ACUITY_SCORE: 34
ADLS_ACUITY_SCORE: 33
ADLS_ACUITY_SCORE: 34
ADLS_ACUITY_SCORE: 33
ADLS_ACUITY_SCORE: 34
ADLS_ACUITY_SCORE: 31
ADLS_ACUITY_SCORE: 34
ADLS_ACUITY_SCORE: 33
ADLS_ACUITY_SCORE: 34
ADLS_ACUITY_SCORE: 31
ADLS_ACUITY_SCORE: 34
ADLS_ACUITY_SCORE: 33
ADLS_ACUITY_SCORE: 34
ADLS_ACUITY_SCORE: 33
ADLS_ACUITY_SCORE: 33
ADLS_ACUITY_SCORE: 37
ADLS_ACUITY_SCORE: 33
ADLS_ACUITY_SCORE: 34
ADLS_ACUITY_SCORE: 33
ADLS_ACUITY_SCORE: 31
ADLS_ACUITY_SCORE: 34
ADLS_ACUITY_SCORE: 25
ADLS_ACUITY_SCORE: 35
ADLS_ACUITY_SCORE: 37
ADLS_ACUITY_SCORE: 33
ADLS_ACUITY_SCORE: 33
ADLS_ACUITY_SCORE: 25
ADLS_ACUITY_SCORE: 35
ADLS_ACUITY_SCORE: 31
ADLS_ACUITY_SCORE: 33
ADLS_ACUITY_SCORE: 37
ADLS_ACUITY_SCORE: 34
ADLS_ACUITY_SCORE: 34
ADLS_ACUITY_SCORE: 33
ADLS_ACUITY_SCORE: 34
ADLS_ACUITY_SCORE: 33
ADLS_ACUITY_SCORE: 37
ADLS_ACUITY_SCORE: 33
ADLS_ACUITY_SCORE: 34
ADLS_ACUITY_SCORE: 33
ADLS_ACUITY_SCORE: 33
ADLS_ACUITY_SCORE: 34
ADLS_ACUITY_SCORE: 33
ADLS_ACUITY_SCORE: 34
ADLS_ACUITY_SCORE: 33
ADLS_ACUITY_SCORE: 34
ADLS_ACUITY_SCORE: 33

## 2023-01-01 ASSESSMENT — PAIN SCALES - GENERAL: PAINLEVEL: NO PAIN (0)

## 2023-04-27 NOTE — PROGRESS NOTES
ASSESSMENT/PLAN                                                      (K21.9) Gastroesophageal reflux disease without esophagitis  (primary encounter diagnosis)  Comment: likely due to chronic aspirin use  Plan: START omeprazole 40 mg daily and consistently; can consider discontinuing after 3-6 months.    (I48.20) Chronic atrial fibrillation (H)  Comment: patient has been on daily high-dose aspirin as a substitute for chronic anticoagulation for decades; she understands and accepts that daily high-dose aspirin is not equivalent to chronic anticoagulation in terms of decreasing her risk of stroke, but it is certainly better than not being on aspirin.  Plan: RESUME daily high-dose aspirin.    (E11.9) Type 2 diabetes mellitus without complication, without long-term current use of insulin (H)  Plan: Diabetic labs today; recommendations to follow; ideally we can get patient off of her sulfonylurea; referred for annual diabetic eye exam - patient will be contacted to schedule (though she does not intend to schedule an appointment).    (E03.4) Hypothyroidism due to acquired atrophy of thyroid  Plan: TSH reflex today.    Danika Thomas MD   [address] [address]  T: [phone], F: [phone]    SUBJECTIVE                                                      Gavi Pearson is a very pleasant 89 year old female who presents to discuss aspirin:    Accompanied by nephew.    Patient recently stopped her daily high-dose aspirin due to acid reflux. After stopping her aspirin, her acid reflux resolved. She has been on daily high-dose aspirin for decades as a substitute for chronic anticoagulation (which is indicated for her chronic atrial fibrillation). Patient is aware that aspirin is not equivalent to chronic anticoagulation in terms of decreasing risk of stroke, but it is certainly better than nothing. Patient would like to resume her daily aspirin but is concerned about recurrent  "acid reflux.  No treatments tried to date for acid reflux.    PMH also significant for NIDDM and hypothyroidism. Labs due. She is also due for her annual diabetic eye exam.    OBJECTIVE                                                      /74   Pulse 91   Temp 97.6  F (36.4  C) (Oral)   Ht 1.753 m (5' 9\")   Wt 83.5 kg (184 lb 1.6 oz)   LMP  (LMP Unknown)   SpO2 98%   Breastfeeding No   BMI 27.19 kg/m    Constitutional: well-appearing, hard of hearing  Musculoskeletal: walks with walker  Psych: normal judgment and insight; normal mood and affect; recent and remote memory intact    ---    (Note documentation was completed, in part, with [employer] voice-recognition software. Documentation was reviewed, but some grammatical, spelling, and word errors may remain.)    "

## 2023-05-08 NOTE — PROGRESS NOTES
Gavi is a 89 year old who is being evaluated via a billable telephone visit.      What phone number would you like to be contacted at? [phone]  How would you like to obtain your AVS? Mario  Distant Location (provider location):  On-site      Subjective   Gavi is a 89 year old, presenting for the following health issues:  Forms and Health Maintenance (Last Eye exam done 02/21/2020 at [facility])      HPI     Chief Complaint:     Admissions paperwork to [facility]     HPI:   Patient Gavi Pearson is a very pleasant 89 year old female with history of Type 2 Diabetes, hyperlipidemia, hypothyroidism who presents to Internal Medicine clinic today via telephone visit for Admissions paperwork to [facility] nursing home/assisted living facility.         Current Medications:     Current Outpatient Medications   Medication Sig Dispense Refill     aspirin (ASA) 325 MG EC tablet Take 1 tablet (325 mg) by mouth daily       glipiZIDE (GLUCOTROL) 5 MG tablet Take 0.5 tablets (2.5 mg) by mouth 2 times daily (before meals) 90 tablet 3     levothyroxine (SYNTHROID/LEVOTHROID) 137 MCG tablet TAKE 1 TABLET(137 MCG) BY MOUTH DAILY 90 tablet 3     lisinopril (ZESTRIL) 20 MG tablet TAKE 1 TABLET(20 MG) BY MOUTH DAILY 90 tablet 3     lisinopril-hydrochlorothiazide (ZESTORETIC) 20-12.5 MG tablet TAKE 1 TABLET BY MOUTH EVERY MORNING 90 tablet 3     lovastatin (MEVACOR) 20 MG tablet TAKE 1 TABLET(20 MG) BY MOUTH DAILY 90 tablet 1     metFORMIN (GLUCOPHAGE) 500 MG tablet TAKE 1 TABLET(500 MG) BY MOUTH TWICE DAILY WITH MEALS 180 tablet 0     metoprolol tartrate (LOPRESSOR) 50 MG tablet TAKE 1 TABLET(50 MG) BY MOUTH TWICE DAILY 180 tablet 3     omeprazole (PRILOSEC) 40 MG DR capsule Take 1 capsule (40 mg) by mouth daily 90 capsule 3     sitagliptin (JANUVIA) 100 MG tablet Take 1 tablet (100 mg) by mouth daily 90 tablet 0     triamcinolone (KENALOG) 0.1 % external cream Apply b.i.d. p.r.n. to dermatitis but not on the face 45 g 1          Allergies:      Allergies   Allergen Reactions     No Known Allergies             Past Medical History:   No past medical history on file.      Past Surgical History:     Past Surgical History:   Procedure Laterality Date     OPEN REDUCTION INTERNAL FIXATION HIP BIPOLAR Right 11/7/2020    Procedure: RIGHT HIP CEMENTED UNIPOLAR HEMIARTHROPLASTY;  Surgeon: Enrico Ledesma MD;  Location:  OR         Family Medical History:   No family history on file.      Social History:     Social History     Socioeconomic History     Marital status: Single     Spouse name: Not on file     Number of children: Not on file     Years of education: Not on file     Highest education level: Not on file   Occupational History     Not on file   Tobacco Use     Smoking status: Never     Smokeless tobacco: Never   Vaping Use     Vaping status: Never Used   Substance and Sexual Activity     Alcohol use: Yes     Alcohol/week: 0.0 standard drinks of alcohol     Comment: occ     Drug use: No     Sexual activity: Not Currently     Partners: Male   Other Topics Concern     Not on file   Social History Narrative     Not on file     Social Determinants of Health     Financial Resource Strain: Low Risk  (12/21/2020)    Overall Financial Resource Strain (CARDIA)      Difficulty of Paying Living Expenses: Not hard at all   Food Insecurity: No Food Insecurity (12/21/2020)    Hunger Vital Sign      Worried About Running Out of Food in the Last Year: Never true      Ran Out of Food in the Last Year: Never true   Transportation Needs: No Transportation Needs (12/21/2020)    PRAPARE - Transportation      Lack of Transportation (Medical): No      Lack of Transportation (Non-Medical): No   Physical Activity: Inactive (12/21/2020)    Exercise Vital Sign      Days of Exercise per Week: 0 days      Minutes of Exercise per Session: 0 min   Stress: No Stress Concern Present (12/21/2020)    Citizen of [place] of Occupational Health - Occupational Stress  Questionnaire      Feeling of Stress : Not at all   Social Connections: Unknown (12/21/2020)    Social Connection and Isolation Panel [NHANES]      Frequency of Communication with Friends and Family: Twice a week      Frequency of Social Gatherings with Friends and Family: Twice a week      Attends Yazidism Services: Not on file      Active Member of Clubs or Organizations: Not on file      Attends Club or Organization Meetings: Not on file      Marital Status: Not on file   Intimate Partner Violence: Not on file   Housing Stability: Not on file           Review of System:     Constitutional: Negative for fever or chills  Skin: Negative for rashes  Ears/Nose/Throat: Negative for nasal congestion, sore throat  Respiratory: No shortness of breath, dyspnea on exertion, cough, or hemoptysis  Cardiovascular: Negative for chest pain  Gastrointestinal: Negative for nausea, vomiting  Genitourinary: Negative for dysuria, hematuria  Musculoskeletal: Negative for myalgias  Neurologic: Negative for headaches  Psychiatric: Negative for depression, anxiety  Hematologic/Lymphatic/Immunologic: Negative  Endocrine: positive for Type 2 Diabetes, hypothyroidism  Behavioral: Negative for tobacco use       Physical Exam:   LMP  (LMP Unknown)     RESP: no cough over the phone   NEURO: Alert & Oriented x 3.   PSYCH: mentation appears normal, affect normal        Diagnostic Test Results:     Diagnostic Test Results:  Labs reviewed in Epic    ASSESSMENT/PLAN:       Gavi was seen today for forms and health maintenance.    Diagnoses and all orders for this visit:    Advanced care planning/counseling discussion  Physical deconditioning  Advanced age  - patient is moving from her house to [facility] homes  - I have spoke to patient and confirmed over the phone that she wishes to be DNR/DNI code status going forward  - completed nursing home/assisted living facility admissions paperwork in clinic today    Type 2 diabetes mellitus without  complication, without long-term current use of insulin (H)  -     Adult Eye  Referral; Future    Hyperlipidemia  - stable, continue current therapy    Hypothyroidism  - stable, continue current therapy      Follow Up Plan:     Patient is instructed to return to Internal Medicine clinic for follow-up visit in 1 to 3 months.        Niki Mcmullen MD  Internal Medicine  [facility]        Telephone visit duration including chart review, medication management: 30 minutes

## 2023-05-22 NOTE — TELEPHONE ENCOUNTER
Nurse Triage SBAR    Is this a 2nd Level Triage? NO    Situation: Patient reports feeling nauseated about 5 days. Patient states that she recently moved into assisted living. States that she can't take omeprazole due to side effect of nausea. Patient also has a dry cough.   Also reports fasting blood sugar of 71 a couple of days ago.     Background: History of GERD and not taking medication.     Assessment: Uncontrolled GERD with nausea. Also report of low fasting blood sugar without symptoms.     Protocol Recommended Disposition:   See in Office Within 3 Days, See More Appropriate Protocol    Recommendation: Please advise on OK for same day use for follow up.      Routed to provider    Does the patient meet one of the following criteria for ADS visit consideration? 16+ years old, with an [facility] PCP     TIP  Providers, please consider if this condition is appropriate for management at one of our Acute and Diagnostic Services sites.     If patient is a good candidate, please use dotphrase <dot>triageresponse and select Refer to ADS to document.      Reason for Disposition    Anxiety or stress suspected (i.e., nausea with anxiety attacks or stressful situations)    Nausea lasts > 1 week    Additional Information    Negative: Shock suspected (e.g., cold/pale/clammy skin, too weak to stand, low BP, rapid pulse)    Negative: Sounds like a life-threatening emergency to the triager    Negative: Nausea or vomiting and pregnancy < 20 weeks    Negative: Menstrual Period - Missed or Late (i.e., pregnancy suspected)    Negative: Heat exhaustion suspected (i.e., dehydration from heat exposure)    Negative: Traumatic Brain Injury (TBI) suspected    Negative: Vomiting occurs    Negative: Other symptom is present, see that guideline.  (e.g., chest pain, headache, dizziness, abdominal pain, colds, sore throat, etc.).    Negative: Unable to walk, or can only walk with assistance (e.g., requires support)    Negative: Difficulty  "breathing    Negative: Insulin-dependent diabetes (Type I) and glucose > 400 mg/dL (22 mmol/L)    Negative: Drinking very little and dehydration suspected (e.g., no urine > 12 hours, very dry mouth, very lightheaded)    Negative: Patient sounds very sick or weak to the triager    Negative: Fever > 104 F  (40 C)    Negative: Fever > 101 F  (38.3 C) and over 60 years of age    Negative: Fever > 100.0 F  (37.8 C) and bedridden (e.g., nursing home patient, CVA, chronic illness, recovering from surgery)    Negative: Fever > 100.0 F  (37.8 C) and diabetes mellitus or weak immune system (e.g., HIV positive, cancer chemo, splenectomy, chronic steroids)    Negative: Taking any of the following medications: digoxin (Lanoxin), lithium, theophylline, phenytoin (Dilantin)    Negative: Yellowish color of the skin or white of the eye (i.e., jaundice)    Negative: Fever present > 3 days (72 hours)    Negative: Patient wants to be seen    Negative: Receiving cancer chemotherapy medication    Negative: Taking prescription medication that could cause nausea (e.g., narcotics/opiates, antibiotics, OCPs, many others)    Answer Assessment - Initial Assessment Questions  1. NAUSEA SEVERITY: \"How bad is the nausea?\" (e.g., mild, moderate, severe; dehydration, weight loss)    - MILD: loss of appetite without change in eating habits    - MODERATE: decreased oral intake without significant weight loss, dehydration, or malnutrition    - SEVERE: inadequate caloric or fluid intake, significant weight loss, symptoms of dehydration      Moderate. Lasts all day. Holding weight. Not eating a lot due to loss of appetite. Does eat 3 meals a day.   2. ONSET: \"When did the nausea begin?\"      Started last week. About 4-5 days ago.   3. VOMITING: \"Any vomiting?\" If Yes, ask: \"How many times today?\"      At the beginning I did. Heart burn and threw up liquid. Heart is now more \"stationary\". Denies chest. Describes heart burn as burning in upper esophagus. " "Couldn't take drug prescribed by Dr. Thomas.   4. RECURRENT SYMPTOM: \"Have you had nausea before?\" If Yes, ask: \"When was the last time?\" \"What happened that time?\"      Never nauseated at home.   5. CAUSE: \"What do you think is causing the nausea?\"      Wondering if due to blood sugar. BS is 71 in the morning. Drinks orange juice, but doesn't recheck.   6. PREGNANCY: \"Is there any chance you are pregnant?\" (e.g., unprotected intercourse, missed birth control pill, broken condom)      NA    Protocols used: NAUSEA-A-OH    Louisa Villanueva RN      "

## 2023-05-23 NOTE — TELEPHONE ENCOUNTER
Patients nephew calling regarding needing an MD response from below message regarding omeprazole and nausea.    Patients nephew would also like PCP to let patient know that it is OK for her to transfer to the PCP at the Assisted Living Facility as it would be easier for her to be seen.     Please advise.     Triage: Please call patient back with response.    Sparkle Frederick RN on 5/23/2023 at 11:26 AM

## 2023-05-24 NOTE — TELEPHONE ENCOUNTER
Forms/Letter Request    Type of form/letter: Physical Therapy PT Evaluation & Plan of Treatment form    Have you been seen for this request: Yes     Do we have the form/letter: Yes:     Who is the form from? [facility]    Where did/will the form come from? form was mailed in    When is form/letter needed by: ASAP    How would you like the form/letter returned: Mail in provided envelope.  Is this the correct address?: Yes  [facility]  [address] [place] [address]     Patient Notified form requests are processed in 3-5 business days:No    Okay to leave a detailed message?: Yes at Cell number on file:    Telephone Information:   Mobile [phone]

## 2023-05-24 NOTE — TELEPHONE ENCOUNTER
Forms/Letter Request    Type of form/letter: Physical Therapy PT Evaluation & Plan of Treatment form    Have you been seen for this request: Yes     Do we have the form/letter: Yes:     Who is the form from? [place] [facility]    Where did/will the form come from? form was mailed in    When is form/letter needed by: ASAP    How would you like the form/letter returned: Mail in provided envelope      Is this the correct address?: Yes. [facility] THER[facility]  [id number] [place] [id number]      Patient Notified form requests are processed in 3-5 business days:No    Okay to leave a detailed message?: Yes at Cell number on file:    Telephone Information:   Mobile [phone]

## 2023-06-05 NOTE — ED NOTES
PIT/Triage Evaluation    Patient has a history of a-fib and presents with low sodium (133). Patient reports she was seen at [facility] today for 3 weeks of ongoing nausea, where they discovered her low sodium count.  She endorses vomiting, but denies dysuria, diarrhea, chest pain, abdominal pain, or fevers. She was referred to the ED by her clinical physician. She is currently on antibiotics for an infected tooth.She is not on blood thinners. Is on Aspirin.     Exam is notable for:  Constitutional: Well appearing.  HEENT: Atraumatic.  Moist mucous membranes.  Neck: Soft.  Supple.    Cardiac: Regular rate with an irregularly irregular rhythm.  No murmur or rub.  Respiratory:  No respiratory distress.    Abdomen: Soft and nontender.  No guarding.  Nondistended.  Musculoskeletal: No edema.  Normal range of motion.  Neurologic: Alert and oriented x3.  Normal tone and bulk.    Skin: No rashes.  No edema.  Psych: Normal affect.  Normal behavior.          Appropriate interventions for symptom management were initiated if applicable.  Appropriate diagnostic tests were initiated if indicated.    Important information for subsequent clinician:  Lab work, review of PCP notes.    I briefly evaluated the patient and developed an initial plan of care. I discussed this plan and explained that this brief interaction does not constitute a full evaluation. Patient/family understands that they should wait to be fully evaluated and discuss any test results with another clinician prior to leaving the hospital.       Kodak Blevins MD  06/05/23 5367

## 2023-06-05 NOTE — ED TRIAGE NOTES
Sent by PCP because her sodium was low, 133 noted in Epic. Pt was seen for nausea and dental infection and was prescribed pepcid and antibiotic with relief. States her PCP was concerned about her heart.     Triage Assessment       Row Name 06/05/23 1611       Respiratory WDL    Respiratory WDL WDL       Skin Circulation/Temperature WDL    Skin Circulation/Temperature WDL WDL       Cardiac WDL    Cardiac WDL WDL       Cognitive/Neuro/Behavioral WDL    Cognitive/Neuro/Behavioral WDL WDL

## 2023-06-05 NOTE — PROGRESS NOTES
Assessment & Plan     Nausea    Patient states she has had some nausea and diarrhea, no vomiting  Theses symptoms have been going on for 1 week  She denies SOB or chest pain during the last week  She states that the nausea comes and goes and so does the diarrhea    CBC is normal  Amylase and Lipase are negative for pancreatitis    Hyponatremia    CMP does show some mild ongoing hyponatremia  This could contribute to her nausea symptoms but is not likely the sole cause of this  She has taken GERD medications in the past for reflux but stopped them awhile ago.    Due to low sodium and nausea we are going to have her evaluated closer.  She states that they avoid salt at [facility] and that this could be contributing to her lower salt levels  - Comprehensive metabolic panel (BMP + Alb, Alk Phos, ALT, AST, Total. Bili, TP);     Type 2 diabetes mellitus without complication, without long-term current use of insulin (H)    Patient is type 2 diabetic and is at greater risk of infections  She has jaw and tooth swelling from an infection  We have started her on omnicef    Tooth infection    CBC is normal, however, pt has a tooth infection with jaw swelling on right lower side  We are going to have her take omnicef to cover her teeth as it appears she may have a UTI as well      - CBC with platelets and differential    Chronic atrial fibrillation (H)    Patient has been on daily high-dose aspirin as a substitute for chronic anticoagulation for decades; she understands and accepts that daily high-dose aspirin is not equivalent to chronic anticoagulation in terms of decreasing her risk of stroke, but it is certainly better than not being on aspirin.  RESUME daily high-dose aspirin.  We have discussed that atrial fibrillation can lead to clotting and strokes, she is aware of this    Chest xray Negative for acute findings, read by Giovany Higgins PA-C [facility] at time of visit.      Gastroesophageal reflux disease, unspecified  "whether esophagitis present    Patient has a hx of GERD but has been unable to take her Omeprazole due to her intolerance  She was prescribed Pepcid for GERD and  nausea on 5/22 by her PCP Dr. Mcmullen  She has not taken these medications yet.  She states that she was unaware and not contacted about even getting these medications to her pharmacy  I have encouraged her to take this to help with her GERD  Due to her prolonged nausea we are checking a heart enzyme and EKG as well     - famotidine (PEPCID) 20 MG tablet; Take 1 tablet (20 mg) by mouth 2 times daily  - Troponin T, High Sensitivity    EKG shows chronic atrial fibrillation with chronic findings  Troponin is slightly elevated, this could be due to her chronic atrial fib, tooth infection, hyponatremia or UTI.  Due to having chronic atrial fib a chronically abnormal EKG and having a slightly elevated troponin at 91 I am going to have her be seen in the ED to have these labs checked and have her followed  her heart enzymes.  Her symptoms have been present for 7-10 days.  This is warranted to have evaluated closer in the ED.  I have contacted patient and she is going to go to the ED for a closer evaluation.     Urinary tract infection with hematuria, site unspecified    UA appears positive for infection  This is possible cause of her nausea, however, she also has a tooth infection and jaw swelling as well  At this time we are going to start her on omincef for UTI and Her tooth infection  Advised follow up next week with her PCP    - cefdinir (OMNICEF) 300 MG capsule; Take 1 capsule (300 mg) by mouth 2 times daily    Review of external notes as documented elsewhere in note       BMI:   Estimated body mass index is 27.17 kg/m  as calculated from the following:    Height as of 4/27/23: 1.753 m (5' 9\").    Weight as of this encounter: 83.5 kg (184 lb).       CONSULTATION/REFERRAL to ED due to ongoing nausea, hyponatremia and mildly elevated troponin level.,  " Patient understands and is in agreement    Follow up with PCP for recheck of symptoms next week    Giovany Higgins, [facility], PA-HALIE  [facility] Gatica is a 89 year old, presenting for the following health issues:  Nausea (PT states she is having nausea (1 week) and diarrhea that started last night. Pt is also having R side tooth/jaw pain)        4/27/2023     8:26 AM   Additional Questions   Roomed by Cynthia OLVERA CMA   Accompanied by Nephew-Bekah Villalba     HPI     Patient has been nauseated intermittently for over 1 week, has had some diarrhea.  She is experiencing tooth pain and facial swelling as well.  She called her doctor about the nausea and hasnt heard back yet.    Review of Systems   Constitutional, HEENT, cardiovascular, pulmonary, GI, , musculoskeletal, neuro, skin, endocrine and psych systems are negative, except as otherwise noted.      Objective    /72   Pulse 99   Temp 98.7  F (37.1  C) (Tympanic)   Resp 16   Wt 83.5 kg (184 lb)   LMP  (LMP Unknown)   SpO2 97%   BMI 27.17 kg/m    Body mass index is 27.17 kg/m .  Physical Exam   GENERAL: healthy, alert and no distress  EYES: Eyes grossly normal to inspection, PERRL and conjunctivae and sclerae normal  HENT: ear canals and TM's normal, nose and mouth without ulcers or lesions  NECK: no adenopathy, no asymmetry, masses, or scars and thyroid normal to palpation  RESP: lungs clear to auscultation - no rales, rhonchi or wheezes  CV: irregularly irregular rhythm, frequent extasystoles without compensatory pause, peripheral pulses strong and no peripheral edema  ABDOMEN: soft, nontender, no hepatosplenomegaly, no masses and bowel sounds normal  MS: no gross musculoskeletal defects noted, no edema  SKIN: no suspicious lesions or rashes  NEURO: Normal strength and tone, mentation intact and speech normal  PSYCH: mentation appears normal, affect normal/bright        Results for orders placed or performed in visit on  06/02/23   XR Chest 2 Views     Status: None    Narrative    CHEST TWO VIEWS 6/2/2023 3:28 PM     HISTORY: Gastroesophageal reflux disease with esophagitis, unspecified  whether hemorrhage    COMPARISON: Chest x-ray 1/28/2004       Impression    IMPRESSION: Borderline heart size. Normal pulmonary vasculature. No  consolidative opacities. Eventration of the anterior right  hemidiaphragm. No significant pleural effusion. No pneumothorax.  Probable hiatal hernia.    KAREEM CAVANAUGH MD         SYSTEM ID:  [id number]   Results for orders placed or performed in visit on 06/02/23   UA with Microscopic reflex to Culture     Status: Abnormal    Specimen: Urine, Midstream   Result Value Ref Range    Color Urine Yellow Colorless, Straw, Light Yellow, Yellow    Appearance Urine Slightly Cloudy (A) Clear    Glucose Urine Negative Negative mg/dL    Bilirubin Urine Moderate (A) Negative    Ketones Urine 15 (A) Negative mg/dL    Specific Gravity Urine >=1.030 1.003 - 1.035    Blood Urine Negative Negative    pH Urine 6.0 5.0 - 7.0    Protein Albumin Urine 30 (A) Negative mg/dL    Urobilinogen Urine 2.0 (A) 0.2, 1.0 E.U./dL    Nitrite Urine Negative Negative    Leukocyte Esterase Urine Trace (A) Negative   UA Microscopic with Reflex to Culture     Status: Abnormal   Result Value Ref Range    Bacteria Urine Many (A) None Seen /HPF    RBC Urine None Seen 0-2 /HPF /HPF    WBC Urine 10-25 (A) 0-5 /HPF /HPF    Squamous Epithelials Urine Many (A) None Seen /LPF    Mucus Urine Present (A) None Seen /LPF   Comprehensive metabolic panel (BMP + Alb, Alk Phos, ALT, AST, Total. Bili, TP)     Status: Abnormal   Result Value Ref Range    Sodium 133 (L) 136 - 145 mmol/L    Potassium 3.8 3.4 - 5.3 mmol/L    Chloride 93 (L) 98 - 107 mmol/L    Carbon Dioxide (CO2) 24 22 - 29 mmol/L    Anion Gap 16 (H) 7 - 15 mmol/L    Urea Nitrogen 17.7 8.0 - 23.0 mg/dL    Creatinine 0.98 (H) 0.51 - 0.95 mg/dL    Calcium 9.0 8.2 - 9.6 mg/dL    Glucose 101 (H) 70 - 99  mg/dL    Alkaline Phosphatase 68 35 - 104 U/L    AST 21 10 - 35 U/L    ALT 6 (L) 10 - 35 U/L    Protein Total 7.2 6.4 - 8.3 g/dL    Albumin 3.7 3.5 - 5.2 g/dL    Bilirubin Total 0.6 <=1.2 mg/dL    GFR Estimate 54 (L) >60 mL/min/1.73m2   Lipase     Status: Normal   Result Value Ref Range    Lipase 27 13 - 60 U/L   Amylase     Status: Normal   Result Value Ref Range    Amylase 57 28 - 100 U/L   Troponin T, High Sensitivity     Status: Abnormal   Result Value Ref Range    Troponin T, High Sensitivity 23 (H) <=14 ng/L   CBC with platelets and differential     Status: None   Result Value Ref Range    WBC Count 9.5 4.0 - 11.0 10e3/uL    RBC Count 4.67 3.80 - 5.20 10e6/uL    Hemoglobin 13.8 11.7 - 15.7 g/dL    Hematocrit 42.0 35.0 - 47.0 %    MCV 90 78 - 100 fL    MCH 29.6 26.5 - 33.0 pg    MCHC 32.9 31.5 - 36.5 g/dL    RDW 13.5 10.0 - 15.0 %    Platelet Count 285 150 - 450 10e3/uL    % Neutrophils 64 %    % Lymphocytes 20 %    % Monocytes 13 %    % Eosinophils 2 %    % Basophils 0 %    % Immature Granulocytes 1 %    Absolute Neutrophils 6.0 1.6 - 8.3 10e3/uL    Absolute Lymphocytes 1.9 0.8 - 5.3 10e3/uL    Absolute Monocytes 1.3 0.0 - 1.3 10e3/uL    Absolute Eosinophils 0.2 0.0 - 0.7 10e3/uL    Absolute Basophils 0.0 0.0 - 0.2 10e3/uL    Absolute Immature Granulocytes 0.1 <=0.4 10e3/uL

## 2023-06-05 NOTE — ED PROVIDER NOTES
History     Chief Complaint:  Abnormal Labs       The history is provided by the patient.      Gavi Pearson is a 89 year old female with s a history of a-fib and presents with low sodium (133). Patient reports she was seen at [facility] today for 3 weeks of ongoing nausea, where they discovered her low sodium count.  She endorses vomiting, but denies dysuria, diarrhea, chest pain, abdominal pain, or fevers.    She was seen in the office last Friday, 3 days ago.  She was called today because her blood work was abnormal including a sodium of 133 and a troponin of 23.  She has had no chest pain.  Her provider was concerned that there could be a cardiac cause of her nausea.  At that visit, she was started on an antibiotic for sore tooth.  That pain is greatly improved.  She has had no nausea or vomiting today.  She ate breakfast and lunch without difficulty today.  She has no abdominal pain.  No diarrhea or constipation.  No urinary changes.    Independent Historian:   None - Patient Only    Review of External Notes:   Primary care note dated 6/2/2023.    Medications:    aspirin   cefdinir   famotidine   glipiZIDE   levothyroxine   lisinopril   lisinopril-hydrochlorothiazide   lovastatin   metFORMIN   metoprolol tartrate   sitagliptin     Past Medical History:    Displaced neck fracture  A-fib  Vertigo  Hypertension  Hyperlipidemia  Hypothyroidism  Polyneuropathy  Vitamin D deficiency    Past Surgical History:    Open reduction of hip    Physical Exam     Patient Vitals for the past 24 hrs:   BP Temp Temp src Pulse Resp SpO2   06/05/23 1608 122/78 97.8  F (36.6  C) Temporal 75 20 98 %        Physical Exam  Constitutional: Well appearing.  HEENT: Atraumatic.  PERRL.  EOMI.  Moist mucous membranes.  Neck: Soft.  Supple.  No JVD.  Cardiac: Regular rate and rhythm.  No murmur or rub.  Respiratory: Clear to auscultation bilaterally.  No respiratory distress.  No wheezing, rhonchi, or rales.  Abdomen: Soft and nontender.   No rebound or guarding.  Nondistended.  Musculoskeletal: No edema.  Normal range of motion.  Neurologic: Alert and oriented x3.  Normal tone and bulk.  No facial drooping.  Normal speech.  5/5 strength in bilateral upper and lower extremities.  Sensation to light touch intact throughout.   Skin: No rashes.  No edema.  Psych: Normal affect.  Normal behavior.      Emergency Department Course   ECG  ECG results from 06/05/23   EKG 12 lead     Value    Systolic Blood Pressure     Diastolic Blood Pressure     Ventricular Rate 78    Atrial Rate     DE Interval     QRS Duration 74        QTc 433    P Axis     R AXIS 119    T Axis -37    Interpretation ECG      Atrial fibrillation  Right axis deviation  Nonspecific ST and T wave abnormality  Abnormal ECG  When compared with ECG of 06-NOV-2020 19:26,  Inverted T waves have replaced nonspecific T wave abnormality in Inferior leads  Nonspecific T wave abnormality, worse in Anterior leads       Laboratory:  Labs Ordered and Resulted from Time of ED Arrival to Time of ED Departure   BASIC METABOLIC PANEL - Abnormal       Result Value    Sodium 131 (*)     Potassium 3.5      Chloride 92 (*)     Carbon Dioxide (CO2) 26      Anion Gap 13      Urea Nitrogen 17.8      Creatinine 1.06 (*)     Calcium 8.9      Glucose 117 (*)     GFR Estimate 49 (*)    TROPONIN T, HIGH SENSITIVITY - Abnormal    Troponin T, High Sensitivity 22 (*)    ROUTINE UA WITH MICROSCOPIC REFLEX TO CULTURE - Abnormal    Color Urine Yellow      Appearance Urine Slightly Cloudy (*)     Glucose Urine Negative      Bilirubin Urine Negative      Ketones Urine Trace (*)     Specific Gravity Urine 1.030      Blood Urine Negative      pH Urine 5.5      Protein Albumin Urine 30 (*)     Urobilinogen Urine Normal      Nitrite Urine Negative      Leukocyte Esterase Urine Large (*)     Bacteria Urine Few (*)     RBC Urine 2      WBC Urine 51 (*)     Squamous Epithelials Urine 13 (*)     Transitional Epithelials Urine 2  (*)    CBC WITH PLATELETS AND DIFFERENTIAL    WBC Count 9.5      RBC Count 4.34      Hemoglobin 13.1      Hematocrit 39.4      MCV 91      MCH 30.2      MCHC 33.2      RDW 13.8      Platelet Count 282      % Neutrophils 65      % Lymphocytes 19      % Monocytes 13      % Eosinophils 2      % Basophils 0      % Immature Granulocytes 1      NRBCs per 100 WBC 0      Absolute Neutrophils 6.2      Absolute Lymphocytes 1.8      Absolute Monocytes 1.2      Absolute Eosinophils 0.2      Absolute Basophils 0.0      Absolute Immature Granulocytes 0.1      Absolute NRBCs 0.0     URINE CULTURE      Emergency Department Course & Assessments:    Interventions:  Medications - No data to display     Independent Interpretation (X-rays, CTs, rhythm strip):  None    Assessments/Consultations/Discussion of Management or Tests:  ED Course as of 06/05/23 1954 Mon Jun 05, 2023 1812 Dr. Harper' evaluation.    1936 Rechecked and updated.        Social Determinants of Health affecting care:   None    Disposition:  The patient was discharged to home.     Impression & Plan      CMS Diagnoses: None    Medical Decision Making:  Gavi Pearson is a 89-year-old woman who is afebrile and hemodynamically stable.  She presents today essentially because she received a call due to abnormal labs.  She denies any nausea today.  She ate breakfast and ate lunch without difficulties.  She has no abdominal pain.  Lab work-up today as noted as above.  She has sodium 131 and she does appear to be chronically in the lower 130s.  Her troponin was mildly elevated at 23 at clinic 3 days ago.  She has no chest pain.  EKG reveals no acute ischemic changes.  Troponin today is 22 and I think this is likely her baseline high-sensitivity test.  She has no symptoms consistent with ACS.  We discussed a CT scan of the abdomen pelvis to look for etiology of her ongoing nausea, however, she is no longer nauseous and has no pain and I think is likely low yield at this time  and she does not want to do it tonight and will follow with primary care physician.  I see no occasion for admission to the hospital I think she appears safe for discharge home.  She is in agreement feels countable this plan.  Discussed results with patient as well as with niece and nephew who are present.  The questions were answered and they are agreement with plan.  She was in no distress at time of discharge.    Diagnosis:    ICD-10-CM    1. Hyponatremia  E87.1       2. Nausea  R11.0            Discharge Medications:  New Prescriptions    No medications on file      Scribe Disclosure:  I, RENETTA RANDHAWA, am serving as a scribe at 6:15 PM on 6/5/2023 to document services personally performed by Kodak Blevins MD based on my observations and the provider's statements to me.   6/5/2023   Kodak Blveins MD Salay, Nicholas J, MD  06/07/23 1021

## 2023-06-06 NOTE — DISCHARGE INSTRUCTIONS
Your sodium today is 131.  Please increase the salt in your diet and follow-up very close with your primary care physician.  Your heart attack test today is stable and I see no signs of heart attack on your EKG and you are not having chest pain.  I do not think your heart is making you nauseous.  Please return at any time if you change your mind about the abdominal CT scan that we talked about.  Thank you for coming in and I hope you feel better.

## 2023-06-06 NOTE — TELEPHONE ENCOUNTER
What type of discharge? Emergency Department  Risk of Hospital admission or ED visit: 67%  Is a TCM episode required? No  When should the patient follow up with PCP? N/A    Sparkle Frederick RN  [facility]

## 2023-06-06 NOTE — TELEPHONE ENCOUNTER
"ED/Discharge Protocol    \"Hi, my name is Temi George RN, a registered nurse, and I am calling on behalf of Dr. Mcmullen's office at [place].  I am calling to follow up and see how things are going for you after your recent visit.\"    \"I see that you were in the (ER/UC/IP) on 6/5/23.    How are you doing now that you are home?\" had a little nausea this afternoon - but going to see if he can get over it - did make appointment with Dr Mcmullen     Appointments in Next Year    Jun 28, 2023 11:30 AM  (Arrive by 11:15 AM)  ED/Hospital Follow Up with Niki Mcmullen MD  [facility] ([facility] ) [phone]          Is patient experiencing symptoms that may require a hospital visit?  No     Discharge Instructions    \"Let's review your discharge instructions.  What is/are the follow-up recommendations?  Pt. Response: follow up with PCP     \"Were you instructed to make a follow-up appointment?\"  Pt. Response: Yes.  Has appointment been made?   Yes      \"When you see the provider, I would recommend that you bring your discharge instructions with you.    Medications    \"How many new medications are you on since your hospitalization/ED visit?\"    0-1  \"How many of your current medicines changed (dose, timing, name, etc.) while you were in the hospital/ED visit?\"   0-1  \"Do you have questions about your medications?\"   No  \"Were you newly diagnosed with heart failure, COPD, diabetes or did you have a heart attack?\"   No  For patients on insulin: \"Did you start on insulin in the hospital or did you have your insulin dose changed?\"   No  Post Discharge Medication Reconciliation Status: discharge medications reconciled, continue medications without change.    Was MTM referral placed (*Make sure to put transitions as reason for referral)?   No    Call Summary    \"Do you have any questions or concerns about your condition or care plan at the moment?\"    No    Patient was in ER 1x in the past " "year (assess appropriateness of ER visits.)      \"If you have questions or things don't continue to improve, we encourage you contact us through the main clinic number,  ([phone]).  Even if the clinic is not open, triage nurses are available 24/7 to help you.     We would like you to know that our clinic has extended hours (provide information).  We also have urgent care (provide details on closest location and hours/contact info)\"      \"Thank you for your time and take care!\"    Temi THOMPSON, Triage RN  [facility]     "

## 2023-06-09 NOTE — TELEPHONE ENCOUNTER
Patient requesting referral for CT scan based on discussion with ED provider on 06/05/23. Per chart review:        PCP, please advise on referral (referral pended) or if OV is necessary. Upcoming appt:    6/28/2023 11:30 AM (Arrive by 11:15 AM) Niki Mcmullen MD [facility]     Patient is very hard of hearing. If vm must be left, also please call [facility] facility nurse Jessica DELGADO at [phone]    Callback if needed: [phone] ok to leave detailed vm    Sammie Wellington, RN  [facility]

## 2023-06-12 NOTE — TELEPHONE ENCOUNTER
"    Writer called patient and reviewed PCP's message above, scheduled patient for telephone visit:    6/14/2023 4:00 PM (Arrive by 3:40 PM) Niki Mcmullen MD [facility]     After scheduling, patient is reporting new abdominal cramping     LOCATION: \"the whole stomach\" is cramping   RADIATION: denies   ONSET: 1 week or so   SUDDEN: gradually   PATTERN: intermittent   SEVERITY: 5-6/10   RECURRENT SYMPTOM: denies   CAUSE: unsure - was recently in ED for nausea/hyponatremia - did not report any abdominal pain at this time   RELIEVING/AGGRAVATING FACTORS: unsure   OTHER SYMPTOMS: denies back pain, diarrhea, fever, urination pain, vomiting    Triaged per [facility] protocol, patient to be seen in office today. No appointments available with PCP or within clinic today. Advised option of urgent care - patient declines and states she does not want to return to urgent care due to multiple recent UCC/ER visits.     Routing to PCP to please advise if symptoms can be addressed at upcoming appt with PCP or if patient needs to be seen urgently (protocol to be seen in office today)? Please review and advise - thank you!     Callback [phone] - do NOT leave detailed VM (patient hard of hearing)     Reviewed red flag symptoms with patient that warrant emergent evaluation - constant severe abdominal pain, fever, vomiting, black tarry bowel movements etc. Patient expressed verbal understanding and is agreeable.     Susie Diaz RN  [facility]    Reason for Disposition    MODERATE pain (e.g., interferes with normal activities that comes and goes (cramps) lasts > 24 hours  (Exception: Pain with Vomiting or Diarrhea - see that Protocol.)    Additional Information    Negative: Passed out (i.e., fainted, collapsed and was not responding)    Negative: Shock suspected (e.g., cold/pale/clammy skin, too weak to stand, low BP, rapid pulse)    Negative: Sounds like a life-threatening emergency to " the triager    Negative: Chest pain    Negative: Pain is mainly in upper abdomen (if needed ask: 'is it mainly above the belly button?')    Negative: Abdominal pain and pregnant < 20 weeks    Negative: Abdominal pain and pregnant 20 or more weeks    Negative: SEVERE abdominal pain (e.g., excruciating)    Negative: Vomiting red blood or black (coffee ground) material    Negative: Bloody, black, or tarry bowel movements  (Exception: Chronic-unchanged black-grey bowel movements and is taking iron pills or Pepto-Bismol.)    Negative: Constant abdominal pain lasting > 2 hours    Negative: Vomiting bile (green color)    Negative: Patient sounds very sick or weak to the triager    Negative: Vomiting and abdomen looks much more swollen than usual    Negative: White of the eyes have turned yellow (i.e., jaundice)    Negative: Blood in urine (red, pink, or tea-colored)    Negative: Fever > 103 F (39.4 C)    Negative: Fever > 101 F (38.3 C) and over 60 years of age    Negative: Fever > 100.0 F (37.8 C) and has diabetes mellitus or a weak immune system (e.g., HIV positive, cancer chemotherapy, organ transplant, splenectomy, chronic steroids)    Negative: Fever > 100.0 F (37.8 C) and bedridden (e.g., nursing home patient, stroke, chronic illness, recovering from surgery)    Negative: Pregnant or could be pregnant (i.e., missed last menstrual period)    Protocols used: ABDOMINAL PAIN - FEMALE-A-OH

## 2023-06-12 NOTE — TELEPHONE ENCOUNTER
Message from PCP:      Patient Contact    Attempt # 1    Was call answered?  Yes. Writer relayed PCP's message above, patient expressed verbal understanding and plans to speak with PCP about cramping at 6/14/23 VV.     Patient also stated that she would contact PCP's office if symptoms worsened or if new symptoms presented.    Sammie Wellington, RN  [facility]

## 2023-06-12 NOTE — TELEPHONE ENCOUNTER
Writer called patient to follow up on above. Patient states that she does not have a primary care doctor at the assisted living. Patient would like to keep Dr. Mcmullen as her PCP.     Routing to update PCP - please advise on order for CT scan - thank you!    Susie Diaz RN  [facility]

## 2023-06-13 PROBLEM — D64.9 ANEMIA, UNSPECIFIED: Status: ACTIVE | Noted: 2020-11-12

## 2023-06-13 PROBLEM — K59.01 SLOW TRANSIT CONSTIPATION: Status: ACTIVE | Noted: 2020-11-11

## 2023-06-13 PROBLEM — Z79.82 LONG TERM (CURRENT) USE OF ASPIRIN: Status: ACTIVE | Noted: 2020-11-11

## 2023-06-13 PROBLEM — Z91.81 HISTORY OF FALLING: Status: ACTIVE | Noted: 2020-11-11

## 2023-06-13 NOTE — PROGRESS NOTES
Gavi is a 89 year old who is being evaluated via a billable telephone visit.      What phone number would you like to be contacted at? [phone]  How would you like to obtain your AVS? Mario  Distant Location (provider location):  On-site      Subjective   Gavi is a 89 year old, presenting for the following health issues:  Follow Up (Discuss imaging )      HPI     Chief Complaint:     Follow up of multiple concerns including recent abdominal pains    HPI:   Patient Gavi Pearson is a very pleasant 89 year old female with history of Type 2 Diabetes, hyperlipidemia, hypothyroidism who presents to Internal Medicine clinic today via telephone visit for follow up of multiple concerns including recent abdominal pains        Current Medications:     No current outpatient medications on file.         Allergies:      Allergies   Allergen Reactions     No Known Allergies             Past Medical History:   No past medical history on file.      Past Surgical History:     Past Surgical History:   Procedure Laterality Date     OPEN REDUCTION INTERNAL FIXATION HIP BIPOLAR Right 11/7/2020    Procedure: RIGHT HIP CEMENTED UNIPOLAR HEMIARTHROPLASTY;  Surgeon: Enrico Ledesma MD;  Location:  OR         Family Medical History:   No family history on file.      Social History:     Social History     Socioeconomic History     Marital status: Single     Spouse name: Not on file     Number of children: Not on file     Years of education: Not on file     Highest education level: Not on file   Occupational History     Not on file   Tobacco Use     Smoking status: Never     Smokeless tobacco: Never   Vaping Use     Vaping Use: Never used   Substance and Sexual Activity     Alcohol use: Yes     Alcohol/week: 0.0 standard drinks of alcohol     Comment: occ     Drug use: No     Sexual activity: Not Currently     Partners: Male   Other Topics Concern     Not on file   Social History Narrative     Not on file     Social Determinants of  Health     Financial Resource Strain: Low Risk  (12/21/2020)    Overall Financial Resource Strain (CARDIA)      Difficulty of Paying Living Expenses: Not hard at all   Food Insecurity: No Food Insecurity (12/21/2020)    Hunger Vital Sign      Worried About Running Out of Food in the Last Year: Never true      Ran Out of Food in the Last Year: Never true   Transportation Needs: No Transportation Needs (12/21/2020)    PRAPARE - Transportation      Lack of Transportation (Medical): No      Lack of Transportation (Non-Medical): No   Physical Activity: Inactive (12/21/2020)    Exercise Vital Sign      Days of Exercise per Week: 0 days      Minutes of Exercise per Session: 0 min   Stress: No Stress Concern Present (12/21/2020)    [employer] - Occupational Stress Questionnaire      Feeling of Stress : Not at all   Social Connections: Unknown (12/21/2020)    Social Connection and Isolation Panel [NHANES]      Frequency of Communication with Friends and Family: Twice a week      Frequency of Social Gatherings with Friends and Family: Twice a week      Attends Denominational Services: Not on file      Active Member of Clubs or Organizations: Not on file      Attends Club or Organization Meetings: Not on file      Marital Status: Not on file   Intimate Partner Violence: Not on file   Housing Stability: Not on file           Review of System:     Constitutional: Negative for fever or chills  Skin: Negative for rashes  Ears/Nose/Throat: Negative for nasal congestion, sore throat  Respiratory: No shortness of breath, dyspnea on exertion, cough, or hemoptysis  Cardiovascular: Negative for chest pain  Gastrointestinal: positive for abdominal pains  Genitourinary: Negative for dysuria, hematuria  Musculoskeletal: Negative for myalgias  Neurologic: Negative for headaches  Psychiatric: Negative for depression, anxiety  Hematologic/Lymphatic/Immunologic: Negative  Endocrine: positive for Type 2 Diabetes,  hypothyroidism  Behavioral: Negative for tobacco use       Physical Exam:   LMP  (LMP Unknown)     RESP: no cough over the phone   NEURO: Alert & Oriented x 3.   PSYCH: mentation appears normal, affect normal        Diagnostic Test Results:     Diagnostic Test Results:  Labs reviewed in Epic    ASSESSMENT/PLAN:       Gavi was seen today for forms and health maintenance.    Diagnoses and all orders for this visit:    Gavi was seen today for follow up, nausea and abdominal pain.    Diagnoses and all orders for this visit:    Abdominal pain, generalized  - recent generalized abdominal pain  - ADS clinic referral for tomorrow    Type 2 diabetes mellitus without complication, without long-term current use of insulin (H)  -     Adult [facility] Referral; Future    Hyperlipidemia  - stable, continue current therapy    Hypothyroidism  - stable, continue current therapy      Follow Up Plan:     Patient is instructed to return to Internal Medicine clinic for follow-up visit in 1 to 3 months.        Niki Mcmullen MD  Internal Medicine  [facility]        Telephone visit duration including chart review, medication management: 30 minutes

## 2023-06-15 PROBLEM — C78.6 PERITONEAL CARCINOMATOSIS (H): Status: ACTIVE | Noted: 2023-01-01

## 2023-06-15 NOTE — PLAN OF CARE
RECEIVING UNIT ED HANDOFF REVIEW    ED Nurse Handoff Report was reviewed by: AILEEN BLOUNT RN on Eden 15, 2023 at 3:51 PM

## 2023-06-15 NOTE — ED TRIAGE NOTES
Pt comes in from urgent care accompanied by nephew where they had a CT scan with c/f new dx ovarian cancer. Pt has abdominal distention. Pt has been feeling nauseous for 3 weeks, intermittent abdominal cramping.

## 2023-06-15 NOTE — H&P
[facility]    History and Physical  Hospitalist       Date of Admission:  6/15/2023    Assessment & Plan     This is a 89-year-old female with history of hypertension, hyperlipidemia, hypothyroidism, atrial fibrillation, diabetes mellitus type 2 and constipation who came to the ER with complaint of abdominal distention, nausea and vomiting going on for the last 3-4 weeks.     ASSESSMENT AND PLAN:    1.  Large ascites with carcinomatosis/right adnexal mass/dilated appendix:  This is a 89-year-old female who lives in assisted living and has history of diabetes, hypertension, hyperlipidemia and atrial fibrillation, not on any anticoagulation, now presented with abdominal distention, nausea and vomiting and found to have large ascites.  CT scan was consistent with peritoneal carcinomatosis, large volume ascites and omental caking, most likely secondary to ovarian cancer, as she has a right adnexal mass as well.  They also mentioned about dilated appendix as well.  At this time, she had 6 liters of paracentesis done and fluid sent for cytology.  The patient told me that she is 89 years old and, if cancer, she does not want any surgery, she does not want any chemotherapy and, if it is progressed, she just wants to be comfortable.  She is DNR/DNI.  I did send up tumor markers, CEA, CA-125 and CA 19-9.  Send the fluid for cytology.  GYN oncology already consulted in the ED.  We will see what they will say. As the patient mentioned,  our goals of care are more comfortable, if she has cancer.  At this time, she will be DNR/DNI.  She has bilateral lower extremity edema as well as significant ascites.  I will start her on IV Lasix and Aldactone to prevent reaccumulation of ascites as well as decrease the lower extremity edema.   2.  Bilateral lower extremity edema:  I will do ultrasound of the lower extremities to rule out DVT.  I will continue with IV Lasix and Aldactone to control that.  3.  Nausea,  vomiting, abdominal pain:  This is most likely secondary to large ascites.  It will be improved with the paracentesis.  Keep her on antiemetic protocol.  4.  Diabetes mellitus type 2:  She is on glipizide as well as metformin and Januvia.  We will hold the oral medication and will keep her on sliding scale insulin for correction and hypoglycemia protocol.  5.  Hypothyroidism.  She is on levothyroxine.  I will continue with that.  6.  Hyperlipidemia:  On Mevacor.  We will continue with that.  7.  Hypertension:  She is on lisinopril and metoprolol.  I will continue with that.    DEEP THROMBOSIS PROPHYLAXIS:  SCDS.    CODE STATUS:  DNR/DNI, as per the patient wishes.    The case was discussed with the ED physician and the nursing staff taking care of the patient.    Gurvinder Medina MD  DVT Prophylaxis: Pneumatic Compression Devices  Code Status: Full Code    Disposition: Expected discharge in 2 days once stable.    Gurvinder Medina MD, MD    Primary Care Physician   Niki Mcmullen    Chief Complaint   Abdominal pain, nausea, vomiting.     History is obtained from the patient    History of Present Illness   Admitted: 06/15/2023    HISTORY OF PRESENT ILLNESS:  This is a 89-year-old female with history of hypertension, hyperlipidemia, hypothyroidism, atrial fibrillation, diabetes mellitus type 2 and constipation who came to the ER with complaint of abdominal distention, nausea and vomiting going on for the last 3-4 weeks.  According to the patient, she noticed that her abdomen was getting bigger and distended.  She was having nausea, vomiting and constipation.  She took some prune juice, and that worked.  She had a couple of loose stools.  Because of the continuous problem, she went to see her physician today.  She went to the physician, and a CT scan of the abdomen and pelvis was done, which showed large ascites and adnexal mass.  Subsequently, she was sent to the ED for further evaluation.  No fever, no chills, no chest pain,  orthopnea, PND or palpitation, no dysuria or  hematuria at this time.  She does get short of breath because of the distended abdomen.  Rest of the review of system is negative.  By the time I saw the patient, she had large volume paracentesis done, and she is feeling much better at this time.    Past Medical History    I have reviewed this patient's medical history and updated it with pertinent information if needed.   History reviewed. No pertinent past medical history.    Past Surgical History   I have reviewed this patient's surgical history and updated it with pertinent information if needed.  Past Surgical History:   Procedure Laterality Date     OPEN REDUCTION INTERNAL FIXATION HIP BIPOLAR Right 11/7/2020    Procedure: RIGHT HIP CEMENTED UNIPOLAR HEMIARTHROPLASTY;  Surgeon: Enrico Ledesma MD;  Location:  OR       Prior to Admission Medications   Prior to Admission Medications   Prescriptions Last Dose Informant Patient Reported? Taking?   aspirin (ASA) 325 MG EC tablet   Yes No   Sig: Take 1 tablet (325 mg) by mouth daily   cefdinir (OMNICEF) 300 MG capsule   No No   Sig: Take 1 capsule (300 mg) by mouth 2 times daily   famotidine (PEPCID) 20 MG tablet   No No   Sig: Take 1 tablet (20 mg) by mouth 2 times daily   glipiZIDE (GLUCOTROL) 5 MG tablet   No No   Sig: Take 0.5 tablets (2.5 mg) by mouth 2 times daily (before meals)   levothyroxine (SYNTHROID/LEVOTHROID) 137 MCG tablet   No No   Sig: TAKE 1 TABLET(137 MCG) BY MOUTH DAILY   lisinopril (ZESTRIL) 20 MG tablet   No No   Sig: TAKE 1 TABLET(20 MG) BY MOUTH DAILY   lisinopril-hydrochlorothiazide (ZESTORETIC) 20-12.5 MG tablet   No No   Sig: TAKE 1 TABLET BY MOUTH EVERY MORNING   lovastatin (MEVACOR) 20 MG tablet   No No   Sig: TAKE 1 TABLET(20 MG) BY MOUTH DAILY   metFORMIN (GLUCOPHAGE) 500 MG tablet   No No   Sig: TAKE 1 TABLET(500 MG) BY MOUTH TWICE DAILY WITH MEALS   metoprolol tartrate (LOPRESSOR) 50 MG tablet   No No   Sig: TAKE 1 TABLET(50 MG) BY  MOUTH TWICE DAILY   ondansetron (ZOFRAN ODT) 4 MG ODT tab   No No   Sig: Take 1 tablet (4 mg) by mouth every 8 hours as needed for nausea   sitagliptin (JANUVIA) 100 MG tablet   No No   Sig: Take 1 tablet (100 mg) by mouth daily   triamcinolone (KENALOG) 0.1 % external cream   No No   Sig: Apply b.i.d. p.r.n. to dermatitis but not on the face      Facility-Administered Medications Last Administration Doses Remaining   sodium chloride (PF) 0.9% PF flush 3 mL 6/15/2023 10:52 AM         Allergies   Allergies   Allergen Reactions     No Known Allergies        Social History   I have reviewed this patient's social history and updated it with pertinent information if needed. Gavi Pearson  reports that she has never smoked. She has never used smokeless tobacco. She reports current alcohol use. She reports that she does not use drugs.    Family History   I have reviewed this patient's family history and updated it with pertinent information if needed.   History reviewed. No pertinent family history.    Review of Systems   CONSTITUTIONAL:  positive for  fatigue  EYES:  negative  HEENT:  negative  RESPIRATORY:  positive for  dyspnea  CARDIOVASCULAR:  negative  GASTROINTESTINAL:  positive for nausea, vomiting, constipation and abdominal pain  GENITOURINARY:  negative  INTEGUMENT/BREAST:  negative  HEMATOLOGIC/LYMPHATIC:  negative  ALLERGIC/IMMUNOLOGIC:  negative  ENDOCRINE:  negative  MUSCULOSKELETAL:  negative  NEUROLOGICAL:  negative  BEHAVIOR/PSYCH:  negative    Physical Exam   Temp: 97.6  F (36.4  C) Temp src: Oral BP: (!) 148/79 Pulse: 89   Resp: 18 SpO2: 98 %      Vital Signs with Ranges  Temp:  [97.6  F (36.4  C)-97.7  F (36.5  C)] 97.6  F (36.4  C)  Pulse:  [89-91] 89  Resp:  [18] 18  BP: (134-148)/(79-88) 148/79  SpO2:  [94 %-98 %] 98 %  0 lbs 0 oz    Constitutional: Awake, alert, cooperative, no apparent distress.  Eyes: Conjunctiva and pupils examined and normal.  HEENT: Moist mucous membranes, normal  dentition.  Respiratory: Clear to auscultation bilaterally, no crackles or wheezing.  Cardiovascular: Regular rate and rhythm, normal S1 and S2, and no murmur noted.  GI: Soft, non-distended, non-tender, normal bowel sounds.  Lymph/Hematologic: No anterior cervical or supraclavicular adenopathy.  Skin: No rashes, no cyanosis, 2+LE edema.  Musculoskeletal: No joint swelling, erythema or tenderness.  Neurologic: Cranial nerves 2-12 intact, normal strength and sensation.  Psychiatric: Alert, oriented to person, place and time, no obvious anxiety or depression.    Data   Data reviewed today:  I personally reviewed the abdominal CT image(s) and agree with the report below   Recent Labs   Lab 06/15/23  1012   WBC 8.5   HGB 13.1   MCV 90      *   POTASSIUM 3.8   CHLORIDE 92*   CO2 26   BUN 16.9   CR 0.99*   ANIONGAP 14   CHAVA 9.0   *   ALBUMIN 3.2*   PROTTOTAL 6.5   BILITOTAL 0.6   ALKPHOS 68   ALT 5   AST 19   LIPASE 20       Recent Results (from the past 24 hour(s))   CT Abdomen Pelvis w Contrast   Result Value    Radiologist flags (Urgent)     Metastatic disease throughout the abdomen and pelvis    Narrative    EXAM: CT ABDOMEN PELVIS W CONTRAST  LOCATION: [facility]  DATE: 6/15/2023    INDICATION: llq abdominal pain, nausea  COMPARISON: None.  TECHNIQUE: CT scan of the abdomen and pelvis was performed following injection of IV contrast. Multiplanar reformats were obtained. Dose reduction techniques were used.  CONTRAST: 100mL Isovue 370    FINDINGS:   LOWER CHEST: Small left and trace right pleural effusions. Moderate size hiatal hernia.    Coronary artery calcification.    HEPATOBILIARY: There are numerous hypodense lesions throughout the liver. No calcified gallstones. No biliary ductal dilatation.    PANCREAS: Normal.    SPLEEN: There are a few hypodense lesions in the spleen.    ADRENAL GLANDS: Normal.    KIDNEYS/BLADDER: No significant mass, stone, or  hydronephrosis.    BOWEL: Dilated appendix measuring up to 2.0 cm in diameter with irregular wall thickening. Large volume ascites. Hypodense nodular omental thickening, for example in the left anterior upper abdomen measuring up to 5.7 cm in thickness. Scattered areas of   peritoneal nodularity throughout the abdomen and pelvis, for example in the left lower quadrant measuring 2.0 x 1.5 cm on series 4 image 174.    LYMPH NODES: Normal.    VASCULATURE: Unremarkable.    PELVIC ORGANS: Hysterectomy. A heterogeneous enhancing mass in the right adnexal region measuring 3.3 x 5.1 cm.    MUSCULOSKELETAL: ORIF of the left femur. Right hip arthroplasty. Degenerative changes throughout the spine. No suspicious osseous lesions.      Impression    IMPRESSION:   1.  Large volume ascites with multifocal peritoneal nodularity and omental caking consistent with peritoneal carcinomatosis.    2.  A heterogeneous right adnexal mass may represent a primary ovarian malignancy or metastasis.    3.  Dilated appendix with irregular wall thickening may represent primary malignancy or metastasis.    4.  Numerous hypodense lesions throughout the liver, likely metastases.      [Access Center: Metastatic disease throughout the abdomen and pelvis]    This report will be copied to the [facility] [employer] to ensure a provider acknowledges the finding. Access Center is available Monday through Friday 8am-3:30 pm.

## 2023-06-15 NOTE — H&P
Admitted: 06/15/2023    HISTORY OF PRESENT ILLNESS:  This is a 89-year-old female with history of hypertension, hyperlipidemia, hypothyroidism, atrial fibrillation, diabetes mellitus type 2 and constipation who came to the ER with complaint of abdominal distention, nausea and vomiting going on for the last 3-4 weeks.  According to the patient, she noticed that her abdomen was getting bigger and distended.  She was having nausea, vomiting and constipation.  She took some prune juice, and that worked.  She had a couple of loose stools.  Because of the continuous problem, she went to see her physician today.  She went to the physician, and a CT scan of the abdomen and pelvis was done, which showed large ascites and adnexal mass.  Subsequently, she was sent to the ED for further evaluation.  No fever, no chills, no chest pain, orthopnea, PND or palpitation, no dysuria or  hematuria at this time.  She does get short of breath because of the distended abdomen.  Rest of the review of system is negative.  By the time I saw the patient, she had large volume paracentesis done, and she is feeling much better at this time.    ASSESSMENT AND PLAN:    1.  Large ascites with carcinomatosis/right adnexal mass/dilated appendix:  This is a 89-year-old female who lives in assisted living and has history of diabetes, hypertension, hyperlipidemia and atrial fibrillation, not on any anticoagulation, now presented with abdominal distention, nausea and vomiting and found to have large ascites.  CT scan was consistent with peritoneal carcinomatosis, large volume ascites and omental caking, most likely secondary to ovarian cancer, as she has a right adnexal mass as well.  They also mentioned about dilated appendix as well.  At this time, she had 6 liters of paracentesis done and fluid sent for cytology.  The patient told me that she is 89 years old and, if cancer, she does not want any surgery, she does not want any chemotherapy and, if it  is progressed, she just wants to be comfortable.  She is DNR/DNI.  I did send up tumor markers, CEA, CA-125 and CA 19-9.  Send the fluid for cytology.  GYN oncology already consulted in the ED.  We will see what they will say. As the patient mentioned,  our goals of care are more comfortable, if she has cancer.  At this time, she will be DNR/DNI.  She has bilateral lower extremity edema as well as significant ascites.  I will start her on IV Lasix and Aldactone to prevent reaccumulation of ascites as well as decrease the lower extremity edema.   2.  Bilateral lower extremity edema:  I will do ultrasound of the lower extremities to rule out DVT.  I will continue with IV Lasix and Aldactone to control that.  3.  Nausea, vomiting, abdominal pain:  This is most likely secondary to large ascites.  It will be improved with the paracentesis.  Keep her on antiemetic protocol.  4.  Diabetes mellitus type 2:  She is on glipizide as well as metformin and Januvia.  We will hold the oral medication and will keep her on sliding scale insulin for correction and hypoglycemia protocol.  5.  Hypothyroidism.  She is on levothyroxine.  I will continue with that.  6.  Hyperlipidemia:  On Mevacor.  We will continue with that.  7.  Hypertension:  She is on lisinopril and metoprolol.  I will continue with that.    DEEP THROMBOSIS PROPHYLAXIS:  SCDS.    CODE STATUS:  DNR/DNI, as per the patient wishes.    The case was discussed with the ED physician and the nursing staff taking care of the patient.    Gurvinder Medina MD        D: 06/15/2023   T: 06/15/2023   MT: flex    Name:     FLYNN TIRADO  MRN:      [id number]        Account:     [id number]   :      10/21/1931           Admitted:    06/15/2023       Document: [id number]

## 2023-06-15 NOTE — CONSULTS
Gynecologic Oncology Consult Note    Name: Gavi Pearson    MRN: [id number]   YOB: 1931         We were asked to see Gavi Pearson at the request of Dr. Medina  for evaluation of carcinomatosis, right adnexal mass.         Chief Complaint:   Nausea and Vomiting   History is obtained from the patient           History of Present Illness:   Gavi Pearson is a 89 year old female with history of Type 2 Diabetes with bilateral LE neuropathy, hyperlipidemia, hypothyroidism  who is being seen for evaluation of 3 weeks of nausea and vomiting and abdominal fullness. She was seen by PCP this morning and CT scan showed large volume ascites, peritoneal nodularity and omental caking consistent with peritoneal carcinomatosis. There was also heterogenous right adnexal mass, dilated appendix with irregular wall thickening and numerous hypodense liver lesions, likely mets. She was therefore sent to the ED for further evaluation of likely ascites and overall clinical decline.    In the ED, patient was mildly hypertensive, normal heart rate with 02 saturations 99% on room air. CBC unremarkable. Patient with mild hyponatremia with Na 131, and elevated creatinine at 0.99, which has been her baseline over the last month. Troponin elevated at 51 with EKG showing atrial fibrillation/flutter and TSH elevated at 6.13 with normal T4. Due to large volume ascites, paracentesis performed with 6.2 L of clear fluid removed and sent for cytology. She was given 1 L of NS.     Today, she reports continued nausea. Has not eaten since yesterday afternoon. Had some LLQ cramping last week, that has since resolved. Thought she may have been constipated, but drank some prune juice had normal BMs after that. Does note more swelling in her LE.     No headaches, vision changes, chills, chest pain, SOB. Denies any diarrhea, dysuria, changes in vaginal discharge or vaginal bleeding.    Gyn Hx:   Patient underwent post-menopause in her early 50s. No  "vaginal bleeding since.   No history of abnormal pap smears  Last Mammogram benign ()  Patient reports has had colonoscopy \"years ago,\" which was normal. Per chart review, unable to view any reports    OB Hx: Has never been pregnant    She recently moved to [facility] 6 weeks ago. She had previously been living alone with some help in the home, but knew it was time to have higher level of care. Believes she may need more nursing when she goes back after this hospitalization. Currently uses a walker, but has become increasingly weak and understands she may need a wheelchair.   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           Past Medical History:   HLD, HTN, Hypothyroidism, T2DM         Past Surgical History:     Past Surgical History:   Procedure Laterality Date     OPEN REDUCTION INTERNAL FIXATION HIP BIPOLAR Right 2020    Procedure: RIGHT HIP CEMENTED UNIPOLAR HEMIARTHROPLASTY;  Surgeon: Enrico Ledesma MD;  Location:  OR            Social History:     Social History     Tobacco Use     Smoking status: Never     Smokeless tobacco: Never   Vaping Use     Vaping status: Never Used   Substance Use Topics     Alcohol use: Yes     Alcohol/week: 0.0 standard drinks of alcohol     Comment: occ            Family History:     Mother  of colon cancer- Diagnosed in her 60s.   No other family history   Patient is the youngest of 7. Her other siblings have all .          Allergies:     Allergies   Allergen Reactions     No Known Allergies             Medications:     Current Facility-Administered Medications   Medication     acetaminophen (TYLENOL) tablet 650 mg    Or     acetaminophen (TYLENOL) Suppository 650 mg     aspirin (ASA) EC tablet 325 mg     glucose gel 15-30 g    Or     dextrose 50 % injection 25-50 mL    Or     glucagon injection 1 mg     famotidine (PEPCID) tablet 20 mg     furosemide (LASIX) injection 40 mg     insulin aspart (NovoLOG) injection (RAPID ACTING)     insulin aspart " (NovoLOG) injection (RAPID ACTING)     levothyroxine (SYNTHROID/LEVOTHROID) tablet 137 mcg     lidocaine (LMX4) cream     lidocaine 1 % 0.1-1 mL     [START ON 6/16/2023] lisinopril (ZESTRIL) tablet 20 mg     melatonin tablet 1 mg     metoprolol tartrate (LOPRESSOR) tablet 50 mg     naloxone (NARCAN) injection 0.2 mg    Or     naloxone (NARCAN) injection 0.4 mg    Or     naloxone (NARCAN) injection 0.2 mg    Or     naloxone (NARCAN) injection 0.4 mg     ondansetron (ZOFRAN ODT) ODT tab 4 mg    Or     ondansetron (ZOFRAN) injection 4 mg     oxyCODONE (ROXICODONE) tablet 5 mg     polyethylene glycol (MIRALAX) Packet 17 g     pravastatin (PRAVACHOL) tablet 20 mg     senna-docusate (SENOKOT-S/PERICOLACE) 8.6-50 MG per tablet 1 tablet    Or     senna-docusate (SENOKOT-S/PERICOLACE) 8.6-50 MG per tablet 2 tablet     sitagliptin (JANUVIA) tablet 50 mg     sodium chloride (PF) 0.9% PF flush 3 mL     sodium chloride (PF) 0.9% PF flush 3 mL     spironolactone (ALDACTONE) tablet 50 mg             Review of Systems:    ROS: 10 point ROS negative other than those noted above in the HPI.         Physical Exam:     Vitals:    06/15/23 1226 06/15/23 1433 06/15/23 1500 06/15/23 1655   BP: (!) 148/79  (!) 148/66 (!) 165/89   BP Location:    Right arm   Pulse: 89  70 81   Resp: 18   17   Temp: 97.6  F (36.4  C)   97.4  F (36.3  C)   TempSrc: Oral   Axillary   SpO2: 98% 99% 97% 98%     General: Appear comfortable in bed  Resp: no respiratory distress, CTAB with no wheezes, rubs or rhonchi  CV: Well perfused.  Abdomen: soft, mildly distended, fluid wave present. No pain to palpation  : normal external genitalia. Cervix small without masses or lesions. Small, 3mm nodule on anterior aspect of vaginal wall  Ext: 2+ pitting edema; symmetric. nontender  Neuro: appears intact grossly moving all 4 extremities equally.      Data     Results for orders placed or performed during the hospital encounter of 06/15/23 (from the past 24 hour(s))    Troponin T, High Sensitivity (now)   Result Value Ref Range    Troponin T, High Sensitivity 49 (H) <=14 ng/L   Cell count with differential fluid    Narrative    The following orders were created for panel order Cell count with differential fluid.  Procedure                               Abnormality         Status                     ---------                               -----------         ------                     Cell Count Body Fluid[[id number]]        Abnormal            Final result               Differential Body Fluid[[id number]]                          Final result                 Please view results for these tests on the individual orders.   Albumin fluid   Result Value Ref Range    Albumin Fluid Source Abdomen     Albumin fluid 2.7 g/dL    Narrative    No reference ranges have been established. This result should be interpreted in the context of the patient's clinical condition and compared to simultaneous measurement in the patient's blood. This is a lab developed test. It has not been cleared or approved by the FDA. FDA clearance is not required for clinical use.   Protein fluid   Result Value Ref Range    Protein Fluid Source Abdomen     Protein Total Fluid 4.6 g/dL    Narrative    No reference ranges have been established. This result should be interpreted in the context of the patient's clinical condition and compared to simultaneous measurement in the patient's blood. This is a lab developed test. It has not been cleared or approved by the FDA. FDA clearance is not required for clinical use.   Cell Count Body Fluid   Result Value Ref Range    Color Yellow Colorless, Yellow    Clarity Hazy (A) Clear    Cell Count Fluid Source Abdomen     Total Nucleated Cells 661 /uL    Narrative    No reference ranges have been established.  This result  should be interpreted in the context of the patient's clinical condition and   compared to simultaneous measurement in the patient's blood.          Differential Body Fluid   Result Value Ref Range    % Neutrophils 4 %    % Lymphocytes 46 %    % Monocyte/Macrophages 45 %    % Lining Cells 3 %    % Other Cells 2 %    Absolute Neutrophils, Body Fluid 26.4   /uL    Narrative    No reference ranges have been established. This result should be interpreted in the context of the patient's clinical condition and compared to simultaneous measurement in the patient's blood.   US Paracentesis without Albumin    Narrative    US PARACENTESIS WITHOUT ALBUMIN 6/15/2023 2:23 PM    CLINICAL HISTORY: new diagnosis of likely ovarian cancer vs  appendiceal cancer, ascites    PROCEDURE: Informed consent obtained. Time out performed. The abdomen  was prepped and draped in a sterile fashion. 10 mL of 1% lidocaine was  infused into local soft tissues. A 5 Uzbek catheter system was  introduced into the abdominal ascites under ultrasound guidance.    6.2 liters of clear fluid were removed and sent to lab if requested.    Patient tolerated procedure well.    Ultrasound imaging was obtained and placed in the patient's permanent  medical record.      Impression    IMPRESSION:  1.  Status post ultrasound-guided paracentesis.    ALVARO VILLASENOR MD         SYSTEM ID:  [id number]   Glucose by meter   Result Value Ref Range    GLUCOSE BY METER POCT 73 70 - 99 mg/dL       Assessment and Recommendations   Impression:   Gavi Pearson is a 89 year old emale with PMH significant for HTN, HLD, hypothyroidism, T2DM, who presents with abdominal fullness, nausea and concern for metastatic disease of unknown primary on CT scan. With presence of carcinomatosis, ascites and right ovarian mass, possible ovarian primary, however without biopsies, difficult to know. Patient has made it very clear that should this come back malignant, as it likely will, she does not want to pursue chemotherapy or surgical management-- she would rather remain comfortable as possible. She would like to be DNR/DNI during the  admission. Given that there is a high likelihood for cancer, would recommend having the Palliative Care team be involved during this hospitalization. If likely gynecologic in nature, our team will discuss management options and likely prognosis and have her set up with the care she needs. Will continue to follow closely until results back.       Plan:   - Will follow cytology results from paracentesis 6/15  - CA-125, CA-19-9, CEA pending   - Recommend Palliative Care Consult inpatient given patient's wishes   - Due to increase in BL LE swelling, recommend bilateral LE dopplers to rule out DVT  - Consider anticoagulation given hospitalization and likely malignancy; recommend SCDs  - Gyn/Onc will continue to follow closely      Thank you for allowing us to be involved in the care of your patient. Please do not hesitate to give us a call with further questions.       Patient seen and care plan discussed under supervision of Dr. Driss Martins MD  Gynecologic Oncology, PGY-3  06/15/2023, 5:37 PM     [facility]  [phone] weekdays: [phone]   [phone] & weekends: [phone]    Please include 10-digit call back number, resident is off-site

## 2023-06-15 NOTE — ED NOTES
[facility]  ED Nurse Handoff Report    ED Chief complaint: Abdominal Pain and Nausea, Vomiting, & Diarrhea      ED Diagnosis:   Final diagnoses:   None       Code Status: To be addressed by admitting provider    Allergies:   Allergies   Allergen Reactions     No Known Allergies        Patient Story: Patient presents to ED with distended abdomen/pain & nausea. New dx of ovarian cancer.   Focused Assessment:    US Paracentesis without Albumin    (Results Pending)     Treatments and/or interventions provided:   Paracentesis    Does this patient have any cognitive concerns?: None    Activity level - Baseline/Home:  Walker  Activity Level - Current:   Unknown    Patient's Preferred language: English   Needed?: No    Isolation: None  Infection: Not Applicable  Patient tested for COVID 19 prior to admission: YES  Bariatric?: No    Vital Signs:   Vitals:    06/15/23 1226   BP: (!) 148/79   Pulse: 89   Resp: 18   Temp: 97.6  F (36.4  C)   TempSrc: Oral   SpO2: 98%       ED NURSE PHONE NUMBER: *[phone]

## 2023-06-15 NOTE — PROGRESS NOTES
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  Needs rhea and believe echo may be helpful to evaluate for wall motion abnormality, etc.  Also having a lot of difficulty with nausea recently so hydration and meds will be helpful.  This nausea most likely is related to the cancer which needs to be further investigated as well.        122 minutes spent by me on the date of the encounter doing chart review, review of outside records, review of test results, interpretation of tests, patient visit, documentation, discussion with other provider(s) and discussion with family        BMI:   Estimated body mass index is 27.76 kg/m  as calculated from the following:    Height as of 4/27/23: 1.753 m (5' 9\").    Weight as of this encounter: 85.3 kg (188 lb).       See Patient Instructions    No follow-ups on file.    Skinny Marshall MD  [facility] FLORESITA Gatica is a 89 year old, presenting for the following health issues:  Abdominal Pain        4/27/2023     8:26 AM   Additional Questions   Roomed by Cynthia OLVERA CMA   Accompanied by Nephew-Bekah WILSON     Abdominal/Flank Pain  Onset/Duration: 3 weeks  Description:   Character: Cramping  Location: left lower quadrant  Radiation: None  Intensity: mild  Progression of " Symptoms:  same  Accompanying Signs & Symptoms:  Fever/chills: no   Gas/Bloating: YES  Nausea: YES  Vomitting: YES- last night, not really able to hold food down  Diarrhea: YES  Constipation:YES  Dysuria: no            Hematuria: no            Frequency: no   History:   Trauma: no   Previous similar pain: no    Previous tests done: recent ER visit 6/5, labs complete           Previous Abdominal surgery: no   Precipitating factors:   Does the pain change with:     Food: YES, has GERD, unable to keep some food down    Bowel Movement: YES- feels better   Therapies tried and outcome:  Got zofran in UC, helped temporarily    When food last eaten: last night, emesis after    This is a 89 yo female who has had fullness of her abdomen along with nausea over the past few weeks.  Some llq abdominal pain as well.  Has been evaluated in the UC on 6/2 and ED on 6/5 with cxr, ekg, labs recently and has been treated with abx for uti/tooth infection. antiemetics and h2 blocker.  No hx of abdominal or pelvic surgery.  No sob, cp, sweats, neck pain or arm pain.  Has not lost or gained weight recently.  No fever or chills.  Having normal bm's.  No pain or burning with urination.  No blood in urine or stool.  Recently moved to assisted living at [place] 6 weeks ago.    Review of Systems   Constitutional, HEENT, cardiovascular, pulmonary, GI, , musculoskeletal, neuro, skin, endocrine and psych systems are negative, except as otherwise noted.      Objective    /88 (BP Location: Right arm, Patient Position: Sitting, Cuff Size: Adult Regular)   Pulse 91   Temp 97.7  F (36.5  C) (Oral)   Wt 85.3 kg (188 lb)   LMP  (LMP Unknown)   SpO2 94%   BMI 27.76 kg/m    Body mass index is 27.76 kg/m .  Physical Exam   GENERAL: healthy, alert and no distress  EYES: Eyes grossly normal to inspection, PERRL and conjunctivae and sclerae normal  HENT: ear canals and TM's normal, nose and mouth without ulcers or lesions  NECK: no  adenopathy, no asymmetry, masses, or scars and thyroid normal to palpation  RESP: lungs clear to auscultation - no rales, rhonchi or wheezes  CV: regular rate and rhythm, normal S1 S2, no S3 or S4, no murmur, click or rub, no peripheral edema and peripheral pulses strong  ABDOMEN: soft, minimal ttp, dull to percussion, no guarding, no rebound, no hepatosplenomegaly, no masses and bowel sounds normal  MS: no gross musculoskeletal defects noted, no edema  SKIN: no suspicious lesions or rashes  NEURO: Normal strength and tone, mentation intact and speech normal  PSYCH: mentation appears normal, affect normal/bright    Results for orders placed or performed in visit on 06/15/23   CT Abdomen Pelvis w Contrast     Status: Abnormal   Result Value Ref Range    Radiologist flags (Urgent)      Metastatic disease throughout the abdomen and pelvis    Narrative    EXAM: CT ABDOMEN PELVIS W CONTRAST  LOCATION: [facility]  DATE: 6/15/2023    INDICATION: llq abdominal pain, nausea  COMPARISON: None.  TECHNIQUE: CT scan of the abdomen and pelvis was performed following injection of IV contrast. Multiplanar reformats were obtained. Dose reduction techniques were used.  CONTRAST: 100mL Isovue 370    FINDINGS:   LOWER CHEST: Small left and trace right pleural effusions. Moderate size hiatal hernia.    Coronary artery calcification.    HEPATOBILIARY: There are numerous hypodense lesions throughout the liver. No calcified gallstones. No biliary ductal dilatation.    PANCREAS: Normal.    SPLEEN: There are a few hypodense lesions in the spleen.    ADRENAL GLANDS: Normal.    KIDNEYS/BLADDER: No significant mass, stone, or hydronephrosis.    BOWEL: Dilated appendix measuring up to 2.0 cm in diameter with irregular wall thickening. Large volume ascites. Hypodense nodular omental thickening, for example in the left anterior upper abdomen measuring up to 5.7 cm in thickness. Scattered areas of   peritoneal nodularity  throughout the abdomen and pelvis, for example in the left lower quadrant measuring 2.0 x 1.5 cm on series 4 image 174.    LYMPH NODES: Normal.    VASCULATURE: Unremarkable.    PELVIC ORGANS: Hysterectomy. A heterogeneous enhancing mass in the right adnexal region measuring 3.3 x 5.1 cm.    MUSCULOSKELETAL: ORIF of the left femur. Right hip arthroplasty. Degenerative changes throughout the spine. No suspicious osseous lesions.      Impression    IMPRESSION:   1.  Large volume ascites with multifocal peritoneal nodularity and omental caking consistent with peritoneal carcinomatosis.    2.  A heterogeneous right adnexal mass may represent a primary ovarian malignancy or metastasis.    3.  Dilated appendix with irregular wall thickening may represent primary malignancy or metastasis.    4.  Numerous hypodense lesions throughout the liver, likely metastases.      [Access Center: Metastatic disease throughout the abdomen and pelvis]    This report will be copied to the [employer] [employer] [facility] [place] to ensure a provider acknowledges the finding. Access Center is available Monday through Friday 8am-3:30 pm.      Lipase     Status: Normal   Result Value Ref Range    Lipase 20 13 - 60 U/L   Troponin T, High Sensitivity     Status: Abnormal   Result Value Ref Range    Troponin T, High Sensitivity 51 (H) <=14 ng/L   Comprehensive metabolic panel     Status: Abnormal   Result Value Ref Range    Sodium 132 (L) 136 - 145 mmol/L    Potassium 3.8 3.4 - 5.3 mmol/L    Chloride 92 (L) 98 - 107 mmol/L    Carbon Dioxide (CO2) 26 22 - 29 mmol/L    Anion Gap 14 7 - 15 mmol/L    Urea Nitrogen 16.9 8.0 - 23.0 mg/dL    Creatinine 0.99 (H) 0.51 - 0.95 mg/dL    Calcium 9.0 8.2 - 9.6 mg/dL    Glucose 132 (H) 70 - 99 mg/dL    Alkaline Phosphatase 68 35 - 104 U/L    AST 19 0 - 45 U/L    ALT 5 0 - 50 U/L    Protein Total 6.5 6.4 - 8.3 g/dL    Albumin 3.2 (L) 3.5 - 5.2 g/dL    Bilirubin Total 0.6 <=1.2 mg/dL    GFR Estimate 54 (L) >60 mL/min/1.73m2   TSH  with free T4 reflex     Status: Abnormal   Result Value Ref Range    TSH 6.13 (H) 0.30 - 4.20 uIU/mL   CBC with platelets and differential     Status: None   Result Value Ref Range    WBC Count 8.5 4.0 - 11.0 10e3/uL    RBC Count 4.35 3.80 - 5.20 10e6/uL    Hemoglobin 13.1 11.7 - 15.7 g/dL    Hematocrit 39.3 35.0 - 47.0 %    MCV 90 78 - 100 fL    MCH 30.1 26.5 - 33.0 pg    MCHC 33.3 31.5 - 36.5 g/dL    RDW 13.5 10.0 - 15.0 %    Platelet Count 198 150 - 450 10e3/uL    % Neutrophils 73 %    % Lymphocytes 13 %    % Monocytes 13 %    % Eosinophils 1 %    % Basophils 0 %    % Immature Granulocytes 1 %    Absolute Neutrophils 6.1 1.6 - 8.3 10e3/uL    Absolute Lymphocytes 1.1 0.8 - 5.3 10e3/uL    Absolute Monocytes 1.1 0.0 - 1.3 10e3/uL    Absolute Eosinophils 0.1 0.0 - 0.7 10e3/uL    Absolute Basophils 0.0 0.0 - 0.2 10e3/uL    Absolute Immature Granulocytes 0.1 <=0.4 10e3/uL   T4 free     Status: Normal   Result Value Ref Range    Free T4 1.58 0.90 - 1.70 ng/dL   CBC with platelets and differential     Status: None    Narrative    The following orders were created for panel order CBC with platelets and differential.  Procedure                               Abnormality         Status                     ---------                               -----------         ------                     CBC with platelets and d...[[id number]]                      Final result                 Please view results for these tests on the individual orders.

## 2023-06-15 NOTE — ED PROVIDER NOTES
History   Chief Complaint:  Abdominal Pain and Nausea, Vomiting, & Diarrhea    The history is provided by the patient and a relative.      Gavi Pearson is a 89 year old female with a history of Afib, hypertension, diabetes, and hyperlipidemia who presents with abdominal pain nausea, vomiting, and diarrhea. The patient reports that the symptoms have been intermittent for about 3 weeks, and that she has been getting weaker. She denies any constipation.  Patient had extensive outpatient testing today with labs and CT scan.  CT suggested ovarian versus appendix malignancy and peritoneal carcinomatosis.  Presumptive diagnosis of cancer was shared with patient prior to ED evaluation.  She states that if she is diagnosed with cancer, she has lived a full life and would not like to go through chemotherapy, or any prolonged cancer treatment.    Independent Historian:   None-only the patient.     Review of External Notes:   Reviewed labs and imaging and note from earlier today.    Medications:    Januvia   Lopressor   Glucophage   Mevacor   Zestoretic   Zestril   Levothyroxine   Glucotrol   Pepcid   Omnicef   Aspirin     Past Medical History:     Anemia   Slow transit constipation   Physical deconditioning   Hypothyroidism   Dermatitis   Vitamin D deficiency   Benign paroxysmal positional vertigo   Type 2 diabetes   Polyneuropathy associated with underlying disease   Vitamin B12 deficiency   Paroxysmal Afib   Hyperlipidemia   Hypertension     Past Surgical History:    Open reduction internal fixation hip bipolar right      Physical Exam     Patient Vitals for the past 24 hrs:   BP Temp Temp src Pulse Resp SpO2   06/15/23 1226 (!) 148/79 97.6  F (36.4  C) Oral 89 18 98 %      Physical Exam  Nursing note and vitals reviewed.  HENT:   Mouth/Throat: Moist mucous membranes.   Eyes: EOMI, nonicteric sclera  Cardiovascular: Normal rate, regular rhythm, no murmurs, rubs, or gallops  Pulmonary/Chest: Effort normal and breath sounds  normal. No respiratory distress. No wheezes. No rales.   Abdominal: Soft. Nontender, distended, no guarding or rigidity.   Musculoskeletal: Normal range of motion.   Neurological: Alert. Moves all extremities spontaneously.   Skin: Skin is warm and dry. No rash noted.       Emergency Department Course   Imaging:  US Lower Extremity Venous Duplex Right   Final Result   IMPRESSION:      No deep venous thrombosis in the visualized right lower extremity.      US Paracentesis without Albumin   Final Result   IMPRESSION:   1.  Status post ultrasound-guided paracentesis.      ALVARO VILLASENOR MD            SYSTEM ID:  [id number]         Report per radiology    Laboratory:  Labs Ordered and Resulted from Time of ED Arrival to Time of ED Departure   TROPONIN T, HIGH SENSITIVITY - Abnormal       Result Value    Troponin T, High Sensitivity 49 (*)    ALBUMIN FLUID   PROTEIN FLUID   NON-GYNECOLOGIC CYTOLOGY   CELL COUNT WITH DIFFERENTIAL FLUID   Reviewed labs from prior to arrival.    Emergency Department Course & Assessments:  Interventions:  Medications   0.9% sodium chloride BOLUS (1,000 mLs Intravenous $New Bag 6/15/23 1232)   lidocaine (PF) (XYLOCAINE) 1 % injection 10 mL (10 mLs Subcutaneous $Given by Other 6/15/23 1352)      Assessments:  1307 I obtained history and examined the patient as reported above.     Independent Interpretation (X-rays, CTs, rhythm strip):  None    Consultations/Discussion of Management or Tests:  1353 I spoke with Dr. Martins of the OB GYN Oncology service from [facility] regarding patient's presentation, findings, and plan of care.  1407 I spoke with Dr. Medina of the hospitalist service from [facility] regarding patient's presentation, findings, and plan of care.    Social Determinants of Health affecting care:   None    Disposition:  The patient was admitted to the hospital under the care of Dr. Medina.     Impression & Plan    Medical Decision Making:  Patient presents with  likely new diagnosis of malignancy, either ovarian or appendiceal cancer with resultant peritoneal carcinomatosis.  Patient with escalating symptoms at this time most likely due to large volume ascites.  I contacted radiology and patient successfully underwent paracentesis with cytology sent.  I contacted Gyn/onc who will evaluate patient, and recommended adding on tumor markers which I ordered.  Given uncertainty regarding gynecologic versus appendiceal primary, they recommended admission to hospitalist service where I spoke with Dr. Medina who accepted for admission.  Patient and her nephew are in agreement with the plan.  All questions answered.    Diagnosis:    ICD-10-CM    1. Peritoneal carcinomatosis (H)  C78.6            Scribe Disclosure:  I, Remy Bro, am serving as a scribe at 12:44 PM on 6/15/2023 to document services personally performed by Judd Nicolas MD based on my observations and the provider's statements to me.         uJdd Nicolas MD  06/15/23 9901

## 2023-06-16 NOTE — PROGRESS NOTES
Notified provider about indwelling hernandez catheter discussed removal or continued need.    Did provider choose to remove indwelling hernandez catheter? no    Provider's hernandez indication for keeping indwelling hernandez catheter: retention    Is there an order for indwelling hernandez catheter? yes    *If there is a plan to keep hernandez catheter in place at discharge daily notification with provider is not necessary, but please add a notation in the treatment team sticky note that the patient will be discharging with the catheter.

## 2023-06-16 NOTE — CONSULTS
Palliative Care Consultation Note  [facility]      Patient: Gavi Pearson  Date of Admission:  6/15/2023    Requesting Clinician / Team: Hospital medicine  Reason for consult:   Goals of care     Recommendations & Counseling     GOALS OF CARE:     Comfort focused -Start comfort focused measures here.    Plan to discharge back to [place] with transition to long-term care with hospice    Social work consult for disposition planning    ADVANCE CARE PLANNING:    No health care directive on file. Per  informed consent policy next of kin should be involved if patient becomes unable.    There is no POLST form on file, recommend to complete prior to DC.    Code status: No CPR- Do NOT Intubate    MEDICAL MANAGEMENT:   Comfort Care      #Pain  - 1st choice:Morphine PO/SL 5  mg q 3 hour as needed.   - 2nd choice:Morphine IV 1-2 mg q 15 minutes as needed   - Adjunct: Tylenol PRN  -Starting at decreased frequency of morphine given that she does not appear to be actively dying and does not need aggressive pain management at this time.  Increase dose and frequency as needed to achieve tolerable pain management    #Air hunger/Dyspnea   -Currently denies dyspnea, if this were to develop recommend management as above for pain  -Fan at bedside  -Recommend avoiding oxygen for comfort      #Anxiety    - 1st choice: Lorazepam PO/SL q 0.5 mg  q 3 hour as needed.   - 2nd choice: Lorazepam IV q 0.5 mg  q 3 hour as needed    #Nausea   -Zofran 4 mg q 6 hours as needed. Can increase to 8 mg   -Zyprexa 5 mg q 6 hours as needed     #Agitation  -Aggressive symptoms control first, then  -1st choice: Zyprexa 5 mg ODT or IM   -2nd choice: Increase dose of Zyprexa to 10 mg or consider switch to Haldol   -3rd choice: Lorazepam as above     PSYCHOSOCIAL/SPIRITUAL SUPPORT:    Family     Friends  [place] community: [facility]    Palliative Care will continue to follow. Thank you for the  consult and allowing us to aid in the care of Gavi Pearson.    These recommendations have been discussed with primary team, SW, and bedside.    Isac Sierra NP  Securely message with [employer] (more info)  Text page via [facility] Paging/Directory       Palliative Summary/HPI     Gavi Pearson is a 89 year old female with a past medical history of hypertension, hyperlipidemia, hypothyroidism, atrial fibrillation, diabetes mellitus type 2 and constipation who came to the ER with complaint of abdominal distention, nausea and vomiting going on for the last 3-4 weeks. CT scan was consistent with peritoneal carcinomatosis, large volume ascites and omental caking, most likely secondary to ovarian cancer. Her had a large volume paracentesis and cytology remains pending. However, regardless of the final pathology she has already elected to not undergo chemotherapy or surgical management.  The palliative care team was consulted to explore goals of care.    Today, the patient was seen for:  Role introduction goals of care support    Palliative Care Summary:   Met with Gavi garcia her niece.   I introduced our role as an extra layer of support and how we help patients and families dealing with serious, potentially life-limiting illnesses. I explained the composition of the palliative care team.  Palliative care helps patients and families navigate their care while focusing on the whole person; providing emotional, social and spiritual support  Palliative care often assists with symptom management, information sharing about what to expect from the illness, available treatment options and what effect those options may have on the disease course, and provide effective communication and caring support.    Prognosis, Goals, & Planning:      Functional Status just prior to this current hospitalization:    ECOG2 (Ambulatory and capable of all selfcare but unable to carry out any work activities; may need help with IADLs up and about > 50% of  waking hours)  Per self-report, she is experiencing increasing fatigue and weakness and is requesting a higher level of care     Prognosis, Goals, and/or Advance Care Planning:    We discussed general treatment options (full/restorative, selective/conservatives, and comfort only/hospice). We then discussed how these specifically apply to Gavi.. Based on this discussion, Gavi shared that she wants to focus solely on quality of life and would not want to undergo any undue treatment or procedures that would prolong her life.    has decided on comfort measures only.  Education provided on transition to comfort-focused goals of care would be including discontinuation of IV fluids, cardiac monitoring, labs, tube feeding, TPN and other interventions that do not directly promote comfort.  Anticipatory guidance was given regarding feeding, hunger, fluids at end of life. We discussed utilization of medications to ease air hunger, agitation and restlessness at the time that ventilator is compassionately withdrawn.  Discussed that this process is very purposeful in terms of ensuring patient is as comfortable as possible and that family wishes are honored.      Code Status was addressed today:     Yes, We discussed potential risks and rationale of attempting cardiac resuscitation, intubation, and mechanical ventilation.  We also discussed probability of survival as well as quality of life implications.  Based on this discussion, patient or surrogate response/decision: Confirmed DNR/DNI        Patient's decision making preferences: shared with support from loved ones          Patient has decision-making capacity today for complex decisions: Questionable, on assessment to see occasional forgetfulness but has support of her niece            Coping, Meaning, & Spirituality:     Mood, coping, and/or meaning in the context of serious illness were addressed today: Yes    Social:   Living situation:resides in assisted living However she  feels that she needs a higher level of care    Medications:  I have reviewed this patient's medication profile and medications from this hospitalization. Notable medications:     ROS:  Comprehensive ROS is reviewed and is negative except as here & per HPI:     Physical Exam   Vital Signs with Ranges  Temp:  [97.4  F (36.3  C)-97.9  F (36.6  C)] 97.4  F (36.3  C)  Pulse:  [70-92] 77  Resp:  [16-18] 18  BP: (112-165)/(66-89) 125/67  SpO2:  [94 %-99 %] 99 %  170 lbs 12.8 oz    Physical Exam      Data reviewed:  Results for orders placed or performed during the hospital encounter of 06/15/23 (from the past 24 hour(s))   Cell count with differential fluid    Narrative    The following orders were created for panel order Cell count with differential fluid.  Procedure                               Abnormality         Status                     ---------                               -----------         ------                     Cell Count Body Fluid[[id number]]        Abnormal            Final result               Differential Body Fluid[[id number]]                          Final result                 Please view results for these tests on the individual orders.   Albumin fluid   Result Value Ref Range    Albumin Fluid Source Abdomen     Albumin fluid 2.7 g/dL    Narrative    No reference ranges have been established. This result should be interpreted in the context of the patient's clinical condition and compared to simultaneous measurement in the patient's blood. This is a lab developed test. It has not been cleared or approved by the FDA. FDA clearance is not required for clinical use.   Protein fluid   Result Value Ref Range    Protein Fluid Source Abdomen     Protein Total Fluid 4.6 g/dL    Narrative    No reference ranges have been established. This result should be interpreted in the context of the patient's clinical condition and compared to simultaneous measurement in the patient's blood. This is a lab developed  test. It has not been cleared or approved by the FDA. FDA clearance is not required for clinical use.   Cell Count Body Fluid   Result Value Ref Range    Color Yellow Colorless, Yellow    Clarity Hazy (A) Clear    Cell Count Fluid Source Abdomen     Total Nucleated Cells 661 /uL    Narrative    No reference ranges have been established.  This result  should be interpreted in the context of the patient's clinical condition and   compared to simultaneous measurement in the patient's blood.         Differential Body Fluid   Result Value Ref Range    % Neutrophils 4 %    % Lymphocytes 46 %    % Monocyte/Macrophages 45 %    % Lining Cells 3 %    % Other Cells 2 %    Absolute Neutrophils, Body Fluid 26.4   /uL    Narrative    No reference ranges have been established. This result should be interpreted in the context of the patient's clinical condition and compared to simultaneous measurement in the patient's blood.   US Paracentesis without Albumin    Narrative    US PARACENTESIS WITHOUT ALBUMIN 6/15/2023 2:23 PM    CLINICAL HISTORY: new diagnosis of likely ovarian cancer vs  appendiceal cancer, ascites    PROCEDURE: Informed consent obtained. Time out performed. The abdomen  was prepped and draped in a sterile fashion. 10 mL of 1% lidocaine was  infused into local soft tissues. A 5 Nepali catheter system was  introduced into the abdominal ascites under ultrasound guidance.    6.2 liters of clear fluid were removed and sent to lab if requested.    Patient tolerated procedure well.    Ultrasound imaging was obtained and placed in the patient's permanent  medical record.      Impression    IMPRESSION:  1.  Status post ultrasound-guided paracentesis.    ALVARO VILLASENOR MD         SYSTEM ID:  [id number]      Result Value Ref Range     414 (H) <=38 U/mL    Narrative    This result is obtained using the Roche Elecsys  II method on the wes [id number] immunoassay analyzer. Results obtained with different assay methods  or kits cannot be used interchangeably.   Glucose by meter   Result Value Ref Range    GLUCOSE BY METER POCT 73 70 - 99 mg/dL   US Lower Extremity Venous Duplex Right    Narrative    EXAM: US LOWER EXTREMITY VENOUS DUPLEX RIGHT  LOCATION: [facility]  DATE: 6/15/2023    INDICATION: Bilateral lower extremity swelling.  COMPARISON: None available.  TECHNIQUE: Venous Duplex ultrasound of the right lower extremity with and without compression, augmentation and duplex. Color flow and spectral Doppler with waveform analysis performed.    FINDINGS: Exam includes the common femoral, femoral, popliteal, and contralateral common femoral veins as well as segmentally visualized deep calf veins and greater saphenous vein.     RIGHT: No deep vein thrombosis. No superficial thrombophlebitis. There is nonspecific edema at the right calf.      Impression    IMPRESSION:    No deep venous thrombosis in the visualized right lower extremity.   Glucose by meter   Result Value Ref Range    GLUCOSE BY METER POCT 156 (H) 70 - 99 mg/dL   Glucose by meter   Result Value Ref Range    GLUCOSE BY METER POCT 145 (H) 70 - 99 mg/dL   CBC with platelets differential    Narrative    The following orders were created for panel order CBC with platelets differential.  Procedure                               Abnormality         Status                     ---------                               -----------         ------                     CBC with platelets and d...[[id number]]                      Final result                 Please view results for these tests on the individual orders.   Renal panel   Result Value Ref Range    Sodium 131 (L) 136 - 145 mmol/L    Potassium 3.3 (L) 3.4 - 5.3 mmol/L    Chloride 93 (L) 98 - 107 mmol/L    Carbon Dioxide (CO2) 26 22 - 29 mmol/L    Anion Gap 12 7 - 15 mmol/L    Glucose 136 (H) 70 - 99 mg/dL    Urea Nitrogen 11.4 8.0 - 23.0 mg/dL    Creatinine 0.73 0.51 - 0.95 mg/dL    GFR Estimate 77  >60 mL/min/1.73m2    Calcium 8.1 (L) 8.2 - 9.6 mg/dL    Albumin 2.6 (L) 3.5 - 5.2 g/dL    Phosphorus 2.8 2.5 - 4.5 mg/dL   Magnesium   Result Value Ref Range    Magnesium 2.4 (H) 1.7 - 2.3 mg/dL   CBC with platelets and differential   Result Value Ref Range    WBC Count 8.0 4.0 - 11.0 10e3/uL    RBC Count 4.51 3.80 - 5.20 10e6/uL    Hemoglobin 13.5 11.7 - 15.7 g/dL    Hematocrit 40.3 35.0 - 47.0 %    MCV 89 78 - 100 fL    MCH 29.9 26.5 - 33.0 pg    MCHC 33.5 31.5 - 36.5 g/dL    RDW 13.6 10.0 - 15.0 %    Platelet Count 209 150 - 450 10e3/uL    % Neutrophils 65 %    % Lymphocytes 18 %    % Monocytes 15 %    % Eosinophils 1 %    % Basophils 0 %    % Immature Granulocytes 1 %    NRBCs per 100 WBC 0 <1 /100    Absolute Neutrophils 5.2 1.6 - 8.3 10e3/uL    Absolute Lymphocytes 1.4 0.8 - 5.3 10e3/uL    Absolute Monocytes 1.2 0.0 - 1.3 10e3/uL    Absolute Eosinophils 0.1 0.0 - 0.7 10e3/uL    Absolute Basophils 0.0 0.0 - 0.2 10e3/uL    Absolute Immature Granulocytes 0.1 <=0.4 10e3/uL    Absolute NRBCs 0.0 10e3/uL          Medical Decision Making     92 MINUTES SPENT BY ME on the date of service doing chart review, history, exam, documentation & further activities per the note.

## 2023-06-16 NOTE — PLAN OF CARE
Physical Therapy Discharge Summary    Reason for therapy discharge:    Patient transitioning to comfort care and discharging to NH with hospice.    Progress towards therapy goal(s). See goals on Care Plan in Epic electronic health record for goal details.  Transitioned to comfort care.     Therapy recommendation(s):    No further therapy is recommended.    Goal Outcome Evaluation:

## 2023-06-16 NOTE — PROGRESS NOTES
[facility]    Medicine Progress Note - Hospitalist Service    Date of Admission:  6/15/2023    Assessment & Plan   Large ascites with carcinomatosis/right adnexal mass/dilated appendix:  This is a 89-year-old female who lives in assisted living and has history of diabetes, hypertension, hyperlipidemia and atrial fibrillation, not on any anticoagulation, now presented with abdominal distention, nausea and vomiting and found to have large ascites.  CT scan was consistent with peritoneal carcinomatosis, large volume ascites and omental caking, most likely secondary to ovarian cancer, as she has a right adnexal mass as well.  They also mentioned about dilated appendix as well.  At this time, she had 6 liters of paracentesis done and fluid sent for cytology.    -The patient mentioned that she is 89 years old and, if cancer, she does not want any surgery, she does not want any chemotherapy and, if it is progressed, she just wants to be comfortable.  She is DNR/DNI. Palliative care consulted.  - CEA wnl, CA-125 elevated to 414. NoCA 19-9 sent and will hold off given comfort wishes.   -F/u on fluid for cytology.    -Appreciate GYN oncology reccs appreciated  -She has bilateral lower extremity edema as well as significant ascites. Continue V Lasix (decreased to daily) and Aldactone to prevent reaccumulation of ascites as well as decrease the lower extremity edema.     Bilateral lower extremity edema: No DVT, continue diuretics    Nausea, vomiting, abdominal pain:  This is most likely secondary to large ascites.  It will be improved with the paracentesis.  Keep her on antiemetic protocol. She reports significant improvement today.     Diabetes mellitus type 2:  She is on glipizide as well as metformin and Januvia.  We will hold the oral medication and will keep her on sliding scale insulin for correction and hypoglycemia protocol.    Hypothyroidism.  She is on levothyroxine.  I will continue with  that.    Hyperlipidemia:  On Mevacor.  We will continue with that.    Hypertension:  She is on lisinopril and metoprolol.  I will continue with that.       Diet: Combination Diet Regular Diet Adult; Moderate Consistent Carb (60 g CHO per Meal) Diet; 2 gm NA Diet    DVT Prophylaxis: Pneumatic Compression Devices  Hernandez Catheter: PRESENT, indication: Retention  Lines: None     Cardiac Monitoring: ACTIVE order. Indication: SOB  Code Status: No CPR- Do NOT Intubate      Clinically Significant Risk Factors Present on Admission        # Hypokalemia: Lowest K = 3.3 mmol/L in last 2 days, will replace as needed    # Hypercalcemia: corrected calcium is >10.1, will monitor as appropriate  # Hypomagnesemia: Lowest Mg = 1 mg/dL in last 2 days, will replace as needed   # Hypoalbuminemia: Lowest albumin = 2.6 g/dL at 6/16/2023  7:20 AM, will monitor as appropriate     # Hypertension: Noted on problem list               Disposition Plan     Expected Discharge Date: 06/17/2023                  Jerri Shi MD  Hospitalist Service  [facility]  Securely message with [email or web address] (more info)  Text page via [email or web address] Paging/Directory   ______________________________________________________________________    Interval History   Feeling significantly better and eating food. Wants to hear about options as she doesn't want anything aggressive, waiting to talk to palliative. Her nephew help her with most things    Physical Exam   Vital Signs: Temp: 97.4  F (36.3  C) Temp src: Oral BP: 125/67 Pulse: 77   Resp: 18 SpO2: 99 % O2 Device: None (Room air)    Weight: 170 lbs 12.8 oz  Physical Exam  HENT:      Head: Normocephalic and atraumatic.      Mouth/Throat:      Mouth: Mucous membranes are moist.   Cardiovascular:      Rate and Rhythm: Normal rate and regular rhythm.   Abdominal:      Palpations: Abdomen is soft.      Tenderness: There is no abdominal tenderness. There is no guarding or rebound.   Genitourinary:      Comments: David present  Neurological:      General: No focal deficit present.      Mental Status: She is alert and oriented to person, place, and time. Mental status is at baseline.         Medical Decision Making       30 MINUTES SPENT BY ME on the date of service doing chart review, history, exam, documentation & further activities per the note.      Data     I have personally reviewed the following data over the past 24 hrs:    8.0  \   13.5   / 209     131 (L) 93 (L) 11.4 /  136 (H)   3.3 (L) 26 0.73 \       ALT: N/A AST: N/A AP: N/A TBILI: N/A   ALB: 2.6 (L) TOT PROTEIN: N/A LIPASE: N/A       Imaging results reviewed over the past 24 hrs:   Recent Results (from the past 24 hour(s))   US Lower Extremity Venous Duplex Right    Narrative    EXAM: US LOWER EXTREMITY VENOUS DUPLEX RIGHT  LOCATION: [facility]  DATE: 6/15/2023    INDICATION: Bilateral lower extremity swelling.  COMPARISON: None available.  TECHNIQUE: Venous Duplex ultrasound of the right lower extremity with and without compression, augmentation and duplex. Color flow and spectral Doppler with waveform analysis performed.    FINDINGS: Exam includes the common femoral, femoral, popliteal, and contralateral common femoral veins as well as segmentally visualized deep calf veins and greater saphenous vein.     RIGHT: No deep vein thrombosis. No superficial thrombophlebitis. There is nonspecific edema at the right calf.      Impression    IMPRESSION:    No deep venous thrombosis in the visualized right lower extremity.

## 2023-06-16 NOTE — PLAN OF CARE
Goal Outcome Evaluation:    A&O X4 some forgetfulness. VSS on RA. [place], Tele Afib, BLE edema +2-3, legs elevated, abdomen soft, non-tender, denies pain or n/v, slightly distended, paracentesis site RUQ WNL, tolerated current diet. Up with assist x1, able to take pills whole. Hernandez placed adequate output. Pending possible palliative c/s and diagnostic tests. Continue to monitor.

## 2023-06-16 NOTE — PROVIDER NOTIFICATION
MD paged: Pt is having urgency, voided 200cc, scanned for >800cc, no order for straight cath yet, pls advise.

## 2023-06-16 NOTE — PLAN OF CARE
Occupational Therapy: Orders received. Chart reviewed and discussed with care team.? Occupational Therapy not indicated due to pt transitioning to hospice cares.? Defer discharge recommendations to medical team.? Will complete orders.

## 2023-06-16 NOTE — CONSULTS
Care Management Initial Consult    General Information  Assessment completed with: Patient, Family, Pt and her nephewNicola  Type of CM/SW Visit: Initial Assessment    Primary Care Provider verified and updated as needed: Yes   Readmission within the last 30 days: no previous admission in last 30 days      Reason for Consult: discharge planning, care coordination/care conference  Advance Care Planning: Advance Care Planning Reviewed: other (see comments) (No docs scanned)          Communication Assessment  Patient's communication style: spoken language (English or Bilingual)    Hearing Difficulty or Deaf: yes   Wear Glasses or Blind: yes    Cognitive  Cognitive/Neuro/Behavioral: WDL                      Living Environment:   People in home: facility resident     Current living Arrangements: assisted living  Name of Facility: [facility]   Able to return to prior arrangements:         Family/Social Support:  Care provided by: self, other (see comments) (facility staff)  Provides care for: no one  Marital Status: Single  Other (specify) (nephew)          Description of Support System: Involved, Supportive         Current Resources:   Patient receiving home care services: No     Community Resources: None  Equipment currently used at home: walker, rolling  Supplies currently used at home: Other (2WW)    Employment/Financial:  Employment Status: retired        Financial Concerns: No concerns identified   Referral to Financial Worker: No       Does the patient's insurance plan have a 3 day qualifying hospital stay waiver?  No    Lifestyle & Psychosocial Needs:  Social Determinants of Health     Tobacco Use: Low Risk  (6/15/2023)    Patient History      Smoking Tobacco Use: Never      Smokeless Tobacco Use: Never      Passive Exposure: Not on file   Alcohol Use: Not on file   Financial Resource Strain: Low Risk  (12/21/2020)    Overall Financial Resource Strain (CARDIA)      Difficulty of Paying Living  Expenses: Not hard at all   Food Insecurity: No Food Insecurity (12/21/2020)    Hunger Vital Sign      Worried About Running Out of Food in the Last Year: Never true      Ran Out of Food in the Last Year: Never true   Transportation Needs: No Transportation Needs (12/21/2020)    PRAPARE - Transportation      Lack of Transportation (Medical): No      Lack of Transportation (Non-Medical): No   Physical Activity: Inactive (12/21/2020)    Exercise Vital Sign      Days of Exercise per Week: 0 days      Minutes of Exercise per Session: 0 min   Stress: No Stress Concern Present (12/21/2020)    [employer] - Occupational Stress Questionnaire      Feeling of Stress : Not at all   Social Connections: Unknown (12/21/2020)    Social Connection and Isolation Panel [NHANES]      Frequency of Communication with Friends and Family: Twice a week      Frequency of Social Gatherings with Friends and Family: Twice a week      Attends Jew Services: Not on file      Active Member of Clubs or Organizations: Not on file      Attends Club or Organization Meetings: Not on file      Marital Status: Not on file   Intimate Partner Violence: Not on file   Depression: Not at risk (6/14/2023)    PHQ-2      PHQ-2 Score: 0   Housing Stability: Not on file       Functional Status:  Prior to admission patient needed assistance:              Mental Health Status:          Chemical Dependency Status:                Values/Beliefs:  Spiritual, Cultural Beliefs, Jew Practices, Values that affect care: no               Additional Information:  SW met with pt in her room. Pt reports that she lives at [facility] in their [facility] apartments. Pt states staff assists with showering (1x a week), laundry, homemeaking (cleaning), and will receive an escort from staff for all meal. Pt utilizes a 2WW for all mobility. Pt manages her own medications at home. Pt receives her medications from [facility]. Pt is not  interested in restorative care to treat her cancer, so she will have a palliative care consult to determine if she would like to pursue comfort care. Pt is considering hospice and SW assist if need be. SW will continue to follow chart, and see what pt decides regarding her plan of care. [facility] does house long term care and TCU.     Humberto Maki, MSW, LSW     [facility]         12-Jul-2017 12:58

## 2023-06-16 NOTE — PHARMACY-ADMISSION MEDICATION HISTORY
Pharmacist Admission Medication History    Admission medication history is complete. The information provided in this note is only as accurate as the sources available at the time of the update.    Medication reconciliation/reorder completed by provider prior to medication history? No    Information Source(s): Patient and CareEverywhere/SureScripts via in-person     The medication history interview was completed by another pharmacist. I verified medication fill history with SureScripts and marked list as completed/reviewed.    Pertinent Information: None    Changes made to PTA medication list:    Added: None    Deleted: None    Changed: None    Medication Affordability:  Not including over the counter (OTC) medications, was there a time in the past 3 months when you did not take your medications as prescribed because of cost?: No    Allergies reviewed with patient and updates made in EHR: no    Medication History Completed By: Catherine Dai [facility] 6/16/2023 1:38 PM    Prior to Admission medications    Medication Sig Last Dose Taking? Auth Provider Long Term End Date   famotidine (PEPCID) 20 MG tablet Take 1 tablet (20 mg) by mouth 2 times daily  Patient taking differently: Take 20 mg by mouth 2 times daily as needed  at prn Yes Giovany Higgins PA-C     glipiZIDE (GLUCOTROL) 5 MG tablet Take 0.5 tablets (2.5 mg) by mouth 2 times daily (before meals)  Patient taking differently: Take 2.5 mg by mouth 2 times daily (before meals) QAM and QPM 6/14/2023 at pm Yes Danika Thomas MD Yes    levothyroxine (SYNTHROID/LEVOTHROID) 137 MCG tablet TAKE 1 TABLET(137 MCG) BY MOUTH DAILY 6/14/2023 at am Yes Danika Thomas MD Yes    lisinopril (ZESTRIL) 20 MG tablet TAKE 1 TABLET(20 MG) BY MOUTH DAILY 6/14/2023 at am Yes Danika Thomas MD Yes    lisinopril-hydrochlorothiazide (ZESTORETIC) 20-12.5 MG tablet TAKE 1 TABLET BY MOUTH EVERY MORNING  Patient taking differently: Take 1 tablet by mouth every morning Taken with  lisinopril 20mg for total of 40mg 6/14/2023 at am Yes Danika Thomas MD Yes    lovastatin (MEVACOR) 20 MG tablet TAKE 1 TABLET(20 MG) BY MOUTH DAILY 6/14/2023 at pm Yes Niki Mcmullen MD Yes    metFORMIN (GLUCOPHAGE) 500 MG tablet TAKE 1 TABLET(500 MG) BY MOUTH TWICE DAILY WITH MEALS 6/14/2023 at am Yes Niki Mcmullen MD Yes    metoprolol tartrate (LOPRESSOR) 50 MG tablet TAKE 1 TABLET(50 MG) BY MOUTH TWICE DAILY 6/14/2023 at am Yes Danika Thomas MD Yes    sitagliptin (JANUVIA) 100 MG tablet Take 1 tablet (100 mg) by mouth daily 6/14/2023 at am Yes Kane Wick MD Yes    triamcinolone (KENALOG) 0.1 % external cream Apply b.i.d. p.r.n. to dermatitis but not on the face  Patient taking differently: 2 times daily as needed Apply b.i.d. p.r.n. to dermatitis but not on the face  at prn Yes Kane Wick MD

## 2023-06-16 NOTE — PROVIDER NOTIFICATION
MD Notification    Notified Person: MD    Notified Person Name: Dr. Bojorquez    Notification Date/Time: 6/16/23 0100    Notification Interaction: Vocera paging    Purpose of Notification: Pt quite anxious, can we try atarax or similar?    Orders Received: One time dose 25mg atarax    Comments:

## 2023-06-16 NOTE — PLAN OF CARE
Received pt 1700, pt is AOx4, forgetful at times, [place], Tele Afib CVR, BLE edema +2-3, legs elevated, abdomen soft, non-tender, denies pain or n/v, slightly distended, paracentesis site RUQ wnl, tolerated current diet, incontinent, declined ambulation for shift, assist x1, able to take pills whole, noted urinary retention at end of shift, Hernandez placed. Pending possible palliative c/s and diagnostic tests.

## 2023-06-16 NOTE — PROGRESS NOTES
Gynecology Oncology Progress Note  June 16, 2023  89 year old HD#2 N/V, Abdominal fullness     Dz:  CT scan c/w peritoneal carcinomatosis, large volume ascites and omental caking, right adnexal mass and dilated appendix    24 hour events:   Hernandez placed for suspected urinary retention     Subjective: Ms. Pearson is doing very well this morning. She reports nausea has resolved since paracentesis. Denies any pain. Has hernandez in place due to urinary retention, however is having lots of discomfort with it in place.    Objective:   Vitals:    06/15/23 2201 06/15/23 2341 06/16/23 0631 06/16/23 0744   BP: 139/74 112/67  125/67   BP Location:  Left arm  Right arm   Cuff Size:  Adult Regular     Pulse: 92 78  77   Resp:  16  18   Temp:  97.9  F (36.6  C)  97.4  F (36.3  C)   TempSrc:  Oral  Oral   SpO2:  94%  99%   Weight:   77.5 kg (170 lb 12.8 oz)        General: Comfortable in bed  CV: Well perfused  Resp: no increased work of breathing  Abdomen: Soft, non-tender     I/Os  UOP 2150 mL over 24 hours. Had 1500 ml out when hernandez was placed    New labs/imaging-  Ca 125 414  CEA 2.0    US Lower Extremity Right   No deep venous thrombosis in the visualized right lower extremity.    Assessment/Plan: Gavi Pearson is a 89 year old emale with PMH significant for HTN, HLD, hypothyroidism, T2DM, who is now HD#2 with abdominal fullness, nausea and concern for metastatic disease of unknown primary on CT scan. Ca 125 returned elevated overnight, which certainly raises concern for ovarian primary, however can also be elevated with other etiologies. Will continue to wait cytology in order to confirm malignancy, however possible this would not be diagnostic. Patient continues to express her desire to not pursue chemotherapy or surgical management-- she would rather remain comfortable as possible. She remains DNR/DNI during the admission.   - Will continue to follow-up cytology from Paracentesis 6/15  - CA-19 still pending  - Recommend  Palliative Care involvement  - Consider anticoagulation given hospitalization and likely malignancy; SCDs  - If cytology returns prior to patient discharge per primary team, will continue to follow and set up appt outpatient to discuss next steps in care if GYN primary malignancy  - Gyn/Onc will continue to follow closely     Temi Martins MD  Gynecologic Oncology, PGY-3  06/15/2023, 5:37 PM      [facility] Gyn Onc Pagers  [phone] weekdays: [phone]   [phone] & weekends: [phone]    Please include 10-digit call back number, resident is off-site        I, Elva Álvarez NP personally examined and evaluated this patient on 6/16/2023.  I discussed the patient with the resident and care team, and agree with the assessment and plan of care as documented in the resident's note above.     I personally reviewed vital signs, laboratory values and imaging results.         45 minutes spent on the date of the encounter doing chart review, history and exam, documentation, and further activities as noted above.          RAJIV Jean Baptiste, NP-BC  Women's Health Nurse Practitioner  Division of Gynecologic Oncology    [facility] gyn onc resident pager (1st call call [phone] weekdays): [phone]   [facility] gyn onc resident pager (1st call [phone] & weekends): [phone]

## 2023-06-16 NOTE — CONSULTS
Care Management Follow Up    Length of Stay (days): 1    Expected Discharge Date: 06/19/2023     Concerns to be Addressed:       Patient plan of care discussed at interdisciplinary rounds: Yes    Anticipated Discharge Disposition: Assisted Living  Disposition Comments: hospice  Anticipated Discharge Services: Transportation Services  Anticipated Discharge DME:      Patient/family educated on Medicare website which has current facility and service quality ratings:    Education Provided on the Discharge Plan:    Patient/Family in Agreement with the Plan: yes    Referrals Placed by CM/SW:    Private pay costs discussed: Not applicable    Additional Information:  GIRISH spoke with pt who would like to enroll into hospice. GIRISH called [facility] and spoke with Jessica who manages the assisted living side of care. Jessica can be reached at [phone]. Jessica reports pt can come back and that they can increase services as needed. Jessica also endorsed they can accept pt back on the weekend. GIRISH was informed they utilize [facility] and [facility]. GIRISH sent a referral to [facility]. GIRISH called Nicola and updated him on the plan. GIRISH also updated pt that she can return to [facility]. SW waiting to see if [facility] can accept pt and when they can meet with pt and family for enrollment. Pt's nephew Nicola reports that he can provide transport over the weekend. SW to follow up once pt is ready for discharge.     GIRISH spoke with [facility] who would have an opening for start of care on Monday at the earliest. GIRISH called [facility] to verify if they had start of care this weekend. GIRISH spoke with Daysi who stated she would look into it.     [facility] can accept pt on Monday.

## 2023-06-16 NOTE — PLAN OF CARE
Goal Outcome Evaluation:      Plan of Care Reviewed With: patient    Overall Patient Progress: no changeOverall Patient Progress: no change    A&OX4, however forgetful.  VSS, denies pain. Tolerating diet without nausea, hernandez patient with clear, pale yellow urine.  Up with assist of 1, gait belt and walker. BLE edema with baseline neuropathy.  Repo in bed, sacrum/coccyx with blanchable redness/mepilex on.  Comfort cares now.

## 2023-06-17 NOTE — PROGRESS NOTES
[facility]    Medicine Progress Note - Hospitalist Service    Date of Admission:  6/15/2023    Assessment & Plan   Large ascites with carcinomatosis/right adnexal mass/dilated appendix:  This is a 89-year-old female who lives in assisted living and has history of diabetes, hypertension, hyperlipidemia and atrial fibrillation, not on any anticoagulation, now presented with abdominal distention, nausea and vomiting and found to have large ascites.  CT scan was consistent with peritoneal carcinomatosis, large volume ascites and omental caking, most likely secondary to ovarian cancer, as she has a right adnexal mass as well.  They also mentioned about dilated appendix as well.  At this time, she had 6 liters of paracentesis done and fluid sent for cytology.    -The patient mentioned that she is 89 years old and, if cancer, she does not want any surgery, she does not want any chemotherapy and, if it is progressed, she just wants to be comfortable.  She is DNR/DNI. Palliative care consulted and she will go home with hospice   -CEA wnl, CA-125 elevated to 414, CA 19-9 wnl    -F/u on fluid for cytology.    -Appreciate GYN oncology reccs appreciated  -She has bilateral lower extremity edema as well as significant ascites. Continue V Lasix (decreased to daily) to prevent reaccumulation of ascites as well as decrease the lower extremity edema.     Bilateral lower extremity edema: No DVT, continue diuretics    Nausea, vomiting, abdominal pain:  This is most likely secondary to large ascites.  It will be improved with the paracentesis.  Keep her on antiemetic protocol. She reports significant improvement, eating well.    Diabetes mellitus type 2:  She is on glipizide as well as metformin and Januvia.  We will hold the oral medication and will keep her on sliding scale insulin for correction and hypoglycemia protocol.    Hypothyroidism.  She is on levothyroxine.  I will continue with  that.    Hyperlipidemia:  On Mevacor.  We will continue with that.    Hypertension:  She is on lisinopril and metoprolol.  I will continue with that.       Diet: Regular Diet Adult    DVT Prophylaxis: Pneumatic Compression Devices  Hernandez Catheter: PRESENT, indication: Retention  Lines: None     Cardiac Monitoring: None  Code Status: No CPR- Do NOT Intubate      Clinically Significant Risk Factors        # Hypokalemia: Lowest K = 3.3 mmol/L in last 2 days, will replace as needed       # Hypoalbuminemia: Lowest albumin = 2.6 g/dL at 6/16/2023  7:20 AM, will monitor as appropriate     # Hypertension: Noted on problem list                 Disposition Plan      Expected Discharge Date: 06/19/2023        Discharge Comments: hospice/palliative          Jerri Shi MD  Hospitalist Service  [facility]  Securely message with [employer] (more info)  Text page via [employer] Paging/Directory   ______________________________________________________________________    Interval History   Eating breakfast, again happy with overall course as nausea is completely gone       Physical Exam   Vital Signs: Temp: 97.3  F (36.3  C) Temp src: Oral BP: 136/80 Pulse: 69   Resp: 18 SpO2: 96 % O2 Device: None (Room air)    Weight: 170 lbs 12.8 oz  Physical Exam  HENT:      Head: Normocephalic and atraumatic.      Mouth/Throat:      Mouth: Mucous membranes are moist.   Cardiovascular:      Rate and Rhythm: Normal rate and regular rhythm.   Abdominal:      Palpations: Abdomen is soft.      Tenderness: There is no abdominal tenderness. There is no guarding or rebound.   Genitourinary:     Comments: Hernandez present  Musculoskeletal:         General: Swelling present.   Neurological:      General: No focal deficit present.      Mental Status: She is alert and oriented to person, place, and time. Mental status is at baseline.         Medical Decision Making       30 MINUTES SPENT BY ME on the date of service doing chart review,  history, exam, documentation & further activities per the note.      Data         Imaging results reviewed over the past 24 hrs:   No results found for this or any previous visit (from the past 24 hour(s)).

## 2023-06-17 NOTE — PLAN OF CARE
Goal Outcome Evaluation:      Plan of Care Reviewed With: patient    Overall Patient Progress: no changeOverall Patient Progress: no change    A&OX4, with some forgetful. VSS on RA. Pt is on comfort cares. Turn/repo in bed. Denies pain, N/V. Tolerating regular diet. Abd soft/non-tender. Hernandez in place with adequate output. BLE edema +2. Plan for possible discharge on 06/19.

## 2023-06-17 NOTE — PROGRESS NOTES
Gynecology Oncology Progress Note  June 16, 2023  89 year old HD#3 N/V, Abdominal fullness     Dz:  CT scan c/w peritoneal carcinomatosis, large volume ascites and omental caking, right adnexal mass and dilated appendix      Subjective: Ms. Pearson sleeping o my arrival - I did nto wake her.    Objective:   Vitals:    06/16/23 0744 06/16/23 1529 06/16/23 2229 06/17/23 0737   BP: 125/67 106/59 134/76 136/80   BP Location: Right arm Right arm Right arm Right arm   Patient Position:   Semi-Hall's    Cuff Size:   Adult Regular    Pulse: 77 62 79 69   Resp: 18 18 18 18   Temp: 97.4  F (36.3  C) 97.6  F (36.4  C) 97.3  F (36.3  C) 97.3  F (36.3  C)   TempSrc: Oral Axillary Oral Oral   SpO2: 99% 96% 96% 96%   Weight:           General: Comfortable in bed  CV: Well perfused  Resp: no increased work of breathing        Intake/Output Summary (Last 24 hours) at 6/17/2023 1339  Last data filed at 6/17/2023 1300  Gross per 24 hour   Intake 800 ml   Output 1825 ml   Net -1025 ml           Assessment/Plan: Gavi Pearson is a 89 year old emale with PMH significant for HTN, HLD, hypothyroidism, T2DM, who is now HD#2 with abdominal fullness, nausea and concern for metastatic disease of unknown primary on CT scan. Ca 125 returned elevated overnight, which certainly raises concern for ovarian primary, however can also be elevated with other etiologies. Will continue to wait cytology in order to confirm malignancy, however possible this would not be diagnostic. Patient continues to express her desire to not pursue chemotherapy or surgical management-- she would rather remain comfortable as possible. She remains DNR/DNI during the admission.     - Will continue to follow-up cytology - pending this AM  - CA-19-9 = 8    Patient's signs and symptoms are concerning for ovarian cancer.  She is declining intervention, which at her age, is very reasonable.  WE will sign off for now, btu would be happy to re-see her if she changes her mind  regarding disposition    Duncan Jamison MD       [facility] Gyn Onc Pagers  [phone] weekdays: [phone]   [phone] & weekends: [phone]    Please include 10-digit call back number, resident is off-site    [facility] gyn onc resident pager (1st call call [phone] weekdays): [phone]   [facility] gyn onc resident pager (1st call [phone] & weekends): [phone]

## 2023-06-17 NOTE — PROGRESS NOTES
Care Management Follow Up    Length of Stay (days): 2    Expected Discharge Date: 06/19/2023     Concerns to be Addressed:       Patient plan of care discussed at interdisciplinary rounds: Yes    Anticipated Discharge Disposition: Assisted Living  Disposition Comments: hospice  Anticipated Discharge Services: Transportation Services  Anticipated Discharge DME:      Patient/family educated on Medicare website which has current facility and service quality ratings:    Education Provided on the Discharge Plan:    Patient/Family in Agreement with the Plan: yes    Referrals Placed by CM/SW:    Private pay costs discussed: Not applicable    Additional Information:  Call placed to Jessica [facility] at [phone] to discuss patient's return with hospice. Writer waiting for a call back to discuss services     TIMOTHY Brasher, LGSW   Social Work   [facility]

## 2023-06-17 NOTE — PLAN OF CARE
"Goal Outcome Evaluation:      Plan of Care Reviewed With: patient    Overall Patient Progress: no changeOverall Patient Progress: no change    A&OX4, however forgetful. VSS, RA. Comfort cares. Tylenol given times one for \"sore back\". Up with 1, gait belt and walker.  Tolerating diet.  Hernandez patent with clear yellow urine.            "

## 2023-06-18 NOTE — PROGRESS NOTES
Continue comfort cares. A&Ox4. [place]. Tolerating regular diet, good intake. Hernandez in place with good UOP. Up A1 w/ gb/w. C/o back pain, controlled with repositioning & rest. Abdomen distended, IV lasix given. Palliative care consult tomorrow AM, discharge plan pending.

## 2023-06-18 NOTE — PROGRESS NOTES
A/ o x4 [place].  Denies pain.  VS and full assessment defered due to comfort care.  Tolerating regular diet.  Hernandez drng clear yellow urine.  Abdomen distended.  Discharge pending pallative, possible plurex drain placement.

## 2023-06-18 NOTE — PROGRESS NOTES
[facility]    Medicine Progress Note - Hospitalist Service    Date of Admission:  6/15/2023    Assessment & Plan   Large ascites with carcinomatosis/right adnexal mass/dilated appendix:  This is a 89-year-old female who lives in assisted living and has history of diabetes, hypertension, hyperlipidemia and atrial fibrillation, not on any anticoagulation, now presented with abdominal distention, nausea and vomiting and found to have large ascites.  CT scan was consistent with peritoneal carcinomatosis, large volume ascites and omental caking, most likely secondary to ovarian cancer, as she has a right adnexal mass as well.  They also mentioned about dilated appendix as well.  At this time, she had 6 liters of paracentesis done and fluid sent for cytology.    -The patient mentioned that she is 89 years old and, if cancer, she does not want any surgery, she does not want any chemotherapy and, if it is progressed, she just wants to be comfortable.  She is DNR/DNI. Palliative care consulted and she will go home with hospice   -CEA wnl, CA-125 elevated to 414, CA 19-9 wnl    -F/u on fluid for cytology.    -Appreciate GYN oncology reccs appreciated  -She has bilateral lower extremity edema as well as significant ascites. Continue IV Lasix daily to prevent reaccumulation of ascites as well as decrease the lower extremity edema.   -6/18: fluid re accumulating quite rapidly in abdomen, discuss with palliative tomorrow if pleurX drain might be beneficial prior to d/c     Bilateral lower extremity edema: No DVT, continue diuretics    Nausea, vomiting, abdominal pain:  This is most likely secondary to large ascites.  It will be improved with the paracentesis.  Keep her on antiemetic protocol. She reports significant improvement, eating well.    Diabetes mellitus type 2:  no further glucose checks as she wants to go home with hospice     Hypothyroidism.  She is on levothyroxine and has been stopped after  GOC by palliative    Hyperlipidemia:  On Mevacor which has been stopped to align with hospice care    Hypertension:  She is on lisinopril and metoprolol, again stopped due to hospice        Diet: Regular Diet Adult    DVT Prophylaxis: Pneumatic Compression Devices  Hernandez Catheter: PRESENT, indication: Retention  Lines: None     Cardiac Monitoring: None  Code Status: No CPR- Do NOT Intubate      Clinically Significant Risk Factors              # Hypoalbuminemia: Lowest albumin = 2.6 g/dL at 6/16/2023  7:20 AM, will monitor as appropriate     # Hypertension: Noted on problem list                 Disposition Plan      Expected Discharge Date: 06/19/2023        Discharge Comments: hospice/palliative          Jerri Shi MD  Hospitalist Service  [facility]  Securely message with [employer] (more info)  Text page via [employer] Paging/Directory   ______________________________________________________________________    Interval History   Resting, left shoulder is bothering her today. Also notes her stomach is starting to get distended again. Hoping the nausea wont come back with this.     Physical Exam   Vital Signs: Temp: 97.5  F (36.4  C) Temp src: Oral BP: (!) 147/81 (RN Notified) Pulse: 85   Resp: 16 SpO2: 94 % O2 Device: None (Room air)    Weight: 170 lbs 12.8 oz  Physical Exam  HENT:      Head: Normocephalic and atraumatic.      Mouth/Throat:      Mouth: Mucous membranes are moist.   Cardiovascular:      Rate and Rhythm: Normal rate and regular rhythm.   Abdominal:      General: There is distension.      Palpations: Abdomen is soft.      Tenderness: There is no abdominal tenderness. There is no guarding or rebound.   Genitourinary:     Comments: Hernandez present  Musculoskeletal:         General: Swelling present.   Neurological:      General: No focal deficit present.      Mental Status: She is alert and oriented to person, place, and time. Mental status is at baseline.         Medical Decision  Making       30 MINUTES SPENT BY ME on the date of service doing chart review, history, exam, documentation & further activities per the note.      Data         Imaging results reviewed over the past 24 hrs:   No results found for this or any previous visit (from the past 24 hour(s)).

## 2023-06-18 NOTE — PLAN OF CARE
Pt here for peritoneal carcinomatosis. A&O x4, forgetful at times. Pt is on comfort cares. Pt is tolerating regular diet, thin liquids and denies N/V. Takes pills whole. Hernandez catheter in place for retention. Up with x1 gb, walker. Denies pain. Discharge pending, possibly return to facility on hospice next week.

## 2023-06-18 NOTE — PROGRESS NOTES
Notified provider about indwelling hernandez catheter discussed removal or continued need.    Did provider choose to remove indwelling hernandez catheter? YES    Provider's hernandez indication for keeping indwelling hernandez catheter: Comfort cares    Is there an order for indwelling hernandez catheter? YES    *If there is a plan to keep hernandez catheter in place at discharge daily notification with provider is not necessary, but please add a notation in the treatment team sticky note that the patient will be discharging with the catheter.

## 2023-06-19 NOTE — PROGRESS NOTES
Palliative Care Daily Progress Note  [facility]      Patient: Gavi Pearson  Date of Admission:  6/15/2023     Recommendations & Counseling     GOALS OF CARE:     Comfort focused      SW working on hospice planning. Hopeful discharge to [facility] with hospice (agency TBD). Discharge likely to be delayed for plan for abdominal pleurx placement     ADVANCE CARE PLANNING:    No health care directive on file. Per  informed consent policy next of kin should be involved if patient becomes unable.    No POLST    Code status: No CPR- Do NOT Intubate    MEDICAL MANAGEMENT:     Proceed with abdominal pleurx placement for palliation. Consult IR, hopefully plan to place tomorrow     Drain pleurx catheter daily PRN, or when symptoms develop/worsen (abodminal pain, distention, fullness, reduced appetite)     Reviewed potential/likely symptoms given her malignancy pathophysiology     PSYCHOSOCIAL/SPIRITUAL SUPPORT:    Family - elena Walters present for today's visit     [facility]: [facility]    Palliative Care will continue to follow. Thank you for the consult and allowing us to aid in the care of Gavi Pearson.    These recommendations have been discussed with Dr. Day, bedside RN Delio, and unit SW Felicitas.    RAJIV Boss CNP  Securely message with [employer] (more info)  Text page via [facility] Paging/Directory       Palliative Summary/HPI     Gavi Pearson is a 89 year old female with a past medical history of hypertension, hyperlipidemia, hypothyroidism, atrial fibrillation, diabetes mellitus type 2 and constipation who came to the ER with complaint of abdominal distention, nausea and vomiting. CT scan was consistent with peritoneal carcinomatosis, large volume ascites and omental caking, most likely secondary to ovarian cancer. She had a large volume paracentesis and cytology remains pending. However, regardless of the final pathology she has  already elected to not undergo chemotherapy or surgical management. The palliative care team was consulted to explore goals of care. She has elected comfort measures and hospice planning.     Today, the patient was seen for:  Emotional support, goals of care     Prognosis, Goals, & Planning:     Prognosis, Goals, and/or Advance Care Planning:    Pt affirms desire for comfort measures, hospice     We reviewed the risks and benefits of an abdominal pleurx drain. We talked about how and what it would look/feel like. We discussed what it would be like with medication management only without the tube. Ultimately, patient feels that the paracentesis helped her feel better the other day. It made her feel less full and she could enjoy PO intake. She is nice and alert, up in the chair, engaged. Due to my assessment of time to live (many weeks to months), she could benefit from a pleurx for symptom management/palliation. She and her niece are in agreement. We reviewed the logistics of tube placement and how this impacts discharge plan.     We discussed the general and natural progression of her illness. We discussed the GI symptoms she is possibly/likely going to experience as her disease progresses. Symptoms will be managed with medications     Code Status was addressed today:     No already DNR/DNI      Patient's decision making preferences: with input from medical clinicians and loved ones          Patient has decision-making capacity today for complex decisions: Yes, and benefits from support of family in decision making (she does have hearing impairment, somewhat forgetful, benefits from reinforcement of information learned)           Coping, Meaning, & Spirituality:     Mood, coping, and/or meaning in the context of serious illness were addressed today: Yes    Medications:  I have reviewed this patient's medication profile and medications from this hospitalization. Notable medications:  Pepcid   Lasix 40mg IV daily    Ativan 0.5mg IV/SL every 3hrs PRN anxiety   Morphine 5mg PO Q3hrs PRN pain, SOB  Zyprexa 5mg PO Q6hrs PRN agitation   Zofran PRN nausea     ROS:  Comprehensive ROS is reviewed and is negative except as here & per HPI: N/A    Physical Exam   Vital Signs with Ranges  Temp:  [97.7  F (36.5  C)-98.1  F (36.7  C)] 98.1  F (36.7  C)  Pulse:  [87-92] 92  Resp:  [16] 16  BP: (144-168)/(89-91) 168/91  SpO2:  [95 %] 95 %  170 lbs 12.8 oz  CONSTITUTIONAL: Chronically ill woman seen sitting up in the chair in NAD, A&Ox3. Kaw. Calm and cooperative. Niece at side.   HEENT: NCAT  RESPIRATORY: NL respiratory effort on RA  NEUROLOGIC: Appropriately responsive during interview  PSYCH: Affect engaged     Data reviewed:  Recent imaging independently reviewed, my comments on pertinents:   6/15 CT CAP ascites, liver mets     Recent lab data independently reviewed, my comments on pertinents:   Na 131  K 3.3  Creat 0.73  Mg 2.4  Phos 2.8  Albumin 2.6  WBC 8  Hgb 13.5  Plt 209  6/15 peritoneal fluid cytology pending     Medical Decision Making   Please see A&P for additional details of medical decision making.  MANAGEMENT DISCUSSED with the following over the past 24 hours: Delio Spring RN, Felicitas STOUT, elena Walters    NOTE(S)/MEDICAL RECORDS REVIEWED over the past 24 hours: Gyn Onc, Hospitalist notes, nursing notes   Tests personally interpreted in the past 24 hours:  - 6/15 CT CAP with liver mets, ascites   Tests REVIEWED in the past 24 hours:  - See lab/imaging results included in the data section of the note  - BMP  - CBC  - Magnesium  - Phosphorus  SUPPLEMENTAL HISTORY, in addition to the patient's history, over the past 24 hours obtained from:   - Delio Spring RN, Felicitas STOUT, elena Walters   Medical complexity over the past 24 hours:  - pleurx placement for sx management

## 2023-06-19 NOTE — PROGRESS NOTES
Medicine Progress Note - Hospitalist Service    Date of Admission:  6/15/2023    Assessment & Plan    89-year-old female who lives in assisted living with past medical history of diabetes, hypertension, hyperlipidemia and atrial fibrillation (not on any anticoagulation), presenting with abdominal distention, nausea and vomiting, and ascites.     Large ascites with carcinomatosis/right adnexal mass/dilated appendix   * CT scan was consistent with peritoneal carcinomatosis, large volume ascites and omental caking, most likely secondary to ovarian cancer, as she has a right adnexal mass as well. Dilated appendix as well.  6 liters of paracentesis completed and fluid sent for cytology.  By report, patient stated she is 89 years old and, if cancer, she does not want any surgery and does not want chemotherapy and, if it is progressed, just wants to be comfortable.  DNR/DNI.   * CEA wnl, CA-125 elevated to 414, CA 19-9 wnl    * Gyn/Onc consulted, note 6/15, recommended follow-up of test results and palliative care consult  * IR consult 6/19 to review candidacy for tunneled intraperitoneal catheter for ascites     - Palliative care consulted 6/19, future plans for discharge with hospice   - F/u on fluid for cytology results, malignancy suspected    - She has bilateral lower extremity edema as well as significant ascites. Recent IV Lasix in hospital  - transition to oral Lasix 6/20, 40 mg daily    Bilateral lower extremity edema:   - No DVT on ultrasound 6/15, continue diuretics     Nausea, vomiting, abdominal pain    Most likely secondary to large ascites, improved after paracentesis.    - antiemetics as needed     Diabetes mellitus type 2:    - No further glucose checks as she wants to go home with hospice      Hypothyroidism.    - On levothyroxine and has been stopped after GOC by palliative     Hyperlipidemia:    - On Mevacor which has been stopped to align with hospice  care     Hypertension:    - She is on lisinopril and metoprolol, again stopped due to hospice     DNR, DNI code status  - comfort focused, hospice planning    Disposition    Estimated length of stay 48 hours, possible discharge 6/21 after IR procedure for abdominal drainage catheter for ascites    [email or web address]    Social work consulted       Diet: Regular Diet Adult  NPO per Anesthesia Guidelines for Procedure/Surgery Except for: Meds, Ice Chips    DVT Prophylaxis: Pneumatic Compression Devices  Hernandez Catheter: PRESENT, indication: Retention  Lines: None     Cardiac Monitoring: None  Code Status: No CPR- Do NOT Intubate      Clinically Significant Risk Factors              # Hypoalbuminemia: Lowest albumin = 2.6 g/dL at 6/16/2023  7:20 AM, will monitor as appropriate     # Hypertension: Noted on problem list                 Disposition Plan      Expected Discharge Date: 06/21/2023        Discharge Comments: hospice/palliative          Chauncey Day MD  Hospitalist Service  [facility]  Securely message with [employer] (more info)  Text page via [employer] Paging/Directory   ______________________________________________________________________    Interval History   First visit with patient today.  Comfortable, in good spirits, sitting up in her chair, feeling better overall compared to prior to admission.  Palliative care consult scheduled for today to review goals of care.  No complaints of nausea, vomiting, or abdominal pain today    Physical Exam   Vital Signs: Temp: 98.1  F (36.7  C) Temp src: Oral BP: (!) 168/91 Pulse: 92   Resp: 16 SpO2: 95 % O2 Device: None (Room air)    Weight: 170 lbs 12.8 oz    Awake and alert, in good spirits, sitting up in her chair  Lungs clear with good inspiratory effort  Heart regular rhythm without rubs or gallops  Abdomen soft, mildly distended without rebound or rigidity, minimally tender to  palpation  Extremities without significant edema  Skin warm and dry  Mental status pleasant and cooperative, answering questions and following simple commands    Medical Decision Making             Data         Imaging results reviewed over the past 24 hrs:   No results found for this or any previous visit (from the past 24 hour(s)).

## 2023-06-19 NOTE — PROGRESS NOTES
Care Management Follow Up    Length of Stay (days): 4    Expected Discharge Date: 06/19/2023     Concerns to be Addressed:       Patient plan of care discussed at interdisciplinary rounds: Yes    Anticipated Discharge Disposition: Assisted Living  Disposition Comments: hospice  Anticipated Discharge Services: Transportation Services  Anticipated Discharge DME:      Patient/family educated on Medicare website which has current facility and service quality ratings:    Education Provided on the Discharge Plan:    Patient/Family in Agreement with the Plan: yes    Referrals Placed by CM/SW:    Private pay costs discussed: Not applicable    Additional Information:  Call received from Ting in intake with [facility]. Writer returned call to [phone] to discuss discharge plans. Message left requesting a call back. Ting returned call for an update. Writer updated her that patient will have a procedure tomorrow, per palliative but timing is unknown at this time for abdominal pleurex. Ting requested that when discharge is known, to update her and she will connect with family for enrollment.    Voicemail had been received from Bekah, patient's nephew requesting an update. Writer returned call and left a message requesting a call back.    Call placed to Jessica at [facility] to update. Message left.       SABINA Lira

## 2023-06-19 NOTE — PROGRESS NOTES
Notified provider about indwelling hernandez catheter discussed removal or continued need.    Did provider choose to remove indwelling hernandez catheter? no     Provider's hernandez indication for keeping indwelling hernandez catheter: end of life comfort care      Is there an order for indwelling hernandez catheter? Yes    *If there is a plan to keep hernandez catheter in place at discharge daily notification with provider is not necessary, but please add a notation in the treatment team sticky note that the patient will be discharging with the catheter.

## 2023-06-19 NOTE — PROGRESS NOTES
Comfort care cont. A/O. Very [place]. Up-1/belt. Distended abdo. Denies NN/V. Tolerating diet. Hernandez good UOP. txr to st 88. Pending abdo drain placement this wednasday.

## 2023-06-19 NOTE — CONSULTS
Interventional Radiology - Pre-Procedure Note:  Inpatient - [facility]  06/19/2023     Procedure Requested: Tunneled intraperitoneal catheter placement  Requested by: Mary Rodriguez CNP    Brief HPI: Gavi Pearson is a 89 year old female with a past medical history of hypertension, hyperlipidemia, hypothyroidism, atrial fibrillation, diabetes mellitus type 2 and constipation who came to the ER with complaint of abdominal distention, nausea and vomiting. CT scan was consistent with peritoneal carcinomatosis, large volume ascites of 6.6 L on 6/19 and omental caking, most likely secondary to ovarian cancer. She had a large volume paracentesis and cytology remains pending. However, regardless of the final pathology she has already elected to not undergo chemotherapy or surgical management.       A tunneled intraperitoneal catheter has been requested for ascites drainage while in Hospice.     Gavi was seen in her room today. She is mildly [place] and extremely pleasant. She appeared to have been explained the basics of the abdominal drain already and was open to having it placed. Her goal was to be discharged out of the hospital and not have to come back in for subsequent drainages. She also did not want to feel nauseous like she did with the build up of ascites. She is still feeling fairly comfortable in her abdomen.     IMAGING:    EXAM: CT ABDOMEN PELVIS W CONTRAST  LOCATION: [facility]  DATE: 6/15/2023    IMPRESSION:   1.  Large volume ascites with multifocal peritoneal nodularity and omental caking consistent with peritoneal carcinomatosis.     2.  A heterogeneous right adnexal mass may represent a primary ovarian malignancy or metastasis.     3.  Dilated appendix with irregular wall thickening may represent primary malignancy or metastasis.     4.  Numerous hypodense lesions throughout the liver, likely metastases    NPO: will be made NPO prior   ANTICOAGULANTS:   ANTIBIOTICS: Will be  entered    History reviewed. No pertinent past medical history.    Facility-Administered Medications Prior to Admission   Medication Dose Route Frequency Provider Last Rate Last Admin     sodium chloride (PF) 0.9% PF flush 3 mL  3 mL Intracatheter q1 min prn Skinny Marshall MD   3 mL at 06/15/23 1052     Medications Prior to Admission   Medication Sig Dispense Refill Last Dose     famotidine (PEPCID) 20 MG tablet Take 1 tablet (20 mg) by mouth 2 times daily (Patient taking differently: Take 20 mg by mouth 2 times daily as needed) 60 tablet 3  at prn     glipiZIDE (GLUCOTROL) 5 MG tablet Take 0.5 tablets (2.5 mg) by mouth 2 times daily (before meals) (Patient taking differently: Take 2.5 mg by mouth 2 times daily (before meals) QAM and QPM) 90 tablet 3 6/14/2023 at pm     levothyroxine (SYNTHROID/LEVOTHROID) 137 MCG tablet TAKE 1 TABLET(137 MCG) BY MOUTH DAILY 90 tablet 3 6/14/2023 at am     lisinopril (ZESTRIL) 20 MG tablet TAKE 1 TABLET(20 MG) BY MOUTH DAILY 90 tablet 3 6/14/2023 at am     lisinopril-hydrochlorothiazide (ZESTORETIC) 20-12.5 MG tablet TAKE 1 TABLET BY MOUTH EVERY MORNING (Patient taking differently: Take 1 tablet by mouth every morning Taken with lisinopril 20mg for total of 40mg) 90 tablet 3 6/14/2023 at am     lovastatin (MEVACOR) 20 MG tablet TAKE 1 TABLET(20 MG) BY MOUTH DAILY 90 tablet 1 6/14/2023 at pm     metFORMIN (GLUCOPHAGE) 500 MG tablet TAKE 1 TABLET(500 MG) BY MOUTH TWICE DAILY WITH MEALS 180 tablet 0 6/14/2023 at am     metoprolol tartrate (LOPRESSOR) 50 MG tablet TAKE 1 TABLET(50 MG) BY MOUTH TWICE DAILY 180 tablet 3 6/14/2023 at am     sitagliptin (JANUVIA) 100 MG tablet Take 1 tablet (100 mg) by mouth daily 90 tablet 0 6/14/2023 at am     triamcinolone (KENALOG) 0.1 % external cream Apply b.i.d. p.r.n. to dermatitis but not on the face (Patient taking differently: 2 times daily as needed Apply b.i.d. p.r.n. to dermatitis but not on the face) 45 g 1  at prn      ALLERGIES  Allergies   Allergen Reactions     No Known Allergies      LABS:  ROUTINE ICU LABS (Last four results)  CMPRecent Labs   Lab 06/16/23  0720 06/16/23  0626 06/15/23  2056 06/15/23  1700 06/15/23  1012   *  --   --   --  132*   POTASSIUM 3.3*  --   --   --  3.8   CHLORIDE 93*  --   --   --  92*   CO2 26  --   --   --  26   ANIONGAP 12  --   --   --  14   * 145* 156* 73 132*   BUN 11.4  --   --   --  16.9   CR 0.73  --   --   --  0.99*   GFRESTIMATED 77  --   --   --  54*   CHAVA 8.1*  --   --   --  9.0   MAG 2.4*  --   --   --  1.0*   PHOS 2.8  --   --   --   --    PROTTOTAL  --   --   --   --  6.5   ALBUMIN 2.6*  --   --   --  3.2*   BILITOTAL  --   --   --   --  0.6   ALKPHOS  --   --   --   --  68   AST  --   --   --   --  19   ALT  --   --   --   --  5     CBC  Recent Labs   Lab 06/16/23  0720 06/15/23  1012   WBC 8.0 8.5   RBC 4.51 4.35   HGB 13.5 13.1   HCT 40.3 39.3   MCV 89 90   MCH 29.9 30.1   MCHC 33.5 33.3   RDW 13.6 13.5    198     EXAM:  Temp:  [97.7  F (36.5  C)-98.1  F (36.7  C)] 98.1  F (36.7  C)  Pulse:  [87-92] 92  Resp:  [16] 16  BP: (144-168)/(89-91) 168/91  SpO2:  [95 %] 95 %    General: Pleasant, [place], cooperative woman in no acute distress.    Neuro:  A&O x 4. Moves all extremities equally.  Resp:  Lungs clear to auscultation bilaterally, decreased in bases.  Cardio:  S1S2 and reg, without murmur, clicks or rubs  Abdomen:  Soft, distended, non-tender, positive bowel sounds.  Vascular: +2/4 bilateral dorsalis pedis pulses, +1 edema bilaterally LEs      Pre-Sedation Code Status Assessment:  Code Status: DNR / DNI intra procedure, per chart review.      History and Physical Reviewed: H&P documented within 30 days.  I have personally reviewed the patient's medical history and have updated the medical record as necessary.    ASSESSMENT/PLAN: Gavi Pearson is a 89 year old female with a past medical history of hypertension, hyperlipidemia, hypothyroidism, atrial  fibrillation, diabetes mellitus type 2 and constipation who came to the ER with complaint of abdominal distention, nausea and vomiting. CT scan was consistent with peritoneal carcinomatosis, large volume ascites of 6.6 L on 6/19 and omental caking, most likely secondary to ovarian cancer. She had a large volume paracentesis and cytology remains pending. However, regardless of the final pathology she has already elected to not undergo chemotherapy or surgical management.    Procedural education reviewed with patient in detail including, but not limited to risks, benefits and alternatives with understanding verbalized by her.    -Procedure in IR will occur on Wednesday 6/21/23 in the am  -NPO at midnight   -Orders will be entered prior    Discussed with IR Dr Osorio and Hospitalist Dr Day today    Total time spent on the date of the encounter: 40 minutes.    Thanks [facility] Interventional Radiology CNP ([phone]) (phone [phone])

## 2023-06-19 NOTE — PLAN OF CARE
Patient transferred to station 88 at about 1630. A&Ox4. [place]. Vital signs and assessment deferred due to comfort cares. Denies pain. Up with assist x1 + walker, gait-belt. Sat up in chair and ordered meal for dinner. Appetite fair. Hernandez catheter with good output. Abdomen distended. Plan for PleurX drain placement in IR on Wednesday, 6/21. SW following for placement to facility with hospice.

## 2023-06-19 NOTE — PLAN OF CARE
Goal Outcome Evaluation:       A&Ox4- is [place]. Denies pain. Tolerating a regular diet. PIV SL. Hernandez in place with adequate yellow output. Abdomen distended. Up Ax1 gaitbelt & walker. Full assessment deferred due to comfort cares. Palliative care consult today- discharge plan pending.

## 2023-06-20 NOTE — PLAN OF CARE
Comfort cares continued. No new complaints today. Denies nausea. Appetite/intake OK. Hernandez catheter patent and draining clear harshil urine. Abdomen is distended. Plan for PleurX drain placement in IR tomorrow. NPO at midnight. SW following for discharge to facility with hospice.

## 2023-06-20 NOTE — PROGRESS NOTES
[facility]    Medicine Progress Note - Hospitalist Service    Date of Admission:  6/15/2023    Assessment & Plan    89-year-old female who lives in assisted living with past medical history of diabetes, hypertension, hyperlipidemia and atrial fibrillation (not on any anticoagulation), presenting with abdominal distention, nausea and vomiting, and ascites.     Large ascites with carcinomatosis/right adnexal mass/dilated appendix  Rule out malignancy   * CT scan was consistent with peritoneal carcinomatosis, large volume ascites and omental caking, most likely secondary to ovarian cancer, as she has a right adnexal mass as well. Dilated appendix as well.  6 liters of paracentesis completed and fluid sent for cytology.  By report, patient stated she is 89 years old and, if cancer, she does not want any surgery and does not want chemotherapy and, if it is progressed, just wants to be comfortable.  DNR/DNI.   * CEA wnl, CA-125 elevated to 414, CA 19-9 wnl    * Gyn/Onc consulted, note 6/15, recommended follow-up of test results and palliative care consult  * IR consult 6/19 to review candidacy for tunneled intraperitoneal catheter for ascites   * Palliative care consulted 6/19, future plans for discharge with hospice  * IR consult 6/19, plans for abdominal drainage catheter for ascites 6/21     - IR consult follow-up, plans for abdominal drainage catheter for ascites 6/21  - F/u on fluid for cytology results, malignancy suspected, results PENDING  - recent transition from IV to oral Lasix 6/20, 40 mg daily, monitor lower extremity edema    Bilateral lower extremity edema   - No DVT on ultrasound 6/15, continue diuretics     Nausea, vomiting, abdominal pain, resolving  Most likely secondary to large ascites, improved after paracentesis.    - antiemetics as needed  - tunneled abdominal drainage catheter 6/21 as above     Diabetes mellitus type 2:    - No further glucose checks as she wants to go  home with hospice      Hypothyroidism.    - On levothyroxine and has been stopped after GOC by palliative     Hyperlipidemia:    - On Mevacor which has been stopped to align with hospice care     Hypertension:    - She is on lisinopril and metoprolol, again stopped due to hospice     DNR, DNI code status  - comfort focused, hospice planning    Disposition  - Estimated length of stay 24 hours, possible discharge 6/21 after IR procedure for abdominal drainage catheter for ascites (if hospice and facility confirmed)  - Anticipated discharge to assisted-living on hospice, [facility], [facility]?  - Social work consulted  - patient's nephew, Nicola, called and updated at the bedside 6/20, appreciative of call       Diet: Regular Diet Adult  NPO per Anesthesia Guidelines for Procedure/Surgery Except for: Meds, Ice Chips    DVT Prophylaxis: Pneumatic Compression Devices  Hernandez Catheter: PRESENT, indication: End of Life  Lines: None     Cardiac Monitoring: None  Code Status: No CPR- Do NOT Intubate      Clinically Significant Risk Factors              # Hypoalbuminemia: Lowest albumin = 2.6 g/dL at 6/16/2023  7:20 AM, will monitor as appropriate     # Hypertension: Noted on problem list                 Disposition Plan      Expected Discharge Date: 06/21/2023        Discharge Comments: hospice/palliative          Chauncey Day MD  Hospitalist Service  [facility]  Securely message with [employer] (more info)  Text page via "[employer] Paging/Directory   ______________________________________________________________________    Interval History   First visit with patient yesterday.  Lying in bed, feeling better overall, denying abdominal pain, nausea, or vomiting.  No new complaints.  Plans for tunneled intraperitoneal catheter tomorrow for drainage of ascites while in hospice following discharge.    Physical Exam   Vital Signs:                   Weight: 170 lbs 12.8 oz    Awake and alert, in good  spirits, lying in bed in no acute distress   Lungs clear with good inspiratory effort  Heart regular rhythm without rubs or gallops  Abdomen soft, mildly distended without rebound or rigidity, not tender to palpation  Extremities without significant edema  Skin warm and dry  Mental status pleasant and cooperative, normal    Medical Decision Making             Data         Imaging results reviewed over the past 24 hrs:   No results found for this or any previous visit (from the past 24 hour(s)).

## 2023-06-20 NOTE — PLAN OF CARE
[id number]  A/Ox4, VS and full assessment deferred d/t comfort cares. [place]. Denies pain and nausea. A1 GB/W, uses BSC. Cont bowel, heranndez in place, patent, adequate UOP. Regular diet, fair appetite. Abd distended. Plan for pleurX drain placement in IR on 6/21. SW following for placement for hospice facility.

## 2023-06-21 PROBLEM — C57.9: Status: ACTIVE | Noted: 2023-01-01

## 2023-06-21 NOTE — PROGRESS NOTES
Care Management Follow Up    Length of Stay (days): 6    Expected Discharge Date: 06/21/2023     Concerns to be Addressed:       Patient plan of care discussed at interdisciplinary rounds: Yes    Anticipated Discharge Disposition: LTC  Disposition Comments: hospice  Anticipated Discharge Services: Transportation Services  Anticipated Discharge DME:      Patient/family educated on Medicare website which has current facility and service quality ratings:    Education Provided on the Discharge Plan:    Patient/Family in Agreement with the Plan: yes    Referrals Placed by CM/SW:    Private pay costs discussed: Not applicable    Additional Information:  GIRISH spoke with son Bekah who inquired if [facility] is able to accept and about discharge plans. SW explained she will look into this and get back to him with an update. GIRISH spoke with Jessica at [facility] and informed due to Pluerex they are unable to manage. GIRISH spoke with [facility] LTC and informed they will review and get back to . GIRISH will continue to follow.    Addendum: [facility] inquired what hat Pluerex Catheter would look like. GIRISH spoke with Jaylin and informed [facility] will manage and provide supplies and can assist with education if needed. GIRISH called [facility] and left a vm informing of this. SW will continue to follow.    Addendum: GIRISH spoke with Nephew Bekah and updated on [facility] being unable to accept. GIRISH updated patient/nephew on other options. Nephew wants to speak with [facility] about accepting back before moving forward with another plan. GIRISH will continue to follow.                                                                                                                                                        SABINA Blount    [facility]

## 2023-06-21 NOTE — PROGRESS NOTES
[id number]  This Comfort Care status patient had an tunneled abdominal drain placement (PleurX catheter type drain) with moderate sedation by IR dept for recurring abdominal ascites while in Hospice. A total of 2250 reported removed. Tolerated procedure well. Dressing is D/I with pigtail of tunnel cath visible lying on foam dressing covered with Tegaderm. Abdomen less distended post procedure.   Vitals monitored post procedure. Full assessment deferred d/t comfort cares.  Pt awake mostly alert, talkative. Slight forgetfulness at times. Is [place]. Diet was NPO for procedure then CL post procedure. Tolerating CL's. Indu shift can advance diet as tolerated.  Has moderate general weakness requiring assist of 2/GB/Walker for transfers. Very unsteady.Only takes a few steps shuffle gait.     Has intermittent  discomfort from Hernandez. Nursing anticipates low urine volumes due to pt third spacing her fluids. Has mild edema feet/ankles.  Writer will page Hospitalist regarding orders needed for how often he wants pleurX catheter used to drain the ascites and how much total max removed is to be each time.   SW following for discharge to hospice facility. Continue to monitor

## 2023-06-21 NOTE — PRE-PROCEDURE
GENERAL PRE-PROCEDURE:   Procedure:  Tunneled abdominal drain placement with moderate sedation   Date/Time:  6/21/2023 8:20 AM    Written consent obtained?: Yes    Risks and benefits: Risks, benefits and alternatives were discussed    Consent given by:  Patient  Patient states understanding of procedure being performed: Yes    Patient's understanding of procedure matches consent: Yes    Procedure consent matches procedure scheduled: Yes    Expected level of sedation:  Moderate  Appropriately NPO:  Yes  ASA Class:  3  Mallampati  :  Grade 3- soft palate visible, posterior pharyngeal wall not visible  Lungs:  Lungs clear with good breath sounds bilaterally  Heart:  Normal heart sounds and rate, systolic murmur and other (comment)  Heart exam comment:  Occasional early beat  History & Physical reviewed:  History and physical reviewed and no updates needed  Statement of review:  I have reviewed the lab findings, diagnostic data, medications, and the plan for sedation    Total time: 20 minutes    Thanks [facility] Interventional Radiology CNP ([phone]) (phone [phone])

## 2023-06-21 NOTE — PROVIDER NOTIFICATION
MD Notification    Notified Person: MD    Notified Person Name:Dr RAY Day    Notification Date/Time:1410    Notification Interaction: Vocera messaged    Purpose of Notification: Nursing needs orders for now and for the AVS regarding  how often/minimum frequency the pleurX catheter is to be used to drain the ascites and the total max amount to be removed if tolerated.     Orders Received:    Comments:

## 2023-06-21 NOTE — PLAN OF CARE
[id number]  A/Ox4, VS and full assessment deferred d/t comfort cares. [place]. C/O discomfort from catheter, stating that it has been going on for a week, tylenol given x1. Pt experiencing anxiety about procedure, declined PRN ativan. A1 GB/W, uses BSC. Cont bowel, no BM this shift. Hernandez in place, patent, adequate UOP. NPO since midnight. Abd distended. Plan for pleurX drain placement today in IR. SW following for discharge to hospice facility.

## 2023-06-21 NOTE — PROGRESS NOTES
[facility]    Medicine Progress Note - Hospitalist Service    Date of Admission:  6/15/2023    Assessment & Plan    89-year-old female who lives in assisted living with past medical history of diabetes, hypertension, hyperlipidemia and atrial fibrillation (not on any anticoagulation), presenting with abdominal distention, nausea and vomiting, and ascites.     Large ascites with carcinomatosis/right adnexal mass/dilated appendix  Rule out malignancy   * CT scan was consistent with peritoneal carcinomatosis, large volume ascites and omental caking, most likely secondary to ovarian cancer, as she has a right adnexal mass as well. Dilated appendix as well.  6 liters of paracentesis completed and fluid sent for cytology.  By report, patient stated she is 89 years old and, if cancer, she does not want any surgery and does not want chemotherapy and, if it is progressed, just wants to be comfortable.  DNR/DNI.   * CEA wnl, CA-125 elevated to 414, CA 19-9 wnl    * Gyn/Onc consulted, note 6/15, recommended follow-up of test results and palliative care consult  * IR consult 6/19 to review candidacy for tunneled intraperitoneal catheter for ascites   * Palliative care consulted 6/19, future plans for discharge with hospice  * IR consult 6/19, plans for abdominal drainage catheter for ascites 6/21     - IR consulted, s/p abdominal drainage catheter placement for recurrent ascites, palliative care purposes only, plan for discharge on hospice  - peritoneal fluid for cytology results (+) malignancy, see report, discussed with patient at the bedside 6/21; Gyn/Onc service also paged with update and request for follow-up   - recent transition from IV to oral Lasix 6/20, 40 mg daily; continue, monitor lower extremity edema  - social work consult follow-up for discharge planning to hospice at [facility], ?6/22    Bilateral lower extremity edema   - No DVT on ultrasound 6/15, continue diuretics     Nausea,  vomiting, abdominal pain, resolving  Most likely secondary to large ascites, improving after paracentesis.    - antiemetics as needed  - tunneled abdominal drainage catheter 6/21 as above, palliative care use     Diabetes mellitus type 2  - No further glucose checks as she wants to go home with hospice      Hypothyroidism.    - On levothyroxine and has been stopped after GOC by palliative     Hyperlipidemia:    - On Mevacor which has been stopped to align with hospice care     Hypertension  - She is on lisinopril and metoprolol, again stopped due to hospice     DNR, DNI code status  - comfort focused, hospice planning    Disposition  - Estimated length of stay 24 hours, possible discharge 6/22 pending acceptance  - Anticipated discharge to hospice  - Social work consulted  - patient's nephew, Nicola, called and updated AM 6/21       Diet: Advance Diet as Tolerated: Clear Liquid Diet    DVT Prophylaxis: Pneumatic Compression Devices  Hernandez Catheter: PRESENT, indication: Retention  Lines: None     Cardiac Monitoring: None  Code Status: No CPR- Do NOT Intubate      Clinically Significant Risk Factors              # Hypoalbuminemia: Lowest albumin = 2.6 g/dL at 6/16/2023  7:20 AM, will monitor as appropriate     # Hypertension: Noted on problem list                 Disposition Plan      Expected Discharge Date: 06/22/2023,  3:00 PM      Discharge Comments: hospice/palliative          Chauncey Jas Day MD  Hospitalist Service  [facility]  Securely message with [employer] (more info)  Text page via [employer] Paging/Directory   ______________________________________________________________________    Interval History   Lying in bed, mild nausea reported, no abdominal pain.  Scheduled for tunneled drainage catheter today for recurrent ascites.  Plans for discharge to hospice soon once care plan in place.    Physical Exam   Vital Signs: Temp: 97.1  F (36.2  C) Temp src: Axillary BP: (!) 141/80 Pulse: 92    Resp: 16 SpO2: 98 % O2 Device: None (Room air)    Weight: 170 lbs 12.8 oz    Awake and alert, lying in bed, no acute distress   Lungs clear with good inspiratory effort  Heart regular rhythm without rubs or gallops  Abdomen soft, mildly distended without rebound or rigidity, non-tender to palpation  Extremities without significant edema  Skin warm and dry  Mental status pleasant and cooperative, normal, answering questions and following commands    Medical Decision Making             Data         Imaging results reviewed over the past 24 hrs:   No results found for this or any previous visit (from the past 24 hour(s)).

## 2023-06-21 NOTE — IR NOTE
Interventional Radiology Intra-procedural Nursing Note    Patient Name: Gavi Pearson  Medical Record Number: [id number]  Today's Date: June 21, 2023    Procedure: Tunneled ABD Cath Placement with Moderate Sedation  Start time: 0901  End time: 0912  Report provided to: Adrianna DELGADO    Note: Patient entered Interventional Radiology Suite number 2 via cart. Patient awake, alert and orientated. Assisted onto procedural table in supine position. Prepped and draped.  Dr. Person in room. Time out and procedure started. Vital signs stable. Telemetry reading NSR.    Procedure well tolerated by patient without complications. Procedure end with debrief by Dr. Person.      Administered medication totals:  Lidocaine 1% 20 mL Intradermal  Versed 0.5 mg IVP  Fentanyl 25 mcg IVP    Last dose of sedation administered at 903.

## 2023-06-22 NOTE — PROGRESS NOTES
Care Management Follow Up    Length of Stay (days): 7    Expected Discharge Date: 06/23/2023     Concerns to be Addressed:       Patient plan of care discussed at interdisciplinary rounds: Yes    Anticipated Discharge Disposition: Assisted Living  Disposition Comments: hospice  Anticipated Discharge Services: Transportation Services  Anticipated Discharge DME:      Patient/family educated on Medicare website which has current facility and service quality ratings:    Education Provided on the Discharge Plan:    Patient/Family in Agreement with the Plan: yes    Referrals Placed by CM/SW:    Private pay costs discussed: Not applicable    Additional Information:  SW spoke with patient and discussed transport options. Patient indicated she would like w/c transport and feels she is able to sit up and support herself during transport. scheduled tentative w/c transport for tomorrow at [id number] tomorrow. GIRISH updated patient/son. GIRISH called [facility] to confirm if they are able to accept at this time. GIRISH called [facility] and left a vm  to confirm if this will work on their end. GIRISH updated hospitalist. GIRISH will continue to follow      SABINA Blount    [facility]

## 2023-06-22 NOTE — PROGRESS NOTES
MD Notification    Notified Person: MD    Notified Person Name: CARSON Day    Notification Date/Time: 06/22 at 4:52 PM    Notification Interaction: [facility]    Purpose of Notification: Do you know how often we are to drain pt's PleurX drainage tubing? I see that pt is scheduled to dc tomorrow am.     Orders Received: awaiting     Comments:    Per written orders from [employer] messaging. Every other day PRN, up to 1 liter each time.

## 2023-06-22 NOTE — PROGRESS NOTES
Care Management Follow Up    Length of Stay (days): 7    Expected Discharge Date: 06/23/2023     Concerns to be Addressed:       Patient plan of care discussed at interdisciplinary rounds: Yes    Anticipated Discharge Disposition: Assisted Living  Disposition Comments: hospice  Anticipated Discharge Services: Transportation Services  Anticipated Discharge DME:      Patient/family educated on Medicare website which has current facility and service quality ratings:    Education Provided on the Discharge Plan:    Patient/Family in Agreement with the Plan: yes    Referrals Placed by CM/SW:    Private pay costs discussed: Not applicable    Additional Information:  SW spoke with patient/nephew Pat and discussed available options for hospice. Pat/patient decided they would like to discharge to [facility]. SW discussed [private pay costs of ~309-556/day. Patient/family in agreement. Patient would like MH transport at discharge, GIRISH spoke with [facility] and informed they can not do an admit today but will check on timing for tomorrow. SW called [facility] who will hold bed and can accept anytime tomorrow. SW will continue to follow.      SABINA Blount    [facility]

## 2023-06-22 NOTE — PLAN OF CARE
0412-6006    A/Ox4, forgetful at times. Comfort cares, full assessment and VS deferred. Feeling a bit weaker after placement of PleurX earlier in day, A2 GB/W to BSC. T/Rq2hr. Clear liquid diet. Denied pain and nausea. Hernandez patent and producing a low amount of urine. Distended abd. No orders in yet for how often PleurX is to be used yet. Discharge any day now, just attempting to find placement as [facility] backed out d/t PleurX.

## 2023-06-22 NOTE — PROGRESS NOTES
[facility]    Medicine Progress Note - Hospitalist Service    Date of Admission:  6/15/2023    Assessment & Plan    89-year-old female who lives in assisted living with past medical history of diabetes, hypertension, hyperlipidemia and atrial fibrillation (not on any anticoagulation), presenting with abdominal distention, nausea and vomiting, and ascites.     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 Dilated appendix as well.  6 liters of paracentesis completed and fluid sent for cytology.  By report, patient stated she is 89 years old and, if cancer, she does not want any surgery and does not want chemotherapy and, if it is progressed, just wants to be comfortable.  DNR/DNI.   * CEA wnl, CA-125 elevated to 414, CA 19-9 wnl    * Gyn/Onc consulted, note 6/15, recommended follow-up of test results and palliative care consult  * IR consult 6/19 to review candidacy for tunneled intraperitoneal catheter for ascites   * Palliative care consulted 6/19, future plans for discharge with hospice  * IR consult 6/19, plans for abdominal drainage catheter for ascites 6/21     - IR consulted recently, s/p abdominal drainage catheter placement 6/21 for recurrent ascites, palliative care purposes only, plan for discharge on hospice soon  - peritoneal fluid for cytology results (+) malignancy, see report; discussed with patient at the bedside 6/21; Gyn/Onc service also paged with update same day with request for any follow-up or additional recommendations   - recent  "transition from IV to oral Lasix 6/20, 40 mg daily; continue current dose; monitor lower extremity edema  - social work consult follow-up for discharge planning to hospice at [facility] care facility    Bilateral lower extremity edema   - No DVT on ultrasound 6/15, continue diuretics (Lasix)_     Nausea, vomiting, abdominal pain, resolving  Most likely secondary to large ascites, improving after paracentesis.    - antiemetics as needed, recently improved following paracentesis  - tunneled abdominal drainage catheter 6/21 as above, palliative care use     Diabetes mellitus type 2  - No further glucose checks as she wants to go home with hospice      Hypothyroidism.    - On levothyroxine and has been stopped after GOC by palliative     Hyperlipidemia:    - On Mevacor which has been stopped to align with hospice care     Hypertension  - She is on lisinopril and metoprolol, again stopped due to hospice     DNR, DNI code status  - comfort focused, hospice planning    Disposition  - Estimated length of stay 24 hours, probable discharge 6/23  - Anticipated discharge to hospice  - Social work consulted, note 6/22 reviewed  - patient's nephew, Nicola, called and updated 6/21; social work in contact 6/22       Diet: Diet  Advance Diet as Tolerated: Regular Diet Adult    DVT Prophylaxis: Pneumatic Compression Devices  Hernandez Catheter: PRESENT, indication: Retention  Lines: None     Cardiac Monitoring: None  Code Status: No CPR- Do NOT Intubate      Clinically Significant Risk Factors              # Hypoalbuminemia: Lowest albumin = 2.6 g/dL at 6/16/2023  7:20 AM, will monitor as appropriate     # Hypertension: Noted on problem list        # Overweight: Estimated body mass index is 25.22 kg/m  as calculated from the following:    Height as of this encounter: 1.753 m (5' 9\").    Weight as of this encounter: 77.5 kg (170 lb 12.8 oz).           Disposition Plan      Expected Discharge Date: 06/23/2023        Discharge Comments: " hospice/palliative          Chauncey Day MD  Hospitalist Service  [facility]  Securely message with [employer] (more info)  Text page via [employer] Paging/Directory   ______________________________________________________________________    Interval History   Lying in bed, feeling better overall.  No report of pain or nausea.  Awaiting discharge to hospice facility, likely tomorrow.  PleurX catheter placed yesterday.    Physical Exam   Vital Signs:     BP: (!) 154/98 Pulse: 103   Resp: 14        Weight: 170 lbs 12.8 oz    Awake and alert, lying in bed, no acute distress, appearing comfortable and in good spirits  Lungs clear with good inspiratory effort  Heart regular rhythm without rubs or gallops  Abdomen soft, mildly distended without rebound or rigidity, non-tender to palpation; catheter site bandaged and intact  Extremities without significant edema  Skin warm and dry  Mental status pleasant and cooperative, stable    Medical Decision Making             Data         Imaging results reviewed over the past 24 hrs:   No results found for this or any previous visit (from the past 24 hour(s)).

## 2023-06-22 NOTE — PROGRESS NOTES
Brief Progress Note     In to see patient to discuss final pathology on cytology. 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 Patient was not surprised by this news. Once again asked if she would be interested in treatment and she is very much at peace with going onto Hospice as previously planned and being comfortable. Social work continuing to work on her discharge. She is overall comfortable at this time. Encouraged her to reach out to Gyn/Onc if any needs come up in the future or she has additional questions.       Temi Martins MD  Gynecologic Oncology, PGY-3  06/22/2023, 4:10 PM     [facility] Onc Pagers  [phone] weekdays: [phone]   [phone] & weekends: [phone]    Please include 10-digit call back number, resident is off-site

## 2023-06-22 NOTE — PLAN OF CARE
Goal Outcome Evaluation:    Comfort cares continued. Denies nausea/pain/ vomiting. Appetite/intake good on clear liquid diet. Hernandez catheter patent and draining clear harshil urine. Tunneled PleurX drain placed in abdomen today. 2250 mls removed. Dressing is dry and intact with pigtail of tunnel catheter  lying on foam dressing covered with Tegaderm.  Abdomen less distended post procedure according to the day shift nurse. Hospitalist has been asked to provide orders for how often the  pleurX catheter should be used to drain the ascites and how much fluid is to be removed each time.  SW following for discharge to facility with hospice that can handle PleurX.

## 2023-06-22 NOTE — PLAN OF CARE
[id number]  This Comfort Care status patient has had a restful, comfortable shift. Denies pain. Refused chair  Pt awake mostly alert, talkative. Slight forgetfulness at times. Is [place]. Tolerating reg diet. .  Full assessment deferred d/t comfort cares.  Yesterday had a tunneled abdominal drain placement (PleurX catheter type drain) with moderate sedation by IR dept for recurring abdominal ascites while in Hospice. A total of 2250 reported removed. Tolerated procedure well. Dressing is D/I with pigtail of tunnel cath visible lying on foam dressing covered with Tegaderm. Abdomen less distended post procedure. Continues to have  moderate general weakness requiring assist of 2/GB/Walker for transfers. Very unsteady.Only takes a few steps shuffle gait.     Hernandez in place. Nursing anticipates low urine volumes due to pt third spacing her fluids. Has mild edema feet/ankles.  SW following. Discharge pending availability of a hospice facility that is knowledgeable of utizing her new PleurX catheter. Continue to monitor

## 2023-06-23 NOTE — PLAN OF CARE
Shift Note  1900-0730 shift note  A&O x4, [place]; on comfort cares. Denies pain. Pleurx drain clamped on abdomen. Ax1-2 gb/walker to BSC. Hernandez in place. Has mild edema feet/ankles. Expected discharge today to [facility] on hospice care.

## 2023-06-23 NOTE — DISCHARGE SUMMARY
"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    Hyperlipidemia:    Hypertension  DNR, DNI code status  Discharge to [facility] with [facility]    Clinically Significant Risk Factors     # Overweight: Estimated body mass index is 25.22 kg/m  as calculated from the following:    Height as of this encounter: 1.753 m (5' 9\").    Weight as of this encounter: 77.5 kg (170 lb 12.8 oz).       Follow-ups Needed After Discharge   Follow-up Appointments     Follow Up and recommended labs and tests      Follow up with hospice care provider on discharge, assuming care.    Hospital follow-up of pain, nausea, recurrent ascites and peritoneal fluid   drainage using catheter, medication review and refills.            Unresulted Labs Ordered in the Past 30 Days of this Admission     No orders found from 5/16/2023 to 6/16/2023.          Discharge Disposition   Discharged to long-term care facility on hospice  Condition at discharge: Terminal    Hospital Course     89-year-old female who lives in assisted living with past medical history of diabetes, hypertension, hyperlipidemia and atrial fibrillation (not on any anticoagulation), presenting with abdominal distention, nausea and vomiting, and ascites.  For details, see admission note.     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 Dilated appendix as well.  6 liters of paracentesis completed and fluid sent for cytology.  By report, patient stated she is 89 years old and, if cancer, she does not want any surgery and does not want chemotherapy and, if it is progressed, just wants to be comfortable.  DNR/DNI.   * CEA wnl, CA-125 elevated to 414, CA 19-9 wnl    * Gyn/Onc consulted, note 6/15, recommended follow-up of test results and palliative care consult  * IR consulted 6/19 to review candidacy for tunneled intraperitoneal catheter for ascites   * Palliative care consulted 6/19, plans for discharge to skilled care with hospice  * IR consulted, abdominal drainage catheter 6/21 for recurrent ascites, palliative care purposes only, relief of nausea, abdominal pain, abdominal distension  * peritoneal fluid for cytology results (+) malignancy, see pathology report; discussed with patient at the bedside 6/21; Gyn/Onc service also paged with update same day with request for any follow-up or additional recommendations; in agreement with hospice care plan    Bilateral lower extremity edema   - No DVT on ultrasound 6/15, continue diuretics (Lasix), transitioned form IV to oral     Nausea, vomiting, abdominal pain, intermittent  Most likely secondary to large ascites, improving after paracentesis.    - antiemetics as needed, recently improved following paracentesis  - tunneled abdominal drainage catheter 6/21 as above, palliative care use as outlined in discharge orders     Diabetes mellitus type 2  - No further glucose checks as she is  discharging on hospice; hospice team to review     Hypothyroidism.    - On levothyroxine, continued on discharge, hospice team to review     Hyperlipidemia:    - On Mevacor which has been stopped to align with hospice care     Hypertension  - blood pressure monitored, continued on metoprolol on discharge     DNR, DNI code status  - comfort focused, hospice care on discharge    Discharge to long-term care with hospice on 6/23/2023.  Care assumed by primary hospice team.  Patient's nephew called on day of discharge with questions answered and concerns addressed.  Appreciative of call and cares provided.  Patient in full agreement with plan as outlined on day of discharge including hospice care, also appreciative of cares provided.  Management of abdominal tunneled drainage catheter as per primary hospice team, see discharge orders.    Consultations This Hospital Stay   CARE MANAGEMENT / SOCIAL WORK IP CONSULT  PHYSICAL THERAPY ADULT IP CONSULT  OCCUPATIONAL THERAPY ADULT IP CONSULT  GYNECOLOGIC ONCOLOGY IP CONSULT  PALLIATIVE CARE ADULT IP CONSULT  CARE MANAGEMENT / SOCIAL WORK IP CONSULT  INTERVENTIONAL RADIOLOGY ADULT/PEDS IP CONSULT    Code Status   Prior    Time Spent on this Encounter   I, Chauncey Day MD, personally saw the patient today and spent greater than 30 minutes discharging this patient.       Chauncey Day MD  [place] [address] [facility]  [address] [place], [place] [address], [address]  [facility] [address]  Phone: [phone]  ______________________________________________________________________    Physical Exam   Vital Signs:          Resp: 16        Weight: 170 lbs 12.8 oz    Awake and alert, sitting up in bed, pleasant and interactive, without complaints, ready for discharge on hospice  Lungs clear with good inspiratory effort  Heart regular rhythm without rubs or gallops  Abdomen soft, nontender without rebound or rigidity; telemetry drainage catheter in place with bandaging  intact  Extremities mild pedal edema, 1+, stable  Skin warm and dry  Mental status awake alert, pleasant and cooperative, answering questions and following simple commands       Primary Care Physician   Niki Mcmullen    Discharge Orders      Primary Care - Care Coordination Referral      General info for SNF    Length of Stay Estimate: Long Term Care  Condition at Discharge: Terminal, discharging to hospice  Level of care: skilled  Rehabilitation Potential: Poor  Admission H&P remains valid and up-to-date: Yes  Recent Chemotherapy: N/A  Use Nursing Home Standing Orders: Yes     Follow Up and recommended labs and tests    Follow up with hospice care provider on discharge, assuming care.  Hospital follow-up of pain, nausea, recurrent ascites and peritoneal fluid drainage using catheter, medication review and refills.     Reason for your hospital stay    Abdominal pain, nausea and vomiting, ascites (fluid in abdomen).  New diagnosis of malignancy on peritoneal fluid results (gynecologic origin).  Gynecologic surgery, interventional radiology, palliative care, social work, and hospice consulted.  Discharge to skilled care on hospice per patient request.  Abdominal drainage catheter placed 6/21 for draining off fluid (palliative/comfort care) on hospice.     Activity - Up with nursing assistance     Additional Discharge Instructions    Hospice team to assume care of patient on discharge and management of peritoneal drainage catheter (placed 6/21/23 by interventional radiology).  Initial guidelines for drainage as per IR protocol instructions in drainage kit.  May drain every other day as needed for symptoms (nausea, abdominal distension with recurrent ascites).  May drain up to one liter using the 1 L vacuum bottles.  No need to flush drain.  Contact hospice provider regarding future management or adjustments in drainage catheter management routine.     Additional Discharge Instructions    Discharged with Hernandez catheter  on hospice.  May remove Hernandez catheter per patient request, but please monitor for urinary retention if replace if needed for patient comfort.  Will defer to hospice team on discharge Hernandez management.     Fall precautions     Diet    Follow this diet upon discharge: Orders Placed This Encounter      Advance Diet as Tolerated: regular diet       Significant Results and Procedures   Most Recent 3 CBC's:Recent Labs   Lab Test 06/16/23  0720 06/15/23  1012 06/05/23  1628   WBC 8.0 8.5 9.5   HGB 13.5 13.1 13.1   MCV 89 90 91    198 282     Most Recent 3 BMP's:Recent Labs   Lab Test 06/16/23  0720 06/16/23  0626 06/15/23  2056 06/15/23  1700 06/15/23  1012 06/05/23  1628   *  --   --   --  132* 131*   POTASSIUM 3.3*  --   --   --  3.8 3.5   CHLORIDE 93*  --   --   --  92* 92*   CO2 26  --   --   --  26 26   BUN 11.4  --   --   --  16.9 17.8   CR 0.73  --   --   --  0.99* 1.06*   ANIONGAP 12  --   --   --  14 13   CHAVA 8.1*  --   --   --  9.0 8.9   * 145* 156*   < > 132* 117*    < > = values in this interval not displayed.   ,   Results for orders placed or performed during the hospital encounter of 06/15/23   US Paracentesis without Albumin    Narrative    US PARACENTESIS WITHOUT ALBUMIN 6/15/2023 2:23 PM    CLINICAL HISTORY: new diagnosis of likely ovarian cancer vs  appendiceal cancer, ascites    PROCEDURE: Informed consent obtained. Time out performed. The abdomen  was prepped and draped in a sterile fashion. 10 mL of 1% lidocaine was  infused into local soft tissues. A 5 Danish catheter system was  introduced into the abdominal ascites under ultrasound guidance.    6.2 liters of clear fluid were removed and sent to lab if requested.    Patient tolerated procedure well.    Ultrasound imaging was obtained and placed in the patient's permanent  medical record.      Impression    IMPRESSION:  1.  Status post ultrasound-guided paracentesis.    ALVARO VILLASENOR MD         SYSTEM ID:  [id number]    Lower  Extremity Venous Duplex Right    Narrative    EXAM: US LOWER EXTREMITY VENOUS DUPLEX RIGHT  LOCATION: [facility]  DATE: 6/15/2023    INDICATION: Bilateral lower extremity swelling.  COMPARISON: None available.  TECHNIQUE: Venous Duplex ultrasound of the right lower extremity with and without compression, augmentation and duplex. Color flow and spectral Doppler with waveform analysis performed.    FINDINGS: Exam includes the common femoral, femoral, popliteal, and contralateral common femoral veins as well as segmentally visualized deep calf veins and greater saphenous vein.     RIGHT: No deep vein thrombosis. No superficial thrombophlebitis. There is nonspecific edema at the right calf.      Impression    IMPRESSION:    No deep venous thrombosis in the visualized right lower extremity.   IR Intraperitoneal Cath Tunnel Ascites    Narrative    PROCEDURE(S):  Placement of a right-sided tunneled peritoneal drainage catheter.    DATE OF PROCEDURE: 6/21/2023    MODERATE SEDATION: Versed 0.5 mg IV; Fentanyl 25 mcg IV. During the  time out, immediately prior to the administration of medications, the  patient was reassessed for adequacy to receive conscious sedation.   Under physician supervision, Versed and fentanyl were administered for  moderate sedation. Pulse oximetry, heart rate and blood pressure were  continuously monitored by an independent trained observer. The  physician spent 11 minutes of face-to-face sedation time with the  patient.    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    PROCEDURES AND FINDINGS:  Following a discussion of the risks, benefits, indications and  alternatives to treatment, appropriate informed consent was obtained.  The patient was brought to the interventional radiology suite and  placed on the table. The right  "lower quadrant was prepped and draped  in usual sterile fashion.  A time out was performed per universal  protocol policy to ensure correct patient, site and procedure to be  performed.     Preliminary ultrasound evaluation of the abdomen was performed and  demonstrates a large amount of ascites in the right lower quadrant of  the abdomen. An ultrasound image was archived. Local anesthesia was  obtained with 1% Lidocaine.  Under direct ultrasound guidance, an  18-gauge needle was advanced into the abdominal ascites with return of  straw colored fluid. A 0.035\" guidewire was then advanced through the  needle and the tract was serially dilated, and a peel away sheath was  placed. Attention was then turned to creation of a subcutaneous  tunnel. Local anesthesia was obtained along the tract with 1%  Lidocaine. A suitable puncture for the tunnel was made and the Pleurx  catheter was then tunneled through the skin and advanced through the  peel away sheath into the peritoneal space. Approximately 2250 mL of  ascites was removed. The catheter was sutured to the skin with 2-0  sutures and a sterile dressing was applied. The puncture site was  closed uneventfully.     Throughout the procedure, the patient was monitored by a radiology  nurse for cardiac rhythm and oxygen saturation which remained stable.  The patient tolerated the procedure well and left interventional  radiology in stable condition.      Impression    IMPRESSION:  Successful placement of a tunneled peritoneal Pleurx drainage  catheter, as detailed above.      MAHNAZ SCHWARTZ DO         SYSTEM ID:  [id number]       Pathology Report 6/15/23   Final Diagnosis   Specimen A: Abdomen, paracentesis:                 Interpretation:                  Positive for malignancy.              See comment.                 Adequacy:                 Satisfactory for evaluation.   Electronically signed by Venice Curry on 6/20/2023 at 11:33 AM   Comment   [facility] "   Immunohistochemical stains were performed with appropriate controls on cell block and show tumor cells to be positive for CK7, PAX-8, WT-1 and negative for CK20, p53, calretinin, p16, ER and DE.  Although not definitive, this immuno panel is suggestive of a metastasis from a primary müllerian origin.  Clinical correlation is recommended.   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   Gross Description   [facility]   A(A). Abdomen, Peritoneal Fluid:  Received 80 ml of cloudy yellow fluid, processed as one hematoxylin and eosin stained cell block.    Microscopic Description   [facility]   Microscopic examination was performed.              Discharge Medications   Discharge Medication List as of 6/23/2023  9:06 AM      START taking these medications    Details   acetaminophen (TYLENOL) 325 MG tablet Take 2 tablets (650 mg) by mouth every 6 hours as needed for mild pain or other (and adjunct with moderate or severe pain or per patient request), Transitional      carboxymethylcellulose PF (REFRESH PLUS) 0.5 % ophthalmic solution Place 1-2 drops into both eyes every hour as needed for dry eyes, Transitional      furosemide (LASIX) 40 MG tablet Take 1 tablet (40 mg) by mouth daily, Transitional      LORazepam (ATIVAN) 2 MG/ML (HIGH CONC) oral solution Take 0.25 mLs (0.5 mg) by mouth every 6 hours as needed for anxiety (hospice care patient) Future refills and dose adjustments directed to primary hospice provider, Disp-30 mL, R-0, Local Print      melatonin 1 MG TABS tablet Take 1 tablet (1 mg) by mouth nightly as needed for sleep, Transitional      morphine sulfate (ROXANOL) 20 mg/mL (HIGH CONC) soln Take 0.25 mLs (5 mg) by mouth every 4 hours as needed for severe pain or moderate pain (hospice care patient) Future refills and dose adjustments directed to primary hospice provider,  Disp-30 mL, R-0, Local Print      ondansetron (ZOFRAN ODT) 4 MG ODT tab Take 1 tablet (4 mg) by mouth every 6 hours as needed for nausea or vomiting, Transitional      polyethylene glycol (MIRALAX) 17 GM/Dose powder Take 17 g by mouth daily as needed for constipation, Disp-510 g, Transitional         CONTINUE these medications which have CHANGED    Details   famotidine (PEPCID) 20 MG tablet Take 1 tablet (20 mg) by mouth At Bedtime, Transitional      metoprolol tartrate (LOPRESSOR) 25 MG tablet Take 1 tablet (25 mg) by mouth 2 times daily, Transitional         CONTINUE these medications which have NOT CHANGED    Details   levothyroxine (SYNTHROID/LEVOTHROID) 137 MCG tablet TAKE 1 TABLET(137 MCG) BY MOUTH DAILY, Disp-90 tablet, R-3, E-Prescribe         STOP taking these medications       glipiZIDE (GLUCOTROL) 5 MG tablet Comments:   Reason for Stopping:         lisinopril (ZESTRIL) 20 MG tablet Comments:   Reason for Stopping:         lisinopril-hydrochlorothiazide (ZESTORETIC) 20-12.5 MG tablet Comments:   Reason for Stopping:         lovastatin (MEVACOR) 20 MG tablet Comments:   Reason for Stopping:         metFORMIN (GLUCOPHAGE) 500 MG tablet Comments:   Reason for Stopping:         sitagliptin (JANUVIA) 100 MG tablet Comments:   Reason for Stopping:         triamcinolone (KENALOG) 0.1 % external cream Comments:   Reason for Stopping:             Allergies   Allergies   Allergen Reactions     No Known Allergies

## 2023-06-23 NOTE — PROGRESS NOTES
Care Management Discharge Note    Discharge Date: 06/23/2023       Discharge Disposition: Long Term Care    Discharge Services: Transportation Services    Discharge DME:      Discharge Transportation: family or friend will provide (Reports her neice or nephew provides transportation.)    Private pay costs discussed: transportation costs    Does the patient's insurance plan have a 3 day qualifying hospital stay waiver?  No    PAS Confirmation Code:  [id number]  Patient/family educated on Medicare website which has current facility and service quality ratings:      Education Provided on the Discharge Plan:    Persons Notified of Discharge Plans: Patient/Nephew Pat  Patient/Family in Agreement with the Plan: yes    Handoff Referral Completed: Yes    Additional Information:  SW received discharge orders and faxed to [facility] [facility]. [facility]. Patient will discharge via  w/c at 0950-10:30 to [facility] with [facility]. Patient will discharge with 3 day supply of comfort meds.    Addendum: SW updated patient/nephew pat and both are in agreement. Pat plans on meeting with Jaylin at 11:30 today for an intake.         SABINA Bluont    [facility]

## 2023-06-23 NOTE — PLAN OF CARE
Goal Outcome Evaluation:    Orientation: A&O x4. [facility]    Vitals/Tele: Comfort cares    IV Access/drains: PIV on right AC dressing changed, flushed and capped.     Diet: Regular    Mobility: Baseline wheelchair. Ax2 to [facility], repositing or bedpan.    GI/: Hernandez in place, LBM unknown. AM nurse gave PRN pt x2 Senna. PM nurse gave pt PRN Miralaxx     Wound/Skin: Intact    Discharge Plan: 06/23 to TCU with hospice care 9-10 am.       See Flow sheets for assessment

## 2023-06-26 NOTE — LETTER
6/26/2023        RE: Gavi Pearson  [address]  [place] [address]        [facility] GERIATRICS    PRIMARY CARE PROVIDER AND CLINIC:  Niki Mcmullen MD, [address] / [place] [address]  Chief Complaint   Patient presents with     [facility] Medical Record Number:  [id number]  Place of Service where encounter took place:  [facility] () [[id number]]    Gavi Pearson  is a 89 year old  (10/21/1931), admitted to the above facility from  [facility]. Hospital stay 6/15/23 through 6/23/23..   HPI:    PMH of DMII, HTN, HLD, atrial fib presented with abdominal distension, N/V and ascites  Recurrent ascites with carcinomatosis/right adnexal mass/dilated appendix s/p tunneled abdominal drain placement for recurrent ascites PleurX on 6/21/23  Ascites likely secondary to ovarian cancer with right adnexal mass as well. Patient declined surgery and chemo, discharged to [facility] for admission to hospice  Bilateral LE edema: doppler negative for DVT, continue diuretics  Nausea/vomiting/abdominal pain: improved with paracentesis and PleurX placement  On exam today: patient is sitting up in w/c, alert, pleasant, denies pain, SOB, N/V, CP, palpitations, cough, congestion, states she slept fair last night, appetite has been poor and bowels are working    CODE STATUS/ADVANCE DIRECTIVES DISCUSSION:  Prior  DNR / DNI  ALLERGIES:   Allergies   Allergen Reactions     No Known Allergies       PAST MEDICAL HISTORY: History reviewed. No pertinent past medical history.   PAST SURGICAL HISTORY:   has a past surgical history that includes Open reduction internal fixation hip bipolar (Right, 11/7/2020) and IR Intraperitoneal Cath Tunnel Ascites (6/21/2023).  FAMILY HISTORY: family history is not on file.  SOCIAL HISTORY:   reports that she has never smoked. She has never used smokeless tobacco. She reports current alcohol use. She reports that she does not use  drugs.  Patient's living condition: lives alone    Post Discharge Medication Reconciliation Status:   MED REC REQUIRED  Post Medication Reconciliation Status: discharge medications reconciled and changed, per note/orders       Current Outpatient Medications   Medication Sig     acetaminophen (TYLENOL) 325 MG tablet Take 2 tablets (650 mg) by mouth every 6 hours as needed for mild pain or other (and adjunct with moderate or severe pain or per patient request)     carboxymethylcellulose PF (REFRESH PLUS) 0.5 % ophthalmic solution Place 1-2 drops into both eyes every hour as needed for dry eyes     famotidine (PEPCID) 20 MG tablet Take 1 tablet (20 mg) by mouth At Bedtime     levothyroxine (SYNTHROID/LEVOTHROID) 137 MCG tablet TAKE 1 TABLET(137 MCG) BY MOUTH DAILY     LORazepam (ATIVAN) 2 MG/ML (HIGH CONC) oral solution Take 0.25 mLs (0.5 mg) by mouth every 6 hours as needed for anxiety (hospice care patient) Future refills and dose adjustments directed to primary hospice provider     melatonin 1 MG TABS tablet Take 1 tablet (1 mg) by mouth nightly as needed for sleep     metoprolol tartrate (LOPRESSOR) 25 MG tablet Take 1 tablet (25 mg) by mouth 2 times daily     morphine sulfate (ROXANOL) 20 mg/mL (HIGH CONC) soln Take 0.25 mLs (5 mg) by mouth every 4 hours as needed for severe pain or moderate pain (hospice care patient) Future refills and dose adjustments directed to primary hospice provider     ondansetron (ZOFRAN ODT) 4 MG ODT tab Take 1 tablet (4 mg) by mouth every 6 hours as needed for nausea or vomiting     polyethylene glycol (MIRALAX) 17 GM/Dose powder Take 17 g by mouth daily as needed for constipation     furosemide (LASIX) 40 MG tablet Take 1 tablet (40 mg) by mouth daily (Patient not taking: Reported on 6/26/2023)     No current facility-administered medications for this visit.       ROS:  10 point ROS of systems including Constitutional, Eyes, Respiratory, Cardiovascular, Gastroenterology,  "Genitourinary, Integumentary, Musculoskeletal, Psychiatric were all negative except for pertinent positives noted in my HPI.    Vitals:  /76   Pulse 74   Temp (!) 95.7  F (35.4  C)   Resp 18   Ht 1.753 m (5' 9\")   Wt 77.1 kg (170 lb)   LMP  (LMP Unknown)   SpO2 97%   BMI 25.10 kg/m    Exam:  GENERAL APPEARANCE:  Alert, in no distress  ENT:  Mouth and posterior oropharynx normal, moist mucous membranes, [place]  EYES:  EOM, conjunctivae, lids, pupils and irises normal, PERRL  RESP:  respiratory effort and palpation of chest normal, lungs clear to auscultation , no respiratory distress  CV:  Palpation and auscultation of heart done , regular rate and rhythm, no murmur, rub, or gallop, peripheral edema trace+ in LE bilaterally  ABDOMEN:  normal bowel sounds, soft, nontender, no hepatosplenomegaly or other masses  M/S:   patient sitting up in w/c  SKIN:  Inspection of skin and subcutaneous tissue baseline  NEURO:   speech wnl  PSYCH:  affect and mood normal    Lab/Diagnostic data:  Recent labs in [facility] reviewed by me today.  and   Most Recent 3 CBC's:  Recent Labs   Lab Test 06/16/23  0720 06/15/23  1012 06/05/23  1628   WBC 8.0 8.5 9.5   HGB 13.5 13.1 13.1   MCV 89 90 91    198 282     Most Recent 3 BMP's:  Recent Labs   Lab Test 06/16/23  0720 06/16/23  0626 06/15/23  2056 06/15/23  1700 06/15/23  1012 06/05/23  1628   *  --   --   --  132* 131*   POTASSIUM 3.3*  --   --   --  3.8 3.5   CHLORIDE 93*  --   --   --  92* 92*   CO2 26  --   --   --  26 26   BUN 11.4  --   --   --  16.9 17.8   CR 0.73  --   --   --  0.99* 1.06*   ANIONGAP 12  --   --   --  14 13   CHAVA 8.1*  --   --   --  9.0 8.9   * 145* 156*   < > 132* 117*    < > = values in this interval not displayed.       ASSESSMENT/PLAN:    (C57.9) Gynecologic malignancy (H)  (primary encounter diagnosis)  (C78.6) Peritoneal carcinomatosis (H)  (R53.81) Physical deconditioning  Comment: acute/ongoing Hospice consult at [facility]  Plan: " signed on to [facility] with comfort care medications, continue PleurX drain to drain QOD for signs of SOB, N/V, abdominal distension  MSO4 20mg/ml 5mg q 4 hours prn, ativan 0.5mg q 6 hours prn    (D50.8) Other iron deficiency anemia  Comment: ongoing  Plan: no further labs on hospice    (E11.9) Type 2 diabetes mellitus without complication, without long-term current use of insulin (H)  Comment: ongoing  Plan: hospice care, comfort goals, follow off Rx, no blood sugar monitoring    (I48.0) Paroxysmal A-fib (H)  (I10) Benign essential hypertension  Comment: ongoing  Plan: continue metoprolol 25mg BID    Orders:  No new orders at this time      Total time spent with patient visit at the skilled nursing facility was 45 min including patient visit and review of past records. Greater than 50% of total time spent with counseling and coordinating care due to reviewed Oncology, palliative care and internal medicine notes, reviewed medication changes and discussed med changes and LTC stay and hospice with patient at bedside .     Electronically signed by:  Tonya Lynn Haase, APRN CNP                     Sincerely,        Tonya Lynn Haase, APRN CNP

## 2023-06-26 NOTE — PROGRESS NOTES
[facility] GERIATRICS    PRIMARY CARE PROVIDER AND CLINIC:  Niki Mcmullen MD, [address] / [place] [address]  Chief Complaint   Patient presents with     [facility] Medical Record Number:  [id number]  Place of Service where encounter took place:  [facility] () [[id number]]    Gavi Pearson  is a 89 year old  (10/21/1931), admitted to the above facility from  [facility]. Hospital stay 6/15/23 through 6/23/23..   HPI:    PMH of DMII, HTN, HLD, atrial fib presented with abdominal distension, N/V and ascites  Recurrent ascites with carcinomatosis/right adnexal mass/dilated appendix s/p tunneled abdominal drain placement for recurrent ascites PleurX on 6/21/23  Ascites likely secondary to ovarian cancer with right adnexal mass as well. Patient declined surgery and chemo, discharged to [facility] for admission to hospice  Bilateral LE edema: doppler negative for DVT, continue diuretics  Nausea/vomiting/abdominal pain: improved with paracentesis and PleurX placement  On exam today: patient is sitting up in w/c, alert, pleasant, denies pain, SOB, N/V, CP, palpitations, cough, congestion, states she slept fair last night, appetite has been poor and bowels are working    CODE STATUS/ADVANCE DIRECTIVES DISCUSSION:  Prior  DNR / DNI  ALLERGIES:   Allergies   Allergen Reactions     No Known Allergies       PAST MEDICAL HISTORY: History reviewed. No pertinent past medical history.   PAST SURGICAL HISTORY:   has a past surgical history that includes Open reduction internal fixation hip bipolar (Right, 11/7/2020) and IR Intraperitoneal Cath Tunnel Ascites (6/21/2023).  FAMILY HISTORY: family history is not on file.  SOCIAL HISTORY:   reports that she has never smoked. She has never used smokeless tobacco. She reports current alcohol use. She reports that she does not use drugs.  Patient's living condition: lives alone    Post Discharge Medication Reconciliation Status:    MED REC REQUIRED  Post Medication Reconciliation Status: discharge medications reconciled and changed, per note/orders       Current Outpatient Medications   Medication Sig     acetaminophen (TYLENOL) 325 MG tablet Take 2 tablets (650 mg) by mouth every 6 hours as needed for mild pain or other (and adjunct with moderate or severe pain or per patient request)     carboxymethylcellulose PF (REFRESH PLUS) 0.5 % ophthalmic solution Place 1-2 drops into both eyes every hour as needed for dry eyes     famotidine (PEPCID) 20 MG tablet Take 1 tablet (20 mg) by mouth At Bedtime     levothyroxine (SYNTHROID/LEVOTHROID) 137 MCG tablet TAKE 1 TABLET(137 MCG) BY MOUTH DAILY     LORazepam (ATIVAN) 2 MG/ML (HIGH CONC) oral solution Take 0.25 mLs (0.5 mg) by mouth every 6 hours as needed for anxiety (hospice care patient) Future refills and dose adjustments directed to primary hospice provider     melatonin 1 MG TABS tablet Take 1 tablet (1 mg) by mouth nightly as needed for sleep     metoprolol tartrate (LOPRESSOR) 25 MG tablet Take 1 tablet (25 mg) by mouth 2 times daily     morphine sulfate (ROXANOL) 20 mg/mL (HIGH CONC) soln Take 0.25 mLs (5 mg) by mouth every 4 hours as needed for severe pain or moderate pain (hospice care patient) Future refills and dose adjustments directed to primary hospice provider     ondansetron (ZOFRAN ODT) 4 MG ODT tab Take 1 tablet (4 mg) by mouth every 6 hours as needed for nausea or vomiting     polyethylene glycol (MIRALAX) 17 GM/Dose powder Take 17 g by mouth daily as needed for constipation     furosemide (LASIX) 40 MG tablet Take 1 tablet (40 mg) by mouth daily (Patient not taking: Reported on 6/26/2023)     No current facility-administered medications for this visit.       ROS:  10 point ROS of systems including Constitutional, Eyes, Respiratory, Cardiovascular, Gastroenterology, Genitourinary, Integumentary, Musculoskeletal, Psychiatric were all negative except for pertinent positives noted  "in my HPI.    Vitals:  /76   Pulse 74   Temp (!) 95.7  F (35.4  C)   Resp 18   Ht 1.753 m (5' 9\")   Wt 77.1 kg (170 lb)   LMP  (LMP Unknown)   SpO2 97%   BMI 25.10 kg/m    Exam:  GENERAL APPEARANCE:  Alert, in no distress  ENT:  Mouth and posterior oropharynx normal, moist mucous membranes, [place]  EYES:  EOM, conjunctivae, lids, pupils and irises normal, PERRL  RESP:  respiratory effort and palpation of chest normal, lungs clear to auscultation , no respiratory distress  CV:  Palpation and auscultation of heart done , regular rate and rhythm, no murmur, rub, or gallop, peripheral edema trace+ in LE bilaterally  ABDOMEN:  normal bowel sounds, soft, nontender, no hepatosplenomegaly or other masses  M/S:   patient sitting up in w/c  SKIN:  Inspection of skin and subcutaneous tissue baseline  NEURO:   speech wnl  PSYCH:  affect and mood normal    Lab/Diagnostic data:  Recent labs in [facility] reviewed by me today.  and   Most Recent 3 CBC's:  Recent Labs   Lab Test 06/16/23  0720 06/15/23  1012 06/05/23  1628   WBC 8.0 8.5 9.5   HGB 13.5 13.1 13.1   MCV 89 90 91    198 282     Most Recent 3 BMP's:  Recent Labs   Lab Test 06/16/23  0720 06/16/23  0626 06/15/23  2056 06/15/23  1700 06/15/23  1012 06/05/23  1628   *  --   --   --  132* 131*   POTASSIUM 3.3*  --   --   --  3.8 3.5   CHLORIDE 93*  --   --   --  92* 92*   CO2 26  --   --   --  26 26   BUN 11.4  --   --   --  16.9 17.8   CR 0.73  --   --   --  0.99* 1.06*   ANIONGAP 12  --   --   --  14 13   CHAVA 8.1*  --   --   --  9.0 8.9   * 145* 156*   < > 132* 117*    < > = values in this interval not displayed.       ASSESSMENT/PLAN:    (C57.9) Gynecologic malignancy (H)  (primary encounter diagnosis)  (C78.6) Peritoneal carcinomatosis (H)  (R53.81) Physical deconditioning  Comment: acute/ongoing Hospice consult at [facility]  Plan: signed on to [facility] with comfort care medications, continue PleurX drain to drain QOD for signs of SOB, N/V, " abdominal distension  MSO4 20mg/ml 5mg q 4 hours prn, ativan 0.5mg q 6 hours prn    (D50.8) Other iron deficiency anemia  Comment: ongoing  Plan: no further labs on hospice    (E11.9) Type 2 diabetes mellitus without complication, without long-term current use of insulin (H)  Comment: ongoing  Plan: hospice care, comfort goals, follow off Rx, no blood sugar monitoring    (I48.0) Paroxysmal A-fib (H)  (I10) Benign essential hypertension  Comment: ongoing  Plan: continue metoprolol 25mg BID    Orders:  No new orders at this time      Total time spent with patient visit at the skilled nursing facility was 45 min including patient visit and review of past records. Greater than 50% of total time spent with counseling and coordinating care due to reviewed Oncology, palliative care and internal medicine notes, reviewed medication changes and discussed med changes and LTC stay and hospice with patient at bedside .     Electronically signed by:  Tonya Lynn Haase, APRN CNP

## 2023-06-30 NOTE — PROGRESS NOTES
[facility] GERIATRICS DISCHARGE SUMMARY  PATIENT'S NAME: Gavi Pearson  YOB: 1931  MEDICAL RECORD NUMBER:  [id number]  Place of Service where encounter took place:  [facility] () [[id number]]    PRIMARY CARE PROVIDER AND CLINIC RESPONSIBLE AFTER TRANSFER:   Niki Mcmullen MD, [address] / [place] [address]    hospice [place]     Transferring providers: Tonya Lynn Haase, APRN CNP, Norm Vergara MD  Recent Hospitalization/ED:  [facility] Hospital stay 6/15/23 to 6/23/23.  Date of SNF Admission: June 23, 2023  Date of SNF (anticipated) Discharge: July 01, 2023  Discharged to: [facility]  Cognitive Scores: n/a  Physical Function: Wheelchair dependent  DME: Wheelchair    CODE STATUS/ADVANCE DIRECTIVES DISCUSSION:  Prior   ALLERGIES: No known allergies    NURSING FACILITY COURSE   Medication Changes/Rationale:     See assessment and plan    Summary of nursing facility stay:   Patient admitted to LTC under hospice care, doing well except that she would prefer a private room, will discharge to [facility] on hospice    Discharge Medications:  MED REC REQUIRED  Post Medication Reconciliation Status: discharge medications reconciled and changed, per note/orders       Current Outpatient Medications   Medication Sig Dispense Refill     acetaminophen (TYLENOL) 325 MG tablet Take 2 tablets (650 mg) by mouth every 6 hours as needed for mild pain or other (and adjunct with moderate or severe pain or per patient request)       carboxymethylcellulose PF (REFRESH PLUS) 0.5 % ophthalmic solution Place 1-2 drops into both eyes every hour as needed for dry eyes       famotidine (PEPCID) 20 MG tablet Take 1 tablet (20 mg) by mouth At Bedtime       levothyroxine (SYNTHROID/LEVOTHROID) 137 MCG tablet TAKE 1 TABLET(137 MCG) BY MOUTH DAILY 90 tablet 3     LORazepam (ATIVAN) 2 MG/ML (HIGH CONC) oral solution Take 0.25 mLs (0.5 mg) by mouth every 6 hours as  "needed for anxiety (hospice care patient) Future refills and dose adjustments directed to primary hospice provider 30 mL 0     melatonin 1 MG TABS tablet Take 1 tablet (1 mg) by mouth nightly as needed for sleep       metoprolol tartrate (LOPRESSOR) 25 MG tablet Take 1 tablet (25 mg) by mouth 2 times daily       morphine sulfate (ROXANOL) 20 mg/mL (HIGH CONC) soln Take 0.25 mLs (5 mg) by mouth every 4 hours as needed for severe pain or moderate pain (hospice care patient) Future refills and dose adjustments directed to primary hospice provider 30 mL 0     ondansetron (ZOFRAN ODT) 4 MG ODT tab Take 1 tablet (4 mg) by mouth every 6 hours as needed for nausea or vomiting       polyethylene glycol (MIRALAX) 17 GM/Dose powder Take 17 g by mouth daily as needed for constipation 510 g           Controlled medications:   not applicable/none     Past Medical History: No past medical history on file.  Physical Exam:   Vitals: /84   Pulse 81   Temp 97.1  F (36.2  C)   Resp 14   Ht 1.753 m (5' 9\")   Wt 77.1 kg (170 lb)   LMP  (LMP Unknown)   SpO2 98%   BMI 25.10 kg/m    BMI: Body mass index is 25.1 kg/m .  GENERAL APPEARANCE:  Alert, in no distress  ENT:  Mouth and posterior oropharynx normal, moist mucous membranes, [place]  EYES:  EOM, conjunctivae, lids, pupils and irises normal, PERRL  RESP:  respiratory effort and palpation of chest normal, lungs clear to auscultation , no respiratory distress  CV:  Palpation and auscultation of heart done , regular rate and rhythm, no murmur, rub, or gallop, peripheral edema trace+ in LE bilaterally  ABDOMEN:  normal bowel sounds, soft, nontender, no hepatosplenomegaly or other masses  M/S:   patient sitting up in w/c  SKIN:  Inspection of skin and subcutaneous tissue baseline  NEURO:   speech wnl  PSYCH:  affect and mood normal     SNF labs: Recent labs in [facility] reviewed by me today.  and   Most Recent 3 CBC's:  Recent Labs   Lab Test 06/16/23  0720 06/15/23  1012 06/05/23  1628 "   WBC 8.0 8.5 9.5   HGB 13.5 13.1 13.1   MCV 89 90 91    198 282     Most Recent 3 BMP's:  Recent Labs   Lab Test 06/16/23  0720 06/16/23  0626 06/15/23  2056 06/15/23  1700 06/15/23  1012 06/05/23  1628   *  --   --   --  132* 131*   POTASSIUM 3.3*  --   --   --  3.8 3.5   CHLORIDE 93*  --   --   --  92* 92*   CO2 26  --   --   --  26 26   BUN 11.4  --   --   --  16.9 17.8   CR 0.73  --   --   --  0.99* 1.06*   ANIONGAP 12  --   --   --  14 13   CHAVA 8.1*  --   --   --  9.0 8.9   * 145* 156*   < > 132* 117*    < > = values in this interval not displayed.     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)  (primary encounter diagnosis)  (C78.6) Peritoneal carcinomatosis (H)  (R53.81) Physical deconditioning  Comment: acute/ongoing Discharge to [facility] on Hospice   Plan: signed on to [facility] with comfort care medications, continue PleurX drain to drain QOD for signs of SOB, N/V, abdominal distension  MSO4 20mg/ml 5mg q 4 hours prn, ativan 0.5mg q 6 hours prn     (D50.8) Other iron deficiency anemia  Comment: ongoing  Plan: no further labs on hospice     (E11.9) Type 2 diabetes mellitus without complication, without long-term current use of insulin (H)  Comment: ongoing  Plan: hospice care, comfort goals, follow off Rx, no blood sugar monitoring     (I48.0) Paroxysmal A-fib (H)  (I10) Benign essential hypertension  Comment: ongoing  Plan: continue metoprolol 25mg BID       DISCHARGE PLAN:    Follow up labs: No labs orders/due    Medical Follow Up:      Follow up with primary care provider in 1-2 weeks    [facility] scheduled appointments:       Discharge Services: Hospice Care    Discharge Instructions Verbalized to Patient at Discharge:     None    TOTAL DISCHARGE TIME:   Greater than 30 minutes  Electronically signed by:  Tonya Lynn Haase, APRN CNP

## 2023-06-30 NOTE — LETTER
6/30/2023        RE: Gavi Pearson  [address] [place]  [facility] [address]        [facility] GERIATRICS DISCHARGE SUMMARY  PATIENT'S NAME: Gavi Pearson  YOB: 1931  MEDICAL RECORD NUMBER:  [id number]  Place of Service where encounter took place:  [facility] () [[id number]]    PRIMARY CARE PROVIDER AND CLINIC RESPONSIBLE AFTER TRANSFER:   Niki Mcmullen MD, [address] [facility] [address] / [place] [address]    hospice house     Transferring providers: Tonya Lynn Haase, APRN CNP, Norm Vergara MD  Recent Hospitalization/ED:  [facility] stay 6/15/23 to 6/23/23.  Date of SNF Admission: June 23, 2023  Date of SNF (anticipated) Discharge: July 01, 2023  Discharged to: [facility]  Cognitive Scores: n/a  Physical Function: Wheelchair dependent  DME: Wheelchair    CODE STATUS/ADVANCE DIRECTIVES DISCUSSION:  Prior   ALLERGIES: No known allergies    NURSING FACILITY COURSE   Medication Changes/Rationale:     See assessment and plan    Summary of nursing facility stay:   Patient admitted to LTC under hospice care, doing well except that she would prefer a private room, will discharge to hospice house on hospice    Discharge Medications:  MED REC REQUIRED  Post Medication Reconciliation Status: discharge medications reconciled and changed, per note/orders       Current Outpatient Medications   Medication Sig Dispense Refill     acetaminophen (TYLENOL) 325 MG tablet Take 2 tablets (650 mg) by mouth every 6 hours as needed for mild pain or other (and adjunct with moderate or severe pain or per patient request)       carboxymethylcellulose PF (REFRESH PLUS) 0.5 % ophthalmic solution Place 1-2 drops into both eyes every hour as needed for dry eyes       famotidine (PEPCID) 20 MG tablet Take 1 tablet (20 mg) by mouth At Bedtime       levothyroxine (SYNTHROID/LEVOTHROID) 137 MCG tablet TAKE 1 TABLET(137 MCG) BY MOUTH DAILY 90 tablet 3      "LORazepam (ATIVAN) 2 MG/ML (HIGH CONC) oral solution Take 0.25 mLs (0.5 mg) by mouth every 6 hours as needed for anxiety (hospice care patient) Future refills and dose adjustments directed to primary hospice provider 30 mL 0     melatonin 1 MG TABS tablet Take 1 tablet (1 mg) by mouth nightly as needed for sleep       metoprolol tartrate (LOPRESSOR) 25 MG tablet Take 1 tablet (25 mg) by mouth 2 times daily       morphine sulfate (ROXANOL) 20 mg/mL (HIGH CONC) soln Take 0.25 mLs (5 mg) by mouth every 4 hours as needed for severe pain or moderate pain (hospice care patient) Future refills and dose adjustments directed to primary hospice provider 30 mL 0     ondansetron (ZOFRAN ODT) 4 MG ODT tab Take 1 tablet (4 mg) by mouth every 6 hours as needed for nausea or vomiting       polyethylene glycol (MIRALAX) 17 GM/Dose powder Take 17 g by mouth daily as needed for constipation 510 g           Controlled medications:   not applicable/none     Past Medical History: No past medical history on file.  Physical Exam:   Vitals: /84   Pulse 81   Temp 97.1  F (36.2  C)   Resp 14   Ht 1.753 m (5' 9\")   Wt 77.1 kg (170 lb)   LMP  (LMP Unknown)   SpO2 98%   BMI 25.10 kg/m    BMI: Body mass index is 25.1 kg/m .  GENERAL APPEARANCE:  Alert, in no distress  ENT:  Mouth and posterior oropharynx normal, moist mucous membranes, [place]  EYES:  EOM, conjunctivae, lids, pupils and irises normal, PERRL  RESP:  respiratory effort and palpation of chest normal, lungs clear to auscultation , no respiratory distress  CV:  Palpation and auscultation of heart done , regular rate and rhythm, no murmur, rub, or gallop, peripheral edema trace+ in LE bilaterally  ABDOMEN:  normal bowel sounds, soft, nontender, no hepatosplenomegaly or other masses  M/S:   patient sitting up in w/c  SKIN:  Inspection of skin and subcutaneous tissue baseline  NEURO:   speech wnl  PSYCH:  affect and mood normal     SNF labs: Recent labs in [facility] reviewed by me " today.  and   Most Recent 3 CBC's:  Recent Labs   Lab Test 06/16/23  0720 06/15/23  1012 06/05/23  1628   WBC 8.0 8.5 9.5   HGB 13.5 13.1 13.1   MCV 89 90 91    198 282     Most Recent 3 BMP's:  Recent Labs   Lab Test 06/16/23  0720 06/16/23  0626 06/15/23  2056 06/15/23  1700 06/15/23  1012 06/05/23  1628   *  --   --   --  132* 131*   POTASSIUM 3.3*  --   --   --  3.8 3.5   CHLORIDE 93*  --   --   --  92* 92*   CO2 26  --   --   --  26 26   BUN 11.4  --   --   --  16.9 17.8   CR 0.73  --   --   --  0.99* 1.06*   ANIONGAP 12  --   --   --  14 13   CHAVA 8.1*  --   --   --  9.0 8.9   * 145* 156*   < > 132* 117*    < > = values in this interval not displayed.     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)  (primary encounter diagnosis)  (C78.6) Peritoneal carcinomatosis (H)  (R53.81) Physical deconditioning  Comment: acute/ongoing Discharge to [facility] on Hospice   Plan: signed on to [facility] with comfort care medications, continue PleurX drain to drain QOD for signs of SOB, N/V, abdominal distension  MSO4 20mg/ml 5mg q 4 hours prn, ativan 0.5mg q 6 hours prn     (D50.8) Other iron deficiency anemia  Comment: ongoing  Plan: no further labs on hospice     (E11.9) Type 2 diabetes mellitus without complication, without long-term current use of insulin (H)  Comment: ongoing  Plan: hospice care, comfort goals, follow off Rx, no blood sugar monitoring     (I48.0) Paroxysmal A-fib (H)  (I10) Benign essential hypertension  Comment: ongoing  Plan: continue metoprolol 25mg BID       DISCHARGE PLAN:    Follow up labs: No labs orders/due    Medical Follow Up:      Follow up with primary care provider in 1-2 weeks    [facility] scheduled appointments:       Discharge Services: Hospice Care    Discharge Instructions Verbalized to Patient at Discharge:     None    TOTAL DISCHARGE TIME:   Greater than 30 minutes  Electronically signed by:  Tonya Lynn Haase, APRN CNP                        Sincerely,        Tonya Lynn Haase, APRN CNP

## 2023-12-04 NOTE — TELEPHONE ENCOUNTER
Prescription approved per [facility] Refill Protocol.  Elver Herbert RN  [facility] Triage Nurse  
16

## 2024-10-09 NOTE — LETTER
May 13, 2022      Gavi Pearson  [address] [place] [address]        Dear ,    We are writing to inform you of your test results.    I am happy to see you that your hemoglobin A1c is much better than it was.  You are doing a great job.     Reduce your salt intake by just a bit.      Resulted Orders   Albumin Random Urine Quantitative with Creat Ratio   Result Value Ref Range    Creatinine Urine mg/dL 70 mg/dL    Albumin Urine mg/L 60 mg/L    Albumin Urine mg/g Cr 85.71 (H) 0.00 - 25.00 mg/g Cr   HEMOGLOBIN A1C   Result Value Ref Range    Hemoglobin A1C 6.9 (H) 0.0 - 5.6 %      Comment:      Normal <5.7%   Prediabetes 5.7-6.4%    Diabetes 6.5% or higher     Note: Adopted from ADA consensus guidelines.     If you have any questions or concerns, please call the clinic at the number listed above.       Sincerely,      Kane Wick MD         no

## (undated) DEVICE — DRSG AQUACEL AG 3.5X9.75" HYDROFIBER 412011

## (undated) DEVICE — BLADE SAW SAGITTAL STRK 18X90X1.37MM HD SYS 6 6118-137-090

## (undated) DEVICE — SU VICRYL 1 CT 36" J959H

## (undated) DEVICE — GLOVE PROTEXIS BLUE W/NEU-THERA 8.0  2D73EB80

## (undated) DEVICE — GLOVE PROTEXIS W/NEU-THERA 8.0  2D73TE80

## (undated) DEVICE — SU STRATAFIX PDS PLUS 3-0 SPIRAL SH 15CM SXPP1B420

## (undated) DEVICE — PACK TOTAL HIP W/POUCH SOP15HPFSM

## (undated) DEVICE — GOWN IMPERVIOUS SPECIALTY XL/XLONG 39049

## (undated) DEVICE — IMM PILLOW ABDUCT HIP MED 31143061

## (undated) DEVICE — LINEN TOWEL PACK X5 5464

## (undated) DEVICE — SU VICRYL 1 CTX CR 8X18" J765D

## (undated) DEVICE — DRAPE SPLIT SHEET 77X108 REINFORCED 29436

## (undated) DEVICE — BLADE SAW SAGITTAL STRK 20.7X85X0.89MM 2108-109-000S13

## (undated) DEVICE — DEVICE RETRIEVER HEWSON 71111579

## (undated) DEVICE — SOLUTION WOUND CLEANSING 3/4OZ 10% PVP EA-L3011FB-50

## (undated) DEVICE — BONE CEMENT MIXEVAC HI VAC W/CARTRIDGE 0306-563-000

## (undated) DEVICE — SUCTION IRR SYSTEM W/O TIP INTERPULSE HANDPIECE 0210-100-000

## (undated) DEVICE — SU ETHIBOND 5 V-37 4X30" MB66G

## (undated) DEVICE — DRSG TEGADERM 4X10" 1627

## (undated) DEVICE — DRSG XEROFORM 1X8"

## (undated) RX ORDER — PROPOFOL 10 MG/ML
INJECTION, EMULSION INTRAVENOUS
Status: DISPENSED
Start: 2020-11-07

## (undated) RX ORDER — BUPIVACAINE HYDROCHLORIDE AND EPINEPHRINE 2.5; 5 MG/ML; UG/ML
INJECTION, SOLUTION EPIDURAL; INFILTRATION; INTRACAUDAL; PERINEURAL
Status: DISPENSED
Start: 2020-11-07

## (undated) RX ORDER — HYDROMORPHONE HYDROCHLORIDE 1 MG/ML
INJECTION, SOLUTION INTRAMUSCULAR; INTRAVENOUS; SUBCUTANEOUS
Status: DISPENSED
Start: 2020-11-07

## (undated) RX ORDER — GLYCOPYRROLATE 0.2 MG/ML
INJECTION, SOLUTION INTRAMUSCULAR; INTRAVENOUS
Status: DISPENSED
Start: 2020-11-07

## (undated) RX ORDER — LIDOCAINE HYDROCHLORIDE 10 MG/ML
INJECTION, SOLUTION INFILTRATION; PERINEURAL
Status: DISPENSED
Start: 2023-01-01

## (undated) RX ORDER — LABETALOL HYDROCHLORIDE 5 MG/ML
INJECTION, SOLUTION INTRAVENOUS
Status: DISPENSED
Start: 2020-11-07

## (undated) RX ORDER — NEOSTIGMINE METHYLSULFATE 1 MG/ML
VIAL (ML) INJECTION
Status: DISPENSED
Start: 2020-11-07

## (undated) RX ORDER — FENTANYL CITRATE 50 UG/ML
INJECTION, SOLUTION INTRAMUSCULAR; INTRAVENOUS
Status: DISPENSED
Start: 2020-11-07

## (undated) RX ORDER — CEFAZOLIN SODIUM 2 G/100ML
INJECTION, SOLUTION INTRAVENOUS
Status: DISPENSED
Start: 2023-01-01

## (undated) RX ORDER — CEFAZOLIN SODIUM 2 G/100ML
INJECTION, SOLUTION INTRAVENOUS
Status: DISPENSED
Start: 2020-11-07

## (undated) RX ORDER — ONDANSETRON 2 MG/ML
INJECTION INTRAMUSCULAR; INTRAVENOUS
Status: DISPENSED
Start: 2020-11-07

## (undated) RX ORDER — FENTANYL CITRATE 50 UG/ML
INJECTION, SOLUTION INTRAMUSCULAR; INTRAVENOUS
Status: DISPENSED
Start: 2023-01-01

## (undated) RX ORDER — LIDOCAINE HYDROCHLORIDE 20 MG/ML
INJECTION, SOLUTION EPIDURAL; INFILTRATION; INTRACAUDAL; PERINEURAL
Status: DISPENSED
Start: 2020-11-07